# Patient Record
Sex: FEMALE | Race: WHITE | NOT HISPANIC OR LATINO | Employment: OTHER | URBAN - METROPOLITAN AREA
[De-identification: names, ages, dates, MRNs, and addresses within clinical notes are randomized per-mention and may not be internally consistent; named-entity substitution may affect disease eponyms.]

---

## 2017-03-03 RX ORDER — NITROFURANTOIN 25; 75 MG/1; MG/1
100 CAPSULE ORAL 2 TIMES DAILY
Qty: 14 CAPSULE | Refills: 0 | Status: SHIPPED | OUTPATIENT
Start: 2017-03-03 | End: 2017-03-10

## 2017-03-03 NOTE — TELEPHONE ENCOUNTER
Patient was advised to take macrobid 100 mg twice daily for seven days per [place] which will be sent in to the pharmacy per patient request patient verbalized understanding.

## 2017-03-20 ENCOUNTER — HOSPITAL ENCOUNTER (OUTPATIENT)
Dept: RADIOLOGY | Facility: HOSPITAL | Age: 57
Discharge: HOME | End: 2017-03-20
Attending: OBSTETRICS & GYNECOLOGY
Payer: MEDICARE

## 2017-03-20 ENCOUNTER — OFFICE VISIT (OUTPATIENT)
Dept: OBSTETRICS AND GYNECOLOGY | Facility: CLINIC | Age: 57
End: 2017-03-20
Attending: OBSTETRICS & GYNECOLOGY
Payer: MEDICARE

## 2017-03-20 VITALS
BODY MASS INDEX: 23.47 KG/M2 | SYSTOLIC BLOOD PRESSURE: 102 MMHG | DIASTOLIC BLOOD PRESSURE: 78 MMHG | WEIGHT: 124.31 LBS | HEIGHT: 61 IN

## 2017-03-20 DIAGNOSIS — N95.2 VAGINAL ATROPHY: ICD-10-CM

## 2017-03-20 DIAGNOSIS — Z12.31 VISIT FOR SCREENING MAMMOGRAM: Primary | ICD-10-CM

## 2017-03-20 DIAGNOSIS — Z01.419 ENCOUNTER FOR GYNECOLOGICAL EXAMINATION WITHOUT ABNORMAL FINDING: ICD-10-CM

## 2017-03-20 DIAGNOSIS — Z12.31 VISIT FOR SCREENING MAMMOGRAM: ICD-10-CM

## 2017-03-20 PROCEDURE — 77067 SCR MAMMO BI INCL CAD: CPT | Mod: 26,,, | Performed by: RADIOLOGY

## 2017-03-20 PROCEDURE — 99396 PREV VISIT EST AGE 40-64: CPT | Mod: S$GLB,,, | Performed by: OBSTETRICS & GYNECOLOGY

## 2017-03-20 PROCEDURE — 77063 BREAST TOMOSYNTHESIS BI: CPT | Mod: 26,,, | Performed by: RADIOLOGY

## 2017-03-20 PROCEDURE — 99999 PR PBB SHADOW E&M-EST. PATIENT-LVL III: CPT | Mod: PBBFAC,,, | Performed by: OBSTETRICS & GYNECOLOGY

## 2017-03-20 PROCEDURE — 77067 SCR MAMMO BI INCL CAD: CPT | Mod: TC

## 2017-03-20 PROCEDURE — 3074F SYST BP LT 130 MM HG: CPT | Mod: S$GLB,,, | Performed by: OBSTETRICS & GYNECOLOGY

## 2017-03-20 PROCEDURE — 3078F DIAST BP <80 MM HG: CPT | Mod: S$GLB,,, | Performed by: OBSTETRICS & GYNECOLOGY

## 2017-03-20 RX ORDER — BIMATOPROST 0.3 MG/ML
SOLUTION/ DROPS OPHTHALMIC
COMMUNITY
Start: 2017-03-07 | End: 2017-03-30

## 2017-03-20 NOTE — PROGRESS NOTES
SUBJECTIVE:   56 y.o. female   for annual routine checkup. Patient's last menstrual period was 04/15/2009..  She complains of vaginal dryness. .        Past Medical History:   Diagnosis Date    Allergy     Back pain     Disorder of vestibular function of both ears     Fibrocystic breast disease in female     HEARING LOSS     right side - no hearing aid    Hyperlipidemia     Meniere's disease     Osteopenia     PONV (postoperative nausea and vomiting)     PONV (postoperative nausea and vomiting)     Special screening for malignant neoplasms, colon 2013     Past Surgical History:   Procedure Laterality Date    ADENOIDECTOMY      APPENDECTOMY      BREAST SURGERY      left breast biopsy    CYST REMOVAL Right     neck    MANDIBLE FRACTURE SURGERY Right     scope    TONSILLECTOMY       Social History     Social History    Marital status: Single     Spouse name: N/A    Number of children: N/A    Years of education: N/A     Occupational History    Not on file.     Social History Main Topics    Smoking status: Never Smoker    Smokeless tobacco: Never Used    Alcohol use Yes      Comment: social    Drug use: No    Sexual activity: Not Currently     Birth control/ protection: None     Other Topics Concern    Not on file     Social History Narrative     Family History   Problem Relation Age of Onset    Heart disease Mother     Kidney disease Mother     Diabetes Mother     Hypertension Mother     Hyperlipidemia Mother     COPD Mother     Heart disease Father     Cancer Father      lymphoma    Breast cancer Neg Hx     Ovarian cancer Neg Hx      OB History    Para Term  AB SAB TAB Ectopic Multiple Living   0                        Current Outpatient Prescriptions   Medication Sig Dispense Refill    aspirin 81 MG Chew       b complex vitamins tablet Take 1 tablet by mouth once daily.      CETIRIZINE HCL (ZYRTEC ORAL) Take 1 tablet by mouth as needed.      cimetidine  (TAGAMET) 400 MG tablet Take 400 mg by mouth 2 (two) times daily.  5    conjugated estrogens (PREMARIN) vaginal cream Place 1 g vaginally twice a week. 1 applicator 3    diphenhydrAMINE (BENADRYL) 25 mg capsule       EPIPEN 2-PEDRO 0.3 mg/0.3 mL (1:1,000) AtIn   0    estradiol (ESTRACE) 1 MG tablet Take 1 tablet (1 mg total) by mouth once daily. 90 tablet 3    fexofenadine-pseudoephedrine (ALLEGRA-D 24) 180-240 mg per 24 hr tablet Take 1 tablet by mouth once daily.      FLAXSEED OIL ORAL Take 1 capsule by mouth once daily.      fluticasone (FLONASE) 50 mcg/actuation nasal spray UES 1 SPRAY IN EACH NOSTRIL DAILY 16 g 2    ibuprofen (ADVIL,MOTRIN) 800 MG tablet Take 1 tablet (800 mg total) by mouth 3 (three) times daily. For inflammation of neck disc. Take with food. 45 tablet 1    LATISSE 0.03 % ophthalmic solution       medroxyPROGESTERone (PROVERA) 2.5 MG tablet Take 1 tablet (2.5 mg total) by mouth once daily. 90 tablet 3    promethazine (PHENERGAN) 25 MG tablet Take 1 tablet (25 mg total) by mouth every 4 (four) hours. 30 tablet 2    RESTASIS 0.05 % ophthalmic emulsion       triamterene-hydrochlorothiazide 37.5-25 mg (DYAZIDE) 37.5-25 mg per capsule Take 1 capsule by mouth daily as needed. 30 capsule 5    bimatoprost (LUMIGAN) 0.03 % ophthalmic drops        No current facility-administered medications for this visit.      Allergies: Dexamethasone     ROS:  Constitutional: no weight loss, weight gain, fever, fatigue  Eyes:  No vision changes, glasses/contacts  ENT/Mouth: No ulcers, sinus problems, ears ringing, headache  Cardiovascular: No inability to lie flat, chest pain, exercise intolerance, swelling, heart palpitations  Respiratory: No wheezing, coughing blood, shortness of breath, or cough  Gastrointestinal: No diarrhea, bloody stool, nausea/vomiting, constipation, gas, hemorrhoids  Genitourinary: No blood in urine, painful urination, urgency of urination, frequency of urination, incomplete  emptying, incontinence, abnormal bleeding, painful periods, heavy periods, vaginal discharge, vaginal odor, painful intercourse, sexual problems, bleeding after intercourse, +vaginal dryness.  Musculoskeletal: No muscle weakness  Skin/Breast: No painful breasts, nipple discharge, masses, rash, ulcers  Neurological: No passing out, seizures, numbness, headache  Endocrine: No diabetes, hypothyroid, hyperthyroid, hot flashes, hair loss, abnormal hair growth, acne  Psychiatric: No depression, crying  Hematologic: No bruises, bleeding, swollen lymph nodes, anemia.      Physical Exam:   Constitutional: She is oriented to person, place, and time. She appears well-developed and well-nourished.      Neck: Normal range of motion. No tracheal deviation present. No thyromegaly present.    Cardiovascular: Exam reveals no edema.     Pulmonary/Chest: Effort normal. She exhibits no mass, no tenderness, no deformity and no retraction. Right breast exhibits no inverted nipple, no mass, no nipple discharge, no skin change, no tenderness, presence, no bleeding and no swelling. Left breast exhibits no inverted nipple, no mass, no nipple discharge, no skin change, no tenderness, presence, no bleeding and no swelling. Breasts are symmetrical.        Abdominal: Soft. She exhibits no distension and no mass. There is no tenderness. There is no rebound and no guarding. No hernia. Hernia confirmed negative in the left inguinal area.     Genitourinary: Vagina normal and uterus normal. Rectal exam shows no external hemorrhoid. There is no rash, tenderness or lesion on the right labia. There is no rash, tenderness or lesion on the left labia. Uterus is not deviated. Cervix is normal. No no adexnal prolapse. Right adnexum displays no mass, no tenderness and no fullness. Left adnexum displays no mass, no tenderness and no fullness. No tenderness, bleeding, rectocele, cystocele or unspecified prolapse of vaginal walls in the vagina. No vaginal  discharge found. Cervix exhibits no motion tenderness, no discharge and no friability.           Musculoskeletal: Normal range of motion and moves all extremeties. She exhibits no edema.      Lymphadenopathy:        Right: No inguinal adenopathy present.        Left: No inguinal adenopathy present.    Neurological: She is alert and oriented to person, place, and time.    Skin: No rash noted. No erythema. No pallor.    Psychiatric: She has a normal mood and affect. Her behavior is normal. Judgment and thought content normal.     +vaginal atrophy      ASSESSMENT:   well woman  Vaginal dryness  PLAN:   mammogram  return annually or prn

## 2017-03-20 NOTE — MR AVS SNAPSHOT
[facility] [address]  [address]  [place] [address]  Phone: [phone]  Fax: [phone]                  Raquel Kaye   3/20/2017 8:45 AM   Office Visit    Description:  Female : 1960   Provider:  Mckenna Mata MD   Department:  [facility] [address]           Reason for Visit     Well Woman           Diagnoses this Visit        Comments    Visit for screening mammogram    -  Primary            To Do List           Future Appointments        Provider Department Dept Phone    3/20/2017 8:45 AM Mckenna Mata MD [facility] [address] [phone]    3/27/2017 7:00 AM LAB, [facility] [phone]    3/27/2017 7:15 AM SPECIMEN, [facility] - Specimen Lab [phone]    3/30/2017 9:00 AM Salazar Bird MD [facility] - Internal Medicine [phone]      Goals (5 Years of Data)     None      [facility] On Call     [facility] On Call Nurse Care Line -  Assistance  Registered nurses in the [facility] On Call Center provide clinical advisement, health education, appointment booking, and other advisory services.  Call for this free service at [phone].             Medications           Message regarding Medications     Verify the changes and/or additions to your medication regime listed below are the same as discussed with your clinician today.  If any of these changes or additions are incorrect, please notify your healthcare provider.             Verify that the below list of medications is an accurate representation of the medications you are currently taking.  If none reported, the list may be blank. If incorrect, please contact your healthcare provider. Carry this list with you in case of emergency.           Current Medications     aspirin 81 MG Chew     b complex vitamins tablet Take 1 tablet by mouth once daily.    CETIRIZINE HCL (ZYRTEC ORAL) Take 1 tablet by mouth as needed.    cimetidine (TAGAMET) 400 MG tablet Take  "400 mg by mouth 2 (two) times daily.    conjugated estrogens (PREMARIN) vaginal cream Place 1 g vaginally twice a week.    diphenhydrAMINE (BENADRYL) 25 mg capsule     EPIPEN 2-PEDRO 0.3 mg/0.3 mL (1:1,000) AtIn     estradiol (ESTRACE) 1 MG tablet Take 1 tablet (1 mg total) by mouth once daily.    fexofenadine-pseudoephedrine (ALLEGRA-D 24) 180-240 mg per 24 hr tablet Take 1 tablet by mouth once daily.    FLAXSEED OIL ORAL Take 1 capsule by mouth once daily.    fluticasone (FLONASE) 50 mcg/actuation nasal spray UES 1 SPRAY IN EACH NOSTRIL DAILY    ibuprofen (ADVIL,MOTRIN) 800 MG tablet Take 1 tablet (800 mg total) by mouth 3 (three) times daily. For inflammation of neck disc. Take with food.    LATISSE 0.03 % ophthalmic solution     medroxyPROGESTERone (PROVERA) 2.5 MG tablet Take 1 tablet (2.5 mg total) by mouth once daily.    promethazine (PHENERGAN) 25 MG tablet Take 1 tablet (25 mg total) by mouth every 4 (four) hours.    RESTASIS 0.05 % ophthalmic emulsion     triamterene-hydrochlorothiazide 37.5-25 mg (DYAZIDE) 37.5-25 mg per capsule Take 1 capsule by mouth daily as needed.           Clinical Reference Information           Your Vitals Were     BP Height Weight Last Period BMI    102/78 5' 1" (1.549 m) 56.4 kg (124 lb 5.4 oz) 04/15/2009 23.49 kg/m2      Blood Pressure          Most Recent Value    BP  102/78      Allergies as of 3/20/2017     Dexamethasone      Immunizations Administered on Date of Encounter - 3/20/2017     None      Orders Placed During Today's Visit     Future Labs/Procedures Expected by Expires    Mammo Digital Screening Bilat with Tomosynthesis CAD  3/20/2017 5/20/2018      Language Assistance Services     ATTENTION: Language assistance services are available, free of charge. Please call [phone].      ATENCIÓN: Si habla radhaañol, tiene a mai disposición servicios gratuitos de asistencia lingüística. Llame al [phone].     [facility] Ý: N?u b?n nói Ti?ng Vi?t, có các d?ch v? h? tr? ngôn " ng? mi?n phí dành cho b?n. G?i s? [phone].         [facility] [address] complies with applicable Federal civil rights laws and does not discriminate on the basis of race, color, national origin, age, disability, or sex.

## 2017-03-27 ENCOUNTER — LAB VISIT (OUTPATIENT)
Dept: LAB | Facility: HOSPITAL | Age: 57
End: 2017-03-27
Attending: INTERNAL MEDICINE
Payer: MEDICARE

## 2017-03-27 DIAGNOSIS — E78.5 HYPERLIPIDEMIA, UNSPECIFIED: ICD-10-CM

## 2017-03-27 DIAGNOSIS — Z11.59 NEED FOR HEPATITIS C SCREENING TEST: ICD-10-CM

## 2017-03-27 DIAGNOSIS — N95.1 POST MENOPAUSAL SYNDROME: ICD-10-CM

## 2017-03-27 LAB
ALBUMIN SERPL BCP-MCNC: 3.9 G/DL
ALP SERPL-CCNC: 65 U/L
ALT SERPL W/O P-5'-P-CCNC: 19 U/L
ANION GAP SERPL CALC-SCNC: 8 MMOL/L
AST SERPL-CCNC: 20 U/L
BASOPHILS # BLD AUTO: 0.03 K/UL
BASOPHILS NFR BLD: 0.4 %
BILIRUB SERPL-MCNC: 0.8 MG/DL
BUN SERPL-MCNC: 12 MG/DL
CALCIUM SERPL-MCNC: 9 MG/DL
CHLORIDE SERPL-SCNC: 107 MMOL/L
CHOLEST/HDLC SERPL: 2.8 {RATIO}
CO2 SERPL-SCNC: 25 MMOL/L
CREAT SERPL-MCNC: 0.7 MG/DL
DIFFERENTIAL METHOD: ABNORMAL
EOSINOPHIL # BLD AUTO: 0.3 K/UL
EOSINOPHIL NFR BLD: 4 %
ERYTHROCYTE [DISTWIDTH] IN BLOOD BY AUTOMATED COUNT: 12.5 %
EST. GFR  (AFRICAN AMERICAN): >60 ML/MIN/1.73 M^2
EST. GFR  (NON AFRICAN AMERICAN): >60 ML/MIN/1.73 M^2
GLUCOSE SERPL-MCNC: 92 MG/DL
HCT VFR BLD AUTO: 40.9 %
HCV AB SERPL QL IA: NEGATIVE
HDL/CHOLESTEROL RATIO: 35.9 %
HDLC SERPL-MCNC: 181 MG/DL
HDLC SERPL-MCNC: 65 MG/DL
HGB BLD-MCNC: 13.7 G/DL
LDLC SERPL CALC-MCNC: 103.4 MG/DL
LYMPHOCYTES # BLD AUTO: 2.9 K/UL
LYMPHOCYTES NFR BLD: 42.3 %
MCH RBC QN AUTO: 33 PG
MCHC RBC AUTO-ENTMCNC: 33.5 %
MCV RBC AUTO: 99 FL
MONOCYTES # BLD AUTO: 0.9 K/UL
MONOCYTES NFR BLD: 12.7 %
NEUTROPHILS # BLD AUTO: 2.7 K/UL
NEUTROPHILS NFR BLD: 40.5 %
NONHDLC SERPL-MCNC: 116 MG/DL
PLATELET # BLD AUTO: 258 K/UL
PMV BLD AUTO: 10.7 FL
POTASSIUM SERPL-SCNC: 4.1 MMOL/L
PROT SERPL-MCNC: 7.3 G/DL
RBC # BLD AUTO: 4.15 M/UL
SODIUM SERPL-SCNC: 140 MMOL/L
TRIGL SERPL-MCNC: 63 MG/DL
TSH SERPL DL<=0.005 MIU/L-ACNC: 1.44 UIU/ML
WBC # BLD AUTO: 6.78 K/UL

## 2017-03-27 PROCEDURE — 85025 COMPLETE CBC W/AUTO DIFF WBC: CPT

## 2017-03-27 PROCEDURE — 84443 ASSAY THYROID STIM HORMONE: CPT

## 2017-03-27 PROCEDURE — 36415 COLL VENOUS BLD VENIPUNCTURE: CPT | Mod: PO

## 2017-03-27 PROCEDURE — 80061 LIPID PANEL: CPT

## 2017-03-27 PROCEDURE — 86803 HEPATITIS C AB TEST: CPT

## 2017-03-27 PROCEDURE — 80053 COMPREHEN METABOLIC PANEL: CPT

## 2017-03-29 ENCOUNTER — PATIENT MESSAGE (OUTPATIENT)
Dept: OBSTETRICS AND GYNECOLOGY | Facility: CLINIC | Age: 57
End: 2017-03-29

## 2017-03-30 ENCOUNTER — HOSPITAL ENCOUNTER (OUTPATIENT)
Dept: RADIOLOGY | Facility: HOSPITAL | Age: 57
Discharge: HOME | End: 2017-03-30
Attending: INTERNAL MEDICINE
Payer: MEDICARE

## 2017-03-30 ENCOUNTER — OFFICE VISIT (OUTPATIENT)
Dept: INTERNAL MEDICINE | Facility: CLINIC | Age: 57
End: 2017-03-30
Payer: MEDICARE

## 2017-03-30 VITALS
TEMPERATURE: 98 F | HEIGHT: 61 IN | DIASTOLIC BLOOD PRESSURE: 84 MMHG | RESPIRATION RATE: 16 BRPM | BODY MASS INDEX: 22.51 KG/M2 | SYSTOLIC BLOOD PRESSURE: 110 MMHG | HEART RATE: 88 BPM | WEIGHT: 119.25 LBS

## 2017-03-30 DIAGNOSIS — Z00.00 WELL ADULT EXAM: Primary | ICD-10-CM

## 2017-03-30 DIAGNOSIS — M54.50 CHRONIC LEFT-SIDED LOW BACK PAIN WITHOUT SCIATICA: ICD-10-CM

## 2017-03-30 DIAGNOSIS — R89.9 ABNORMAL LABORATORY TEST: ICD-10-CM

## 2017-03-30 DIAGNOSIS — G89.29 CHRONIC LEFT-SIDED LOW BACK PAIN WITHOUT SCIATICA: ICD-10-CM

## 2017-03-30 DIAGNOSIS — D51.9 ANEMIA DUE TO VITAMIN B12 DEFICIENCY, UNSPECIFIED B12 DEFICIENCY TYPE: ICD-10-CM

## 2017-03-30 DIAGNOSIS — J30.9 CHRONIC ALLERGIC RHINITIS: ICD-10-CM

## 2017-03-30 PROCEDURE — 99999 PR PBB SHADOW E&M-EST. PATIENT-LVL IV: CPT | Mod: PBBFAC,,, | Performed by: INTERNAL MEDICINE

## 2017-03-30 PROCEDURE — 3074F SYST BP LT 130 MM HG: CPT | Mod: S$GLB,,, | Performed by: INTERNAL MEDICINE

## 2017-03-30 PROCEDURE — 72100 X-RAY EXAM L-S SPINE 2/3 VWS: CPT | Mod: TC,PO

## 2017-03-30 PROCEDURE — 72100 X-RAY EXAM L-S SPINE 2/3 VWS: CPT | Mod: 26,,, | Performed by: RADIOLOGY

## 2017-03-30 PROCEDURE — 3079F DIAST BP 80-89 MM HG: CPT | Mod: S$GLB,,, | Performed by: INTERNAL MEDICINE

## 2017-03-30 PROCEDURE — 99396 PREV VISIT EST AGE 40-64: CPT | Mod: S$GLB,,, | Performed by: INTERNAL MEDICINE

## 2017-03-30 RX ORDER — FLUCONAZOLE 150 MG/1
150 TABLET ORAL ONCE
Qty: 1 TABLET | Refills: 0 | Status: SHIPPED | OUTPATIENT
Start: 2017-03-30 | End: 2017-03-30

## 2017-03-30 RX ORDER — TRIAMTERENE AND HYDROCHLOROTHIAZIDE 37.5; 25 MG/1; MG/1
1 CAPSULE ORAL DAILY PRN
Qty: 30 CAPSULE | Refills: 5 | Status: SHIPPED | OUTPATIENT
Start: 2017-03-30 | End: 2019-05-09 | Stop reason: SDUPTHER

## 2017-03-30 NOTE — PROGRESS NOTES
Subjective:       Patient ID: Raquel Kaye is a 56 y.o. female.    Chief Complaint: Annual Exam    HPI   The patient presents for annual physical examination.  Recent symptoms of upper respiratory infection and rhinitis have been improving.  She is completing Augmentin therapy.  She has noted intermittent symptoms of vertigo.  Symptoms have increased recently apparently related to her recent upper respiratory infection and ALLERGY flareup.  She uses Dyazide intermittently.  Left lumbar pain has been improving since 11/16.  Review of Systems   Constitutional: Positive for fatigue. Negative for fever and unexpected weight change.   HENT: Positive for congestion and postnasal drip. Negative for rhinorrhea and sore throat.    Eyes: Negative for visual disturbance.   Respiratory: Positive for cough. Negative for chest tightness, shortness of breath and wheezing.    Cardiovascular: Negative for chest pain, palpitations and leg swelling.   Gastrointestinal: Positive for diarrhea. Negative for abdominal pain and blood in stool.   Genitourinary: Positive for vaginal discharge (symptoms of yeast infection have been reported following recent antibiotic use.). Negative for dysuria, frequency and hematuria.   Musculoskeletal: Positive for arthralgias and back pain. Negative for joint swelling and myalgias.   Skin: Negative for rash.   Neurological: Positive for dizziness. Negative for syncope, weakness, numbness and headaches.   Psychiatric/Behavioral: Negative for sleep disturbance. The patient is not nervous/anxious.        Objective:      Physical Exam   Constitutional: She is oriented to person, place, and time. Vital signs are normal. She appears well-developed and well-nourished. No distress.   HENT:   Head: Normocephalic and atraumatic.   Right Ear: External ear normal.   Left Ear: External ear normal.   Nose: Nose normal.   Mouth/Throat: Oropharynx is clear and moist. No oropharyngeal exudate.   Sinuses are nontender  to palpation.   Eyes: Conjunctivae and EOM are normal. Pupils are equal, round, and reactive to light. No scleral icterus.   Neck: Normal range of motion. Neck supple. No JVD present. Carotid bruit is not present. No thyromegaly present.   Cardiovascular: Normal rate, regular rhythm, normal heart sounds, intact distal pulses and normal pulses.  Exam reveals no gallop and no friction rub.    No murmur heard.  Pulmonary/Chest: Effort normal and breath sounds normal. No respiratory distress. She has no wheezes. She has no rales.   Abdominal: Soft. Bowel sounds are normal. She exhibits no abdominal bruit and no mass. There is no splenomegaly or hepatomegaly. There is no tenderness. No hernia.   Musculoskeletal: Normal range of motion. She exhibits no edema or tenderness.        Right shoulder: She exhibits no effusion and no deformity.   Negative straight leg raising test bilaterally.  Left SI joint tenderness on palpation.     Lymphadenopathy:     She has no cervical adenopathy.     She has no axillary adenopathy.        Right: No supraclavicular adenopathy present.        Left: No supraclavicular adenopathy present.   Neurological: She is alert and oriented to person, place, and time. She has normal strength. No cranial nerve deficit.   Lower extremity strength is 5/5 bilaterally.  Gait is normal.   Skin: Skin is warm and dry. No rash noted.   Psychiatric: She has a normal mood and affect. Her speech is normal and behavior is normal.   Nursing note and vitals reviewed.      Results for orders placed or performed in visit on 03/27/17   Urinalysis   Result Value Ref Range    Specimen UA Urine, Clean Catch     Color, UA Yellow Yellow, Straw, Genie    Appearance, UA Hazy (A) Clear    pH, UA 6.0 5.0 - 8.0    Specific Gravity, UA 1.025 1.005 - 1.030    Protein, UA Negative Negative    Glucose, UA Negative Negative    Ketones, UA Negative Negative    Bilirubin (UA) Negative Negative    Occult Blood UA Negative Negative     Nitrite, UA Negative Negative    Urobilinogen, UA Negative <2.0 EU/dL    Leukocytes, UA Negative Negative   Urinalysis Microscopic   Result Value Ref Range    RBC, UA 2 0 - 4 /hpf    WBC, UA 1 0 - 5 /hpf    Bacteria, UA Rare None-Occ /hpf    Squam Epithel, UA 7 /hpf    Microscopic Comment SEE COMMENT      Other labs were reviewed with the patient.  Assessment:       1. Well adult exam    2. Chronic allergic rhinitis    3. Chronic left-sided low back pain without sciatica    4. Abnormal laboratory test    5. Anemia due to vitamin B12 deficiency, unspecified B12 deficiency type         Plan:       Raquel was seen today for annual exam.  Dyazide will be renewed alleviated vertical symptoms.  X-rays of lumbar spine will be obtained.  Physical therapy consultation will be obtained for evaluation and treatment of patient's lower back pain.  Diflucan will be ordered for symptoms of yeast infection.  Patient is to return to clinic in 6 months.    Diagnoses and all orders for this visit:    Well adult exam    Chronic allergic rhinitis    Chronic left-sided low back pain without sciatica  -     X-Ray Lumbar Spine Ap And Lateral; Future  -     Ambulatory consult to Physical Therapy    Abnormal laboratory test  -     Vitamin B12; Future    Anemia due to vitamin B12 deficiency, unspecified B12 deficiency type   -     Vitamin B12; Future    Other orders  -     triamterene-hydrochlorothiazide 37.5-25 mg (DYAZIDE) 37.5-25 mg per capsule; Take 1 capsule by mouth daily as needed.  -     fluconazole (DIFLUCAN) 150 MG Tab; Take 1 tablet (150 mg total) by mouth once.

## 2017-03-31 DIAGNOSIS — N95.1 MENOPAUSAL SYMPTOMS: Primary | ICD-10-CM

## 2017-03-31 RX ORDER — ESTRADIOL 1 MG/1
1 TABLET ORAL DAILY
Qty: 90 TABLET | Refills: 3 | Status: SHIPPED | OUTPATIENT
Start: 2017-03-31 | End: 2018-04-19 | Stop reason: SDUPTHER

## 2017-03-31 RX ORDER — MEDROXYPROGESTERONE ACETATE 2.5 MG/1
2.5 TABLET ORAL DAILY
Qty: 90 TABLET | Refills: 3 | Status: SHIPPED | OUTPATIENT
Start: 2017-03-31 | End: 2018-04-19

## 2017-04-12 ENCOUNTER — CLINICAL SUPPORT (OUTPATIENT)
Dept: REHABILITATION | Facility: HOSPITAL | Age: 57
End: 2017-04-12
Attending: INTERNAL MEDICINE
Payer: MEDICARE

## 2017-04-12 DIAGNOSIS — M54.50 CHRONIC LEFT-SIDED LOW BACK PAIN WITHOUT SCIATICA: Primary | ICD-10-CM

## 2017-04-12 DIAGNOSIS — G89.29 CHRONIC LEFT-SIDED LOW BACK PAIN WITHOUT SCIATICA: Primary | ICD-10-CM

## 2017-04-12 PROCEDURE — G8982 BODY POS GOAL STATUS: HCPCS | Mod: CJ

## 2017-04-12 PROCEDURE — 97161 PT EVAL LOW COMPLEX 20 MIN: CPT

## 2017-04-12 PROCEDURE — G8981 BODY POS CURRENT STATUS: HCPCS | Mod: CK

## 2017-04-12 NOTE — PROGRESS NOTES
Name:Raquel Kaye  Physician:Salazar Bird MD  Date of eval:4/12/2017  Orders:  Physical Therapy evaluate and treat  Clinic: [id number]  Diagnosis:  1. Chronic left-sided low back pain without sciatica         Visit 1 of 20. Expiration 12/31/17    SUBJECTIVE: states she wakes up with back stiffness    Onset of pain :  11/16  Mechanism of onset :  insidious    Chief complaint: Patient is a 55 yo WF referred to OP Pt secondary left sided low back pain.    Radicular symptoms: occasionally in B LEs  Bowel and Bladder incontinence: none    Aggravating factors: prolonged car rides, waking in the morning, turning over in bed, and squatting  Easing factors: ice, heat, pain medicine    Sleep is not disturbed. Sleeping position: side    Previous functional status includes: I with amb and ADL  Current functional status :  I with amb and ADL    Patients structured exercise routine:  Treadmill, bike, stretching, and core strengthening  Exercise routine prior to onset: treadmill, bike, core strengthening.    Allergies:    Review of patient's allergies indicates:   Allergen Reactions    Dexamethasone      Flushing and headache with medrol dosepack & tachycardia         Medical history:   Past Medical History:   Diagnosis Date    Allergy     Back pain     Disorder of vestibular function of both ears     Fibrocystic breast disease in female     HEARING LOSS     right side - no hearing aid    Hyperlipidemia     Meniere's disease     Osteopenia     PONV (postoperative nausea and vomiting)     PONV (postoperative nausea and vomiting)     Special screening for malignant neoplasms, colon 7/22/2013         Medication:   Current Outpatient Prescriptions on File Prior to Visit   Medication Sig Dispense Refill    aspirin 81 MG Chew       b complex vitamins tablet Take 1 tablet by mouth once daily.      CETIRIZINE HCL (ZYRTEC ORAL) Take 1 tablet by mouth as needed.      cimetidine (TAGAMET) 400 MG tablet Take 400 mg by  mouth 2 (two) times daily.  5    conjugated estrogens (PREMARIN) vaginal cream Place 1 g vaginally twice a week. 1 applicator 3    diphenhydrAMINE (BENADRYL) 25 mg capsule       EPIPEN 2-PEDRO 0.3 mg/0.3 mL (1:1,000) AtIn   0    estradiol (ESTRACE) 1 MG tablet Take 1 tablet (1 mg total) by mouth once daily. 90 tablet 3    fexofenadine-pseudoephedrine (ALLEGRA-D 24) 180-240 mg per 24 hr tablet Take 1 tablet by mouth once daily.      FLAXSEED OIL ORAL Take 1 capsule by mouth once daily.      fluticasone (FLONASE) 50 mcg/actuation nasal spray UES 1 SPRAY IN EACH NOSTRIL DAILY 16 g 2    ibuprofen (ADVIL,MOTRIN) 800 MG tablet Take 1 tablet (800 mg total) by mouth 3 (three) times daily. For inflammation of neck disc. Take with food. 45 tablet 1    LATISSE 0.03 % ophthalmic solution       medroxyPROGESTERone (PROVERA) 2.5 MG tablet Take 1 tablet (2.5 mg total) by mouth once daily. 90 tablet 3    promethazine (PHENERGAN) 25 MG tablet Take 1 tablet (25 mg total) by mouth every 4 (four) hours. 30 tablet 2    RESTASIS 0.05 % ophthalmic emulsion       triamterene-hydrochlorothiazide 37.5-25 mg (DYAZIDE) 37.5-25 mg per capsule Take 1 capsule by mouth daily as needed. 30 capsule 5     No current facility-administered medications on file prior to visit.          Special tests:   MRI:            Xray:  3/30/17   Mild DJD.  Mild lumbar scoliosis.  The L3/L4 disc space is slightly narrowed.  No fracture, spondylolisthesis or bone destruction identified              Past treatment includes:   OP PT    Work:   retired                       Pts goals: to learn exercises to help with her back pain.  Pain level with 0 being the lowest and 10 being the highest presently: 3  Pain level with 0 being the lowest and 10 being the highest at worst:  8 or 9    OBJECTIVE: 57 yo WF.  Postural examination in standing:  - increased lumbar lordosis  - forward head  - forward shoulders  - R anterior rotated pelvis, L posterior rotated  pelvis    Postural examination in sitting:   - normal lumbar lordosis  - forward head  - forward shoulders      Functional assessment:   - walking:  - sit to stand  - sit to supine:        - supine to sit  - supine to prone:     Lumbar active range of motion in standing is:  Flexion 100 deg   Extension 20 deg   Left side bending 15 deg   Right side bending 30 deg   Left rotation WNL   Right rotation decreased 25%       Flexibility testing:  - hamstrings:               B: WNL  - gastrocnemius:         B: WNL  - piriformis:                  B: WNL   - quadriceps:              B: WNL      - hip flexors:               B: tight, decreased 25%  - hip adductors:          B: WNL  - IT Bands:                 B: WNL     MMT R L   Hip flexion 5/5 5/5   Hip abduction 5/5 3+/5   Hip extension 3+/5 3+/5   Hip ER 5/5 5/5   Hip IR 5/5 5/5   Knee extension 5/5 5/5   Knee flexion 5/5 5/5   Ankle dorsiflexion 5/5 5/5   Ankle plantar flexion 5/5 5/5       Endurance is good.    Lumbar Special tests:  - SLR:                             negative  - Cross SLR:                  negative  - TARA:                        Decreased B hip flexibility  - Prone Instability Test:   Positive for pain reduction    Sensation:  - Light Touch: intact B  - Saddle:    Reflexes:   - Knee Jerk: normal bilaterally    Other:   Functional Limitations  Reports - G Codes    Category:  Changing and Maintaining Body position   Tool:  FOTO Outcomes Measurement System.   Score:  Patient scored out 54 of 100 on the FOTO Outcomes Measurement System.   Current:   CK at least 40% < 60% impaired, limited or restricted   Goal:   CJ at least 20% < 40% impaired, limited or restricted       Patient History Examination Clinical Presentation Clinical Decision Making   Comorbidities:  none    Personal Factors:  none       Activity and Participation Restriction:  Affects exercise performance and participation    Body Systems:  Musculoskeletal: strength    Body  "Regions: back Stable and uncomplicated   Low       FOTO: 40% < 60% impaired, limited or restricted           TREATMENT: evaluation, low complexity    Pt was provided with a written copy of  HEP to perform as tolerated,  Including: HEP2Go Code: [id number]  Push/pull to correct R anterior rotated pelvis/L posterior rotated pelvis  Bridging  Resisted hip add  TA bracing    Exercises were reviewed and pt was able to demonstrate them prior to the end of the session.     No cultural, environmental, or spiritual barriers identified to treatment or learning.    Treatment today also included:  Push/pull to correct R anterior rotated pelvis/L posterior rotated pelvis 3 x 3"  Bridging 3 reps  Resisted hip add x 5"    ASSESSMENT:  PT diagnosis: low back pain, decreased L LE strength, weak core,   Patient can benefit from outpatient physical therapy and a home program.  Treatment will be directed at improving the following impairments.  Prognosis is good. Patient presenting with good flexibility and strength. PT will focus on core stabilization and strengthening with successive session. Pt experienced decreased low back pain following push/pull sequence.    Medical necessity is demonstrated by the following  IMPAIRMENTS:  - poor posture  - pain  - decreased flexibility  - decreased muscle strength  - impaired function    GOALS: 6-8 weeks (6/12/17). Pt agrees with goals set.  1. Independent with HEP.  2. Report decreased lumbar pain < or =  4/10 with adls such as prolonged car rides, waking in the morning, turning over in bed, and squatting.   3. Increased MMT for B LE by 1 muscle grade to promote proper pelvic stability to decrease lumbar pain < or =  4/10 with adls such as prolonged car rides, waking in the morning, turning over in bed, and squatting.     4. Increased flexibility in B hip flexors to promote proper pelvic stability to decrease lumbar pain < or =  4/10 with adls such as prolonged car rides, waking in the morning, " turning over in bed, and squatting.   5. Patient to achieve CJ (at least 20% < 40% impaired, limited or restricted) level on the FOTO Outcomes Measurement System.    PLAN:  Outpatient physical therapy  2 times weekly to include: pt ed, hep, therapeutic exercises, joint mobilizations, and modalities prn. Pt may be seen by PTA to carry out plan of care.      Cont PT for 6-8 weeks.     I certify the need for these services furnished under this plan of treatment and while under my care.    ____________________________________ Physician/Referring Practitioner                                Date of Signature

## 2017-04-12 NOTE — PLAN OF CARE
Name:Raquel Kaye  Physician:Salazar Bird MD  Date of eval:4/12/2017  Orders:  Physical Therapy evaluate and treat  Clinic: [id number]  Diagnosis:  1. Chronic left-sided low back pain without sciatica         Visit 1 of 20. Expiration 12/31/17    SUBJECTIVE: states she wakes up with back stiffness    Onset of pain :  11/16  Mechanism of onset :  insidious    Chief complaint: Patient is a 55 yo WF referred to OP Pt secondary left sided low back pain.    Radicular symptoms: occasionally in B LEs  Bowel and Bladder incontinence: none    Aggravating factors: prolonged car rides, waking in the morning, turning over in bed, and squatting  Easing factors: ice, heat, pain medicine    Sleep is not disturbed. Sleeping position: side    Previous functional status includes: I with amb and ADL  Current functional status :  I with amb and ADL    Patients structured exercise routine:  Treadmill, bike, stretching, and core strengthening  Exercise routine prior to onset: treadmill, bike, core strengthening.    Allergies:    Review of patient's allergies indicates:   Allergen Reactions    Dexamethasone      Flushing and headache with medrol dosepack & tachycardia         Medical history:   Past Medical History:   Diagnosis Date    Allergy     Back pain     Disorder of vestibular function of both ears     Fibrocystic breast disease in female     HEARING LOSS     right side - no hearing aid    Hyperlipidemia     Meniere's disease     Osteopenia     PONV (postoperative nausea and vomiting)     PONV (postoperative nausea and vomiting)     Special screening for malignant neoplasms, colon 7/22/2013         Medication:   Current Outpatient Prescriptions on File Prior to Visit   Medication Sig Dispense Refill    aspirin 81 MG Chew       b complex vitamins tablet Take 1 tablet by mouth once daily.      CETIRIZINE HCL (ZYRTEC ORAL) Take 1 tablet by mouth as needed.      cimetidine (TAGAMET) 400 MG tablet Take 400 mg by  mouth 2 (two) times daily.  5    conjugated estrogens (PREMARIN) vaginal cream Place 1 g vaginally twice a week. 1 applicator 3    diphenhydrAMINE (BENADRYL) 25 mg capsule       EPIPEN 2-PEDRO 0.3 mg/0.3 mL (1:1,000) AtIn   0    estradiol (ESTRACE) 1 MG tablet Take 1 tablet (1 mg total) by mouth once daily. 90 tablet 3    fexofenadine-pseudoephedrine (ALLEGRA-D 24) 180-240 mg per 24 hr tablet Take 1 tablet by mouth once daily.      FLAXSEED OIL ORAL Take 1 capsule by mouth once daily.      fluticasone (FLONASE) 50 mcg/actuation nasal spray UES 1 SPRAY IN EACH NOSTRIL DAILY 16 g 2    ibuprofen (ADVIL,MOTRIN) 800 MG tablet Take 1 tablet (800 mg total) by mouth 3 (three) times daily. For inflammation of neck disc. Take with food. 45 tablet 1    LATISSE 0.03 % ophthalmic solution       medroxyPROGESTERone (PROVERA) 2.5 MG tablet Take 1 tablet (2.5 mg total) by mouth once daily. 90 tablet 3    promethazine (PHENERGAN) 25 MG tablet Take 1 tablet (25 mg total) by mouth every 4 (four) hours. 30 tablet 2    RESTASIS 0.05 % ophthalmic emulsion       triamterene-hydrochlorothiazide 37.5-25 mg (DYAZIDE) 37.5-25 mg per capsule Take 1 capsule by mouth daily as needed. 30 capsule 5     No current facility-administered medications on file prior to visit.          Special tests:   MRI:            Xray:  3/30/17   Mild DJD.  Mild lumbar scoliosis.  The L3/L4 disc space is slightly narrowed.  No fracture, spondylolisthesis or bone destruction identified              Past treatment includes:   OP PT    Work:   retired                       Pts goals: to learn exercises to help with her back pain.  Pain level with 0 being the lowest and 10 being the highest presently: 3  Pain level with 0 being the lowest and 10 being the highest at worst:  8 or 9    OBJECTIVE: 55 yo WF.  Postural examination in standing:  - increased lumbar lordosis  - forward head  - forward shoulders  - R anterior rotated pelvis, L posterior rotated  pelvis    Postural examination in sitting:   - normal lumbar lordosis  - forward head  - forward shoulders      Functional assessment:   - walking:  - sit to stand  - sit to supine:        - supine to sit  - supine to prone:     Lumbar active range of motion in standing is:  Flexion 100 deg   Extension 20 deg   Left side bending 15 deg   Right side bending 30 deg   Left rotation WNL   Right rotation decreased 25%       Flexibility testing:  - hamstrings:               B: WNL  - gastrocnemius:         B: WNL  - piriformis:                  B: WNL   - quadriceps:              B: WNL      - hip flexors:               B: tight, decreased 25%  - hip adductors:          B: WNL  - IT Bands:                 B: WNL     MMT R L   Hip flexion 5/5 5/5   Hip abduction 5/5 3+/5   Hip extension 3+/5 3+/5   Hip ER 5/5 5/5   Hip IR 5/5 5/5   Knee extension 5/5 5/5   Knee flexion 5/5 5/5   Ankle dorsiflexion 5/5 5/5   Ankle plantar flexion 5/5 5/5       Endurance is good.    Lumbar Special tests:  - SLR:                             negative  - Cross SLR:                  negative  - TARA:                        Decreased B hip flexibility  - Prone Instability Test:   Positive for pain reduction    Sensation:  - Light Touch: intact B  - Saddle:    Reflexes:   - Knee Jerk: normal bilaterally    Other:   Functional Limitations  Reports - G Codes    Category:  Changing and Maintaining Body position   Tool:  FOTO Outcomes Measurement System.   Score:  Patient scored out 54 of 100 on the FOTO Outcomes Measurement System.   Current:   CK at least 40% < 60% impaired, limited or restricted   Goal:   CJ at least 20% < 40% impaired, limited or restricted       Patient History Examination Clinical Presentation Clinical Decision Making   Comorbidities:  none    Personal Factors:  none       Activity and Participation Restriction:  Affects exercise performance and participation    Body Systems:  Musculoskeletal: strength    Body  "Regions: back Stable and uncomplicated   Low       FOTO: 40% < 60% impaired, limited or restricted           TREATMENT: evaluation, low complexity    Pt was provided with a written copy of  HEP to perform as tolerated,  Including: HEP2Go Code: [id number]  Push/pull to correct R anterior rotated pelvis/L posterior rotated pelvis  Bridging  Resisted hip add  TA bracing    Exercises were reviewed and pt was able to demonstrate them prior to the end of the session.     No cultural, environmental, or spiritual barriers identified to treatment or learning.    Treatment today also included:  Push/pull to correct R anterior rotated pelvis/L posterior rotated pelvis 3 x 3"  Bridging 3 reps  Resisted hip add x 5"    ASSESSMENT:  PT diagnosis: low back pain, decreased L LE strength, weak core,   Patient can benefit from outpatient physical therapy and a home program.  Treatment will be directed at improving the following impairments.  Prognosis is good. Patient presenting with good flexibility and strength. PT will focus on core stabilization and strengthening with successive session. Pt experienced decreased low back pain following push/pull sequence.    Medical necessity is demonstrated by the following  IMPAIRMENTS:  - poor posture  - pain  - decreased flexibility  - decreased muscle strength  - impaired function    GOALS: 6-8 weeks (6/12/17). Pt agrees with goals set.  1. Independent with HEP.  2. Report decreased lumbar pain < or =  4/10 with adls such as prolonged car rides, waking in the morning, turning over in bed, and squatting.   3. Increased MMT for B LE by 1 muscle grade to promote proper pelvic stability to decrease lumbar pain < or =  4/10 with adls such as prolonged car rides, waking in the morning, turning over in bed, and squatting.     4. Increased flexibility in B hip flexors to promote proper pelvic stability to decrease lumbar pain < or =  4/10 with adls such as prolonged car rides, waking in the morning, " turning over in bed, and squatting.   5. Patient to achieve CJ (at least 20% < 40% impaired, limited or restricted) level on the FOTO Outcomes Measurement System.    PLAN:  Outpatient physical therapy  2 times weekly to include: pt ed, hep, therapeutic exercises, joint mobilizations, and modalities prn. Pt may be seen by PTA to carry out plan of care.      Cont PT for 6-8 weeks.     I certify the need for these services furnished under this plan of treatment and while under my care.    ____________________________________ Physician/Referring Practitioner                                Date of Signature

## 2017-04-17 ENCOUNTER — CLINICAL SUPPORT (OUTPATIENT)
Dept: REHABILITATION | Facility: HOSPITAL | Age: 57
End: 2017-04-17
Attending: INTERNAL MEDICINE
Payer: MEDICARE

## 2017-04-17 DIAGNOSIS — G89.29 CHRONIC LEFT-SIDED LOW BACK PAIN WITHOUT SCIATICA: ICD-10-CM

## 2017-04-17 DIAGNOSIS — M54.50 CHRONIC LEFT-SIDED LOW BACK PAIN WITHOUT SCIATICA: ICD-10-CM

## 2017-04-17 PROCEDURE — 97110 THERAPEUTIC EXERCISES: CPT

## 2017-04-17 NOTE — PROGRESS NOTES
"Name: Raquel Kaye  Clinic Number: [id number]  Date of Treatment: 4/17/2017  Diagnosis:     ICD-10-CM ICD-9-CM    1. Chronic left-sided low back pain without sciatica M54.5 724.2     G89.29 338.29        Subjective:  Raquel Kaye reports improvement of symptoms.  Patient reports her low back pain to be 3 or 4/10 on a 0-10 scale with 0 being no pain and 10 being the worst pain imaginable.    Objective:  Patient was educated and performed therapy to increase strength, posture and core stabilization with activities as follows:     Raquel Kaye was educated and performed therapeutic exercises to develop strength, posture and core stabilization for 60 total minutes including:     One on one ther ex x 30 minutes    DATE 4/17/17   VISIT 2   G CODE 2/10   Push/pull  Bridging  Resisted hip add 2 x 3 x 3"  2 x 3 reps  2 x 5"   TA bracing 2 x 10 x 10"   TA Bracing with knee fallout 10 x 5"   planks Modified 3 x 20"   Side planks Modified 3 x 20"   Quadruped UE/LE lifts 10 x 5"   bridging 20 x 5"           INITIALS CDW, PT       Written Home Exercises Provided: HEP2Go Code: [id number]  TA bracing with knee fall outs  Modified plank  modified lateral plank  Bird dogs    Pt demo good understanding of the education provided. Raquel Kaye demonstrated good return demonstration of activities.     Assessment:   Added core stabilization exercises today to progress to below listed goals.    Pt will continue to benefit from skilled PT intervention. Medical Necessity is demonstrated by:  Requires skilled supervision to complete and progress HEP and Weakness.    Patient is making good progress towards established goals.    New/Revised goals: continue with current goals  GOALS: 6-8 weeks (6/12/17). Pt agrees with goals set.  1. Independent with HEP.  2. Report decreased lumbar pain < or = 4/10 with adls such as prolonged car rides, waking in the morning, turning over in bed, and squatting.   3. Increased MMT for B LE by 1 muscle " grade to promote proper pelvic stability to decrease lumbar pain < or = 4/10 with adls such as prolonged car rides, waking in the morning, turning over in bed, and squatting.    4. Increased flexibility in B hip flexors to promote proper pelvic stability to decrease lumbar pain < or = 4/10 with adls such as prolonged car rides, waking in the morning, turning over in bed, and squatting.   5. Patient to achieve CJ (at least 20% < 40% impaired, limited or restricted) level on the FOTO Outcomes Measurement System.    Plan:  Continue with established Plan of Care towards PT goals.

## 2017-04-19 ENCOUNTER — CLINICAL SUPPORT (OUTPATIENT)
Dept: REHABILITATION | Facility: HOSPITAL | Age: 57
End: 2017-04-19
Attending: INTERNAL MEDICINE
Payer: MEDICARE

## 2017-04-19 DIAGNOSIS — G89.29 CHRONIC LEFT-SIDED LOW BACK PAIN WITHOUT SCIATICA: ICD-10-CM

## 2017-04-19 DIAGNOSIS — M54.50 CHRONIC LEFT-SIDED LOW BACK PAIN WITHOUT SCIATICA: ICD-10-CM

## 2017-04-19 PROCEDURE — 97110 THERAPEUTIC EXERCISES: CPT

## 2017-04-19 NOTE — PROGRESS NOTES
"Name: Raquel Kaye  Clinic Number: [id number]  Date of Treatment: 4/19/2017  Diagnosis:     ICD-10-CM ICD-9-CM    1. Chronic left-sided low back pain without sciatica M54.5 724.2     G89.29 338.29        Subjective:  Raquel Kaye reports improvement of symptoms.  Patient reports her low back pain to be 2/10 on a 0-10 scale with 0 being no pain and 10 being the worst pain imaginable.    Objective: Patient arrived with proper pelvic alignment  Patient was educated and performed therapy to increase strength, posture and core stabilization with activities as follows:     Raquel Kaye was educated and performed therapeutic exercises to develop strength, posture and core stabilization for 46 total minutes including:     One on one ther ex x 25 minutes    DATE 4/1917 4/17/17   VISIT 3 2   G CODE 3/10 2/10   Push/pull  Bridging  Resisted hip add  2 x 3 x 3"  2 x 3 reps  2 x 5"   TA bracing 2 x 10 x 10" 2 x 10 x 10"   TA Bracing with knee fallout 2 x 10 x 10" 10 x 5"   planks Modified 3 x 20" Modified 3 x 20"   Side planks Modified 3 x 20"    Modified 3 x 20"   Quadruped UE/LE lifts 10 x 5" 10 x 5"   bridging 20 x 5" 20 x 5"   Lat pull downs with bridge 20 x 5" with OTB    Multifidus press outs 20 x 5" with OTB                   INITIALS CDW, PT CDW, PT       Written Home Exercises Provided: HEP2Go Code: [id number]  Lat pull down with bridge with band  Multifidus reach with band    Pt demo good understanding of the education provided. Raquel Kaye demonstrated good return demonstration of activities.     Assessment:   Will decrease frequency to once a week secondary progress.    Pt will continue to benefit from skilled PT intervention. Medical Necessity is demonstrated by:  Requires skilled supervision to complete and progress HEP and Weakness.    Patient is making good progress towards established goals.    New/Revised goals: continue with current goals  GOALS: 6-8 weeks (6/12/17). Pt agrees with goals " set.  1. Independent with HEP.  2. Report decreased lumbar pain < or = 4/10 with adls such as prolonged car rides, waking in the morning, turning over in bed, and squatting.   3. Increased MMT for B LE by 1 muscle grade to promote proper pelvic stability to decrease lumbar pain < or = 4/10 with adls such as prolonged car rides, waking in the morning, turning over in bed, and squatting.    4. Increased flexibility in B hip flexors to promote proper pelvic stability to decrease lumbar pain < or = 4/10 with adls such as prolonged car rides, waking in the morning, turning over in bed, and squatting.   5. Patient to achieve CJ (at least 20% < 40% impaired, limited or restricted) level on the FOTO Outcomes Measurement System.    Plan:  Continue with established Plan of Care towards PT goals.

## 2017-04-26 ENCOUNTER — CLINICAL SUPPORT (OUTPATIENT)
Dept: REHABILITATION | Facility: HOSPITAL | Age: 57
End: 2017-04-26
Attending: INTERNAL MEDICINE
Payer: MEDICARE

## 2017-04-26 DIAGNOSIS — M54.50 CHRONIC LEFT-SIDED LOW BACK PAIN WITHOUT SCIATICA: ICD-10-CM

## 2017-04-26 DIAGNOSIS — G89.29 CHRONIC LEFT-SIDED LOW BACK PAIN WITHOUT SCIATICA: ICD-10-CM

## 2017-04-26 PROCEDURE — 97110 THERAPEUTIC EXERCISES: CPT

## 2017-04-26 NOTE — PROGRESS NOTES
"Name: Raquel Kaye  Clinic Number: [id number]  Date of Treatment: 4/26/2017  Diagnosis:   No diagnosis found.    Subjective:  Raquel Kaye reports improvement of symptoms.  Patient reports her low back pain to be 1 or 2/10 on a 0-10 scale with 0 being no pain and 10 being the worst pain imaginable.    Objective: Patient arrived with proper pelvic alignment  Patient was educated and performed therapy to increase strength, posture and core stabilization with activities as follows:     Raquel Kaye was educated and performed therapeutic exercises to develop strength, posture and core stabilization for 46 total minutes including:     One on one ther ex x 55 minutes    DATE 4/26/17 4/1917 4/17/17   VISIT 4 3 2   G CODE 4/10 3/10 2/10   Push/pull  Bridging  Resisted hip add 2 x 3 x 3"  2 x 3 reps  2 x 5"  2 x 3 x 3"  2 x 3 reps  2 x 5"   TA bracing 2 x 10 x 10" 2 x 10 x 10" 2 x 10 x 10"   Pelvic floor activation  2 x 10 x 10"     TA Bracing with knee fallout 2 x 8 x 5" 2 x 10 x 10" 10 x 5"   planks Modified 3 x 20" Modified 3 x 20" Modified 3 x 20"   Side planks Modified 3 x 20" Modified 3 x 20"    Modified 3 x 20"   Quadruped UE/LE lifts 10 x 5" 10 x 5" 10 x 5"   bridging  20 x 5" 20 x 5"   Bridging with diaphragmatic breathing 20 x 5"     Lat pull downs with bridge 20 x 5" with OTB 20 x 5" with OTB    Multifidus press outs 20 x 5" with OTB 20 x 5" with OTB    Supine hip flexor stretch 2 x 45"                 INITIALS JG, PTA CDW, PT CDW, PT       Written Home Exercises Provided: HEP2Go Code: [id number]   Lat pull down with bridge with band  Multifidus reach with band  Diaphragmatic breathing  Pelvic floor activation  Hip flexor stretch    Pt demo good understanding of the education provided. Raquel Kaye demonstrated good return demonstration of activities.     Assessment:   Pt continued to report decrease pain and increased core activation.  Tx were expanded in order to promote core stabilization and hip " flexibility.      Pt will continue to benefit from skilled PT intervention. Medical Necessity is demonstrated by:  Requires skilled supervision to complete and progress HEP and Weakness.    Patient is making good progress towards established goals.    New/Revised goals: continue with current goals  GOALS: 6-8 weeks (6/12/17). Pt agrees with goals set.  1. Independent with HEP.  2. Report decreased lumbar pain < or = 4/10 with adls such as prolonged car rides, waking in the morning, turning over in bed, and squatting.   3. Increased MMT for B LE by 1 muscle grade to promote proper pelvic stability to decrease lumbar pain < or = 4/10 with adls such as prolonged car rides, waking in the morning, turning over in bed, and squatting.    4. Increased flexibility in B hip flexors to promote proper pelvic stability to decrease lumbar pain < or = 4/10 with adls such as prolonged car rides, waking in the morning, turning over in bed, and squatting.   5. Patient to achieve CJ (at least 20% < 40% impaired, limited or restricted) level on the FOTO Outcomes Measurement System.    Plan:  Continue with established Plan of Care towards PT goals.

## 2017-05-10 ENCOUNTER — CLINICAL SUPPORT (OUTPATIENT)
Dept: REHABILITATION | Facility: HOSPITAL | Age: 57
End: 2017-05-10
Attending: INTERNAL MEDICINE
Payer: MEDICARE

## 2017-05-10 DIAGNOSIS — G89.29 CHRONIC LEFT-SIDED LOW BACK PAIN WITHOUT SCIATICA: ICD-10-CM

## 2017-05-10 DIAGNOSIS — M54.50 CHRONIC LEFT-SIDED LOW BACK PAIN WITHOUT SCIATICA: ICD-10-CM

## 2017-05-10 PROCEDURE — 97110 THERAPEUTIC EXERCISES: CPT

## 2017-05-10 PROCEDURE — G8983 BODY POS D/C STATUS: HCPCS | Mod: CJ

## 2017-05-10 PROCEDURE — G8982 BODY POS GOAL STATUS: HCPCS | Mod: CJ

## 2017-05-10 NOTE — PROGRESS NOTES
Name: Raquel Kaye   Clinic Number: [id number]   Age: 56 y.o.   Diagnosis:   Encounter Diagnosis   Name Primary?    Chronic left-sided low back pain without sciatica       Physician: Salazar Bird MD   Original Orders : PT eval and treat  Initial visit: 4/12/17  Date of Last visit: 5/10/2017  Date of Discharge Note:  5/10/2017  Total Visits Received: 5  Missed Visits: 0    Subjective: Raquel Kaye reports improvement of symptoms.  Patient reports her low back pain to be 1 or 2/10 on a 0-10 scale with 0 being no pain and 10 being the worst pain imaginable. States she has the most pain when she first wakes up, but performs her push/pull and that helps to decrease pain.    Objective: Patient is a 57 yo WF referred to OP Pt secondary left sided low back pain.   Treatment :    Included:Therapeutic exercise and Stretching          Flexibility testing:  - hamstrings:        B: WNL  - gastrocnemius:  B: WNL  - piriformis:           B: WNL   - quadriceps:        B: WNL   - hip flexors:          B: WNL  - hip adductors:    B: WNL  - IT Bands:            B: WNL      MMT R L   Hip flexion 5/5 5/5   Hip abduction 5/5 5/5   Hip extension 5/5 5/5   Hip ER 5/5 5/5   Hip IR 5/5 5/5   Knee extension 5/5 5/5   Knee flexion 5/5 5/5   Ankle dorsiflexion 5/5 5/5   Ankle plantar flexion 5/5 5/5         Functional Limitations  Reports - G Codes    Category:  Changing and Maintaining Body Position   Tool:  FOTO Outcomes Measurement System.   Score:  Patient scored out 68 of 100 on the FOTO Outcomes Measurement System.   Discharge:   CJ at least 20% < 40% impaired, limited or restricted   Goal:   CJ at least 20% < 40% impaired, limited or restricted         Written Home Exercises Provided: HEP2Go Code: [id number]  Standing hip flexor stretch on chair    Pt demo good understanding of the education provided. Raquel Kaye demonstrated good return demonstration of activities.     Treatment today: ther ex x 32 minutes    Reassessed strength, flexibility, posture, and G code    Assessment:  Patient has progressed well in OP PT and will continue with her HEP.    Goals Achieved:   1. Independent with HEP.  2. Report decreased lumbar pain < or = 4/10 with adls such as prolonged car rides, waking in the morning, turning over in bed, and squatting.   3. Increased MMT for B LE by 1 muscle grade to promote proper pelvic stability to decrease lumbar pain < or = 4/10 with adls such as prolonged car rides, waking in the morning, turning over in bed, and squatting.    4. Increased flexibility in B hip flexors to promote proper pelvic stability to decrease lumbar pain < or = 4/10 with adls such as prolonged car rides, waking in the morning, turning over in bed, and squatting.   5. Patient to achieve CJ (at least 20% < 40% impaired, limited or restricted) level on the FOTO Outcomes Measurement System.      Discharge reason : Met goals    Discharge plan :Continue HEP and Pt to follow-up with MD as planned    Plan:  This patient is discharged from Physical Therapy Services.

## 2017-08-07 ENCOUNTER — PATIENT MESSAGE (OUTPATIENT)
Dept: INTERNAL MEDICINE | Facility: CLINIC | Age: 57
End: 2017-08-07

## 2017-09-11 ENCOUNTER — OFFICE VISIT (OUTPATIENT)
Dept: URGENT CARE | Facility: CLINIC | Age: 57
End: 2017-09-11
Payer: MEDICARE

## 2017-09-11 VITALS
DIASTOLIC BLOOD PRESSURE: 78 MMHG | SYSTOLIC BLOOD PRESSURE: 107 MMHG | OXYGEN SATURATION: 96 % | HEART RATE: 80 BPM | HEIGHT: 61 IN | BODY MASS INDEX: 22.28 KG/M2 | WEIGHT: 118 LBS | TEMPERATURE: 98 F | RESPIRATION RATE: 18 BRPM

## 2017-09-11 DIAGNOSIS — J01.90 ACUTE NON-RECURRENT SINUSITIS, UNSPECIFIED LOCATION: Primary | ICD-10-CM

## 2017-09-11 PROCEDURE — 99213 OFFICE O/P EST LOW 20 MIN: CPT | Mod: S$GLB,,, | Performed by: EMERGENCY MEDICINE

## 2017-09-11 PROCEDURE — 3074F SYST BP LT 130 MM HG: CPT | Mod: S$GLB,,, | Performed by: EMERGENCY MEDICINE

## 2017-09-11 PROCEDURE — 3078F DIAST BP <80 MM HG: CPT | Mod: S$GLB,,, | Performed by: EMERGENCY MEDICINE

## 2017-09-11 PROCEDURE — 3008F BODY MASS INDEX DOCD: CPT | Mod: S$GLB,,, | Performed by: EMERGENCY MEDICINE

## 2017-09-11 RX ORDER — CODEINE PHOSPHATE AND GUAIFENESIN 10; 100 MG/5ML; MG/5ML
5 SOLUTION ORAL 3 TIMES DAILY PRN
Qty: 118 ML | Refills: 0 | Status: SHIPPED | OUTPATIENT
Start: 2017-09-11 | End: 2017-09-16

## 2017-09-11 RX ORDER — AMOXICILLIN AND CLAVULANATE POTASSIUM 500; 125 MG/1; MG/1
1 TABLET, FILM COATED ORAL 2 TIMES DAILY
Qty: 14 TABLET | Refills: 0 | Status: SHIPPED | OUTPATIENT
Start: 2017-09-11 | End: 2017-09-18

## 2017-09-11 NOTE — PATIENT INSTRUCTIONS
Please drink plenty of fluids.  Please get plenty of rest.  Please return here or go to the Emergency Department for any concerns or worsening of condition.  If you were prescribed antibiotics, please take them to completion.  If you were prescribed a narcotic medication (cough syrup), do not drive or operate heavy equipment or machinery while taking these medications.  If you were given a steroid shot in the clinic and have also been given a prescription for a steroid such as Prednisone or a Medrol Dose Pack, please begin taking them tomorrow.  If you do not have Hypertension or any history of palpitations, it is ok to take over the counter Sudafed or Mucinex D or Allegra-D or Claritin-D or Zyrtec-D.  If you do take one of the above, it is ok to combine that with plain over the counter Mucinex or Allegra or Claritin or Zyrtec.  If for example you are taking Zyrtec -D, you can combine that with Mucinex, but not Mucinex-D.  If you are taking Mucinex-D, you can combine that with plain Allegra or Claritin or Zyrtec.   If you do have Hypertension or palpitations, it is safe to take Coricidin HBP for relief of sinus symptoms.  If not allergic, please take over the counter Tylenol (Acetaminophen) and/or Motrin (Ibuprofen) as directed for control of pain and/or fever.  Please follow up with your primary care doctor or specialist as needed.    If you  smoke, please stop smoking.      Acute Bacterial Rhinosinusitis (ABRS)    Acute bacterial rhinosinusitis (ABRS) is an infection of your nasal cavity and sinuses. Its caused by bacteria. Acute means that youve had symptoms for less than 12 weeks.  Understanding your sinuses  The nasal cavity is the large air-filled space behind your nose. The sinuses are a group of spaces formed by the bones of your face. They connect with your nasal cavity. ABRS causes the tissue lining these spaces to become inflamed. Mucus may not drain normally. This leads to facial pain and other  symptoms.  What causes ABRS?  ABRS most often follows an upper respiratory infection caused by a virus. Bacteria then infect the lining of your nasal cavity and sinuses. But you can also get ABRS if you have:  · Nasal allergies  · Long-term nasal swelling and congestion not caused by allergies  · Blockage in the nose  Symptoms of ABRS  The symptoms of ABRS may be different for each person, and can include:  · Nasal congestion  · Runny nose  · Fluid draining from the nose down the throat (postnasal drip)  · Headache  · Cough  · Pain in the sinuses  · Thick, colored fluid from the nose (mucus)  · Fever  Diagnosing ABRS  ABRS may be diagnosed if youve had an upper respiratory infection like a cold and cough for longer than 10 to 14 days. Your health care provider will ask about your symptoms and your medical history. The provider will check your vital signs, including your temperature. Youll have a physical exam. The health care provider will check your ears, nose, and throat. You likely wont need any tests. If ABRS comes back, you may have a culture or other tests.  Treatment for ABRS  Treatment may include:  · Antibiotic medicine. This is for symptoms that last for at least 10 to 14 days.  · Nasal corticosteroid medicine. Drops or spray used in the nose can lessen swelling and congestion.  · Over-the-counter pain medicine. This is to lessen sinus pain and pressure.  · Nasal decongestant medicine. Spray or drops may help to lessen congestion. Do not use them for more than a few days.  · Salt wash (saline irrigation). This can help to loosen mucus.  Possible complications of ABRS  ABRS may come back or become long-term (chronic).  In rare cases, ABRS may cause complications such as:   · Inflamed tissue around the brain and spinal cord (meningitis)  · Inflamed tissue around the eyes (orbital cellulitis)  · Inflamed bones around the sinuses (osteitis)  These problems may need to be treated in a hospital with  intravenous (IV) antibiotic medicine or surgery.  When to call the health care provider  Call your health care provider if you have any of the following:  · Symptoms that dont get better, or get worse  · Symptoms that dont get better after 3 to 5 days on antibiotics  · Trouble seeing  · Swelling around your eyes  · Confusion or trouble staying awake   Date Last Reviewed: 3/3/2015  © 2816-6828 [employer]. [address] [place] [address], [place] [address]. All rights reserved. This information is not intended as a substitute for professional medical care. Always follow your healthcare professional's instructions.

## 2017-09-11 NOTE — PROGRESS NOTES
"Subjective:       Patient ID: Raquel Kaye is a 57 y.o. female.    Vitals:  height is 5' 1" (1.549 m) and weight is 53.5 kg (118 lb). Her temperature is 97.7 °F (36.5 °C). Her blood pressure is 107/78 and her pulse is 80. Her respiration is 18 and oxygen saturation is 96%.     Chief Complaint: URI    URI    This is a new problem. The current episode started in the past 7 days. The problem has been unchanged. There has been no fever. Associated symptoms include congestion, coughing, ear pain, headaches and sinus pain. Pertinent negatives include no abdominal pain, chest pain, nausea, sore throat or wheezing.     Review of Systems   Constitution: Negative for chills, fever and malaise/fatigue.   HENT: Positive for congestion, ear pain and sinus pain. Negative for hoarse voice and sore throat.    Eyes: Negative for discharge and redness.   Cardiovascular: Negative for chest pain, dyspnea on exertion and leg swelling.   Respiratory: Positive for cough. Negative for shortness of breath, sputum production and wheezing.    Musculoskeletal: Negative for myalgias.   Gastrointestinal: Negative for abdominal pain and nausea.   Neurological: Positive for headaches.       Objective:      Physical Exam   Constitutional: She is oriented to person, place, and time. She appears well-developed and well-nourished. No distress.   HENT:   Head: Normocephalic and atraumatic.   Right Ear: Hearing, external ear and ear canal normal. A middle ear effusion is present.   Left Ear: Hearing, tympanic membrane, external ear and ear canal normal.   Nose: Rhinorrhea and sinus tenderness present. Right sinus exhibits maxillary sinus tenderness. Left sinus exhibits maxillary sinus tenderness.   Mouth/Throat: Uvula is midline. Posterior oropharyngeal erythema present. No oropharyngeal exudate, posterior oropharyngeal edema or tonsillar abscesses.   Eyes: Conjunctivae and lids are normal. Pupils are equal, round, and reactive to light.   Neck: " Normal range of motion. Neck supple.   Cardiovascular: Normal rate, regular rhythm and normal heart sounds.  Exam reveals no gallop and no friction rub.    No murmur heard.  Pulmonary/Chest: Effort normal. No accessory muscle usage. No apnea, no tachypnea and no bradypnea. No respiratory distress. She has no decreased breath sounds. She has no wheezes. She has no rhonchi. She has no rales. She exhibits no tenderness.   Abdominal: Normal appearance. She exhibits no ascites.   Musculoskeletal: She exhibits no edema, tenderness or deformity.   Lymphadenopathy:     She has no cervical adenopathy.     She has no axillary adenopathy.   Neurological: She is alert and oriented to person, place, and time.   Skin: Skin is warm and dry. Capillary refill takes less than 2 seconds. She is not diaphoretic.   Psychiatric: Her behavior is normal.   Nursing note and vitals reviewed.      Assessment:       1. Acute non-recurrent sinusitis, unspecified location        Plan:         Acute non-recurrent sinusitis, unspecified location    Other orders  -     amoxicillin-clavulanate 500-125mg (AUGMENTIN) 500-125 mg Tab; Take 1 tablet (500 mg total) by mouth 2 (two) times daily.  Dispense: 14 tablet; Refill: 0  -     guaifenesin-codeine 100-10 mg/5 ml (CHERATUSSIN AC)  mg/5 mL syrup; Take 5 mLs by mouth 3 (three) times daily as needed for Cough.  Dispense: 118 mL; Refill: 0

## 2017-10-03 DIAGNOSIS — N95.2 VAGINAL ATROPHY: ICD-10-CM

## 2017-10-16 ENCOUNTER — OFFICE VISIT (OUTPATIENT)
Dept: INTERNAL MEDICINE | Facility: CLINIC | Age: 57
End: 2017-10-16
Payer: MEDICARE

## 2017-10-16 VITALS
WEIGHT: 121.69 LBS | DIASTOLIC BLOOD PRESSURE: 82 MMHG | HEIGHT: 61 IN | TEMPERATURE: 98 F | HEART RATE: 76 BPM | SYSTOLIC BLOOD PRESSURE: 120 MMHG | BODY MASS INDEX: 22.98 KG/M2

## 2017-10-16 DIAGNOSIS — M54.50 CHRONIC LEFT-SIDED LOW BACK PAIN WITHOUT SCIATICA: ICD-10-CM

## 2017-10-16 DIAGNOSIS — G89.29 CHRONIC LEFT-SIDED LOW BACK PAIN WITHOUT SCIATICA: ICD-10-CM

## 2017-10-16 DIAGNOSIS — J30.9 CHRONIC ALLERGIC RHINITIS, UNSPECIFIED SEASONALITY, UNSPECIFIED TRIGGER: Primary | ICD-10-CM

## 2017-10-16 DIAGNOSIS — H83.90 DISORDER OF INNER EAR, UNSPECIFIED LATERALITY: Chronic | ICD-10-CM

## 2017-10-16 PROCEDURE — 99999 PR PBB SHADOW E&M-EST. PATIENT-LVL III: CPT | Mod: PBBFAC,,, | Performed by: INTERNAL MEDICINE

## 2017-10-16 PROCEDURE — 99214 OFFICE O/P EST MOD 30 MIN: CPT | Mod: S$GLB,,, | Performed by: INTERNAL MEDICINE

## 2017-10-20 NOTE — PROGRESS NOTES
Subjective:       Patient ID: Raquel Kaye is a 57 y.o. female.    Chief Complaint: Follow-up    HPI   Presents for follow-up of medical conditions.  The patient has chronic lower back pain.  She is performing home exercises as recommended by her physical therapist.  She has been diagnosed and scoliosis of her spine.  Her chiropractor's daughter she has shifting of her pelvis.  Back pain primarily involves the left lumbar area.  The pain is nonradiating.    The patient has had 2 sinus infections over a six-month period.  Her symptoms have not cleared.  She intermittently uses Flonase for rhinitis symptoms.  She does have an inner ear disorder and occasionally uses Dyazide for symptom relief.    She is followed by her eye doctor.  Increased intraocular pressures have been noted.    Review of Systems   Constitutional: Negative for activity change, appetite change, chills, fatigue, fever and unexpected weight change.   HENT: Positive for congestion, postnasal drip and sinus pressure.    Eyes: Negative for visual disturbance.   Respiratory: Negative for cough and shortness of breath.    Cardiovascular: Negative for chest pain, palpitations and leg swelling.   Gastrointestinal: Negative for abdominal pain, blood in stool, constipation and diarrhea.   Genitourinary: Negative for dysuria and hematuria.   Musculoskeletal: Positive for back pain. Negative for arthralgias, gait problem, joint swelling, myalgias, neck pain and neck stiffness.   Skin: Negative for rash.   Neurological: Positive for dizziness. Negative for syncope, weakness and headaches.   Psychiatric/Behavioral: Negative for sleep disturbance.       Objective:      Physical Exam   Constitutional: She is oriented to person, place, and time. She appears well-developed and well-nourished. No distress.   HENT:   Head: Normocephalic and atraumatic.   Eyes: Conjunctivae and EOM are normal. No scleral icterus.   Neck: Normal range of motion. Neck supple. No JVD  present. No thyromegaly present.   Cardiovascular: Normal rate, regular rhythm, normal heart sounds and intact distal pulses.  Exam reveals no gallop and no friction rub.    No murmur heard.  Pulmonary/Chest: Effort normal and breath sounds normal. No respiratory distress. She has no wheezes. She has no rales.   Abdominal: Soft. Bowel sounds are normal. She exhibits no mass. There is no tenderness.   Musculoskeletal: Normal range of motion. She exhibits no tenderness.   Lumbar scoliosis is present.  Negative straight leg raising test bilaterally.  No paraspinous muscle tenderness.  No SI joint tenderness.  Hip range of motion is intact.   Lymphadenopathy:     She has no cervical adenopathy.   Neurological: She is alert and oriented to person, place, and time.   Skin: Skin is warm and dry. No rash noted.   Nursing note and vitals reviewed.      Assessment:       1. Chronic allergic rhinitis, unspecified seasonality, unspecified trigger    2. Disorder of inner ear, unspecified laterality    3. Chronic left-sided low back pain without sciatica        Plan:       Raquel was seen today for follow-up.  The patient will be referred to the pain clinic for consultation regarding lower back pain.  Current therapy will be continued.  The patient is to return to clinic as needed.    Diagnoses and all orders for this visit:    Chronic allergic rhinitis, unspecified seasonality, unspecified trigger    Disorder of inner ear, unspecified laterality    Chronic left-sided low back pain without sciatica  -     Ambulatory consult to Pain Clinic

## 2017-10-26 ENCOUNTER — HOSPITAL ENCOUNTER (OUTPATIENT)
Dept: RADIOLOGY | Facility: OTHER | Age: 57
Discharge: HOME | End: 2017-10-26
Attending: ANESTHESIOLOGY
Payer: MEDICARE

## 2017-10-26 ENCOUNTER — OFFICE VISIT (OUTPATIENT)
Dept: SPINE | Facility: CLINIC | Age: 57
End: 2017-10-26
Attending: ANESTHESIOLOGY
Payer: MEDICARE

## 2017-10-26 VITALS
WEIGHT: 121 LBS | RESPIRATION RATE: 20 BRPM | BODY MASS INDEX: 22.84 KG/M2 | HEIGHT: 61 IN | HEART RATE: 77 BPM | SYSTOLIC BLOOD PRESSURE: 106 MMHG | DIASTOLIC BLOOD PRESSURE: 82 MMHG

## 2017-10-26 DIAGNOSIS — M47.816 SPONDYLOSIS OF LUMBAR REGION WITHOUT MYELOPATHY OR RADICULOPATHY: ICD-10-CM

## 2017-10-26 DIAGNOSIS — M79.10 MYALGIA: Primary | ICD-10-CM

## 2017-10-26 DIAGNOSIS — M79.10 MYALGIA: ICD-10-CM

## 2017-10-26 PROCEDURE — 99204 OFFICE O/P NEW MOD 45 MIN: CPT | Mod: S$GLB,,, | Performed by: ANESTHESIOLOGY

## 2017-10-26 PROCEDURE — 72114 X-RAY EXAM L-S SPINE BENDING: CPT | Mod: 26,,, | Performed by: RADIOLOGY

## 2017-10-26 PROCEDURE — 72114 X-RAY EXAM L-S SPINE BENDING: CPT | Mod: TC

## 2017-10-26 PROCEDURE — 99999 PR PBB SHADOW E&M-EST. PATIENT-LVL IV: CPT | Mod: PBBFAC,,, | Performed by: ANESTHESIOLOGY

## 2017-10-26 RX ORDER — DICLOFENAC SODIUM 10 MG/G
2 GEL TOPICAL 4 TIMES DAILY
Qty: 1 TUBE | Refills: 2 | Status: SHIPPED | OUTPATIENT
Start: 2017-10-26 | End: 2018-04-19

## 2017-10-26 NOTE — PROGRESS NOTES
Chronic Pain - New Consult    Referring Physician: Slaazar Bird MD    Chief Complaint:   Chief Complaint   Patient presents with    Low-back Pain     left sided back pain without sciatica        SUBJECTIVE: Disclaimer: This note has been generated using voice-recognition software. There may be typographical errors that have been missed during proof-reading    Initial encounter:    Raquel Kaye presents to the clinic for the evaluation of left sided low back pain. The pain started 10 years ago and symptoms have been worsening.    Brief history:  Patient had a history of radicular symptoms but they are not occurring now.    Pain Description:    The pain is located in the left side of lower back area and does not radiate.      At BEST  2/10     At WORST  9/10 on the WORST day.      On average pain is rated as 5/10.     Today the pain is rated as 4/10    The pain is described as aching and sharp      Symptoms interfere with daily activity.     Exacerbating factors: Sitting, Standing, Laying, Bending, Coughing/Sneezing, Walking, Morning, Lifting and Getting out of bed/chair.      Mitigating factors heat, ice, massage and medications.     Patient denies night fever/night sweats, urinary incontinence, bowel incontinence, significant weight loss, significant motor weakness and loss of sensations.  Patient denies any suicidal or homicidal ideations    Pain Medications:  Current:  Ibuprofen prn with improvement    Tried in Past:  NSAIDs -Never  TCA -Never  SNRI -Never  Anti-convulsants -asprin 81mg  Muscle Relaxants -Never  Opioids-Never    Physical Therapy/Home Exercise: yes  -works with a physical therapist and psychiatrist     report:  Reviewed and consistent with medication use as prescribed.    Pain Procedures: Never    Chiropractor -never  Acupuncture - never  TENS unit -never  Spinal decompression -never  Joint replacement -never    Imaging:  X-Ray Lumbar Spine  Narrative     3 views    Mild DJD.  Mild  lumbar scoliosis.  The L3/L4 disc space is slightly narrowed.  No fracture, spondylolisthesis or bone destruction identified   Impression      See above      Electronically signed by: Jimbo Johnston MD  Date: 03/30/17  Time: 11:02     Encounter     View Encounter            DEXA scan shows osteopenia    Past Medical History:   Diagnosis Date    Allergy     Back pain     Disorder of vestibular function of both ears     Fibrocystic breast disease in female     HEARING LOSS     right side - no hearing aid    Hyperlipidemia     Meniere's disease     Osteopenia     PONV (postoperative nausea and vomiting)     PONV (postoperative nausea and vomiting)     Special screening for malignant neoplasms, colon 7/22/2013     Past Surgical History:   Procedure Laterality Date    ADENOIDECTOMY      APPENDECTOMY      BREAST SURGERY      left breast biopsy    CYST REMOVAL Right     neck    MANDIBLE FRACTURE SURGERY Right     scope    TONSILLECTOMY       Social History     Social History    Marital status: Single     Spouse name: N/A    Number of children: N/A    Years of education: N/A     Occupational History    Not on file.     Social History Main Topics    Smoking status: Never Smoker    Smokeless tobacco: Never Used    Alcohol use Yes      Comment: social    Drug use: No    Sexual activity: Not Currently     Birth control/ protection: None     Other Topics Concern    Not on file     Social History Narrative    No narrative on file     Family History   Problem Relation Age of Onset    Heart disease Mother     Kidney disease Mother     Diabetes Mother     Hypertension Mother     Hyperlipidemia Mother     COPD Mother     Heart disease Father     Cancer Father      lymphoma    Breast cancer Neg Hx     Ovarian cancer Neg Hx        Review of patient's allergies indicates:   Allergen Reactions    Dexamethasone      Flushing and headache with medrol dosepack & tachycardia       Current Outpatient  Prescriptions   Medication Sig    aspirin 81 MG Chew     b complex vitamins tablet Take 1 tablet by mouth once daily.    CETIRIZINE HCL (ZYRTEC ORAL) Take 1 tablet by mouth as needed.    cimetidine (TAGAMET) 400 MG tablet Take 400 mg by mouth 2 (two) times daily.    conjugated estrogens (PREMARIN) vaginal cream Place 1 g vaginally twice a week.    diphenhydrAMINE (BENADRYL) 25 mg capsule     EPIPEN 2-PEDRO 0.3 mg/0.3 mL (1:1,000) AtIn     estradiol (ESTRACE) 1 MG tablet Take 1 tablet (1 mg total) by mouth once daily.    fexofenadine-pseudoephedrine (ALLEGRA-D 24) 180-240 mg per 24 hr tablet Take 1 tablet by mouth once daily.    FLAXSEED OIL ORAL Take 1 capsule by mouth once daily.    FLUCELVAX QUAD 3412-1914, PF, 60 mcg (15 mcg x 4)/0.5 mL Syrg vaccine TO BE ADMINISTERED BY PHARMACIST FOR IMMUNIZATION    fluticasone (FLONASE) 50 mcg/actuation nasal spray UES 1 SPRAY IN EACH NOSTRIL DAILY    ibuprofen (ADVIL,MOTRIN) 800 MG tablet Take 1 tablet (800 mg total) by mouth 3 (three) times daily. For inflammation of neck disc. Take with food.    LATISSE 0.03 % ophthalmic solution     medroxyPROGESTERone (PROVERA) 2.5 MG tablet Take 1 tablet (2.5 mg total) by mouth once daily.    promethazine (PHENERGAN) 25 MG tablet Take 1 tablet (25 mg total) by mouth every 4 (four) hours.    RESTASIS 0.05 % ophthalmic emulsion     triamterene-hydrochlorothiazide 37.5-25 mg (DYAZIDE) 37.5-25 mg per capsule Take 1 capsule by mouth daily as needed.    diclofenac sodium (VOLTAREN) 1 % Gel Apply 2 g topically 4 (four) times daily.     No current facility-administered medications for this visit.        REVIEW OF SYSTEMS:    GENERAL:  No weight loss, malaise or fevers.  HEENT:   No recent changes in vision or hearing  NECK:  Negative for lumps, no difficulty with swallowing.  RESPIRATORY:  Negative for cough, wheezing or shortness of breath, patient denies any recent URI.  CARDIOVASCULAR:  Negative for chest pain, leg swelling  "or palpitations.  GI:  Negative for abdominal discomfort, blood in stools or black stools or change in bowel habits.  MUSCULOSKELETAL:  See HPI.  SKIN:  Negative for lesions, rash, and itching.  PSYCH:  No mood disorder or recent psychosocial stressors.  Patients sleep is disturbed secondary to pain.  HEMATOLOGY/LYMPHOLOGY:  Negative for prolonged bleeding, bruising easily or swollen nodes.  Patient is not currently taking any anti-coagulants  ENDO: No history of diabetes or thyroid dysfunction  NEURO:   No history of headaches, syncope, paralysis, seizures or tremors.  All other reviewed and negative other than HPI.    OBJECTIVE:    /82   Pulse 77   Resp 20   Ht 5' 1" (1.549 m)   Wt 54.9 kg (121 lb)   LMP 04/15/2009   BMI 22.86 kg/m²     PHYSICAL EXAMINATION:    GENERAL: Well appearing, in no acute distress, alert and oriented x3.  PSYCH:  Mood and affect appropriate.  SKIN: Skin color, texture, turgor normal, no rashes or lesions.  HEAD/FACE:  Normocephalic, atraumatic. Cranial nerves grossly intact.  CV: RRR with palpation of the radial artery.  PULM: No evidence of respiratory difficulty, symmetric chest rise.  BACK: Straight leg raising in the sitting and supine positions is negative to radicular pain. There is pain with palpation over the facet joints of the lumbar spine on the left at L4/5, L5/S1. There is decreased range of motion with extension to 15 degrees, and facet loading maneuvers cause reproducible pain on the left.    EXTREMITIES: Peripheral joint ROM is full and pain free without obvious instability or laxity in all four extremities. No deformities, edema, or skin discoloration. Good capillary refill.  MUSCULOSKELETAL: Hip, and knee provocative maneuvers are negative.  There is  pain with palpation over the sacroiliac joints bilaterally.  There is no pain to palpation over the greater trochanteric bursa bilaterally.  FABERs test is positive bilaterally.  FADIRs test is negative.   " Bilateral lower extremity strength is normal and symmetric.  No atrophy or tone abnormalities are noted.  NEURO: Bilateral lower extremity coordination and muscle stretch reflexes are physiologic and symmetric.  Plantar response are downgoing. No clonus.  No loss of sensation is noted.  GAIT: normal.        ASSESSMENT: 57 y.o. year old female with pain, consistent with     Encounter Diagnoses   Name Primary?    Myalgia Yes    Spondylosis of lumbar region without myelopathy or radiculopathy        PLAN:   Flex/ext xray of the lumbar spine to further evaluate facet arthropathy and r/o instability    voltaren gel to apply to the lower back    Continue PT exercises to tolerance    Will schedule for MBB left L3, L4, L5 to be followed by RFA if diagnostic    F/u in 2 weeks with APC    In the future we can consider trial of celebrex or meloxicam.    The above plan and management options were discussed at length with patient. Patient is in agreement with the above and verbalized understanding. It will be communicated with the referring physician via electronic record, fax, or mail.    Alirio Lei  10/26/2017

## 2017-10-26 NOTE — LETTER
October 26, 2017      Salazar Bird MD  [address] [address]           [facility]  [address], [address]  [facility] [place] [address]  Phone: [phone]  Fax: [phone]          Patient: Raquel Kaye   MR Number: [id number]   YOB: 1960   Date of Visit: 10/26/2017       Dear Dr. Salazar Bird:    Thank you for referring Raquel Kaye to me for evaluation. Attached you will find relevant portions of my assessment and plan of care.    If you have questions, please do not hesitate to call me. I look forward to following Raquel Kaye along with you.    Sincerely,    Alirio Lei MD    Enclosure  CC:  No Recipients    If you would like to receive this communication electronically, please contact [email or web address] or [phone] to request more information on [employer] Link access.    For providers and/or their staff who would like to refer a patient to [facility], please contact us through our one-stop-shop provider referral line, [facility], at [phone].    If you feel you have received this communication in error or would no longer like to receive these types of communications, please e-mail [email or web address]

## 2017-11-07 ENCOUNTER — SURGERY (OUTPATIENT)
Age: 57
End: 2017-11-07

## 2017-11-07 ENCOUNTER — HOSPITAL ENCOUNTER (OUTPATIENT)
Facility: OTHER | Age: 57
Discharge: HOME | End: 2017-11-07
Attending: ANESTHESIOLOGY | Admitting: ANESTHESIOLOGY
Payer: MEDICARE

## 2017-11-07 VITALS
BODY MASS INDEX: 22.28 KG/M2 | TEMPERATURE: 98 F | SYSTOLIC BLOOD PRESSURE: 147 MMHG | WEIGHT: 118 LBS | DIASTOLIC BLOOD PRESSURE: 71 MMHG | HEART RATE: 64 BPM | HEIGHT: 61 IN | RESPIRATION RATE: 18 BRPM | OXYGEN SATURATION: 100 %

## 2017-11-07 DIAGNOSIS — M47.816 SPONDYLOSIS OF LUMBAR REGION WITHOUT MYELOPATHY OR RADICULOPATHY: Primary | ICD-10-CM

## 2017-11-07 PROCEDURE — 64495 INJ PARAVERT F JNT L/S 3 LEV: CPT | Performed by: ANESTHESIOLOGY

## 2017-11-07 PROCEDURE — 64494 INJ PARAVERT F JNT L/S 2 LEV: CPT | Performed by: ANESTHESIOLOGY

## 2017-11-07 PROCEDURE — 64493 INJ PARAVERT F JNT L/S 1 LEV: CPT | Performed by: ANESTHESIOLOGY

## 2017-11-07 PROCEDURE — S0020 INJECTION, BUPIVICAINE HYDRO: HCPCS | Performed by: ANESTHESIOLOGY

## 2017-11-07 PROCEDURE — 25000003 PHARM REV CODE 250: Performed by: ANESTHESIOLOGY

## 2017-11-07 PROCEDURE — 64494 INJ PARAVERT F JNT L/S 2 LEV: CPT | Mod: LT,,, | Performed by: ANESTHESIOLOGY

## 2017-11-07 PROCEDURE — 64493 INJ PARAVERT F JNT L/S 1 LEV: CPT | Mod: LT,,, | Performed by: ANESTHESIOLOGY

## 2017-11-07 RX ORDER — LIDOCAINE HYDROCHLORIDE 10 MG/ML
INJECTION INFILTRATION; PERINEURAL
Status: DISCONTINUED | OUTPATIENT
Start: 2017-11-07 | End: 2017-11-07 | Stop reason: HOSPADM

## 2017-11-07 RX ORDER — BUPIVACAINE HYDROCHLORIDE 5 MG/ML
INJECTION, SOLUTION EPIDURAL; INTRACAUDAL
Status: DISCONTINUED | OUTPATIENT
Start: 2017-11-07 | End: 2017-11-07 | Stop reason: HOSPADM

## 2017-11-07 RX ADMIN — BUPIVACAINE HYDROCHLORIDE 10 ML: 5 INJECTION, SOLUTION EPIDURAL; INTRACAUDAL; PERINEURAL at 09:11

## 2017-11-07 RX ADMIN — LIDOCAINE HYDROCHLORIDE 10 ML: 10 INJECTION, SOLUTION INFILTRATION; PERINEURAL at 09:11

## 2017-11-07 NOTE — DISCHARGE SUMMARY
Discharge Note  Short Stay      SUMMARY     Admit Date: 11/7/2017    Attending Physician: Alirio Lei    Discharge Diagnosis: Facet arthritis, degenerative, lumbar spine [M47.896]    Discharge Physician: Alirio Lei      Discharge Date: 11/7/2017 9:32 AM     PROCEDURE:  Left medial branch block at the transverse processes at the level of L4, L5, Sacral ala    REASON FOR PROCEDURE: Facet arthritis, degenerative, lumbar spine [M47.896], lumbar spondylosis    Disposition: Home or self care    Patient Instructions:   Current Discharge Medication List      CONTINUE these medications which have NOT CHANGED    Details   aspirin 81 MG Chew       b complex vitamins tablet Take 1 tablet by mouth once daily.      CETIRIZINE HCL (ZYRTEC ORAL) Take 1 tablet by mouth as needed.      cimetidine (TAGAMET) 400 MG tablet Take 400 mg by mouth 2 (two) times daily.  Refills: 5      conjugated estrogens (PREMARIN) vaginal cream Place 1 g vaginally twice a week.  Qty: 1 applicator, Refills: 3    Associated Diagnoses: Vaginal atrophy      diclofenac sodium (VOLTAREN) 1 % Gel Apply 2 g topically 4 (four) times daily.  Qty: 1 Tube, Refills: 2    Associated Diagnoses: Myalgia; Spondylosis of lumbar region without myelopathy or radiculopathy      diphenhydrAMINE (BENADRYL) 25 mg capsule       EPIPEN 2-PEDRO 0.3 mg/0.3 mL (1:1,000) AtIn Refills: 0      estradiol (ESTRACE) 1 MG tablet Take 1 tablet (1 mg total) by mouth once daily.  Qty: 90 tablet, Refills: 3    Associated Diagnoses: Menopausal symptoms      fexofenadine-pseudoephedrine (ALLEGRA-D 24) 180-240 mg per 24 hr tablet Take 1 tablet by mouth once daily.      FLAXSEED OIL ORAL Take 1 capsule by mouth once daily.      FLUCELVAX QUAD 6068-3623, PF, 60 mcg (15 mcg x 4)/0.5 mL Syrg vaccine TO BE ADMINISTERED BY PHARMACIST FOR IMMUNIZATION  Refills: 0      fluticasone (FLONASE) 50 mcg/actuation nasal spray UES 1 SPRAY IN EACH NOSTRIL DAILY  Qty: 16 g, Refills: 2      ibuprofen  (ADVIL,MOTRIN) 800 MG tablet Take 1 tablet (800 mg total) by mouth 3 (three) times daily. For inflammation of neck disc. Take with food.  Qty: 45 tablet, Refills: 1    Associated Diagnoses: Neck pain on left side; Degenerative cervical disc      LATISSE 0.03 % ophthalmic solution       medroxyPROGESTERone (PROVERA) 2.5 MG tablet Take 1 tablet (2.5 mg total) by mouth once daily.  Qty: 90 tablet, Refills: 3    Associated Diagnoses: Menopausal symptoms      promethazine (PHENERGAN) 25 MG tablet Take 1 tablet (25 mg total) by mouth every 4 (four) hours.  Qty: 30 tablet, Refills: 2      RESTASIS 0.05 % ophthalmic emulsion       triamterene-hydrochlorothiazide 37.5-25 mg (DYAZIDE) 37.5-25 mg per capsule Take 1 capsule by mouth daily as needed.  Qty: 30 capsule, Refills: 5             Resume home diet and activity

## 2017-11-07 NOTE — DISCHARGE INSTRUCTIONS

## 2017-11-07 NOTE — OP NOTE
lUMBAR Medial Branch Block Under Fluoroscopy  Time-out taken to identify patient and procedure side prior to starting the procedure.            11/07/2017                                                         PROCEDURE:  Left medial branch block at the transverse processes at the level of L4, L5, Sacral ala    REASON FOR PROCEDURE: Facet arthritis, degenerative, lumbar spine [M47.896], lumbar spondylosis    PHYSICIAN: Alirio Lei MD  ASSISTANTS: None    MEDICATIONS INJECTED: 0.5% bupivicane, 1mL at each level    LOCAL ANESTHETIC USED: Xylocaine 1% 10ml    SEDATION MEDICATIONS: None    ESTIMATED BLOOD LOSS:  None.    COMPLICATIONS:  None.    TECHNIQUE: Laying in a prone position, the patient was prepped and draped in the usual sterile fashion using ChloraPrep and fenestrated drape.  The level was determined under fluoroscopic guidance.  Local anesthetic was given by going down to the hub of the 27-gauge 1.25in needle and raising a wheel.  A 22-gauge 3.5inch needle was introduced to the anatomic local of the medial branch at each of the above levels using fluoroscopy in the AP, oblique, and lateral views.  After negative aspiration, medication was injected slowly. The patient tolerated the procedure well.       The patient was monitored after the procedure.  Patient was given post procedure and discharge instructions to follow at home.  We will see the patient back in two weeks or the patient may call to inform of status. The patient was discharged in a stable condition

## 2017-12-07 ENCOUNTER — OFFICE VISIT (OUTPATIENT)
Dept: SPINE | Facility: CLINIC | Age: 57
End: 2017-12-07
Payer: MEDICARE

## 2017-12-07 VITALS
HEIGHT: 61 IN | SYSTOLIC BLOOD PRESSURE: 97 MMHG | HEART RATE: 77 BPM | DIASTOLIC BLOOD PRESSURE: 74 MMHG | WEIGHT: 120 LBS | BODY MASS INDEX: 22.66 KG/M2

## 2017-12-07 DIAGNOSIS — M79.10 MYALGIA: ICD-10-CM

## 2017-12-07 DIAGNOSIS — M53.3 SACROILIAC JOINT PAIN: Primary | ICD-10-CM

## 2017-12-07 DIAGNOSIS — M47.816 SPONDYLOSIS OF LUMBAR REGION WITHOUT MYELOPATHY OR RADICULOPATHY: ICD-10-CM

## 2017-12-07 PROCEDURE — 99999 PR PBB SHADOW E&M-EST. PATIENT-LVL III: CPT | Mod: PBBFAC,,, | Performed by: NURSE PRACTITIONER

## 2017-12-07 PROCEDURE — 99213 OFFICE O/P EST LOW 20 MIN: CPT | Mod: S$GLB,,, | Performed by: NURSE PRACTITIONER

## 2017-12-07 NOTE — PROGRESS NOTES
Chronic Pain - New Consult    Referring Physician: No ref. provider found    Chief Complaint:   Chief Complaint   Patient presents with    Follow-up     2 WKS [facility]        SUBJECTIVE: Disclaimer: This note has been generated using voice-recognition software. There may be typographical errors that have been missed during proof-reading    Interval History 12/7/2017:  The patient presents today for follow up of left sided lower back and buttock pain.  She is s/p left L3,4,5 MBB on 11/7/17.  She reports that she felt numbness to her lower back for about one hour after the procedure.  Immediately after the procedure, she went to walk her dog in the park and started to have her usual pain.  Her pain is mainly to the left buttock at this time.  It is worsened with prolonged sitting, changing from sitting to standing, and lifting her 20 lb nephew.  She also notices that she carried him on her left hip which worsens her pain.  She does have some benefit with OTC NSAIDs.  Voltaren gel provided limited benefit.  Her pain today is     Initial encounter:    Raquel Kaye presents to the clinic for the evaluation of left sided low back pain. The pain started 10 years ago and symptoms have been worsening.    Brief history:  Patient had a history of radicular symptoms but they are not occurring now.    Pain Description:    The pain is located in the left side of lower back area and does not radiate.      At BEST  2/10     At WORST  9/10 on the WORST day.      On average pain is rated as 5/10.     Today the pain is rated as 4/10    The pain is described as aching and sharp      Symptoms interfere with daily activity.     Exacerbating factors: Sitting, Standing, Laying, Bending, Coughing/Sneezing, Walking, Morning, Lifting and Getting out of bed/chair.      Mitigating factors heat, ice, massage and medications.     Patient denies night fever/night sweats, urinary incontinence, bowel incontinence, significant weight loss,  significant motor weakness and loss of sensations.  Patient denies any suicidal or homicidal ideations    Pain Medications:  Current:  Ibuprofen prn with improvement    Tried in Past:  NSAIDs - Ibuprofen and Voltaren gel  TCA -Never  SNRI -Never  Anti-convulsants -asprin 81mg  Muscle Relaxants -Never  Opioids-Never    Physical Therapy/Home Exercise: yes  -works with a physical therapist and psychiatrist     report:  Reviewed and consistent with medication use as prescribed.    Pain Procedures:   11/7/17 Left L3,4,5 MBB- limited relief after 1 hr with activity    Chiropractor -never  Acupuncture - never  TENS unit -never  Spinal decompression -never  Joint replacement -never    Imaging:    X-Ray Lumbar Spine 10/26/17    Narrative     Technique: Lateral neutral, flexion and extension positioning of the lumbosacral spine with additional bilateral oblique and AP views         Comparison: 03/30/2017    Results: Continued convex right curvature the lumbar spine. There is step wise Grade 1 retrolisthesis of L2 on L3 and L3 on L4 with neutral positioning which is relatively fixed with flexion and extension positioning. The lumbar vertebral bodies heights and contours are stable without evidence for acute fracture with scattered endplate degeneration. No evidence for spondylolysis. Further evaluation as warrented clinically.   Impression      See above.       DEXA scan shows osteopenia    Past Medical History:   Diagnosis Date    Allergy     Back pain     Disorder of vestibular function of both ears     Fibrocystic breast disease in female     HEARING LOSS     right side - no hearing aid    Hyperlipidemia     Meniere's disease     Osteopenia     PONV (postoperative nausea and vomiting)     PONV (postoperative nausea and vomiting)     Special screening for malignant neoplasms, colon 7/22/2013     Past Surgical History:   Procedure Laterality Date    ADENOIDECTOMY      APPENDECTOMY      BREAST SURGERY       left breast biopsy    CYST REMOVAL Right     neck    MANDIBLE FRACTURE SURGERY Right     scope    TONSILLECTOMY       Social History     Social History    Marital status: Single     Spouse name: N/A    Number of children: N/A    Years of education: N/A     Occupational History    Not on file.     Social History Main Topics    Smoking status: Never Smoker    Smokeless tobacco: Never Used    Alcohol use Yes      Comment: social    Drug use: No    Sexual activity: Not Currently     Birth control/ protection: None     Other Topics Concern    Not on file     Social History Narrative    No narrative on file     Family History   Problem Relation Age of Onset    Heart disease Mother     Kidney disease Mother     Diabetes Mother     Hypertension Mother     Hyperlipidemia Mother     COPD Mother     Heart disease Father     Cancer Father      lymphoma    Breast cancer Neg Hx     Ovarian cancer Neg Hx        Review of patient's allergies indicates:   Allergen Reactions    Dexamethasone      Flushing and headache with medrol dosepack & tachycardia       Current Outpatient Prescriptions   Medication Sig    aspirin 81 MG Chew     b complex vitamins tablet Take 1 tablet by mouth once daily.    CETIRIZINE HCL (ZYRTEC ORAL) Take 1 tablet by mouth as needed.    cimetidine (TAGAMET) 400 MG tablet Take 400 mg by mouth 2 (two) times daily.    conjugated estrogens (PREMARIN) vaginal cream Place 1 g vaginally twice a week.    diphenhydrAMINE (BENADRYL) 25 mg capsule     EPIPEN 2-PEDRO 0.3 mg/0.3 mL (1:1,000) AtIn     estradiol (ESTRACE) 1 MG tablet Take 1 tablet (1 mg total) by mouth once daily.    fexofenadine-pseudoephedrine (ALLEGRA-D 24) 180-240 mg per 24 hr tablet Take 1 tablet by mouth once daily.    FLAXSEED OIL ORAL Take 1 capsule by mouth once daily.    FLUCELVAX QUAD 5945-8131, PF, 60 mcg (15 mcg x 4)/0.5 mL Syrg vaccine TO BE ADMINISTERED BY PHARMACIST FOR IMMUNIZATION    fluticasone (FLONASE)  "50 mcg/actuation nasal spray UES 1 SPRAY IN EACH NOSTRIL DAILY    ibuprofen (ADVIL,MOTRIN) 800 MG tablet Take 1 tablet (800 mg total) by mouth 3 (three) times daily. For inflammation of neck disc. Take with food.    LATISSE 0.03 % ophthalmic solution     medroxyPROGESTERone (PROVERA) 2.5 MG tablet Take 1 tablet (2.5 mg total) by mouth once daily.    promethazine (PHENERGAN) 25 MG tablet Take 1 tablet (25 mg total) by mouth every 4 (four) hours.    RESTASIS 0.05 % ophthalmic emulsion     triamterene-hydrochlorothiazide 37.5-25 mg (DYAZIDE) 37.5-25 mg per capsule Take 1 capsule by mouth daily as needed.    diclofenac sodium (VOLTAREN) 1 % Gel Apply 2 g topically 4 (four) times daily.     No current facility-administered medications for this visit.        REVIEW OF SYSTEMS:    GENERAL:  No weight loss, malaise or fevers.  HEENT:   No recent changes in vision or hearing  NECK:  Negative for lumps, no difficulty with swallowing.  RESPIRATORY:  Negative for cough, wheezing or shortness of breath, patient denies any recent URI.  CARDIOVASCULAR:  Negative for chest pain, leg swelling or palpitations.  GI:  Negative for abdominal discomfort, blood in stools or black stools or change in bowel habits.  MUSCULOSKELETAL:  See HPI.  SKIN:  Negative for lesions, rash, and itching.  PSYCH:  No mood disorder or recent psychosocial stressors.  Patient's sleep is disturbed secondary to pain.  HEMATOLOGY/LYMPHOLOGY:  Negative for prolonged bleeding, bruising easily or swollen nodes.  Patient is not currently taking any anti-coagulants  ENDO: No history of diabetes or thyroid dysfunction  NEURO:   No history of headaches, syncope, paralysis, seizures or tremors.  All other reviewed and negative other than HPI.    OBJECTIVE:    BP 97/74   Pulse 77   Ht 5' 1" (1.549 m)   Wt 54.4 kg (120 lb)   LMP 04/15/2009   BMI 22.67 kg/m²     PHYSICAL EXAMINATION:    GENERAL: Well appearing, in no acute distress, alert and oriented " x3.  PSYCH:  Mood and affect appropriate.  SKIN: Skin color, texture, turgor normal, no rashes or lesions.  HEAD/FACE:  Normocephalic, atraumatic. Cranial nerves grossly intact.  CV: RRR with palpation of the radial artery.  PULM: No evidence of respiratory difficulty, symmetric chest rise.  BACK: Straight leg raising in the sitting and supine positions is negative to radicular pain. There is pain with palpation over the facet joints of the lumbar spine on the left at L4/5, L5/S1. There is decreased range of motion with extension to 15 degrees, and facet loading maneuvers cause reproducible pain on the left.    EXTREMITIES: Peripheral joint ROM is full and pain free without obvious instability or laxity in all four extremities. No deformities, edema, or skin discoloration. Good capillary refill.  MUSCULOSKELETAL: Hip, and knee provocative maneuvers are negative.  There is  pain with palpation over the sacroiliac joint on the left.  There is no pain to palpation over the greater trochanteric bursa bilaterally.  FABERs test is positive on the left.  Provocative maneuvers of the left hip cause posterior hip and SI joint pain.  Bilateral lower extremity strength is normal and symmetric.  No atrophy or tone abnormalities are noted.  NEURO: Bilateral lower extremity coordination and muscle stretch reflexes are physiologic and symmetric.  Plantar response are downgoing. No clonus.  No loss of sensation is noted.  GAIT: Normal.      ASSESSMENT: 57 y.o. year old female with left sided back/buttock pain, consistent with the following:    Encounter Diagnoses   Name Primary?    Sacroiliac joint pain Yes    Myalgia     Spondylosis of lumbar region without myelopathy or radiculopathy        PLAN:     - Previous imaging was reviewed and discussed with the patient today.    - She is s/p left L3, L4, L5 MBB.  She went to the park about 1 hour after the procedure and walked, and this caused her pain.  Her exam today is mostly  symptomatic for left sided sacroiliitis.  Will schedule for left SI joint injection under fluoro.  The procedure, risks, benefits and options were discussed with patient. There are no contraindications to the procedure. The patient expressed understanding and agreed to proceed.  Consent obtained today.    - Consider repeat MBB vs hip imaging if limited benefit.    - She did not want to trial prescription NSAID at this time.    - RTC 2 weeks after procedure.    - Dr. Lei was consulted on the patient and agrees with this plan.      The above plan and management options were discussed at length with patient. Patient is in agreement with the above and verbalized understanding.    Jennifer Pickens  12/07/2017

## 2017-12-11 PROBLEM — J01.90 ACUTE NON-RECURRENT SINUSITIS: Status: RESOLVED | Noted: 2017-09-11 | Resolved: 2017-12-11

## 2018-01-09 ENCOUNTER — TELEPHONE (OUTPATIENT)
Dept: INTERNAL MEDICINE | Facility: CLINIC | Age: 58
End: 2018-01-09

## 2018-01-09 DIAGNOSIS — N95.1 MENOPAUSAL SYNDROME ON HORMONE REPLACEMENT THERAPY: ICD-10-CM

## 2018-01-09 DIAGNOSIS — M85.9 DISORDER OF BONE DENSITY AND STRUCTURE, UNSPECIFIED: ICD-10-CM

## 2018-01-09 DIAGNOSIS — M85.80 OSTEOPENIA, UNSPECIFIED LOCATION: ICD-10-CM

## 2018-01-09 DIAGNOSIS — Z00.00 WELL WOMAN EXAM (NO GYNECOLOGICAL EXAM): Primary | ICD-10-CM

## 2018-01-09 DIAGNOSIS — Z79.890 MENOPAUSAL SYNDROME ON HORMONE REPLACEMENT THERAPY: ICD-10-CM

## 2018-01-09 DIAGNOSIS — Z51.81 MEDICATION MONITORING ENCOUNTER: ICD-10-CM

## 2018-01-09 DIAGNOSIS — R79.9 ABNORMAL FINDING OF BLOOD CHEMISTRY: ICD-10-CM

## 2018-01-09 DIAGNOSIS — R25.1 TREMOR: ICD-10-CM

## 2018-01-09 NOTE — TELEPHONE ENCOUNTER
----- Message from Vivi Shahid sent at 1/9/2018 11:52 AM CST -----  Lab Orders Needed    I have scheduled the above patients annual physical and lab appointments. Lab orders need to be placed and linked.    Date of Annual Physical: 04/02/2018    Date of Lab appt: 03/27/2018    Thank You

## 2018-01-15 ENCOUNTER — PES CALL (OUTPATIENT)
Dept: ADMINISTRATIVE | Facility: CLINIC | Age: 58
End: 2018-01-15

## 2018-01-16 ENCOUNTER — SURGERY (OUTPATIENT)
Age: 58
End: 2018-01-16

## 2018-01-16 ENCOUNTER — HOSPITAL ENCOUNTER (OUTPATIENT)
Facility: OTHER | Age: 58
Discharge: HOME | End: 2018-01-16
Attending: ANESTHESIOLOGY | Admitting: ANESTHESIOLOGY
Payer: MEDICARE

## 2018-01-16 ENCOUNTER — TELEPHONE (OUTPATIENT)
Dept: PAIN MEDICINE | Facility: CLINIC | Age: 58
End: 2018-01-16

## 2018-01-16 VITALS
OXYGEN SATURATION: 98 % | HEART RATE: 71 BPM | RESPIRATION RATE: 18 BRPM | DIASTOLIC BLOOD PRESSURE: 78 MMHG | SYSTOLIC BLOOD PRESSURE: 126 MMHG

## 2018-01-16 DIAGNOSIS — M47.817 DJD (DEGENERATIVE JOINT DISEASE), LUMBOSACRAL: Primary | ICD-10-CM

## 2018-01-16 DIAGNOSIS — G89.29 CHRONIC PAIN: ICD-10-CM

## 2018-01-16 PROCEDURE — 27096 INJECT SACROILIAC JOINT: CPT | Mod: LT,,, | Performed by: ANESTHESIOLOGY

## 2018-01-16 PROCEDURE — 27096 INJECT SACROILIAC JOINT: CPT | Performed by: ANESTHESIOLOGY

## 2018-01-16 PROCEDURE — 25000003 PHARM REV CODE 250: Performed by: ANESTHESIOLOGY

## 2018-01-16 PROCEDURE — 25500020 PHARM REV CODE 255: Performed by: ANESTHESIOLOGY

## 2018-01-16 PROCEDURE — 63600175 PHARM REV CODE 636 W HCPCS: Performed by: ANESTHESIOLOGY

## 2018-01-16 RX ORDER — LIDOCAINE HYDROCHLORIDE 10 MG/ML
INJECTION INFILTRATION; PERINEURAL
Status: DISCONTINUED | OUTPATIENT
Start: 2018-01-16 | End: 2018-01-16 | Stop reason: HOSPADM

## 2018-01-16 RX ORDER — SODIUM CHLORIDE 9 MG/ML
500 INJECTION, SOLUTION INTRAVENOUS CONTINUOUS
Status: DISCONTINUED | OUTPATIENT
Start: 2018-01-16 | End: 2018-01-16 | Stop reason: HOSPADM

## 2018-01-16 RX ORDER — METHYLPREDNISOLONE ACETATE 40 MG/ML
INJECTION, SUSPENSION INTRA-ARTICULAR; INTRALESIONAL; INTRAMUSCULAR; SOFT TISSUE
Status: DISCONTINUED | OUTPATIENT
Start: 2018-01-16 | End: 2018-01-16 | Stop reason: HOSPADM

## 2018-01-16 RX ORDER — GLUCOSAMINE/CHONDRO SU A 500-400 MG
1 TABLET ORAL DAILY
COMMUNITY

## 2018-01-16 RX ORDER — BUPIVACAINE HYDROCHLORIDE 2.5 MG/ML
INJECTION, SOLUTION EPIDURAL; INFILTRATION; INTRACAUDAL
Status: DISCONTINUED | OUTPATIENT
Start: 2018-01-16 | End: 2018-01-16 | Stop reason: HOSPADM

## 2018-01-16 RX ADMIN — IOHEXOL 3 ML: 300 INJECTION, SOLUTION INTRAVENOUS at 08:01

## 2018-01-16 RX ADMIN — LIDOCAINE HYDROCHLORIDE 10 ML: 10 INJECTION, SOLUTION INFILTRATION; PERINEURAL at 08:01

## 2018-01-16 RX ADMIN — BUPIVACAINE HYDROCHLORIDE 10 ML: 2.5 INJECTION, SOLUTION EPIDURAL; INFILTRATION; INTRACAUDAL; PERINEURAL at 08:01

## 2018-01-16 RX ADMIN — METHYLPREDNISOLONE ACETATE 40 MG: 40 INJECTION, SUSPENSION INTRA-ARTICULAR; INTRALESIONAL; INTRAMUSCULAR; SOFT TISSUE at 08:01

## 2018-01-16 NOTE — DISCHARGE SUMMARY
Discharge Note  Short Stay      SUMMARY     Admit Date: 1/16/2018    Attending Physician: Alirio Lei    Discharge Diagnosis: Sacroiliitis [M46.1]    Discharge Physician: Alirio Lei      Discharge Date: 1/16/2018 8:26 AM       PROCEDURE:  Left sacroiliac joint injection under fluoroscopy.    REASON FOR PROCEDURE: left Sacroiliitis [M46.1]    Disposition: Home or self care    Patient Instructions:   Current Discharge Medication List      CONTINUE these medications which have NOT CHANGED    Details   aspirin 81 MG Chew       b complex vitamins tablet Take 1 tablet by mouth once daily.      conjugated estrogens (PREMARIN) vaginal cream Place 1 g vaginally twice a week.  Qty: 1 applicator, Refills: 3    Associated Diagnoses: Vaginal atrophy      estradiol (ESTRACE) 1 MG tablet Take 1 tablet (1 mg total) by mouth once daily.  Qty: 90 tablet, Refills: 3    Associated Diagnoses: Menopausal symptoms      fexofenadine-pseudoephedrine (ALLEGRA-D 24) 180-240 mg per 24 hr tablet Take 1 tablet by mouth once daily.      glucosamine-chondroitin 500-400 mg tablet Take 1 tablet by mouth 3 (three) times daily.      medroxyPROGESTERone (PROVERA) 2.5 MG tablet Take 1 tablet (2.5 mg total) by mouth once daily.  Qty: 90 tablet, Refills: 3    Associated Diagnoses: Menopausal symptoms      RESTASIS 0.05 % ophthalmic emulsion       CETIRIZINE HCL (ZYRTEC ORAL) Take 1 tablet by mouth as needed.      cimetidine (TAGAMET) 400 MG tablet Take 400 mg by mouth 2 (two) times daily.  Refills: 5      diclofenac sodium (VOLTAREN) 1 % Gel Apply 2 g topically 4 (four) times daily.  Qty: 1 Tube, Refills: 2    Associated Diagnoses: Myalgia; Spondylosis of lumbar region without myelopathy or radiculopathy      diphenhydrAMINE (BENADRYL) 25 mg capsule       EPIPEN 2-PEDRO 0.3 mg/0.3 mL (1:1,000) AtIn Refills: 0      FLAXSEED OIL ORAL Take 1 capsule by mouth once daily.      FLUCELVAX QUAD 4836-3191, PF, 60 mcg (15 mcg x 4)/0.5 mL Syrg vaccine TO BE  ADMINISTERED BY PHARMACIST FOR IMMUNIZATION  Refills: 0      fluticasone (FLONASE) 50 mcg/actuation nasal spray UES 1 SPRAY IN EACH NOSTRIL DAILY  Qty: 16 g, Refills: 2      ibuprofen (ADVIL,MOTRIN) 800 MG tablet Take 1 tablet (800 mg total) by mouth 3 (three) times daily. For inflammation of neck disc. Take with food.  Qty: 45 tablet, Refills: 1    Associated Diagnoses: Neck pain on left side; Degenerative cervical disc      LATISSE 0.03 % ophthalmic solution       promethazine (PHENERGAN) 25 MG tablet Take 1 tablet (25 mg total) by mouth every 4 (four) hours.  Qty: 30 tablet, Refills: 2      triamterene-hydrochlorothiazide 37.5-25 mg (DYAZIDE) 37.5-25 mg per capsule Take 1 capsule by mouth daily as needed.  Qty: 30 capsule, Refills: 5             Resume home diet and activity

## 2018-01-16 NOTE — OP NOTE
Sacroiliac Joint Injection under Fluoroscopy    Date of procedure 01/16/2018    Time-out taken to identify patient and procedure side prior to starting the procedure.                                                                 PROCEDURE:  Left sacroiliac joint injection under fluoroscopy.    REASON FOR PROCEDURE: left Sacroiliitis [M46.1]    PHYSICIAN: Alirio Lei MD    Assistants:   Andrey Glasgow, PGY-5, Pain Fellow  I was present and supervising all critical portions of the procedure      MEDICATIONS INJECTED:  Depo-Medrol 40mg and 4 mL Bupivacaine 0.25%    LOCAL ANESTHETIC USED: Xylocaine 1% 5mL    SEDATION MEDICATIONS: None    ESTIMATED BLOOD LOSS:  None.    COMPLICATIONS:  None.    TECHNIQUE:   Laying in the prone position, the patient was prepped and draped in the usual sterile fashion using ChloraPrep and fenestrated drape.  The area was determined under fluoroscopy.  Local Xylocaine was injected by raising a wheel and going down to the periosteum using a 27-gauge hypodermic needle.  The 3.5 inch 22-gauge spinal needle was introduce into the left sacroiliac joint.  Negative pressure applied to confirm no intravascular placement.  Omnipaque was injected to confirm placement and to confirm that there was no vascular runoff.  The medication was then injected slowly.  The patient tolerated the procedure well.      The patient was monitored for approximately 30 minutes after the procedure.  Patient was given post procedure and discharge instructions to follow at home.  We will see the patient back in two weeks or the patient may call to inform of status. The patient was discharged in a stable condition

## 2018-01-16 NOTE — DISCHARGE INSTRUCTIONS
Thank you for allowing us to care for you today. You may receive a survey about the care we provided. Your feedback is valuable and helps us provide excellent care throughout the community. Home Care Instructions Pain Management:    1. DIET:   You may resume your normal diet today.   2. BATHING:   You may shower with luke warm water.  3. DRESSING:   You may remove your bandage today.   4. ACTIVITY LEVEL:   You may resume your normal activities 24 hrs after your procedure.  5. MEDICATIONS:   You may resume your normal medications today.   6. SPECIAL INSTRUCTIONS:   No heat to the injection site for 24 hrs including, bath or shower, heating pad, moist heat, or hot tubs.    Use ice pack to injection site for any pain or discomfort.  Apply ice packs for 20 minute intervals as needed.   If you have received any sedatives by mouth today you may not drive for 12 hours.    If you have received any sedation through your IV, you may not drive for 24 hrs.     PLEASE CALL YOUR DOCTOR IF:  1. Redness or swelling around the injection site.  2. Fever of 101 degrees  3. Drainage (pus) from the injection site.  4. For any continuous bleeding (some dried blood over the incision is normal.)    FOR EMERGENCIES:   If any unusual problems or difficulties occur during clinic hours, call [phone] or [phone].

## 2018-01-16 NOTE — TELEPHONE ENCOUNTER
----- Message from Araceli Gamez MA sent at 1/15/2018  4:56 PM CST -----  Message   Received: Today    Pt Advice   Message Contents   Zach A[facility] Staff  Caller: Raquel Kaye (Today, 12:25 PM)         X_  1st Request   _  2nd Request   _  3rd Request         Who: Raquel Kaye     Why: Patient returning phone call in regards to procedure     What Number to Call Back: [phone]     When to Expect a call back: (Within 24 hours)     Please return the call at earliest convenience. Thanks

## 2018-02-01 ENCOUNTER — OFFICE VISIT (OUTPATIENT)
Dept: SPINE | Facility: CLINIC | Age: 58
End: 2018-02-01
Payer: MEDICARE

## 2018-02-01 VITALS
WEIGHT: 120 LBS | HEART RATE: 76 BPM | SYSTOLIC BLOOD PRESSURE: 101 MMHG | DIASTOLIC BLOOD PRESSURE: 56 MMHG | HEIGHT: 61 IN | BODY MASS INDEX: 22.66 KG/M2

## 2018-02-01 DIAGNOSIS — M79.10 MYALGIA: ICD-10-CM

## 2018-02-01 DIAGNOSIS — M53.3 SACROILIAC JOINT PAIN: Primary | ICD-10-CM

## 2018-02-01 DIAGNOSIS — M47.816 SPONDYLOSIS OF LUMBAR REGION WITHOUT MYELOPATHY OR RADICULOPATHY: ICD-10-CM

## 2018-02-01 PROCEDURE — 99213 OFFICE O/P EST LOW 20 MIN: CPT | Mod: S$GLB,,, | Performed by: NURSE PRACTITIONER

## 2018-02-01 PROCEDURE — 3008F BODY MASS INDEX DOCD: CPT | Mod: S$GLB,,, | Performed by: NURSE PRACTITIONER

## 2018-02-01 PROCEDURE — 99999 PR PBB SHADOW E&M-EST. PATIENT-LVL III: CPT | Mod: PBBFAC,,, | Performed by: NURSE PRACTITIONER

## 2018-02-01 NOTE — PROGRESS NOTES
Chronic Pain - Established Pain    Referring Physician: No ref. provider found    Chief Complaint:   Chief Complaint   Patient presents with    Follow-up        SUBJECTIVE: Disclaimer: This note has been generated using voice-recognition software. There may be typographical errors that have been missed during proof-reading    Interval History 2/1/2018:  The patient presents today for follow up.  She is s/p left SI joint injection on 1/16/18 with about 80% pain relief overall.  She has intermittent pain to left buttock which is mild.  This is mainly with bending and lifting.  Since her last visit, she has also changed her body mechanics which she thinks is helping.  She takes OTC NSAIDs sparingly as needed.  She has also increased her activity and has been walking.  Her pain today is 3/10.    Interval History 12/7/2017:  The patient presents today for follow up of left sided lower back and buttock pain.  She is s/p left L3,4,5 MBB on 11/7/17.  She reports that she felt numbness to her lower back for about one hour after the procedure.  Immediately after the procedure, she went to walk her dog in the park and started to have her usual pain.  Her pain is mainly to the left buttock at this time.  It is worsened with prolonged sitting, changing from sitting to standing, and lifting her 20 lb nephew.  She also notices that she carried him on her left hip which worsens her pain.  She does have some benefit with OTC NSAIDs.  Voltaren gel provided limited benefit.      Initial encounter:    Raquel Kaye presents to the clinic for the evaluation of left sided low back pain. The pain started 10 years ago and symptoms have been worsening.    Brief history:  Patient had a history of radicular symptoms but they are not occurring now.    Pain Description:    The pain is located in the left side of lower back area and does not radiate.      At BEST  2/10     At WORST  9/10 on the WORST day.      On average pain is rated as  5/10.     Today the pain is rated as 4/10    The pain is described as aching and sharp      Symptoms interfere with daily activity.     Exacerbating factors: Sitting, Standing, Laying, Bending, Coughing/Sneezing, Walking, Morning, Lifting and Getting out of bed/chair.      Mitigating factors heat, ice, massage and medications.     Patient denies night fever/night sweats, urinary incontinence, bowel incontinence, significant weight loss, significant motor weakness and loss of sensations.  Patient denies any suicidal or homicidal ideations    Pain Medications:  Current:  Ibuprofen prn with improvement    Tried in Past:  NSAIDs - Ibuprofen and Voltaren gel  TCA -Never  SNRI -Never  Anti-convulsants -asprin 81mg  Muscle Relaxants -Never  Opioids-Never    Physical Therapy/Home Exercise: yes  -works with a physical therapist and psychiatrist     report:  Reviewed and consistent with medication use as prescribed.    Pain Procedures:   11/7/17 Left L3,4,5 MBB- limited relief after 1 hr with activity  1/16/18 Left SI joint injection- 80% relief    Chiropractor -never  Acupuncture - never  TENS unit -never  Spinal decompression -never  Joint replacement -never    Imaging:    X-Ray Lumbar Spine 10/26/17    Narrative     Technique: Lateral neutral, flexion and extension positioning of the lumbosacral spine with additional bilateral oblique and AP views         Comparison: 03/30/2017    Results: Continued convex right curvature the lumbar spine. There is step wise Grade 1 retrolisthesis of L2 on L3 and L3 on L4 with neutral positioning which is relatively fixed with flexion and extension positioning. The lumbar vertebral bodies heights and contours are stable without evidence for acute fracture with scattered endplate degeneration. No evidence for spondylolysis. Further evaluation as warrented clinically.   Impression      See above.       DEXA scan shows osteopenia    Past Medical History:   Diagnosis Date    Allergy      Back pain     Disorder of vestibular function of both ears     Fibrocystic breast disease in female     HEARING LOSS     right side - no hearing aid    Hyperlipidemia     Meniere's disease     Osteopenia     PONV (postoperative nausea and vomiting)     PONV (postoperative nausea and vomiting)     Special screening for malignant neoplasms, colon 7/22/2013     Past Surgical History:   Procedure Laterality Date    ADENOIDECTOMY      APPENDECTOMY      BREAST SURGERY      left breast biopsy    CYST REMOVAL Right     neck    MANDIBLE FRACTURE SURGERY Right     scope    TONSILLECTOMY       Social History     Social History    Marital status: Single     Spouse name: N/A    Number of children: N/A    Years of education: N/A     Occupational History    Not on file.     Social History Main Topics    Smoking status: Never Smoker    Smokeless tobacco: Never Used    Alcohol use Yes      Comment: social    Drug use: No    Sexual activity: Not Currently     Birth control/ protection: None     Other Topics Concern    Not on file     Social History Narrative    No narrative on file     Family History   Problem Relation Age of Onset    Heart disease Mother     Kidney disease Mother     Diabetes Mother     Hypertension Mother     Hyperlipidemia Mother     COPD Mother     Heart disease Father     Cancer Father      lymphoma    Breast cancer Neg Hx     Ovarian cancer Neg Hx        Review of patient's allergies indicates:   Allergen Reactions    Dexamethasone      Flushing and headache with medrol dosepack & tachycardia       Current Outpatient Prescriptions   Medication Sig    aspirin 81 MG Chew     b complex vitamins tablet Take 1 tablet by mouth once daily.    CETIRIZINE HCL (ZYRTEC ORAL) Take 1 tablet by mouth as needed.    cimetidine (TAGAMET) 400 MG tablet Take 400 mg by mouth 2 (two) times daily.    conjugated estrogens (PREMARIN) vaginal cream Place 1 g vaginally twice a week.     diphenhydrAMINE (BENADRYL) 25 mg capsule     EPIPEN 2-PEDRO 0.3 mg/0.3 mL (1:1,000) AtIn     estradiol (ESTRACE) 1 MG tablet Take 1 tablet (1 mg total) by mouth once daily.    fexofenadine-pseudoephedrine (ALLEGRA-D 24) 180-240 mg per 24 hr tablet Take 1 tablet by mouth once daily.    FLAXSEED OIL ORAL Take 1 capsule by mouth once daily.    FLUCELVAX QUAD 9100-1890, PF, 60 mcg (15 mcg x 4)/0.5 mL Syrg vaccine TO BE ADMINISTERED BY PHARMACIST FOR IMMUNIZATION    fluticasone (FLONASE) 50 mcg/actuation nasal spray UES 1 SPRAY IN EACH NOSTRIL DAILY    glucosamine-chondroitin 500-400 mg tablet Take 1 tablet by mouth 3 (three) times daily.    ibuprofen (ADVIL,MOTRIN) 800 MG tablet Take 1 tablet (800 mg total) by mouth 3 (three) times daily. For inflammation of neck disc. Take with food.    LATISSE 0.03 % ophthalmic solution     medroxyPROGESTERone (PROVERA) 2.5 MG tablet Take 1 tablet (2.5 mg total) by mouth once daily.    promethazine (PHENERGAN) 25 MG tablet Take 1 tablet (25 mg total) by mouth every 4 (four) hours.    RESTASIS 0.05 % ophthalmic emulsion     triamterene-hydrochlorothiazide 37.5-25 mg (DYAZIDE) 37.5-25 mg per capsule Take 1 capsule by mouth daily as needed.    diclofenac sodium (VOLTAREN) 1 % Gel Apply 2 g topically 4 (four) times daily.     No current facility-administered medications for this visit.        REVIEW OF SYSTEMS:    GENERAL:  No weight loss, malaise or fevers.  HEENT:   No recent changes in vision or hearing  NECK:  Negative for lumps, no difficulty with swallowing.  RESPIRATORY:  Negative for cough, wheezing or shortness of breath, patient denies any recent URI.  CARDIOVASCULAR:  Negative for chest pain, leg swelling or palpitations.  GI:  Negative for abdominal discomfort, blood in stools or black stools or change in bowel habits.  MUSCULOSKELETAL:  See HPI.  SKIN:  Negative for lesions, rash, and itching.  PSYCH:  No mood disorder or recent psychosocial stressors.  Patient's  "sleep is disturbed secondary to pain.  HEMATOLOGY/LYMPHOLOGY:  Negative for prolonged bleeding, bruising easily or swollen nodes.  Patient is not currently taking any anti-coagulants  ENDO: No history of diabetes or thyroid dysfunction  NEURO:   No history of headaches, syncope, paralysis, seizures or tremors.  All other reviewed and negative other than HPI.    OBJECTIVE:    BP (!) 101/56   Pulse 76   Ht 5' 1" (1.549 m)   Wt 54.4 kg (120 lb)   LMP 04/15/2009   BMI 22.67 kg/m²     PHYSICAL EXAMINATION:    GENERAL: Well appearing, in no acute distress, alert and oriented x3.  PSYCH:  Mood and affect appropriate.  SKIN: Skin color, texture, turgor normal, no rashes or lesions.  HEAD/FACE:  Normocephalic, atraumatic. Cranial nerves grossly intact.  CV: RRR with palpation of the radial artery.  PULM: No evidence of respiratory difficulty, symmetric chest rise.  BACK: Straight leg raising in the sitting and supine positions is negative to radicular pain. There is no pain with palpation over the facet joints of the lumbar spine. There is decreased range of motion with extension to 15 degrees, and facet loading maneuvers cause reproducible pain on the left.    EXTREMITIES: Peripheral joint ROM is full and pain free without obvious instability or laxity in all four extremities. No deformities, edema, or skin discoloration. Good capillary refill.  MUSCULOSKELETAL: Hip, and knee provocative maneuvers are negative.  There is pain with palpation over the sacroiliac joint on the left.  There is no pain to palpation over the greater trochanteric bursa bilaterally.  FABERs test is negative bilaterally.  Provocative maneuvers of the left hip cause posterior hip and SI joint pain.  Bilateral lower extremity strength is normal and symmetric.  No atrophy or tone abnormalities are noted.  NEURO: Bilateral lower extremity coordination and muscle stretch reflexes are physiologic and symmetric.  Plantar response are downgoing. No " clonus.  No loss of sensation is noted.  GAIT: Normal.      ASSESSMENT: 57 y.o. year old female with left sided back/buttock pain, consistent with the following:    Encounter Diagnoses   Name Primary?    Sacroiliac joint pain Yes    Myalgia     Spondylosis of lumbar region without myelopathy or radiculopathy        PLAN:     - Previous imaging was reviewed and discussed with the patient today.    - She is s/p left SI joint injection with significant benefit.  Can repeat if pain returns in the future.      - Continue OTC NSAIDs sparingly as needed.    - The patient will continue a home exercise routine to help with pain and strengthening.  She will continue walking.  We again discussed proper body mechanics, particularly with lifting her great nephew whom she watches frequently.  I provided her with written literature on SI joint pain and stretching.    - RTC as needed.    - Dr. Lei was consulted on the patient and agrees with this plan.      The above plan and management options were discussed at length with patient. Patient is in agreement with the above and verbalized understanding.    Jennifer Pickens  02/01/2018

## 2018-02-01 NOTE — PATIENT INSTRUCTIONS
Anatomy of the Sacroiliac Joint  There are 2 sacroiliac (SI) joints. They are in the very low back (buttocks area). There is 1 joint on either side of the pelvis. The SI joints link the sacrum to the ilium. The sacrum is a triangular bone at the bottom of the spine. The ilium is part of the pelvis. The SI joints are held in place by ligaments. Ligaments are strong tissue that link bone to bone. The joints do not move very much, but they do help with mobility. They work with your spine and femurs to help you bend and walk. They also help bear the weight of your upper body.      Date Last Reviewed: 9/1/2015  © 2744-3035 [employer]. [address] [place] [address], [place] [address]. All rights reserved. This information is not intended as a substitute for professional medical care. Always follow your healthcare professional's instructions.        Sacroiliitis  The sacrum is the triangle-shaped bone at the base of the spine. It is linked to the other pelvis bones by the sacroiliac joints, also called SI joints. Sacroiliitis is when one or both SI joints are hurt or inflamed. It can make small movements of the lower back and pelvis very painful.        This condition has been linked to other diseases. They include ankylosing spondylitis, rheumatoid arthritis, psoriasis, and Crohns disease or colitis. Symptoms may include pain or stiffness in the hips, lower back, thighs, or buttocks. Pain occurs most often in the morning or after sitting for long periods of time. The pain may get worse when you walk. The swinging motion of the hips strains the SI joints.  Sacroiliitis is caused by many factors such as:  · Heavy lifting, especially if not done the right way  · Severe injury, such as a fall or car accident  · Osteoarthritis  · Pregnancy  · Infection of the joint  This condition is hard to diagnose. It may be confused with other causes of low back pain. To confirm the diagnosis, you may be given a shot of numbing  medicine in the SI joint. Treatment includes rest, physical therapy, and anti-inflammatory medicines. If another health problem is the cause, then that must also be treated. More testing may be needed if your symptoms dont get better.  Home care  · If your healthcare provider has prescribed medicines, take all of them as directed.  · You may use over-the-counter pain medicine to control pain, unless another medicine was prescribed. If prednisone was prescribed, dont use NSAIDs, or nonsteroidal anti-inflammatory drugs, such as ibuprofen or naproxen. Talk with your provider before using this medicine if you have chronic liver or kidney disease or ever had a stomach ulcer or GI bleeding.  · If you were referred to physical therapy, make an appointment. Be sure to do any prescribed exercises.  · Dont smoke. Smoking reduces blood flow to the inflamed area. This makes it harder to treat.  Follow-up care  Follow up with your healthcare provider, or as advised.  If you had an X-ray or an MRI, you will be notified of any new findings that may affect your care.  When to seek medical advice  Contact your healthcare provider right away if any of these occur:  · Increasing low back pain  · Inflammation of the eyes  · Skin rash or redness  · Weakness or numbness in one or both legs  · Loss of bowel or bladder control  · Numbness in the groin area  Date Last Reviewed: 11/24/2015  © 7513-9644 [employer]. [address] [place] [address], [place] [id number]. All rights reserved. This information is not intended as a substitute for professional medical care. Always follow your healthcare professional's instructions.        Side Lying Hip Abduction (Strength)    1. Lie down on the floor on your side. Rest your head on your arm. Bend your legs at the knees.  2. Keep your feet together and lift your top leg up so that your knees are . Keep your hips steady.     3. Slowly lower your leg back down.  4. Repeat 10 times, or  as instructed.  5. Switch sides if instructed.     Challenge yourself  Put an elastic band or tubing around your thighs. Raise and lower your top leg slowly and steadily.      Date Last Reviewed: 3/29/2016  © 3677-9366 [employer]. [address] [place] [address], [place] [address]. All rights reserved. This information is not intended as a substitute for professional medical care. Always follow your healthcare professional's instructions.

## 2018-03-13 ENCOUNTER — TELEPHONE (OUTPATIENT)
Dept: OBSTETRICS AND GYNECOLOGY | Facility: CLINIC | Age: 58
End: 2018-03-13

## 2018-03-13 NOTE — TELEPHONE ENCOUNTER
----- Message from Angel Vázquez sent at 3/13/2018  4:02 PM CDT -----  Contact: Pt  _x  1st Request  _  2nd Request  _  3rd Request        Who: RHONDA WHITE [[id number]]    Why: Requesting a call back in regards to rescheduling 03/26 appointment to 04/05. Attempted to schedule, next available is 06/2018  Please return the call at earliest convenience to discuss further. Thanks!    What Number to Call Back: [phone] (home)         When to Expect a call back: (Within 24 hours)

## 2018-03-13 NOTE — TELEPHONE ENCOUNTER
pt called and needs to reschedule her appointment on Monday 3/26. Pt states she can not make it on that day. Rescheduled to 3/29

## 2018-03-14 ENCOUNTER — TELEPHONE (OUTPATIENT)
Dept: INTERNAL MEDICINE | Facility: CLINIC | Age: 58
End: 2018-03-14

## 2018-03-14 DIAGNOSIS — E78.5 HYPERLIPIDEMIA, UNSPECIFIED HYPERLIPIDEMIA TYPE: Primary | ICD-10-CM

## 2018-03-14 NOTE — TELEPHONE ENCOUNTER
----- Message from Diane Wormkan sent at 3/13/2018  4:10 PM CDT -----  Contact: Pt [phone]  Doctor appointment and lab have been scheduled.  Please link lab orders to the lab appointment.  Date of doctor appointment:  05/07/2018  Physical or EP: Ep    Date of lab appointment:  05/01/2018  Comments:

## 2018-03-17 ENCOUNTER — OFFICE VISIT (OUTPATIENT)
Dept: URGENT CARE | Facility: CLINIC | Age: 58
End: 2018-03-17
Payer: MEDICARE

## 2018-03-17 VITALS
TEMPERATURE: 98 F | HEIGHT: 61 IN | WEIGHT: 120 LBS | OXYGEN SATURATION: 99 % | RESPIRATION RATE: 18 BRPM | DIASTOLIC BLOOD PRESSURE: 74 MMHG | BODY MASS INDEX: 22.66 KG/M2 | HEART RATE: 90 BPM | SYSTOLIC BLOOD PRESSURE: 118 MMHG

## 2018-03-17 DIAGNOSIS — L23.7 PLANT ALLERGIC CONTACT DERMATITIS: Primary | ICD-10-CM

## 2018-03-17 PROCEDURE — 3078F DIAST BP <80 MM HG: CPT | Mod: CPTII,S$GLB,, | Performed by: INTERNAL MEDICINE

## 2018-03-17 PROCEDURE — 99213 OFFICE O/P EST LOW 20 MIN: CPT | Mod: 25,S$GLB,, | Performed by: INTERNAL MEDICINE

## 2018-03-17 PROCEDURE — 3074F SYST BP LT 130 MM HG: CPT | Mod: CPTII,S$GLB,, | Performed by: INTERNAL MEDICINE

## 2018-03-17 PROCEDURE — 96372 THER/PROPH/DIAG INJ SC/IM: CPT | Mod: S$GLB,,, | Performed by: INTERNAL MEDICINE

## 2018-03-17 RX ORDER — BETAMETHASONE SODIUM PHOSPHATE AND BETAMETHASONE ACETATE 3; 3 MG/ML; MG/ML
9 INJECTION, SUSPENSION INTRA-ARTICULAR; INTRALESIONAL; INTRAMUSCULAR; SOFT TISSUE ONCE
Status: COMPLETED | OUTPATIENT
Start: 2018-03-17 | End: 2018-03-17

## 2018-03-17 RX ORDER — METHYLPREDNISOLONE 4 MG/1
TABLET ORAL
Qty: 1 PACKAGE | Refills: 0 | Status: SHIPPED | OUTPATIENT
Start: 2018-03-18 | End: 2018-05-07 | Stop reason: ALTCHOICE

## 2018-03-17 RX ORDER — METHYLPREDNISOLONE 4 MG/1
TABLET ORAL
Qty: 1 PACKAGE | Refills: 0 | Status: CANCELLED | OUTPATIENT
Start: 2018-03-18

## 2018-03-17 RX ORDER — BETAMETHASONE SODIUM PHOSPHATE AND BETAMETHASONE ACETATE 3; 3 MG/ML; MG/ML
9 INJECTION, SUSPENSION INTRA-ARTICULAR; INTRALESIONAL; INTRAMUSCULAR; SOFT TISSUE ONCE
Status: CANCELLED | OUTPATIENT
Start: 2018-03-17 | End: 2018-03-17

## 2018-03-17 RX ORDER — HYDROXYZINE PAMOATE 25 MG/1
25 CAPSULE ORAL EVERY 6 HOURS PRN
Qty: 30 CAPSULE | Refills: 0 | Status: SHIPPED | OUTPATIENT
Start: 2018-03-17 | End: 2019-08-29 | Stop reason: SDUPTHER

## 2018-03-17 RX ORDER — HYDROXYZINE PAMOATE 25 MG/1
25 CAPSULE ORAL 4 TIMES DAILY
Qty: 30 CAPSULE | Refills: 0 | Status: CANCELLED | OUTPATIENT
Start: 2018-03-17

## 2018-03-17 RX ADMIN — BETAMETHASONE SODIUM PHOSPHATE AND BETAMETHASONE ACETATE 9 MG: 3; 3 INJECTION, SUSPENSION INTRA-ARTICULAR; INTRALESIONAL; INTRAMUSCULAR; SOFT TISSUE at 09:03

## 2018-03-17 NOTE — PROGRESS NOTES
"Subjective:       Patient ID: Raquel Kaye is a 57 y.o. female.    Vitals:  height is 5' 1" (1.549 m) and weight is 54.4 kg (120 lb). Her oral temperature is 98 °F (36.7 °C). Her blood pressure is 118/74 and her pulse is 90. Her respiration is 18 and oxygen saturation is 99%.     Chief Complaint: Rash    Rash   This is a new problem. The current episode started 1 to 4 weeks ago. The problem is unchanged. The affected locations include the right arm, left arm, left upper leg, left lower leg, right upper leg and right lower leg. The rash is characterized by itchiness. She was exposed to plant contact. Pertinent negatives include no fever, joint pain, shortness of breath or sore throat. Past treatments include anti-itch cream. The treatment provided mild relief. There is no history of asthma, eczema or varicella.     Review of Systems   Constitution: Negative for chills and fever.   HENT: Negative for sore throat.    Respiratory: Negative for shortness of breath.    Skin: Positive for color change, itching and rash.   Musculoskeletal: Negative for joint pain.       Objective:      Physical Exam   Constitutional: She appears well-developed and well-nourished.   Cardiovascular: Normal rate and regular rhythm.    Pulmonary/Chest: Effort normal and breath sounds normal.   Skin:   Linear vesicular rash arms and legs   Nursing note and vitals reviewed.      Assessment:       1. Plant allergic contact dermatitis        Plan:         Plant allergic contact dermatitis    Other orders  -     Cancel: betamethasone acetate-betamethasone sodium phosphate injection 9 mg; Inject 1.5 mLs (9 mg total) into the muscle once.  -     Cancel: methylPREDNISolone (MEDROL DOSEPACK) 4 mg tablet; use as directed  Dispense: 1 Package; Refill: 0  -     Cancel: hydrOXYzine pamoate (VISTARIL) 25 MG Cap; Take 1 capsule (25 mg total) by mouth 4 (four) times daily.  Dispense: 30 capsule; Refill: 0  -     betamethasone acetate-betamethasone sodium " phosphate injection 9 mg; Inject 1.5 mLs (9 mg total) into the muscle once.  -     methylPREDNISolone (MEDROL DOSEPACK) 4 mg tablet; use as directed  Dispense: 1 Package; Refill: 0  -     hydrOXYzine pamoate (VISTARIL) 25 MG Cap; Take 1 capsule (25 mg total) by mouth every 6 (six) hours as needed.  Dispense: 30 capsule; Refill: 0

## 2018-03-20 DIAGNOSIS — N95.2 VAGINAL ATROPHY: ICD-10-CM

## 2018-03-27 ENCOUNTER — TELEPHONE (OUTPATIENT)
Dept: OBSTETRICS AND GYNECOLOGY | Facility: CLINIC | Age: 58
End: 2018-03-27

## 2018-03-27 DIAGNOSIS — Z12.31 ENCOUNTER FOR SCREENING MAMMOGRAM FOR MALIGNANT NEOPLASM OF BREAST: Primary | ICD-10-CM

## 2018-03-27 NOTE — TELEPHONE ENCOUNTER
Pt called to rescheduled appointment for tomorrow because they are leaving town earlier. Rescheduled pt's appointment and scheduled mmg

## 2018-03-27 NOTE — TELEPHONE ENCOUNTER
----- Message from Yoel Taylor sent at 3/27/2018  8:33 AM CDT -----  Contact: Pt  X_ 1st Request  _ 2nd Request  _ 3rd Request    Who: RHONDA WHITE [[id number]]    Why: Patient would like to speak with staff in regards to rescheduling her appointment due to going out of town. Next available isn't until June. Patient refused another provider at this time    What Number to Call Back: [phone]    When to Expect a call back: (Before the end of the day)  -- if call after 3:00 call back will be tomorrow.

## 2018-04-05 ENCOUNTER — HOSPITAL ENCOUNTER (OUTPATIENT)
Dept: RADIOLOGY | Facility: HOSPITAL | Age: 58
Discharge: HOME | End: 2018-04-05
Attending: OBSTETRICS & GYNECOLOGY
Payer: MEDICARE

## 2018-04-05 DIAGNOSIS — Z12.31 ENCOUNTER FOR SCREENING MAMMOGRAM FOR MALIGNANT NEOPLASM OF BREAST: ICD-10-CM

## 2018-04-05 PROCEDURE — 77067 SCR MAMMO BI INCL CAD: CPT | Mod: 26,,, | Performed by: RADIOLOGY

## 2018-04-05 PROCEDURE — 77063 BREAST TOMOSYNTHESIS BI: CPT | Mod: 26,,, | Performed by: RADIOLOGY

## 2018-04-05 PROCEDURE — 77067 SCR MAMMO BI INCL CAD: CPT | Mod: TC

## 2018-04-17 DIAGNOSIS — N95.2 VAGINAL ATROPHY: ICD-10-CM

## 2018-04-19 ENCOUNTER — OFFICE VISIT (OUTPATIENT)
Dept: OBSTETRICS AND GYNECOLOGY | Facility: CLINIC | Age: 58
End: 2018-04-19
Attending: OBSTETRICS & GYNECOLOGY
Payer: MEDICARE

## 2018-04-19 VITALS
WEIGHT: 119.19 LBS | BODY MASS INDEX: 22.5 KG/M2 | DIASTOLIC BLOOD PRESSURE: 80 MMHG | HEIGHT: 61 IN | SYSTOLIC BLOOD PRESSURE: 120 MMHG

## 2018-04-19 DIAGNOSIS — N95.1 MENOPAUSAL SYMPTOMS: ICD-10-CM

## 2018-04-19 DIAGNOSIS — M81.0 AGE-RELATED OSTEOPOROSIS WITHOUT CURRENT PATHOLOGICAL FRACTURE: ICD-10-CM

## 2018-04-19 DIAGNOSIS — Z12.4 SCREENING FOR CERVICAL CANCER: Primary | ICD-10-CM

## 2018-04-19 DIAGNOSIS — M85.80 OSTEOPENIA, UNSPECIFIED LOCATION: ICD-10-CM

## 2018-04-19 DIAGNOSIS — Z01.419 ENCOUNTER FOR GYNECOLOGICAL EXAMINATION WITHOUT ABNORMAL FINDING: ICD-10-CM

## 2018-04-19 DIAGNOSIS — N95.2 VAGINAL ATROPHY: ICD-10-CM

## 2018-04-19 PROCEDURE — G0101 CA SCREEN;PELVIC/BREAST EXAM: HCPCS | Mod: S$GLB,,, | Performed by: OBSTETRICS & GYNECOLOGY

## 2018-04-19 PROCEDURE — 3079F DIAST BP 80-89 MM HG: CPT | Mod: CPTII,S$GLB,, | Performed by: OBSTETRICS & GYNECOLOGY

## 2018-04-19 PROCEDURE — 3074F SYST BP LT 130 MM HG: CPT | Mod: CPTII,S$GLB,, | Performed by: OBSTETRICS & GYNECOLOGY

## 2018-04-19 PROCEDURE — 88175 CYTOPATH C/V AUTO FLUID REDO: CPT

## 2018-04-19 RX ORDER — ESTRADIOL 0.1 MG/G
1 CREAM VAGINAL
Qty: 42.5 G | Refills: 11 | Status: SHIPPED | OUTPATIENT
Start: 2018-04-19 | End: 2018-05-07 | Stop reason: SDUPTHER

## 2018-04-19 RX ORDER — ESTRADIOL 1 MG/1
1 TABLET ORAL DAILY
Qty: 90 TABLET | Refills: 3 | Status: SHIPPED | OUTPATIENT
Start: 2018-04-19 | End: 2018-04-23 | Stop reason: SDUPTHER

## 2018-04-19 RX ORDER — MEDROXYPROGESTERONE ACETATE 2.5 MG/1
2.5 TABLET ORAL DAILY
Qty: 30 TABLET | Refills: 11 | Status: SHIPPED | OUTPATIENT
Start: 2018-04-19 | End: 2018-04-23 | Stop reason: SDUPTHER

## 2018-04-19 RX ORDER — DOXYCYCLINE HYCLATE 50 MG/1
50 CAPSULE ORAL 2 TIMES DAILY
Refills: 2 | COMMUNITY
Start: 2018-04-12 | End: 2019-04-05

## 2018-04-19 RX ORDER — ASPIRIN 325 MG
TABLET, DELAYED RELEASE (ENTERIC COATED) ORAL
COMMUNITY
Start: 2018-04-02 | End: 2018-05-07

## 2018-04-19 NOTE — PROGRESS NOTES
SUBJECTIVE:   57 y.o. female   for annual routine Pap and checkup. Patient's last menstrual period was 04/15/2009..  She has history of osteopenia. .    She desires to stay on HRT    Past Medical History:   Diagnosis Date    Allergy     Back pain     Disorder of vestibular function of both ears     Fibrocystic breast disease in female     HEARING LOSS     right side - no hearing aid    Hyperlipidemia     Meniere's disease     Osteopenia     PONV (postoperative nausea and vomiting)     PONV (postoperative nausea and vomiting)     Special screening for malignant neoplasms, colon 2013     Past Surgical History:   Procedure Laterality Date    ADENOIDECTOMY      APPENDECTOMY      BREAST BIOPSY      BREAST SURGERY      left breast biopsy    CYST REMOVAL Right     neck    DILATION AND CURETTAGE OF UTERUS      MANDIBLE FRACTURE SURGERY Right     scope    TONSILLECTOMY       Social History     Social History    Marital status: Single     Spouse name: N/A    Number of children: N/A    Years of education: N/A     Occupational History    Not on file.     Social History Main Topics    Smoking status: Never Smoker    Smokeless tobacco: Never Used    Alcohol use Yes      Comment: social    Drug use: No    Sexual activity: Yes     Partners: Male     Birth control/ protection: None     Other Topics Concern    Not on file     Social History Narrative    No narrative on file     Family History   Problem Relation Age of Onset    Heart disease Mother     Kidney disease Mother     Diabetes Mother     Hypertension Mother     Hyperlipidemia Mother     COPD Mother     Heart disease Father     Cancer Father      lymphoma    Breast cancer Neg Hx     Ovarian cancer Neg Hx     Colon cancer Neg Hx      OB History    Para Term  AB Living   0   0         SAB TAB Ectopic Multiple Live Births                             Current Outpatient Prescriptions   Medication Sig Dispense  Refill    aspirin 81 MG Chew       b complex vitamins tablet Take 1 tablet by mouth once daily.      CALCIUM 600 WITH VITAMIN D3 600 mg(1,500mg) -200 unit Tab       CETIRIZINE HCL (ZYRTEC ORAL) Take 1 tablet by mouth as needed.      conjugated estrogens (PREMARIN) vaginal cream Place 1 g vaginally twice a week. 30 g 3    diphenhydrAMINE (BENADRYL) 25 mg capsule       doxycycline (VIBRAMYCIN) 50 MG capsule Take 50 mg by mouth 2 (two) times daily.  2    EPIPEN 2-PEDRO 0.3 mg/0.3 mL (1:1,000) AtIn   0    estradiol (ESTRACE) 1 MG tablet Take 1 tablet (1 mg total) by mouth once daily. 90 tablet 3    fexofenadine-pseudoephedrine (ALLEGRA-D 24) 180-240 mg per 24 hr tablet Take 1 tablet by mouth once daily.      FLUCELVAX QUAD [id number], PF, 60 mcg (15 mcg x 4)/0.5 mL Syrg vaccine TO BE ADMINISTERED BY PHARMACIST FOR IMMUNIZATION  0    fluticasone (FLONASE) 50 mcg/actuation nasal spray UES 1 SPRAY IN EACH NOSTRIL DAILY 16 g 2    glucosamine-chondroitin 500-400 mg tablet Take 1 tablet by mouth 3 (three) times daily.      hydrOXYzine pamoate (VISTARIL) 25 MG Cap Take 1 capsule (25 mg total) by mouth every 6 (six) hours as needed. 30 capsule 0    ibuprofen (ADVIL,MOTRIN) 800 MG tablet Take 1 tablet (800 mg total) by mouth 3 (three) times daily. For inflammation of neck disc. Take with food. 45 tablet 1    LATISSE 0.03 % ophthalmic solution       medroxyPROGESTERone (PROVERA) 2.5 MG tablet Take 1 tablet (2.5 mg total) by mouth once daily. 90 tablet 3    methylPREDNISolone (MEDROL DOSEPACK) 4 mg tablet use as directed 1 Package 0    promethazine (PHENERGAN) 25 MG tablet Take 1 tablet (25 mg total) by mouth every 4 (four) hours. 30 tablet 2    RESTASIS 0.05 % ophthalmic emulsion       triamterene-hydrochlorothiazide 37.5-25 mg (DYAZIDE) 37.5-25 mg per capsule Take 1 capsule by mouth daily as needed. 30 capsule 5    calamine lotion       cetirizine 10 mg Cap       clotrimazole (LOTRIMIN) 1 % Crea        estradiol (ESTRACE) 0.01 % (0.1 mg/gram) vaginal cream Place 1 g vaginally twice a week. 42.5 g 11     No current facility-administered medications for this visit.      Allergies: Patient has no known allergies.     The 10-year ASCVD risk score (Cimarronbenjamín GUTIERREZ Jr., et al., 2013) is: 2.1%    Values used to calculate the score:      Age: 57 years      Sex: Female      Is Non- : No      Diabetic: No      Tobacco smoker: No      Systolic Blood Pressure: 120 mmHg      Is BP treated: No      HDL Cholesterol: 65 mg/dL      Total Cholesterol: 181 mg/dL      ROS:  Constitutional: no weight loss, weight gain, fever, fatigue  Eyes:  No vision changes, glasses/contacts  ENT/Mouth: No ulcers, sinus problems, ears ringing, headache  Cardiovascular: No inability to lie flat, chest pain, exercise intolerance, swelling, heart palpitations  Respiratory: No wheezing, coughing blood, shortness of breath, or cough  Gastrointestinal: No diarrhea, bloody stool, nausea/vomiting, constipation, gas, hemorrhoids  Genitourinary: No blood in urine, painful urination, urgency of urination, frequency of urination, incomplete emptying, incontinence, abnormal bleeding, painful periods, heavy periods, vaginal discharge, vaginal odor, painful intercourse, sexual problems, bleeding after intercourse.  Musculoskeletal: No muscle weakness  Skin/Breast: No painful breasts, nipple discharge, masses, rash, ulcers  Neurological: No passing out, seizures, numbness, headache  Endocrine: No diabetes, hypothyroid, hyperthyroid, hot flashes, hair loss, abnormal hair growth, acne  Psychiatric: No depression, crying  Hematologic: No bruises, bleeding, swollen lymph nodes, anemia.      Physical Exam:   Constitutional: She is oriented to person, place, and time. She appears well-developed and well-nourished.      Neck: Normal range of motion. No tracheal deviation present. No thyromegaly present.    Cardiovascular: Exam reveals no edema.      Pulmonary/Chest: Effort normal. She exhibits no mass, no tenderness, no deformity and no retraction. Right breast exhibits no inverted nipple, no mass, no nipple discharge, no skin change, no tenderness, presence, no bleeding and no swelling. Left breast exhibits no inverted nipple, no mass, no nipple discharge, no skin change, no tenderness, presence, no bleeding and no swelling. Breasts are symmetrical.        Abdominal: Soft. She exhibits no distension and no mass. There is no tenderness. There is no rebound and no guarding. No hernia. Hernia confirmed negative in the left inguinal area.     Genitourinary: Vagina normal and uterus normal. Rectal exam shows no external hemorrhoid. There is no rash, tenderness or lesion on the right labia. There is no rash, tenderness or lesion on the left labia. Uterus is not deviated. Cervix is normal. No no adexnal prolapse. Right adnexum displays no mass, no tenderness and no fullness. Left adnexum displays no mass, no tenderness and no fullness. No tenderness, bleeding, rectocele, cystocele or unspecified prolapse of vaginal walls in the vagina. No vaginal discharge found. Cervix exhibits no motion tenderness, no discharge and no friability.           Musculoskeletal: Normal range of motion and moves all extremeties. She exhibits no edema.      Lymphadenopathy:        Right: No inguinal adenopathy present.        Left: No inguinal adenopathy present.    Neurological: She is alert and oriented to person, place, and time.    Skin: No rash noted. No erythema. No pallor.    Psychiatric: She has a normal mood and affect. Her behavior is normal. Judgment and thought content normal.         ASSESSMENT:   well woman  Vaginal atrophy  Menopausal symptoms  PLAN:   mammogram  pap smear  No contraindication to continue HRT  return annually or prn

## 2018-04-23 ENCOUNTER — APPOINTMENT (OUTPATIENT)
Dept: RADIOLOGY | Facility: CLINIC | Age: 58
End: 2018-04-23
Attending: OBSTETRICS & GYNECOLOGY
Payer: MEDICARE

## 2018-04-23 DIAGNOSIS — M81.0 AGE-RELATED OSTEOPOROSIS WITHOUT CURRENT PATHOLOGICAL FRACTURE: ICD-10-CM

## 2018-04-23 DIAGNOSIS — N95.1 MENOPAUSAL SYMPTOMS: ICD-10-CM

## 2018-04-23 DIAGNOSIS — M85.80 OSTEOPENIA, UNSPECIFIED LOCATION: ICD-10-CM

## 2018-04-23 DIAGNOSIS — N95.2 VAGINAL ATROPHY: ICD-10-CM

## 2018-04-23 PROCEDURE — 77080 DXA BONE DENSITY AXIAL: CPT | Mod: 26,,, | Performed by: INTERNAL MEDICINE

## 2018-04-23 PROCEDURE — 77080 DXA BONE DENSITY AXIAL: CPT | Mod: TC,PO

## 2018-04-23 RX ORDER — ESTRADIOL 1 MG/1
1 TABLET ORAL DAILY
Qty: 90 TABLET | Refills: 3 | Status: SHIPPED | OUTPATIENT
Start: 2018-04-23 | End: 2019-04-07 | Stop reason: SDUPTHER

## 2018-04-23 RX ORDER — MEDROXYPROGESTERONE ACETATE 2.5 MG/1
2.5 TABLET ORAL DAILY
Qty: 30 TABLET | Refills: 11 | Status: SHIPPED | OUTPATIENT
Start: 2018-04-23 | End: 2019-04-07 | Stop reason: SDUPTHER

## 2018-04-23 NOTE — TELEPHONE ENCOUNTER
Pt called needs her prescription sent to [facility] of [facility]. Advised pt will have them resent

## 2018-04-23 NOTE — TELEPHONE ENCOUNTER
----- Message from Anila Peoples sent at 4/23/2018  2:38 PM CDT -----            Name of Who is Calling: RHONDA WHITE [[id number]]      What is the request in detail: PT states she needs to speak with Nayeli regarding her prescriptions. Please call to discuss.      Can the clinic reply by [facility]: no      What [facility]: [phone]

## 2018-04-30 ENCOUNTER — TELEPHONE (OUTPATIENT)
Dept: OBSTETRICS AND GYNECOLOGY | Facility: CLINIC | Age: 58
End: 2018-04-30

## 2018-04-30 NOTE — TELEPHONE ENCOUNTER
----- Message from Vinita Chawla sent at 4/30/2018  3:07 PM CDT -----  Pt needs to talk to nurse about her medication. Pt can be reached at [phone].

## 2018-04-30 NOTE — TELEPHONE ENCOUNTER
Pt called stating she is being charged $142 for  the premarin vaginal cream. Pt states she was paying $47 for the last 2 years. Advised pt spoke with pharmacist at [facility] and she stated they were charging her for a monthly supply when she was getting a 90 day supply because she is suppose to use it twice weekly. Pt states she sometimes uses it more then twice weekly. Advised pt will talk to Dr. Mata and see if there is anything else.

## 2018-05-01 ENCOUNTER — LAB VISIT (OUTPATIENT)
Dept: LAB | Facility: HOSPITAL | Age: 58
End: 2018-05-01
Attending: INTERNAL MEDICINE
Payer: MEDICARE

## 2018-05-01 DIAGNOSIS — Z00.00 WELL WOMAN EXAM (NO GYNECOLOGICAL EXAM): ICD-10-CM

## 2018-05-01 DIAGNOSIS — Z51.81 MEDICATION MONITORING ENCOUNTER: ICD-10-CM

## 2018-05-01 DIAGNOSIS — Z79.890 MENOPAUSAL SYNDROME ON HORMONE REPLACEMENT THERAPY: ICD-10-CM

## 2018-05-01 DIAGNOSIS — N95.1 MENOPAUSAL SYNDROME ON HORMONE REPLACEMENT THERAPY: ICD-10-CM

## 2018-05-01 DIAGNOSIS — M85.9 DISORDER OF BONE DENSITY AND STRUCTURE, UNSPECIFIED: ICD-10-CM

## 2018-05-01 DIAGNOSIS — R79.9 ABNORMAL FINDING OF BLOOD CHEMISTRY: ICD-10-CM

## 2018-05-01 DIAGNOSIS — R25.1 TREMOR: ICD-10-CM

## 2018-05-01 LAB
25(OH)D3+25(OH)D2 SERPL-MCNC: 43 NG/ML
ALBUMIN SERPL BCP-MCNC: 3.9 G/DL
ALP SERPL-CCNC: 66 U/L
ALT SERPL W/O P-5'-P-CCNC: 13 U/L
ANION GAP SERPL CALC-SCNC: 10 MMOL/L
AST SERPL-CCNC: 15 U/L
BASOPHILS # BLD AUTO: 0.06 K/UL
BASOPHILS NFR BLD: 0.8 %
BILIRUB SERPL-MCNC: 0.6 MG/DL
BUN SERPL-MCNC: 17 MG/DL
CALCIUM SERPL-MCNC: 8.8 MG/DL
CHLORIDE SERPL-SCNC: 108 MMOL/L
CHOLEST SERPL-MCNC: 194 MG/DL
CHOLEST/HDLC SERPL: 3.1 {RATIO}
CO2 SERPL-SCNC: 22 MMOL/L
CREAT SERPL-MCNC: 0.7 MG/DL
DIFFERENTIAL METHOD: ABNORMAL
EOSINOPHIL # BLD AUTO: 0.2 K/UL
EOSINOPHIL NFR BLD: 3.2 %
ERYTHROCYTE [DISTWIDTH] IN BLOOD BY AUTOMATED COUNT: 12.3 %
EST. GFR  (AFRICAN AMERICAN): >60 ML/MIN/1.73 M^2
EST. GFR  (NON AFRICAN AMERICAN): >60 ML/MIN/1.73 M^2
GLUCOSE SERPL-MCNC: 87 MG/DL
HCT VFR BLD AUTO: 41 %
HDLC SERPL-MCNC: 63 MG/DL
HDLC SERPL: 32.5 %
HGB BLD-MCNC: 13.2 G/DL
IMM GRANULOCYTES # BLD AUTO: 0.02 K/UL
IMM GRANULOCYTES NFR BLD AUTO: 0.3 %
LDLC SERPL CALC-MCNC: 121 MG/DL
LYMPHOCYTES # BLD AUTO: 2.3 K/UL
LYMPHOCYTES NFR BLD: 29.9 %
MCH RBC QN AUTO: 33 PG
MCHC RBC AUTO-ENTMCNC: 32.2 G/DL
MCV RBC AUTO: 103 FL
MONOCYTES # BLD AUTO: 0.6 K/UL
MONOCYTES NFR BLD: 7.5 %
NEUTROPHILS # BLD AUTO: 4.4 K/UL
NEUTROPHILS NFR BLD: 58.3 %
NONHDLC SERPL-MCNC: 131 MG/DL
NRBC BLD-RTO: 0 /100 WBC
PLATELET # BLD AUTO: 245 K/UL
PMV BLD AUTO: 10.8 FL
POTASSIUM SERPL-SCNC: 4 MMOL/L
PROT SERPL-MCNC: 6.9 G/DL
RBC # BLD AUTO: 4 M/UL
SODIUM SERPL-SCNC: 140 MMOL/L
TRIGL SERPL-MCNC: 50 MG/DL
TSH SERPL DL<=0.005 MIU/L-ACNC: 2.04 UIU/ML
WBC # BLD AUTO: 7.58 K/UL

## 2018-05-01 PROCEDURE — 36415 COLL VENOUS BLD VENIPUNCTURE: CPT | Mod: PO

## 2018-05-01 PROCEDURE — 80053 COMPREHEN METABOLIC PANEL: CPT

## 2018-05-01 PROCEDURE — 84443 ASSAY THYROID STIM HORMONE: CPT

## 2018-05-01 PROCEDURE — 85025 COMPLETE CBC W/AUTO DIFF WBC: CPT

## 2018-05-01 PROCEDURE — 82306 VITAMIN D 25 HYDROXY: CPT

## 2018-05-01 PROCEDURE — 80061 LIPID PANEL: CPT

## 2018-05-07 ENCOUNTER — OFFICE VISIT (OUTPATIENT)
Dept: INTERNAL MEDICINE | Facility: CLINIC | Age: 58
End: 2018-05-07
Payer: MEDICARE

## 2018-05-07 VITALS
DIASTOLIC BLOOD PRESSURE: 44 MMHG | SYSTOLIC BLOOD PRESSURE: 92 MMHG | HEIGHT: 61 IN | BODY MASS INDEX: 22.56 KG/M2 | WEIGHT: 119.5 LBS | HEART RATE: 80 BPM | TEMPERATURE: 98 F | RESPIRATION RATE: 16 BRPM

## 2018-05-07 DIAGNOSIS — Z83.719 FH: COLON POLYPS: ICD-10-CM

## 2018-05-07 DIAGNOSIS — Z12.11 ENCOUNTER FOR SCREENING COLONOSCOPY: ICD-10-CM

## 2018-05-07 DIAGNOSIS — Z00.00 WELL ADULT EXAM: Primary | ICD-10-CM

## 2018-05-07 DIAGNOSIS — J30.9 CHRONIC ALLERGIC RHINITIS: ICD-10-CM

## 2018-05-07 PROCEDURE — 3074F SYST BP LT 130 MM HG: CPT | Mod: CPTII,S$GLB,, | Performed by: INTERNAL MEDICINE

## 2018-05-07 PROCEDURE — 3078F DIAST BP <80 MM HG: CPT | Mod: CPTII,S$GLB,, | Performed by: INTERNAL MEDICINE

## 2018-05-07 PROCEDURE — 99396 PREV VISIT EST AGE 40-64: CPT | Mod: S$GLB,,, | Performed by: INTERNAL MEDICINE

## 2018-05-07 PROCEDURE — 99999 PR PBB SHADOW E&M-EST. PATIENT-LVL III: CPT | Mod: PBBFAC,,, | Performed by: INTERNAL MEDICINE

## 2018-05-07 RX ORDER — MINERAL OIL
180 ENEMA (ML) RECTAL DAILY
Qty: 30 TABLET | Refills: 11
Start: 2018-05-07 | End: 2023-04-11

## 2018-05-07 NOTE — PROGRESS NOTES
Subjective:       Patient ID: Raquel Kaye is a 57 y.o. female.    Chief Complaint: Annual Exam    HPI   The patient presents for annual physical examination.  She has been doing well.  She has chronic ALLERGIC rhinitis which she is managing with daily use of Allegra.  She uses Flonase intermittently for exacerbations.  She is very ALLERGIC to poison ivy has had skin outbreaks related sepsis.  She was treated at a local urgent care clinic with steroid medication.  The symptoms have resolved.  She remains physically active.  She exercises regularly.  She has chronic recurrent lower back pain.  She has had a good response to lumbar spine injections.  She is currently asymptomatic.  She is due for a screening colonoscopy.  There is a strong family history of colon polyps in her mother's sister.  She is up-to-date on bone density study and mammogram.  We discussed the new shingles vaccine indicated for adults 50 and over.    Review of Systems   Constitutional: Negative for fatigue, fever and unexpected weight change.   HENT: Positive for congestion and postnasal drip. Negative for rhinorrhea and sore throat.    Eyes: Negative for visual disturbance.   Respiratory: Negative for cough, chest tightness, shortness of breath and wheezing.    Cardiovascular: Negative for chest pain, palpitations and leg swelling.   Gastrointestinal: Negative for abdominal pain and blood in stool.   Genitourinary: Negative for dysuria, frequency and hematuria.   Musculoskeletal: Positive for back pain. Negative for arthralgias, joint swelling and myalgias.   Skin: Positive for rash.   Neurological: Negative for dizziness, syncope, weakness, numbness and headaches.   Psychiatric/Behavioral: Negative for sleep disturbance. The patient is not nervous/anxious.        Objective:      Physical Exam   Constitutional: She is oriented to person, place, and time. Vital signs are normal. She appears well-developed and well-nourished. No distress.    HENT:   Head: Normocephalic and atraumatic.   Right Ear: External ear normal.   Left Ear: External ear normal.   Nose: Nose normal.   Mouth/Throat: Oropharynx is clear and moist. No oropharyngeal exudate.   Eyes: Conjunctivae and EOM are normal. Pupils are equal, round, and reactive to light. No scleral icterus.   Neck: Normal range of motion. Neck supple. No JVD present. Carotid bruit is not present. No thyromegaly present.   Cardiovascular: Normal rate, regular rhythm, normal heart sounds, intact distal pulses and normal pulses.  Exam reveals no gallop and no friction rub.    No murmur heard.  Pulmonary/Chest: Effort normal and breath sounds normal. No respiratory distress. She has no wheezes. She has no rales.   Abdominal: Soft. Bowel sounds are normal. She exhibits no abdominal bruit and no mass. There is no splenomegaly or hepatomegaly. There is no tenderness. No hernia.   Musculoskeletal: Normal range of motion. She exhibits no edema or tenderness.        Right shoulder: She exhibits no effusion and no deformity.   Lymphadenopathy:     She has no cervical adenopathy.     She has no axillary adenopathy.        Right: No supraclavicular adenopathy present.        Left: No supraclavicular adenopathy present.   Neurological: She is alert and oriented to person, place, and time. She has normal strength. No cranial nerve deficit.   Skin: Skin is warm and dry. No rash noted.   Psychiatric: She has a normal mood and affect. Her speech is normal and behavior is normal.   Nursing note and vitals reviewed.      Results for orders placed or performed in visit on 05/01/18   Urinalysis   Result Value Ref Range    Specimen UA Urine, Clean Catch     Color, UA Yellow Yellow, Straw, Genie    Appearance, UA Hazy (A) Clear    pH, UA 5.0 5.0 - 8.0    Specific Gravity, UA 1.015 1.005 - 1.030    Protein, UA Negative Negative    Glucose, UA Negative Negative    Ketones, UA Negative Negative    Bilirubin (UA) Negative Negative     Occult Blood UA Negative Negative    Nitrite, UA Negative Negative    Urobilinogen, UA Negative <2.0 EU/dL    Leukocytes, UA Negative Negative     Other labs were reviewed with the patient.  Assessment:       1. Well adult exam    2. Chronic allergic rhinitis    3. Encounter for screening colonoscopy    4. FH: colon polyps        Plan:           Raquel was seen today for annual exam.  Current therapy was reviewed.  A prescription for the Shingrix shingles vaccine was given to the patient.  She will take this to her pharmacy.  A screening colonoscopy will be ordered.  The patient is to return to clinic annually and as needed.    Diagnoses and all orders for this visit:    Well adult exam    Chronic allergic rhinitis    Encounter for screening colonoscopy  -     Case request GI: COLONOSCOPY    FH: colon polyps  -     Case request GI: COLONOSCOPY    Other orders  -     fexofenadine (ALLEGRA) 180 MG tablet; Take 1 tablet (180 mg total) by mouth once daily.  -     varicella-zoster [id number], PF, (SHINGRIX, PF,) 50 mcg/0.5 mL injection; Inject 0.5 mLs into the muscle once.

## 2018-07-11 ENCOUNTER — PES CALL (OUTPATIENT)
Dept: ADMINISTRATIVE | Facility: CLINIC | Age: 58
End: 2018-07-11

## 2018-07-30 ENCOUNTER — TELEPHONE (OUTPATIENT)
Dept: INTERNAL MEDICINE | Facility: CLINIC | Age: 58
End: 2018-07-30

## 2018-07-30 DIAGNOSIS — Z12.11 SPECIAL SCREENING FOR MALIGNANT NEOPLASMS, COLON: Primary | ICD-10-CM

## 2018-07-30 NOTE — TELEPHONE ENCOUNTER
----- Message from Lilian Dumont sent at 7/30/2018 11:19 AM CDT -----  Contact: Raquel Kaye [phone]  Raquel is requesting a colonoscopy.

## 2018-08-02 DIAGNOSIS — Z12.11 SPECIAL SCREENING FOR MALIGNANT NEOPLASMS, COLON: Primary | ICD-10-CM

## 2018-08-02 RX ORDER — POLYETHYLENE GLYCOL 3350, SODIUM SULFATE ANHYDROUS, SODIUM BICARBONATE, SODIUM CHLORIDE, POTASSIUM CHLORIDE 236; 22.74; 6.74; 5.86; 2.97 G/4L; G/4L; G/4L; G/4L; G/4L
4 POWDER, FOR SOLUTION ORAL ONCE
Qty: 4000 ML | Refills: 0 | Status: SHIPPED | OUTPATIENT
Start: 2018-08-02 | End: 2018-08-02

## 2018-09-07 ENCOUNTER — TELEPHONE (OUTPATIENT)
Dept: PAIN MEDICINE | Facility: CLINIC | Age: 58
End: 2018-09-07

## 2018-09-07 NOTE — TELEPHONE ENCOUNTER
----- Message from Rivka Valdivia sent at 9/7/2018 12:53 PM CDT -----  Contact: pt            Name of Who is Calling: RHONDA WHITE [[id number]]      What is the request in detail: pt calling to schedule injection appt.. Please adivse      Can the clinic reply by [facility]: no      [facility]: [phone]

## 2018-09-07 NOTE — TELEPHONE ENCOUNTER
Staff contacted and spoke to patient regarding her message, she reports she was told she could call to schedule a repeat injection when her pain returned and it has.     She was informed that her request will be presented to Jennifer Pickens and once she informs staff what is required for her she would be contacted by the schedulers.     Patient verbalized understanding.

## 2018-09-10 DIAGNOSIS — M53.3 SACROILIAC JOINT PAIN: Primary | ICD-10-CM

## 2018-09-25 ENCOUNTER — ANESTHESIA EVENT (OUTPATIENT)
Dept: ENDOSCOPY | Facility: HOSPITAL | Age: 58
End: 2018-09-25
Payer: MEDICARE

## 2018-09-27 ENCOUNTER — HOSPITAL ENCOUNTER (OUTPATIENT)
Facility: HOSPITAL | Age: 58
Discharge: HOME | End: 2018-09-27
Attending: COLON & RECTAL SURGERY | Admitting: COLON & RECTAL SURGERY
Payer: MEDICARE

## 2018-09-27 ENCOUNTER — ANESTHESIA (OUTPATIENT)
Dept: ENDOSCOPY | Facility: HOSPITAL | Age: 58
End: 2018-09-27
Payer: MEDICARE

## 2018-09-27 VITALS
TEMPERATURE: 98 F | RESPIRATION RATE: 17 BRPM | SYSTOLIC BLOOD PRESSURE: 115 MMHG | DIASTOLIC BLOOD PRESSURE: 58 MMHG | HEART RATE: 64 BPM | OXYGEN SATURATION: 100 %

## 2018-09-27 DIAGNOSIS — Z12.11 SCREENING FOR COLON CANCER: Primary | ICD-10-CM

## 2018-09-27 DIAGNOSIS — Z12.11 SPECIAL SCREENING FOR MALIGNANT NEOPLASMS, COLON: ICD-10-CM

## 2018-09-27 PROCEDURE — 37000009 HC ANESTHESIA EA ADD 15 MINS: Performed by: COLON & RECTAL SURGERY

## 2018-09-27 PROCEDURE — 25000003 PHARM REV CODE 250: Performed by: NURSE PRACTITIONER

## 2018-09-27 PROCEDURE — E9220 PRA ENDO ANESTHESIA: HCPCS | Mod: ,,, | Performed by: NURSE ANESTHETIST, CERTIFIED REGISTERED

## 2018-09-27 PROCEDURE — G0121 COLON CA SCRN NOT HI RSK IND: HCPCS | Mod: ,,, | Performed by: COLON & RECTAL SURGERY

## 2018-09-27 PROCEDURE — G0105 COLORECTAL SCRN; HI RISK IND: HCPCS | Performed by: COLON & RECTAL SURGERY

## 2018-09-27 PROCEDURE — 63600175 PHARM REV CODE 636 W HCPCS: Performed by: NURSE ANESTHETIST, CERTIFIED REGISTERED

## 2018-09-27 PROCEDURE — G0121 COLON CA SCRN NOT HI RSK IND: HCPCS | Performed by: COLON & RECTAL SURGERY

## 2018-09-27 PROCEDURE — 37000008 HC ANESTHESIA 1ST 15 MINUTES: Performed by: COLON & RECTAL SURGERY

## 2018-09-27 RX ORDER — LIDOCAINE HCL/PF 100 MG/5ML
SYRINGE (ML) INTRAVENOUS
Status: DISCONTINUED | OUTPATIENT
Start: 2018-09-27 | End: 2018-09-27

## 2018-09-27 RX ORDER — ONDANSETRON 2 MG/ML
INJECTION INTRAMUSCULAR; INTRAVENOUS
Status: DISCONTINUED | OUTPATIENT
Start: 2018-09-27 | End: 2018-09-27

## 2018-09-27 RX ORDER — PROPOFOL 10 MG/ML
VIAL (ML) INTRAVENOUS CONTINUOUS PRN
Status: DISCONTINUED | OUTPATIENT
Start: 2018-09-27 | End: 2018-09-27

## 2018-09-27 RX ORDER — SODIUM CHLORIDE 0.9 % (FLUSH) 0.9 %
3 SYRINGE (ML) INJECTION
Status: DISCONTINUED | OUTPATIENT
Start: 2018-09-27 | End: 2018-09-27 | Stop reason: HOSPADM

## 2018-09-27 RX ORDER — SODIUM CHLORIDE 9 MG/ML
INJECTION, SOLUTION INTRAVENOUS CONTINUOUS
Status: DISCONTINUED | OUTPATIENT
Start: 2018-09-27 | End: 2018-09-27 | Stop reason: HOSPADM

## 2018-09-27 RX ORDER — PROPOFOL 10 MG/ML
VIAL (ML) INTRAVENOUS
Status: DISCONTINUED | OUTPATIENT
Start: 2018-09-27 | End: 2018-09-27

## 2018-09-27 RX ADMIN — SODIUM CHLORIDE: 0.9 INJECTION, SOLUTION INTRAVENOUS at 07:09

## 2018-09-27 RX ADMIN — ONDANSETRON 4 MG: 2 INJECTION INTRAMUSCULAR; INTRAVENOUS at 08:09

## 2018-09-27 RX ADMIN — LIDOCAINE HYDROCHLORIDE 50 MG: 20 INJECTION, SOLUTION INTRAVENOUS at 08:09

## 2018-09-27 RX ADMIN — PROPOFOL 200 MCG/KG/MIN: 10 INJECTION, EMULSION INTRAVENOUS at 08:09

## 2018-09-27 RX ADMIN — PROPOFOL 70 MG: 10 INJECTION, EMULSION INTRAVENOUS at 08:09

## 2018-09-27 NOTE — [employer] PATIENT INSTRUCTIONS
Discharge Summary/Instructions after an Endoscopic Procedure  Patient Name: Raquel Kaye  Patient MRN: [id number]  Patient YOB: 1960 Thursday, September 27, 2018  Uche Smiley MD  RESTRICTIONS:  During your procedure today, you received medications for sedation.  These   medications may affect your judgment, balance and coordination.  Therefore,   for 24 hours, you have the following restrictions:   - DO NOT drive a car, operate machinery, make legal/financial decisions,   sign important papers or drink alcohol.    ACTIVITY:  Today: no heavy lifting, straining or running due to procedural   sedation/anesthesia.  The following day: return to full activity including work.  DIET:  Eat and drink normally unless instructed otherwise.     TREATMENT FOR COMMON SIDE EFFECTS:  - Mild abdominal pain, nausea, belching, bloating or excessive gas:  rest,   eat lightly and use a heating pad.  - Sore Throat: treat with throat lozenges and/or gargle with warm salt   water.  - Because air was used during the procedure, expelling large amounts of air   from your rectum or belching is normal.  - If a bowel prep was taken, you may not have a bowel movement for 1-3 days.    This is normal.  SYMPTOMS TO WATCH FOR AND REPORT TO YOUR PHYSICIAN:  1. Abdominal pain or bloating, other than gas cramps.  2. Chest pain.  3. Back pain.  4. Signs of infection such as: chills or fever occurring within 24 hours   after the procedure.  5. Rectal bleeding, which would show as bright red, maroon, or black stools.   (A tablespoon of blood from the rectum is not serious, especially if   hemorrhoids are present.)  6. Vomiting.  7. Weakness or dizziness.  GO DIRECTLY TO THE NEAREST EMERGENCY ROOM IF YOU HAVE ANY OF THE FOLLOWING:      Difficulty breathing              Chills and/or fever over 101 F   Persistent vomiting and/or vomiting blood   Severe abdominal pain   Severe chest pain   Black, tarry stools   Bleeding- more than one  tablespoon   Any other symptom or condition that you feel may need urgent attention  Your doctor recommends these additional instructions:  If any biopsies were taken, your doctors clinic will contact you in 1 to 2   weeks with any results.  - Patient has a contact number available for emergencies.  The signs and   symptoms of potential delayed complications were discussed with the   patient.  Return to normal activities tomorrow.  Written discharge   instructions were provided to the patient.   - Discharge patient to home (ambulatory).   - Resume regular diet indefinitely.   - Repeat colonoscopy in 10 years for screening purposes.   - Continue present medications.  For questions, problems or results please call your physician - Uche Smiley MD at Work:  [phone].  [facility] [place], EMERGENCY ROOM PHONE NUMBER: [phone]  IF A COMPLICATION OR EMERGENCY SITUATION ARISES AND YOU ARE UNABLE TO REACH   YOUR PHYSICIAN - GO DIRECTLY TO THE EMERGENCY ROOM.  Uche Smiley MD  9/27/2018 9:02:44 AM  This report has been verified and signed electronically.  [employer]

## 2018-09-27 NOTE — DISCHARGE INSTRUCTIONS

## 2018-09-27 NOTE — ANESTHESIA PREPROCEDURE EVALUATION
09/27/2018  Raquel Kaye is a 58 y.o., female.    Anesthesia Evaluation    I have reviewed the Patient Summary Reports.     I have reviewed the Medications.     Review of Systems  Anesthesia Hx:  No problems with previous Anesthesia Denies Hx of Anesthetic complications  Neg history of prior surgery. Denies Family Hx of Anesthesia complications.  Personal Hx of Anesthesia complications, Post-Operative Nausea/Vomiting, in the past, but not with recent anesthetics / prophylaxis   Social:  Non-Smoker    Hematology/Oncology:  Hematology Normal   Oncology Normal     EENT/Dental:EENT/Dental Normal   Cardiovascular:  Cardiovascular Normal Exercise tolerance: good     Pulmonary:  Pulmonary Normal    Renal/:  Renal/ Normal     Hepatic/GI:  Hepatic/GI Normal    Musculoskeletal:   Arthritis     Neurological:  Neurology Normal    Endocrine:  Endocrine Normal    Dermatological:  Skin Normal    Psych:  Psychiatric Normal           Physical Exam  General:  Well nourished    Airway/Jaw/Neck:  Airway Findings: Mouth Opening: Normal Tongue: Normal  General Airway Assessment: Adult  Mallampati: II  TM Distance: Normal, at least 6 cm        Eyes/Ears/Nose:  EYES/EARS/NOSE FINDINGS: Normal   Dental:  Dental Findings: In tact   Chest/Lungs:  Chest/Lungs Findings: Clear to auscultation, Normal Respiratory Rate     Heart/Vascular:  Heart Findings: Rate: Normal  Rhythm: Regular Rhythm  Sounds: Normal  Heart murmur: negative Vascular Findings: Normal    Abdomen:  Abdomen Findings:  Normal, Soft, Nontender     Musculoskeletal:  Musculoskeletal Findings: Normal   Skin:  Skin Findings: Normal    Mental Status:  Mental Status Findings:  Cooperative, Alert and Oriented         Anesthesia Plan  Type of Anesthesia, risks & benefits discussed:  Anesthesia Type:  general  Patient's Preference:   Intra-op Monitoring Plan: standard ASA  monitors  Intra-op Monitoring Plan Comments:   Post Op Pain Control Plan:   Post Op Pain Control Plan Comments:   Induction:   IV  Beta Blocker:  Patient is not currently on a Beta-Blocker (No further documentation required).       Informed Consent: Patient understands risks and agrees with Anesthesia plan.  Questions answered. Anesthesia consent signed with patient.  ASA Score: 2     Day of Surgery Review of History & Physical:    H&P update referred to the provider.         Ready For Surgery From Anesthesia Perspective.

## 2018-09-27 NOTE — ANESTHESIA POSTPROCEDURE EVALUATION
Anesthesia Post Evaluation    Patient: Raquel Kaye    Procedure(s) Performed: Procedure(s) (LRB):  COLONOSCOPY (N/A)    Final Anesthesia Type: general  Patient location during evaluation: PACU  Patient participation: Yes- Able to Participate  Level of consciousness: awake and alert  Post-procedure vital signs: reviewed and stable  Pain management: adequate  Airway patency: patent  PONV status at discharge: No PONV  Anesthetic complications: no      Cardiovascular status: blood pressure returned to baseline  Respiratory status: unassisted  Hydration status: euvolemic  Follow-up not needed.        Visit Vitals  BP (!) 115/58 (BP Location: Left arm, Patient Position: Lying)   Pulse 64   Temp 36.6 °C (97.9 °F) (Temporal)   Resp 17   LMP 04/15/2009   SpO2 100%   Breastfeeding? No       Pain/Abelardo Score: Pain Assessment Performed: Yes (9/27/2018  7:51 AM)  Presence of Pain: denies (9/27/2018  9:30 AM)  Pain Rating Prior to Med Admin: 0 (9/27/2018  7:51 AM)  Abelardo Score: 10 (9/27/2018  9:15 AM)

## 2018-09-27 NOTE — TRANSFER OF CARE
Anesthesia Transfer of Care Note    Patient: Raquel Kaye    Procedure(s) Performed: Procedure(s) (LRB):  COLONOSCOPY (N/A)    Patient location: GI    Anesthesia Type: general    Transport from OR: Transported from OR on 2-3 L/min O2 by NC with adequate spontaneous ventilation    Post pain: adequate analgesia    Post assessment: no apparent anesthetic complications and tolerated procedure well    Post vital signs: stable    Level of consciousness: awake, alert and oriented    Nausea/Vomiting: no nausea/vomiting    Complications: none    Transfer of care protocol was followed      Last vitals:   Visit Vitals  LMP 04/15/2009   Breastfeeding? No

## 2018-09-27 NOTE — PLAN OF CARE
Pt verbalized an understanding of discharge instructions including diet, s/s to notify md, and follow up.  Pt refused wheel chair and will ambulate off unit with boyfriend.

## 2018-09-27 NOTE — ANESTHESIA POSTPROCEDURE EVALUATION
Anesthesia Post Evaluation    Patient: Raquel Kaye    Procedure(s) Performed: Procedure(s) (LRB):  COLONOSCOPY (N/A)    [facility] Anesthesia Post Op Evaluation    Visit Vitals  BP (!) 115/58 (BP Location: Left arm, Patient Position: Lying)   Pulse 64   Temp 36.6 °C (97.9 °F) (Temporal)   Resp 17   LMP 04/15/2009   SpO2 100%   Breastfeeding? No       Pain/Abelardo Score: Pain Assessment Performed: Yes (9/27/2018  7:51 AM)  Presence of Pain: denies (9/27/2018  9:30 AM)  Pain Rating Prior to Med Admin: 0 (9/27/2018  7:51 AM)  Abelardo Score: 10 (9/27/2018  9:15 AM)

## 2018-09-27 NOTE — H&P
Endoscopy H&P    Procedure : Colonoscopy      personal history of colon polyps, last c-scope 2013 with no polyps, undergoing 5 year follow-up; parents both with colonic polyps no known fhx or CRC, UC, CD      Past Medical History:   Diagnosis Date    Allergy     Back pain     Disorder of vestibular function of both ears     Fibrocystic breast disease in female     HEARING LOSS     right side - no hearing aid    Hyperlipidemia     Meniere's disease     Osteopenia     PONV (postoperative nausea and vomiting)     PONV (postoperative nausea and vomiting)     Special screening for malignant neoplasms, colon 7/22/2013     Sedation Problems: NO  Family History   Problem Relation Age of Onset    Heart disease Mother     Kidney disease Mother     Diabetes Mother     Hypertension Mother     Hyperlipidemia Mother     COPD Mother     Heart disease Father     Cancer Father         lymphoma    Breast cancer Neg Hx     Ovarian cancer Neg Hx     Colon cancer Neg Hx      Fam Hx of Sedation Problems: NO  Social History     Socioeconomic History    Marital status: Single     Spouse name: Not on file    Number of children: Not on file    Years of education: Not on file    Highest education level: Not on file   Social Needs    Financial resource strain: Not on file    Food insecurity - worry: Not on file    Food insecurity - inability: Not on file    Transportation needs - medical: Not on file    Transportation needs - non-medical: Not on file   Occupational History    Not on file   Tobacco Use    Smoking status: Never Smoker    Smokeless tobacco: Never Used   Substance and Sexual Activity    Alcohol use: Yes     Comment: social    Drug use: No    Sexual activity: Yes     Partners: Male     Birth control/protection: None   Other Topics Concern    Not on file   Social History Narrative    Not on file       Review of Systems -  Negative     Respiratory ROS: no cough, shortness of breath, or wheezing  Cardiovascular ROS: no chest pain or dyspnea on exertion  Gastrointestinal ROS: no abdominal pain, change in bowel habits, or black or bloody stools  Musculoskeletal ROS: negative  Neurological ROS: negative        Physical Exam:  General: well nourished, no distress  Head: normocephalic  Airway:  normal oropharynx, airway normal  Neck: supple, symmetrical, trachea midline  Lungs:  normal respiratory effort  Heart: HDS  Abdomen: SNTND  Extremities: warm, well perfused       Deep Sedation: Mallampati Score per anesthesia     SedationPlan :Choice     ASA : II

## 2018-10-01 ENCOUNTER — TELEPHONE (OUTPATIENT)
Dept: SURGERY | Facility: CLINIC | Age: 58
End: 2018-10-01

## 2018-10-01 NOTE — TELEPHONE ENCOUNTER
----- Message from Smiley Tomas sent at 10/1/2018  9:32 AM CDT -----  Contact: self [phone]  Pt called stating she is having a small stomach problems after her colonoscopy on 09/27. She is inquiring if that is normal; she also states she had one small bowel     Contact: self [phone]

## 2018-10-01 NOTE — TELEPHONE ENCOUNTER
Spoke with patient and she reports that she has some fullness in her abdomen with only a small bowel movement after having a colonoscopy a 5 days ago.  Instructed her to take a capful of Miralax and call back if this does not help.

## 2018-10-03 ENCOUNTER — PATIENT MESSAGE (OUTPATIENT)
Dept: INTERNAL MEDICINE | Facility: CLINIC | Age: 58
End: 2018-10-03

## 2018-10-03 RX ORDER — MECLIZINE HYDROCHLORIDE 25 MG/1
25 TABLET ORAL EVERY 8 HOURS PRN
Qty: 40 TABLET | Refills: 2 | Status: SHIPPED | OUTPATIENT
Start: 2018-10-03 | End: 2019-08-29

## 2018-10-03 RX ORDER — PROMETHAZINE HYDROCHLORIDE 25 MG/1
25 TABLET ORAL EVERY 4 HOURS PRN
Qty: 30 TABLET | Refills: 2 | Status: SHIPPED | OUTPATIENT
Start: 2018-10-03 | End: 2020-03-04

## 2018-10-03 RX ORDER — PROMETHAZINE HYDROCHLORIDE 25 MG/1
25 TABLET ORAL EVERY 4 HOURS
Qty: 30 TABLET | Refills: 2 | Status: CANCELLED | OUTPATIENT
Start: 2018-10-03

## 2018-10-04 ENCOUNTER — TELEPHONE (OUTPATIENT)
Dept: ENDOSCOPY | Facility: HOSPITAL | Age: 58
End: 2018-10-04

## 2018-10-16 ENCOUNTER — HOSPITAL ENCOUNTER (OUTPATIENT)
Facility: OTHER | Age: 58
Discharge: HOME | End: 2018-10-16
Attending: ANESTHESIOLOGY | Admitting: ANESTHESIOLOGY
Payer: MEDICARE

## 2018-10-16 VITALS
HEART RATE: 72 BPM | OXYGEN SATURATION: 99 % | DIASTOLIC BLOOD PRESSURE: 65 MMHG | SYSTOLIC BLOOD PRESSURE: 146 MMHG | HEIGHT: 61 IN | RESPIRATION RATE: 18 BRPM | BODY MASS INDEX: 21.71 KG/M2 | TEMPERATURE: 98 F | WEIGHT: 115 LBS

## 2018-10-16 DIAGNOSIS — M46.1 SACROILIITIS, NOT ELSEWHERE CLASSIFIED: Primary | ICD-10-CM

## 2018-10-16 DIAGNOSIS — R52 PAIN: ICD-10-CM

## 2018-10-16 PROCEDURE — 25000003 PHARM REV CODE 250: Performed by: ANESTHESIOLOGY

## 2018-10-16 PROCEDURE — 25500020 PHARM REV CODE 255: Performed by: ANESTHESIOLOGY

## 2018-10-16 PROCEDURE — 27096 INJECT SACROILIAC JOINT: CPT | Mod: LT,,, | Performed by: ANESTHESIOLOGY

## 2018-10-16 PROCEDURE — 63600175 PHARM REV CODE 636 W HCPCS: Performed by: ANESTHESIOLOGY

## 2018-10-16 PROCEDURE — 27096 INJECT SACROILIAC JOINT: CPT | Performed by: ANESTHESIOLOGY

## 2018-10-16 RX ORDER — LIDOCAINE HYDROCHLORIDE 10 MG/ML
INJECTION, SOLUTION EPIDURAL; INFILTRATION; INTRACAUDAL; PERINEURAL
Status: DISCONTINUED | OUTPATIENT
Start: 2018-10-16 | End: 2018-10-16 | Stop reason: HOSPADM

## 2018-10-16 RX ORDER — BUPIVACAINE HYDROCHLORIDE 2.5 MG/ML
INJECTION, SOLUTION EPIDURAL; INFILTRATION; INTRACAUDAL
Status: DISCONTINUED | OUTPATIENT
Start: 2018-10-16 | End: 2018-10-16 | Stop reason: HOSPADM

## 2018-10-16 RX ORDER — METHYLPREDNISOLONE ACETATE 40 MG/ML
INJECTION, SUSPENSION INTRA-ARTICULAR; INTRALESIONAL; INTRAMUSCULAR; SOFT TISSUE
Status: DISCONTINUED | OUTPATIENT
Start: 2018-10-16 | End: 2018-10-16 | Stop reason: HOSPADM

## 2018-10-16 NOTE — OP NOTE
Sacroiliac Joint Injection under Fluoroscopy    Date of procedure 10/16/2018    Time-out taken to identify patient and procedure side prior to starting the procedure.                                                                 PROCEDURE:  leftsacroiliac joint injection under fluoroscopy.    REASON FOR PROCEDURE: left Sacroiliac joint pain [M53.3]    PHYSICIAN: Alirio Lei MD    ASSISTANTS: >None    MEDICATIONS INJECTED:  Depo-Medrol 40mg and 4 mL Bupivacaine 0.25%    LOCAL ANESTHETIC USED: Xylocaine 1% 5mL    SEDATION MEDICATIONS: None    ESTIMATED BLOOD LOSS:  None.    COMPLICATIONS:  None.    TECHNIQUE:   Laying in the prone position, the patient was prepped and draped in the usual sterile fashion using ChloraPrep and fenestrated drape.  The area was determined under fluoroscopy.  Local Xylocaine was injected by raising a wheel and going down to the periosteum using a 27-gauge hypodermic needle.  The 3.5 inch 22-gauge spinal needle was introduce into the left sacroiliac joint.  Negative pressure applied to confirm no intravascular placement.  Omnipaque was injected to confirm placement and to confirm that there was no vascular runoff.  The medication was then injected slowly.  The patient tolerated the procedure well.      The patient was monitored for approximately 30 minutes after the procedure.  Patient was given post procedure and discharge instructions to follow at home.  We will see the patient back in two weeks or the patient may call to inform of status. The patient was discharged in a stable condition

## 2018-10-16 NOTE — DISCHARGE INSTRUCTIONS
Thank you for allowing us to care for you today. You may receive a survey about the care we provided. Your feedback is valuable and helps us provide excellent care throughout the community.     Home Care Instructions for Pain Management:    1. DIET:   You may resume your normal diet today.   2. BATHING:   You may shower with luke warm water. No tub baths or anything that will soak injection sites under water for the next 24 hours.  3. DRESSING:   You may remove your bandage today.   4. ACTIVITY LEVEL:   You may resume your normal activities 24 hrs after your procedure. Nothing strenuous today.  5. MEDICATIONS:   You may resume your normal medications today. To restart blood thinners, ask your doctor.  6. DRIVING    If you have received any sedatives by mouth today, you may not drive for 12 hours.    If you have received any sedation through your IV, you may not drive for 24 hrs.   7. SPECIAL INSTRUCTIONS:   No heat to the injection site for 24 hrs including, hot bath or shower, heating pad, moist heat, or hot tubs.    Use ice pack to injection site for any pain or discomfort.  Apply ice packs for 20 minute intervals as needed.    IF you have diabetes, be sure to monitor your blood sugar more closely. IF your injection contained steroids your blood sugar levels may become higher than normal.    If you are still having pain upon discharge:  Your pain may improve over the next 48 hours. The anesthetic (numbing medication) works immediately to 48 hours. IF your injection contained a steroid (anti-inflammatory medication), it takes approximately 3 days to start feeling relief and 7-10 days to see your greatest results from the medication. It is possible you may need subsequent injections. This would be discussed at your follow up appointment with pain management or your referring doctor.      PLEASE CALL YOUR DOCTOR IF:  1. Redness or swelling around the injection site.  2. Fever of 101 degrees or more  3. Drainage  (pus) from the injection site.  4. For any continuous bleeding (some dried blood over the incision is normal.)    FOR EMERGENCIES:   If any unusual problems or difficulties occur during clinic hours, call [phone] or [phone].

## 2018-10-16 NOTE — DISCHARGE SUMMARY
Discharge Note  Short Stay      SUMMARY     Admit Date: 10/16/2018    Attending Physician: Alirio Lei      Discharge Physician: Alirio Lei      Discharge Date: 10/16/2018 10:28 AM    Procedure(s) (LRB):  INJECTION, JOINT, LEFT SI JOINT INJECTION UNDER FLUORO (Left)    Final Diagnosis: Sacroiliac joint pain [M53.3]    Disposition: Home or self care    Patient Instructions:   Current Discharge Medication List      CONTINUE these medications which have NOT CHANGED    Details   aspirin 81 MG Chew       b complex vitamins tablet Take 1 tablet by mouth once daily.      CALCIUM 600 WITH VITAMIN D3 600 mg(1,500mg) -200 unit Tab       CETIRIZINE HCL (ZYRTEC ORAL) Take 1 tablet by mouth as needed.      conjugated estrogens (PREMARIN) vaginal cream Place 1 g vaginally twice a week.  Qty: 30 g, Refills: 3    Associated Diagnoses: Vaginal atrophy      diphenhydrAMINE (BENADRYL) 25 mg capsule       doxycycline (VIBRAMYCIN) 50 MG capsule Take 50 mg by mouth 2 (two) times daily.  Refills: 2      EPIPEN 2-PEDRO 0.3 mg/0.3 mL (1:1,000) AtIn Refills: 0      estradiol (ESTRACE) 1 MG tablet Take 1 tablet (1 mg total) by mouth once daily.  Qty: 90 tablet, Refills: 3    Associated Diagnoses: Menopausal symptoms      fexofenadine (ALLEGRA) 180 MG tablet Take 1 tablet (180 mg total) by mouth once daily.  Qty: 30 tablet, Refills: 11      fluticasone (FLONASE) 50 mcg/actuation nasal spray UES 1 SPRAY IN EACH NOSTRIL DAILY  Qty: 16 g, Refills: 2      glucosamine-chondroitin 500-400 mg tablet Take 1 tablet by mouth 3 (three) times daily.      hydrOXYzine pamoate (VISTARIL) 25 MG Cap Take 1 capsule (25 mg total) by mouth every 6 (six) hours as needed.  Qty: 30 capsule, Refills: 0      ibuprofen (ADVIL,MOTRIN) 800 MG tablet Take 1 tablet (800 mg total) by mouth 3 (three) times daily. For inflammation of neck disc. Take with food.  Qty: 45 tablet, Refills: 1    Associated Diagnoses: Neck pain on left side; Degenerative cervical disc       meclizine (ANTIVERT) 25 mg tablet Take 1 tablet (25 mg total) by mouth every 8 (eight) hours as needed for Dizziness.  Qty: 40 tablet, Refills: 2      medroxyPROGESTERone (PROVERA) 2.5 MG tablet Take 1 tablet (2.5 mg total) by mouth once daily.  Qty: 30 tablet, Refills: 11    Associated Diagnoses: Menopausal symptoms      promethazine (PHENERGAN) 25 MG tablet Take 1 tablet (25 mg total) by mouth every 4 (four) hours as needed for Nausea.  Qty: 30 tablet, Refills: 2      RESTASIS 0.05 % ophthalmic emulsion       triamterene-hydrochlorothiazide 37.5-25 mg (DYAZIDE) 37.5-25 mg per capsule Take 1 capsule by mouth daily as needed.  Qty: 30 capsule, Refills: 5                 Discharge Diagnosis: Sacroiliac joint pain [M53.3]  Condition on Discharge: Stable with no complications to procedure   Diet on Discharge: Same as before.  Activity: as per instruction sheet.  Discharge to: Home with a responsible adult.  Follow up: 2-4 weeks

## 2018-10-23 ENCOUNTER — TELEPHONE (OUTPATIENT)
Dept: PAIN MEDICINE | Facility: CLINIC | Age: 58
End: 2018-10-23

## 2018-10-23 NOTE — TELEPHONE ENCOUNTER
Spoke with pain regarding message, stated that she has had pain since doing yard work on yesterday, refused follow up appointment

## 2018-10-23 NOTE — TELEPHONE ENCOUNTER
Spoke with patient again, after speaking with Jennifer regarding earlier message, patient refused office appointment, stated she needs to get ready to go out of town

## 2018-10-23 NOTE — TELEPHONE ENCOUNTER
----- Message from Fatmata Delgado sent at 10/23/2018  8:36 AM CDT -----  Name of Who is Calling: RHONDA WHITE [[id number]]    What is the request in detail: Pt states she can't stand straight and walk and she has back pain. Please call to further discuss and advise.       Can the clinic reply by [facility]:   No       What Number to Call Back if not in [facility]: [phone]

## 2018-11-26 ENCOUNTER — OFFICE VISIT (OUTPATIENT)
Dept: PAIN MEDICINE | Facility: CLINIC | Age: 58
End: 2018-11-26
Payer: MEDICARE

## 2018-11-26 ENCOUNTER — IMMUNIZATION (OUTPATIENT)
Dept: PHARMACY | Facility: CLINIC | Age: 58
End: 2018-11-26
Payer: MEDICARE

## 2018-11-26 VITALS
DIASTOLIC BLOOD PRESSURE: 68 MMHG | WEIGHT: 122.13 LBS | HEART RATE: 81 BPM | BODY MASS INDEX: 23.06 KG/M2 | HEIGHT: 61 IN | SYSTOLIC BLOOD PRESSURE: 109 MMHG | TEMPERATURE: 98 F

## 2018-11-26 DIAGNOSIS — M46.1 SACROILIITIS, NOT ELSEWHERE CLASSIFIED: Primary | ICD-10-CM

## 2018-11-26 DIAGNOSIS — M47.816 SPONDYLOSIS OF LUMBAR REGION WITHOUT MYELOPATHY OR RADICULOPATHY: ICD-10-CM

## 2018-11-26 DIAGNOSIS — M47.817 DJD (DEGENERATIVE JOINT DISEASE), LUMBOSACRAL: ICD-10-CM

## 2018-11-26 PROCEDURE — 3074F SYST BP LT 130 MM HG: CPT | Mod: CPTII,HCNC,S$GLB, | Performed by: NURSE PRACTITIONER

## 2018-11-26 PROCEDURE — 3078F DIAST BP <80 MM HG: CPT | Mod: CPTII,HCNC,S$GLB, | Performed by: NURSE PRACTITIONER

## 2018-11-26 PROCEDURE — 3008F BODY MASS INDEX DOCD: CPT | Mod: CPTII,HCNC,S$GLB, | Performed by: NURSE PRACTITIONER

## 2018-11-26 PROCEDURE — 99214 OFFICE O/P EST MOD 30 MIN: CPT | Mod: 25,HCNC,S$GLB, | Performed by: NURSE PRACTITIONER

## 2018-11-26 PROCEDURE — 96372 THER/PROPH/DIAG INJ SC/IM: CPT | Mod: HCNC,S$GLB,, | Performed by: NURSE PRACTITIONER

## 2018-11-26 PROCEDURE — 99999 PR PBB SHADOW E&M-EST. PATIENT-LVL IV: CPT | Mod: PBBFAC,HCNC,, | Performed by: NURSE PRACTITIONER

## 2018-11-26 PROCEDURE — 99499 UNLISTED E&M SERVICE: CPT | Mod: S$GLB,,, | Performed by: NURSE PRACTITIONER

## 2018-11-26 RX ORDER — KETOROLAC TROMETHAMINE 30 MG/ML
60 INJECTION, SOLUTION INTRAMUSCULAR; INTRAVENOUS
Status: COMPLETED | OUTPATIENT
Start: 2018-11-26 | End: 2018-11-26

## 2018-11-26 RX ADMIN — KETOROLAC TROMETHAMINE 60 MG: 30 INJECTION, SOLUTION INTRAMUSCULAR; INTRAVENOUS at 02:11

## 2018-11-26 NOTE — PROGRESS NOTES
Chronic Pain - Established Pain    Referring Physician: No ref. provider found    Chief Complaint:   No chief complaint on file.       SUBJECTIVE: Disclaimer: This note has been generated using voice-recognition software. There may be typographical errors that have been missed during proof-reading    Interval History 11/26/2018:  The patient returns for follow up of left sided lower back pain.  She is s/p left SI joint injection on 10/16/18 with minimal benefit.  Previous injection helped significantly.  She continues to report pain over the left buttock.  She does have pain over the lumbar spine, but this is mild.  Her pain is most severe when she sits for prolonged periods or changes from sitting to standing.  She is active and has been exercising and gardening.  Her previous XRAYs from last year show right sided curvature and retrolisthesis without instability.  She has not had imaging of hips or pelvis recently.  Her pain today is 6/10.  She takes OTC Ibuprofen with some benefit.    Interval History 2/1/2018:  The patient presents today for follow up.  She is s/p left SI joint injection on 1/16/18 with about 80% pain relief overall.  She has intermittent pain to left buttock which is mild.  This is mainly with bending and lifting.  Since her last visit, she has also changed her body mechanics which she thinks is helping.  She takes OTC NSAIDs sparingly as needed.  She has also increased her activity and has been walking.  Her pain today is 3/10.    Interval History 12/7/2017:  The patient presents today for follow up of left sided lower back and buttock pain.  She is s/p left L3,4,5 MBB on 11/7/17.  She reports that she felt numbness to her lower back for about one hour after the procedure.  Immediately after the procedure, she went to walk her dog in the park and started to have her usual pain.  Her pain is mainly to the left buttock at this time.  It is worsened with prolonged sitting, changing from sitting  to standing, and lifting her 20 lb nephew.  She also notices that she carried him on her left hip which worsens her pain.  She does have some benefit with OTC NSAIDs.  Voltaren gel provided limited benefit.      Initial encounter:    Raquel Kaye presents to the clinic for the evaluation of left sided low back pain. The pain started 10 years ago and symptoms have been worsening.    Brief history:  Patient had a history of radicular symptoms but they are not occurring now.    Pain Description:    The pain is located in the left side of lower back area and does not radiate.      At BEST  2/10     At WORST  9/10 on the WORST day.      On average pain is rated as 5/10.     Today the pain is rated as 4/10    The pain is described as aching and sharp      Symptoms interfere with daily activity.     Exacerbating factors: Sitting, Standing, Laying, Bending, Coughing/Sneezing, Walking, Morning, Lifting and Getting out of bed/chair.      Mitigating factors heat, ice, massage and medications.     Patient denies night fever/night sweats, urinary incontinence, bowel incontinence, significant weight loss, significant motor weakness and loss of sensations.  Patient denies any suicidal or homicidal ideations    Pain Medications:  Current:  Ibuprofen prn with improvement    Tried in Past:  NSAIDs - Ibuprofen and Voltaren gel  TCA -Never  SNRI -Never  Anti-convulsants -asprin 81mg  Muscle Relaxants -Never  Opioids-Never    Physical Therapy/Home Exercise: yes  -works with a physical therapist and psychiatrist     report:  Reviewed and consistent with medication use as prescribed.    Pain Procedures:   11/7/17 Left L3,4,5 MBB- limited relief after 1 hr with activity  1/16/18 Left SI joint injection- 80% relief  10/16/18 Left SI joint injection    Chiropractor -never  Acupuncture - never  TENS unit -never  Spinal decompression -never  Joint replacement -never    CMP  Sodium   Date Value Ref Range Status   05/01/2018 140 136 - 145  mmol/L Final     Potassium   Date Value Ref Range Status   05/01/2018 4.0 3.5 - 5.1 mmol/L Final     Chloride   Date Value Ref Range Status   05/01/2018 108 95 - 110 mmol/L Final     CO2   Date Value Ref Range Status   05/01/2018 22 (L) 23 - 29 mmol/L Final     Glucose   Date Value Ref Range Status   05/01/2018 87 70 - 110 mg/dL Final     BUN, Bld   Date Value Ref Range Status   05/01/2018 17 6 - 20 mg/dL Final     Creatinine   Date Value Ref Range Status   05/01/2018 0.7 0.5 - 1.4 mg/dL Final     Calcium   Date Value Ref Range Status   05/01/2018 8.8 8.7 - 10.5 mg/dL Final     Total Protein   Date Value Ref Range Status   05/01/2018 6.9 6.0 - 8.4 g/dL Final     Albumin   Date Value Ref Range Status   05/01/2018 3.9 3.5 - 5.2 g/dL Final     Total Bilirubin   Date Value Ref Range Status   05/01/2018 0.6 0.1 - 1.0 mg/dL Final     Comment:     For infants and newborns, interpretation of results should be based  on gestational age, weight and in agreement with clinical  observations.  Premature Infant recommended reference ranges:  Up to 24 hours.............<8.0 mg/dL  Up to 48 hours............<12.0 mg/dL  3-5 days..................<15.0 mg/dL  6-29 days.................<15.0 mg/dL       Alkaline Phosphatase   Date Value Ref Range Status   05/01/2018 66 55 - 135 U/L Final     AST   Date Value Ref Range Status   05/01/2018 15 10 - 40 U/L Final     ALT   Date Value Ref Range Status   05/01/2018 13 10 - 44 U/L Final     Anion Gap   Date Value Ref Range Status   05/01/2018 10 8 - 16 mmol/L Final     eGFR if    Date Value Ref Range Status   05/01/2018 >60.0 >60 mL/min/1.73 m^2 Final     eGFR if non    Date Value Ref Range Status   05/01/2018 >60.0 >60 mL/min/1.73 m^2 Final     Comment:     Calculation used to obtain the estimated glomerular filtration  rate (eGFR) is the CKD-EPI equation.        Lab Results   Component Value Date    WBC 7.58 05/01/2018    HGB 13.2 05/01/2018    HCT 41.0  05/01/2018     (H) 05/01/2018     05/01/2018       Imaging:    X-Ray Lumbar Spine 10/26/17    Narrative     Technique: Lateral neutral, flexion and extension positioning of the lumbosacral spine with additional bilateral oblique and AP views         Comparison: 03/30/2017    Results: Continued convex right curvature the lumbar spine. There is step wise Grade 1 retrolisthesis of L2 on L3 and L3 on L4 with neutral positioning which is relatively fixed with flexion and extension positioning. The lumbar vertebral bodies heights and contours are stable without evidence for acute fracture with scattered endplate degeneration. No evidence for spondylolysis. Further evaluation as warrented clinically.   Impression      See above.       DEXA scan shows osteopenia    Past Medical History:   Diagnosis Date    Allergy     Back pain     Disorder of vestibular function of both ears     Fibrocystic breast disease in female     HEARING LOSS     right side - no hearing aid    Hyperlipidemia     Meniere's disease     Osteopenia     PONV (postoperative nausea and vomiting)     PONV (postoperative nausea and vomiting)     Special screening for malignant neoplasms, colon 7/22/2013     Past Surgical History:   Procedure Laterality Date    ADENOIDECTOMY      APPENDECTOMY      BLOCK-NERVE-MEDIAL BRANCH-LUMBAR Left 11/7/2017    [facility]    BREAST BIOPSY      BREAST SURGERY      left breast biopsy    COLONOSCOPY N/A 9/27/2018    Procedure: COLONOSCOPY;  Surgeon: Uche Smiley MD;  Location: [facility] (4TH FLR);  Service: Endoscopy;  Laterality: N/A;  pt/instructed on prep day before and day of importance    COLONOSCOPY N/A 9/27/2018    Performed by [facility]che Smiley MD at [facility] (4TH FLR)    COLONOSCOPY N/A 7/22/2013    Performed by Uche Smiley MD at [facility] (4TH FLR)    CYST REMOVAL Right     neck    DILATION AND CURETTAGE OF UTERUS       [id number] N/A 4/22/2015    Performed by [facility] at [facility]    INJECTION OF JOINT Left 10/16/2018    Procedure: INJECTION, JOINT, LEFT SI JOINT INJECTION UNDER FLUORO;  Surgeon: Alirio Lei MD;  Location: [facility];  Service: Pain Management;  Laterality: Left;  NEEDS CONSENT    INJECTION, JOINT, LEFT SI JOINT INJECTION UNDER FLUORO Left 10/16/2018    Performed by [facility] at [facility]    INJECTION-JOINT Left 1/16/2018    Performed by [facility] at [facility]    MANDIBLE FRACTURE SURGERY Right     scope    TONSILLECTOMY       Social History     Socioeconomic History    Marital status: Single     Spouse name: Not on file    Number of children: Not on file    Years of education: Not on file    Highest education level: Not on file   Social Needs    Financial resource strain: Not on file    Food insecurity - worry: Not on file    Food insecurity - inability: Not on file    Transportation needs - medical: Not on file    Transportation needs - non-medical: Not on file   Occupational History    Not on file   Tobacco Use    Smoking status: Never Smoker    Smokeless tobacco: Never Used   Substance and Sexual Activity    Alcohol use: Yes     Comment: social    Drug use: No    Sexual activity: Yes     Partners: Male     Birth control/protection: None   Other Topics Concern    Not on file   Social History Narrative    Not on file     Family History   Problem Relation Age of Onset    Heart disease Mother     Kidney disease Mother     Diabetes Mother     Hypertension Mother     Hyperlipidemia Mother     COPD Mother     Heart disease Father     Cancer Father         lymphoma    Breast cancer Neg Hx     Ovarian cancer Neg Hx     Colon cancer Neg Hx        Review of patient's allergies indicates:   Allergen Reactions    Dexamethasone      Flushing and headache with medrol dosepack & tachycardia       Current Outpatient  Medications   Medication Sig    aspirin 81 MG Chew     b complex vitamins tablet Take 1 tablet by mouth once daily.    CALCIUM 600 WITH VITAMIN D3 600 mg(1,500mg) -200 unit Tab     CETIRIZINE HCL (ZYRTEC ORAL) Take 1 tablet by mouth as needed.    conjugated estrogens (PREMARIN) vaginal cream Place 1 g vaginally twice a week.    diphenhydrAMINE (BENADRYL) 25 mg capsule     doxycycline (VIBRAMYCIN) 50 MG capsule Take 50 mg by mouth 2 (two) times daily.    EPIPEN 2-PEDRO 0.3 mg/0.3 mL (1:1,000) AtIn     estradiol (ESTRACE) 1 MG tablet Take 1 tablet (1 mg total) by mouth once daily.    fexofenadine (ALLEGRA) 180 MG tablet Take 1 tablet (180 mg total) by mouth once daily.    fluticasone (FLONASE) 50 mcg/actuation nasal spray UES 1 SPRAY IN EACH NOSTRIL DAILY    glucosamine-chondroitin 500-400 mg tablet Take 1 tablet by mouth 3 (three) times daily.    hydrOXYzine pamoate (VISTARIL) 25 MG Cap Take 1 capsule (25 mg total) by mouth every 6 (six) hours as needed.    ibuprofen (ADVIL,MOTRIN) 800 MG tablet Take 1 tablet (800 mg total) by mouth 3 (three) times daily. For inflammation of neck disc. Take with food.    meclizine (ANTIVERT) 25 mg tablet Take 1 tablet (25 mg total) by mouth every 8 (eight) hours as needed for Dizziness.    medroxyPROGESTERone (PROVERA) 2.5 MG tablet Take 1 tablet (2.5 mg total) by mouth once daily.    promethazine (PHENERGAN) 25 MG tablet Take 1 tablet (25 mg total) by mouth every 4 (four) hours as needed for Nausea.    RESTASIS 0.05 % ophthalmic emulsion     triamterene-hydrochlorothiazide 37.5-25 mg (DYAZIDE) 37.5-25 mg per capsule Take 1 capsule by mouth daily as needed.     No current facility-administered medications for this visit.        REVIEW OF SYSTEMS:    GENERAL:  No weight loss, malaise or fevers.  HEENT:   No recent changes in vision or hearing  NECK:  Negative for lumps, no difficulty with swallowing.  RESPIRATORY:  Negative for cough, wheezing or shortness of breath,  "patient denies any recent URI.  CARDIOVASCULAR:  Negative for chest pain, leg swelling or palpitations.  GI:  Negative for abdominal discomfort, blood in stools or black stools or change in bowel habits.  MUSCULOSKELETAL:  See HPI.  SKIN:  Negative for lesions, rash, and itching.  PSYCH:  No mood disorder or recent psychosocial stressors.  Patient's sleep is disturbed secondary to pain.  HEMATOLOGY/LYMPHOLOGY:  Negative for prolonged bleeding, bruising easily or swollen nodes.  Patient is not currently taking any anti-coagulants  ENDO: No history of diabetes or thyroid dysfunction  NEURO:   No history of headaches, syncope, paralysis, seizures or tremors.  All other reviewed and negative other than HPI.    OBJECTIVE:    /68   Pulse 81   Temp 97.9 °F (36.6 °C)   Ht 5' 1" (1.549 m)   Wt 55.4 kg (122 lb 2.2 oz)   LMP 04/15/2009   BMI 23.08 kg/m²     PHYSICAL EXAMINATION:    GENERAL: Well appearing, in no acute distress, alert and oriented x3.  PSYCH:  Mood and affect appropriate.  SKIN: Skin color, texture, turgor normal, no rashes or lesions.  HEAD/FACE:  Normocephalic, atraumatic. Cranial nerves grossly intact.  CV: RRR with palpation of the radial artery.  PULM: No evidence of respiratory difficulty, symmetric chest rise.  BACK: Straight leg raising in the sitting and supine positions is negative to radicular pain. There is no pain with palpation over the facet joints of the lumbar spine. There is decreased range of motion with extension to 15 degrees, and facet loading maneuvers cause reproducible pain on the left.    EXTREMITIES: Peripheral joint ROM is full and pain free without obvious instability or laxity in all four extremities. No deformities, edema, or skin discoloration. Good capillary refill.  MUSCULOSKELETAL: Hip, and knee provocative maneuvers are negative.  There is pain with palpation over the sacroiliac joint on the left.  There is no pain to palpation over the greater trochanteric bursa " bilaterally.  FABERs test is positive on the left.  Pain with internal rotation of left hip.  Bilateral lower extremity strength is normal and symmetric.  No atrophy or tone abnormalities are noted.  NEURO: Bilateral lower extremity coordination and muscle stretch reflexes are physiologic and symmetric.  Plantar response are downgoing. No clonus.  No loss of sensation is noted.  GAIT: Normal.      ASSESSMENT: 58 y.o. year old female with left sided back/buttock pain, consistent with the following:    Encounter Diagnoses   Name Primary?    Sacroiliitis, not elsewhere classified Yes    Spondylosis of lumbar region without myelopathy or radiculopathy     DJD (degenerative joint disease), lumbosacral        PLAN:     - Previous imaging was reviewed and discussed with the patient today.    - She is s/p left SI joint injection with minimal benefit.    - Will obtain updated lumbar XRAYs and hip/pelvis.    - 60 mg IM Toradol today.    - The patient will continue a home exercise routine to help with pain and strengthening.      - RTC as needed.  I will call with imaging results.        The above plan and management options were discussed at length with patient. Patient is in agreement with the above and verbalized understanding.    Jennifer Pickens  11/26/2018

## 2018-11-27 ENCOUNTER — HOSPITAL ENCOUNTER (OUTPATIENT)
Dept: RADIOLOGY | Facility: OTHER | Age: 58
Discharge: HOME | End: 2018-11-27
Attending: NURSE PRACTITIONER
Payer: MEDICARE

## 2018-11-27 DIAGNOSIS — M47.817 DJD (DEGENERATIVE JOINT DISEASE), LUMBOSACRAL: ICD-10-CM

## 2018-11-27 DIAGNOSIS — M46.1 SACROILIITIS, NOT ELSEWHERE CLASSIFIED: ICD-10-CM

## 2018-11-27 DIAGNOSIS — M47.816 SPONDYLOSIS OF LUMBAR REGION WITHOUT MYELOPATHY OR RADICULOPATHY: ICD-10-CM

## 2018-11-27 PROCEDURE — 73521 X-RAY EXAM HIPS BI 2 VIEWS: CPT | Mod: 26,HCNC,, | Performed by: RADIOLOGY

## 2018-11-27 PROCEDURE — 72110 X-RAY EXAM L-2 SPINE 4/>VWS: CPT | Mod: 26,HCNC,, | Performed by: RADIOLOGY

## 2018-11-27 PROCEDURE — 73521 X-RAY EXAM HIPS BI 2 VIEWS: CPT | Mod: TC,FY,HCNC

## 2018-11-27 PROCEDURE — 72110 X-RAY EXAM L-2 SPINE 4/>VWS: CPT | Mod: TC,FY,HCNC

## 2018-12-13 DIAGNOSIS — M47.816 SPONDYLOSIS OF LUMBAR REGION WITHOUT MYELOPATHY OR RADICULOPATHY: ICD-10-CM

## 2018-12-13 DIAGNOSIS — M79.10 MYALGIA: ICD-10-CM

## 2018-12-13 RX ORDER — DICLOFENAC SODIUM 10 MG/G
2 GEL TOPICAL 4 TIMES DAILY
Qty: 100 G | Refills: 0 | Status: SHIPPED | OUTPATIENT
Start: 2018-12-13 | End: 2019-08-29 | Stop reason: SDUPTHER

## 2019-01-08 ENCOUNTER — TELEPHONE (OUTPATIENT)
Dept: OBSTETRICS AND GYNECOLOGY | Facility: CLINIC | Age: 59
End: 2019-01-08

## 2019-01-08 ENCOUNTER — TELEPHONE (OUTPATIENT)
Dept: INTERNAL MEDICINE | Facility: CLINIC | Age: 59
End: 2019-01-08

## 2019-01-08 DIAGNOSIS — E78.5 HYPERLIPIDEMIA, UNSPECIFIED HYPERLIPIDEMIA TYPE: Primary | ICD-10-CM

## 2019-01-08 DIAGNOSIS — I10 HYPERTENSION, ESSENTIAL: ICD-10-CM

## 2019-01-08 RX ORDER — ALPRAZOLAM 0.5 MG/1
0.5 TABLET ORAL 2 TIMES DAILY PRN
Qty: 30 TABLET | Refills: 1 | Status: SHIPPED | OUTPATIENT
Start: 2019-01-08 | End: 2019-05-09

## 2019-01-08 NOTE — TELEPHONE ENCOUNTER
Patient was notified that we would call in medication , she has a friend who is dying and she  is feeling anxious.

## 2019-01-08 NOTE — TELEPHONE ENCOUNTER
----- Message from Daniel Garcia sent at 1/8/2019  8:51 AM CST -----  Contact: RHONDA WHITE [[id number]]  Name of Who is Calling: RHONDA WHITE [[id number]]      What is the request in detail: Pt requesting to schedule WWE in April. Contact at your earliest convenience.  Thanks-          Can the clinic reply by [facility]: Y    [facility]: [phone]

## 2019-01-08 NOTE — TELEPHONE ENCOUNTER
----- Message from Harini Winn sent at 1/8/2019  8:46 AM CST -----  Contact: Patient/[phone]  The patient would like to speak with the nurse concerning a medication for anxiety.    Doctor appointment and lab have been scheduled.  Please link lab orders to the lab appointment.  Date of doctor appointment: 05/09/19   Physical or EP:  physical  Date of lab appointment: 05/09/19   Comments:     .

## 2019-01-08 NOTE — TELEPHONE ENCOUNTER
----- Message from Harini Winn sent at 1/8/2019  8:46 AM CST -----  Contact: Patient/[phone]  The patient would like to speak with the nurse concerning a medication for anxiety.    Doctor appointment and lab have been scheduled.  Please link lab orders to the lab appointment.  Date of doctor appointment: 05/09/19   Physical or EP:  physical  Date of lab appointment: 05/09/19   Comments:     .

## 2019-01-23 ENCOUNTER — PES CALL (OUTPATIENT)
Dept: ADMINISTRATIVE | Facility: CLINIC | Age: 59
End: 2019-01-23

## 2019-04-04 ENCOUNTER — TELEPHONE (OUTPATIENT)
Dept: PAIN MEDICINE | Facility: CLINIC | Age: 59
End: 2019-04-04

## 2019-04-04 ENCOUNTER — TELEPHONE (OUTPATIENT)
Dept: OBSTETRICS AND GYNECOLOGY | Facility: CLINIC | Age: 59
End: 2019-04-04

## 2019-04-04 DIAGNOSIS — Z12.31 SCREENING MAMMOGRAM, ENCOUNTER FOR: Primary | ICD-10-CM

## 2019-04-04 NOTE — TELEPHONE ENCOUNTER
----- Message from Eloisa Gilliam sent at 4/4/2019  2:51 PM CDT -----  Contact: pt   Name of Who is Calling: RHONDA WHITE [[id number]]    What is the request in detail: Patient is requesting mmg orders....Please contact to further discuss and advise      Can the clinic reply by [facility]:     What Number to Call Back if not in [facility]: [phone]

## 2019-04-04 NOTE — TELEPHONE ENCOUNTER
----- Message from Yoel Taylor sent at 4/4/2019  3:28 PM CDT -----  Contact: RHONDA WHITE [[id number]]  Name of Who is Calling: RHONDA WHITE [[id number]]      What is the request in detail: Patient would like to speak to staff in regards to an in office injection appointment and a prescription for duexis is what she is spelling. Please advise      Can the clinic reply by [facility]: no      What Number to Call Back if not in [facility]: [phone]

## 2019-04-04 NOTE — TELEPHONE ENCOUNTER
Staff contacted and spoke with patient in regards to her message. Staff stated to patient that we can get her in on tomorrow 4.5.19 with PRESTON to further discuss injections & medication options. Patient was fine with staff going forward with making the appointment to see PRESTON for 2:20p on 4.5.19(Fri)    Patient verbalized understanding and has confirm appt.

## 2019-04-05 ENCOUNTER — TELEPHONE (OUTPATIENT)
Dept: DERMATOLOGY | Facility: CLINIC | Age: 59
End: 2019-04-05

## 2019-04-05 ENCOUNTER — OFFICE VISIT (OUTPATIENT)
Dept: PAIN MEDICINE | Facility: CLINIC | Age: 59
End: 2019-04-05
Payer: MEDICARE

## 2019-04-05 VITALS
HEIGHT: 61 IN | BODY MASS INDEX: 22.84 KG/M2 | TEMPERATURE: 98 F | DIASTOLIC BLOOD PRESSURE: 76 MMHG | HEART RATE: 69 BPM | RESPIRATION RATE: 18 BRPM | WEIGHT: 121 LBS | SYSTOLIC BLOOD PRESSURE: 117 MMHG

## 2019-04-05 DIAGNOSIS — M46.1 SACROILIITIS, NOT ELSEWHERE CLASSIFIED: Primary | ICD-10-CM

## 2019-04-05 DIAGNOSIS — M47.817 DJD (DEGENERATIVE JOINT DISEASE), LUMBOSACRAL: ICD-10-CM

## 2019-04-05 DIAGNOSIS — M54.2 NECK PAIN ON LEFT SIDE: ICD-10-CM

## 2019-04-05 DIAGNOSIS — M47.816 SPONDYLOSIS OF LUMBAR REGION WITHOUT MYELOPATHY OR RADICULOPATHY: ICD-10-CM

## 2019-04-05 DIAGNOSIS — M50.30 DEGENERATIVE CERVICAL DISC: ICD-10-CM

## 2019-04-05 PROCEDURE — 3008F BODY MASS INDEX DOCD: CPT | Mod: HCNC,CPTII,S$GLB, | Performed by: NURSE PRACTITIONER

## 2019-04-05 PROCEDURE — 99213 PR OFFICE/OUTPT VISIT, EST, LEVL III, 20-29 MIN: ICD-10-PCS | Mod: HCNC,25,S$GLB, | Performed by: NURSE PRACTITIONER

## 2019-04-05 PROCEDURE — 96372 PR INJECTION,THERAP/PROPH/DIAG2ST, IM OR SUBCUT: ICD-10-PCS | Mod: HCNC,S$GLB,, | Performed by: NURSE PRACTITIONER

## 2019-04-05 PROCEDURE — 3008F PR BODY MASS INDEX (BMI) DOCUMENTED: ICD-10-PCS | Mod: HCNC,CPTII,S$GLB, | Performed by: NURSE PRACTITIONER

## 2019-04-05 PROCEDURE — 3074F PR MOST RECENT SYSTOLIC BLOOD PRESSURE < 130 MM HG: ICD-10-PCS | Mod: HCNC,CPTII,S$GLB, | Performed by: NURSE PRACTITIONER

## 2019-04-05 PROCEDURE — 99999 PR PBB SHADOW E&M-EST. PATIENT-LVL III: ICD-10-PCS | Mod: PBBFAC,HCNC,, | Performed by: NURSE PRACTITIONER

## 2019-04-05 PROCEDURE — 99999 PR PBB SHADOW E&M-EST. PATIENT-LVL III: CPT | Mod: PBBFAC,HCNC,, | Performed by: NURSE PRACTITIONER

## 2019-04-05 PROCEDURE — 99499 UNLISTED E&M SERVICE: CPT | Mod: HCNC,S$GLB,, | Performed by: NURSE PRACTITIONER

## 2019-04-05 PROCEDURE — 3078F PR MOST RECENT DIASTOLIC BLOOD PRESSURE < 80 MM HG: ICD-10-PCS | Mod: HCNC,CPTII,S$GLB, | Performed by: NURSE PRACTITIONER

## 2019-04-05 PROCEDURE — 99213 OFFICE O/P EST LOW 20 MIN: CPT | Mod: HCNC,25,S$GLB, | Performed by: NURSE PRACTITIONER

## 2019-04-05 PROCEDURE — 96372 THER/PROPH/DIAG INJ SC/IM: CPT | Mod: HCNC,S$GLB,, | Performed by: NURSE PRACTITIONER

## 2019-04-05 PROCEDURE — 99499 RISK ADDL DX/OHS AUDIT: ICD-10-PCS | Mod: HCNC,S$GLB,, | Performed by: NURSE PRACTITIONER

## 2019-04-05 PROCEDURE — 3078F DIAST BP <80 MM HG: CPT | Mod: HCNC,CPTII,S$GLB, | Performed by: NURSE PRACTITIONER

## 2019-04-05 PROCEDURE — 3074F SYST BP LT 130 MM HG: CPT | Mod: HCNC,CPTII,S$GLB, | Performed by: NURSE PRACTITIONER

## 2019-04-05 RX ORDER — PHENYLPROPANOLAMINE/CLEMASTINE 75-1.34MG
200 TABLET, EXTENDED RELEASE ORAL
COMMUNITY
Start: 2019-03-05 | End: 2019-04-05

## 2019-04-05 RX ORDER — AZITHROMYCIN 250 MG/1
TABLET, FILM COATED ORAL
Refills: 0 | COMMUNITY
Start: 2019-01-09 | End: 2019-04-05

## 2019-04-05 RX ORDER — IBUPROFEN 800 MG/1
800 TABLET ORAL 3 TIMES DAILY
Qty: 90 TABLET | Refills: 1 | Status: SHIPPED | OUTPATIENT
Start: 2019-04-05 | End: 2020-07-27 | Stop reason: SDUPTHER

## 2019-04-05 RX ORDER — KETOROLAC TROMETHAMINE 30 MG/ML
60 INJECTION, SOLUTION INTRAMUSCULAR; INTRAVENOUS
Status: COMPLETED | OUTPATIENT
Start: 2019-04-05 | End: 2019-04-05

## 2019-04-05 RX ADMIN — KETOROLAC TROMETHAMINE 60 MG: 30 INJECTION, SOLUTION INTRAMUSCULAR; INTRAVENOUS at 04:04

## 2019-04-05 NOTE — PROGRESS NOTES
Chronic Pain - Established Pain    Referring Physician: No ref. provider found    Chief Complaint:   Chief Complaint   Patient presents with    Pelvic Pain     left side        SUBJECTIVE: Disclaimer: This note has been generated using voice-recognition software. There may be typographical errors that have been missed during proof-reading    Interval History 4/5/2019:  The patient returns to discuss pain.  After her last visit, I gave her a Toradol shot which provided her with significant benefit for some time.  She has been taking 800 mg Ibuprofen as needed with benefit, usually about once per week.  Her pain worsens with sitting and driving.  She does not have very much pain with walking.  Her pain today is 9/10.     Interval History 11/26/2018:  The patient returns for follow up of left sided lower back pain.  She is s/p left SI joint injection on 10/16/18 with minimal benefit.  Previous injection helped significantly.  She continues to report pain over the left buttock.  She does have pain over the lumbar spine, but this is mild.  Her pain is most severe when she sits for prolonged periods or changes from sitting to standing.  She is active and has been exercising and gardening.  Her previous XRAYs from last year show right sided curvature and retrolisthesis without instability.  She has not had imaging of hips or pelvis recently.  Her pain today is 6/10.  She takes OTC Ibuprofen with some benefit.    Interval History 2/1/2018:  The patient presents today for follow up.  She is s/p left SI joint injection on 1/16/18 with about 80% pain relief overall.  She has intermittent pain to left buttock which is mild.  This is mainly with bending and lifting.  Since her last visit, she has also changed her body mechanics which she thinks is helping.  She takes OTC NSAIDs sparingly as needed.  She has also increased her activity and has been walking.  Her pain today is 3/10.    Interval History 12/7/2017:  The patient  presents today for follow up of left sided lower back and buttock pain.  She is s/p left L3,4,5 MBB on 11/7/17.  She reports that she felt numbness to her lower back for about one hour after the procedure.  Immediately after the procedure, she went to walk her dog in the park and started to have her usual pain.  Her pain is mainly to the left buttock at this time.  It is worsened with prolonged sitting, changing from sitting to standing, and lifting her 20 lb nephew.  She also notices that she carried him on her left hip which worsens her pain.  She does have some benefit with OTC NSAIDs.  Voltaren gel provided limited benefit.      Initial encounter:    Raquel Kaye presents to the clinic for the evaluation of left sided low back pain. The pain started 10 years ago and symptoms have been worsening.    Brief history:  Patient had a history of radicular symptoms but they are not occurring now.    Pain Description:    The pain is located in the left side of lower back area and does not radiate.      At BEST  2/10     At WORST  9/10 on the WORST day.      On average pain is rated as 5/10.     Today the pain is rated as 4/10    The pain is described as aching and sharp      Symptoms interfere with daily activity.     Exacerbating factors: Sitting, Standing, Laying, Bending, Coughing/Sneezing, Walking, Morning, Lifting and Getting out of bed/chair.      Mitigating factors heat, ice, massage and medications.     Patient denies night fever/night sweats, urinary incontinence, bowel incontinence, significant weight loss, significant motor weakness and loss of sensations.  Patient denies any suicidal or homicidal ideations    Pain Medications:  Current:  Ibuprofen prn with improvement    Tried in Past:  NSAIDs - Ibuprofen and Voltaren gel  TCA -Never  SNRI -Never  Anti-convulsants -asprin 81mg  Muscle Relaxants -Never  Opioids-Never    Physical Therapy/Home Exercise: yes  -works with a physical therapist and  psychiatrist     report:  Reviewed and consistent with medication use as prescribed.    Pain Procedures:   11/7/17 Left L3,4,5 MBB- limited relief after 1 hr with activity  1/16/18 Left SI joint injection- 80% relief  10/16/18 Left SI joint injection    Chiropractor -never  Acupuncture - never  TENS unit -never  Spinal decompression -never  Joint replacement -never    CMP  Sodium   Date Value Ref Range Status   05/01/2018 140 136 - 145 mmol/L Final     Potassium   Date Value Ref Range Status   05/01/2018 4.0 3.5 - 5.1 mmol/L Final     Chloride   Date Value Ref Range Status   05/01/2018 108 95 - 110 mmol/L Final     CO2   Date Value Ref Range Status   05/01/2018 22 (L) 23 - 29 mmol/L Final     Glucose   Date Value Ref Range Status   05/01/2018 87 70 - 110 mg/dL Final     BUN, Bld   Date Value Ref Range Status   05/01/2018 17 6 - 20 mg/dL Final     Creatinine   Date Value Ref Range Status   05/01/2018 0.7 0.5 - 1.4 mg/dL Final     Calcium   Date Value Ref Range Status   05/01/2018 8.8 8.7 - 10.5 mg/dL Final     Total Protein   Date Value Ref Range Status   05/01/2018 6.9 6.0 - 8.4 g/dL Final     Albumin   Date Value Ref Range Status   05/01/2018 3.9 3.5 - 5.2 g/dL Final     Total Bilirubin   Date Value Ref Range Status   05/01/2018 0.6 0.1 - 1.0 mg/dL Final     Comment:     For infants and newborns, interpretation of results should be based  on gestational age, weight and in agreement with clinical  observations.  Premature Infant recommended reference ranges:  Up to 24 hours.............<8.0 mg/dL  Up to 48 hours............<12.0 mg/dL  3-5 days..................<15.0 mg/dL  6-29 days.................<15.0 mg/dL       Alkaline Phosphatase   Date Value Ref Range Status   05/01/2018 66 55 - 135 U/L Final     AST   Date Value Ref Range Status   05/01/2018 15 10 - 40 U/L Final     ALT   Date Value Ref Range Status   05/01/2018 13 10 - 44 U/L Final     Anion Gap   Date Value Ref Range Status   05/01/2018 10 8 - 16  mmol/L Final     eGFR if    Date Value Ref Range Status   05/01/2018 >60.0 >60 mL/min/1.73 m^2 Final     eGFR if non    Date Value Ref Range Status   05/01/2018 >60.0 >60 mL/min/1.73 m^2 Final     Comment:     Calculation used to obtain the estimated glomerular filtration  rate (eGFR) is the CKD-EPI equation.        Lab Results   Component Value Date    WBC 7.58 05/01/2018    HGB 13.2 05/01/2018    HCT 41.0 05/01/2018     (H) 05/01/2018     05/01/2018       Imaging:  Narrative     EXAMINATION:  XR HIPS BILATERAL 2 VIEW INCL AP PELVIS    CLINICAL HISTORY:  Sacroiliitis, not elsewhere classified    TECHNIQUE:  AP view of the pelvis and frogleg lateral views of both hips were performed.    COMPARISON:  None.    FINDINGS:  There is no evidence of acute fracture, dislocation, or bone destruction.  Joint spaces are well preserved.  There is mild degenerative change of the sacroiliac joints.  No erosions are seen      Impression       See above.     Narrative     EXAMINATION:  XR LUMBAR SPINE 5 VIEW WITH FLEX AND EXT    CLINICAL HISTORY:  Low back pain, >6wks conservative tx, persistent-progressive sx, surgical candidate;  Sacroiliitis, not elsewhere classified    TECHNIQUE:  Five views of the lumbar spine plus flexion extension views were performed.    COMPARISON:  Prior dated 10/26/2017    FINDINGS:  There is dextroscoliosis of the lumbar spine.  There is retrolisthesis of L2-3 and L3 on L4 (grade 1) measuring approximately 4 mm.  This is similar to previous exam.  There is no significant change on flexion/extension views to suggest translational instability.  There are endplate degenerative changes with subchondral sclerosis and marginal osteophyte formation and disc height narrowing at L3-4.  Mild facet arthropathy is present at the lower lumbar levels.      Impression       Degenerative change of the lumbar spine with associated dextroscoliosis and L2-3/L3-4  retrolisthesis, similar to previous exam.  No translational instability.       DEXA scan shows osteopenia    Past Medical History:   Diagnosis Date    Allergy     Back pain     Disorder of vestibular function of both ears     Fibrocystic breast disease in female     HEARING LOSS     right side - no hearing aid    Hyperlipidemia     Meniere's disease     Osteopenia     PONV (postoperative nausea and vomiting)     PONV (postoperative nausea and vomiting)     Special screening for malignant neoplasms, colon 7/22/2013     Past Surgical History:   Procedure Laterality Date    ADENOIDECTOMY      APPENDECTOMY      BLOCK-NERVE-MEDIAL BRANCH-LUMBAR Left 11/7/2017    [facility]    BREAST BIOPSY      BREAST SURGERY      left breast biopsy    COLONOSCOPY N/A 9/27/2018    [facility]    COLONOSCOPY N/A 7/22/2013    [facility]    CYST REMOVAL Right     neck    DILATION AND CURETTAGE OF UTERUS      [id number] N/A 4/22/2015    Per[facility] at [facility]    INJECTION, JOINT, LEFT SI JOINT INJECTION UNDER FLUORO Left 10/16/2018    [facility] at [facility]    INJECTION-JOINT Left 1/16/2018    [facility] at [facility]    MANDIBLE FRACTURE SURGERY Right     scope    TONSILLECTOMY       Social History     Socioeconomic History    Marital status: Single     Spouse name: Not on file    Number of children: Not on file    Years of education: Not on file    Highest education level: Not on file   Occupational History    Not on file   Social Needs    Financial resource strain: Not on file    Food insecurity:     Worry: Not on file     Inability: Not on file    Transportation needs:     Medical: Not on file     Non-medical: Not on file   Tobacco Use    Smoking status: Never Smoker     Smokeless tobacco: Never Used   Substance and Sexual Activity    Alcohol use: Yes     Comment: social    Drug use: No    Sexual activity: Yes     Partners: Male     Birth control/protection: None   Lifestyle    Physical activity:     Days per week: Not on file     Minutes per session: Not on file    Stress: Not on file   Relationships    Social connections:     Talks on phone: Not on file     Gets together: Not on file     Attends [employer] service: Not on file     Active member of club or organization: Not on file     Attends meetings of clubs or organizations: Not on file     Relationship status: Not on file   Other Topics Concern    Not on file   Social History Narrative    Not on file     Family History   Problem Relation Age of Onset    Heart disease Mother     Kidney disease Mother     Diabetes Mother     Hypertension Mother     Hyperlipidemia Mother     COPD Mother     Heart disease Father     Cancer Father         lymphoma    Breast cancer Neg Hx     Ovarian cancer Neg Hx     Colon cancer Neg Hx        Review of patient's allergies indicates:   Allergen Reactions    Dexamethasone      Flushing and headache with medrol dosepack & tachycardia       Current Outpatient Medications   Medication Sig    ALPRAZolam (XANAX) 0.5 MG tablet Take 1 tablet (0.5 mg total) by mouth 2 (two) times daily as needed for Anxiety.    aspirin 81 MG Chew     b complex vitamins tablet Take 1 tablet by mouth once daily.    CALCIUM 600 WITH VITAMIN D3 600 mg(1,500mg) -200 unit Tab     CETIRIZINE HCL (ZYRTEC ORAL) Take 1 tablet by mouth as needed.    conjugated estrogens (PREMARIN) vaginal cream Place 1 g vaginally twice a week.    diclofenac sodium (VOLTAREN) 1 % Gel APPLY 2 G TOPICALLY 4 (FOUR) TIMES DAILY.    diphenhydrAMINE (BENADRYL) 25 mg capsule     doxycycline (VIBRAMYCIN) 50 MG capsule Take 50 mg by mouth 2 (two) times daily.    EPIPEN 2-PEDRO 0.3 mg/0.3 mL (1:1,000) AtIn     estradiol (ESTRACE)  1 MG tablet Take 1 tablet (1 mg total) by mouth once daily.    fexofenadine (ALLEGRA) 180 MG tablet Take 1 tablet (180 mg total) by mouth once daily.    fluticasone (FLONASE) 50 mcg/actuation nasal spray UES 1 SPRAY IN EACH NOSTRIL DAILY    glucosamine-chondroitin 500-400 mg tablet Take 1 tablet by mouth 3 (three) times daily.    hydrOXYzine pamoate (VISTARIL) 25 MG Cap Take 1 capsule (25 mg total) by mouth every 6 (six) hours as needed.    ibuprofen (ADVIL,MOTRIN) 800 MG tablet Take 1 tablet (800 mg total) by mouth 3 (three) times daily. For inflammation of neck disc. Take with food.    meclizine (ANTIVERT) 25 mg tablet Take 1 tablet (25 mg total) by mouth every 8 (eight) hours as needed for Dizziness.    medroxyPROGESTERone (PROVERA) 2.5 MG tablet Take 1 tablet (2.5 mg total) by mouth once daily.    promethazine (PHENERGAN) 25 MG tablet Take 1 tablet (25 mg total) by mouth every 4 (four) hours as needed for Nausea.    RESTASIS 0.05 % ophthalmic emulsion     triamterene-hydrochlorothiazide 37.5-25 mg (DYAZIDE) 37.5-25 mg per capsule Take 1 capsule by mouth daily as needed.     No current facility-administered medications for this visit.        REVIEW OF SYSTEMS:    GENERAL:  No weight loss, malaise or fevers.  HEENT:   No recent changes in vision or hearing  NECK:  Negative for lumps, no difficulty with swallowing.  RESPIRATORY:  Negative for cough, wheezing or shortness of breath, patient denies any recent URI.  CARDIOVASCULAR:  Negative for chest pain, leg swelling or palpitations.  GI:  Negative for abdominal discomfort, blood in stools or black stools or change in bowel habits.  MUSCULOSKELETAL:  See HPI.  SKIN:  Negative for lesions, rash, and itching.  PSYCH:  No mood disorder or recent psychosocial stressors.  Patient's sleep is disturbed secondary to pain.  HEMATOLOGY/LYMPHOLOGY:  Negative for prolonged bleeding, bruising easily or swollen nodes.  Patient is not currently taking any  "anti-coagulants  ENDO: No history of diabetes or thyroid dysfunction  NEURO:   No history of headaches, syncope, paralysis, seizures or tremors.  All other reviewed and negative other than HPI.    OBJECTIVE:    /76   Pulse 69   Temp 98.1 °F (36.7 °C)   Resp 18   Ht 5' 1" (1.549 m)   Wt 54.9 kg (121 lb)   LMP 04/15/2009   BMI 22.86 kg/m²     PHYSICAL EXAMINATION:    GENERAL: Well appearing, in no acute distress, alert and oriented x3.  PSYCH:  Mood and affect appropriate.  SKIN: Skin color, texture, turgor normal, no rashes or lesions.  HEAD/FACE:  Normocephalic, atraumatic. Cranial nerves grossly intact.  CV: RRR with palpation of the radial artery.  PULM: No evidence of respiratory difficulty, symmetric chest rise.  BACK: Straight leg raising in the sitting and supine positions is negative to radicular pain. There is no pain with palpation over the facet joints of the lumbar spine. There is decreased range of motion with extension to 15 degrees, and facet loading maneuvers cause reproducible pain bilaterally, L>R.  EXTREMITIES: Peripheral joint ROM is full and pain free without obvious instability or laxity in all four extremities. No deformities, edema, or skin discoloration. Good capillary refill.  MUSCULOSKELETAL: Hip, and knee provocative maneuvers are negative.  There is pain with palpation over the sacroiliac joint on the left.  There is no pain to palpation over the greater trochanteric bursa bilaterally.  FABERs test is positive on the left.  No pain with provocative maneuvers of the hips.  Bilateral lower extremity strength is normal and symmetric.  No atrophy or tone abnormalities are noted.  NEURO: Bilateral lower extremity coordination and muscle stretch reflexes are physiologic and symmetric.  Plantar response are downgoing. No clonus.  No loss of sensation is noted.  GAIT: Normal.      ASSESSMENT: 58 y.o. year old female with left sided back/buttock pain, consistent with the " following:    Encounter Diagnoses   Name Primary?    Sacroiliitis, not elsewhere classified Yes    Spondylosis of lumbar region without myelopathy or radiculopathy     DJD (degenerative joint disease), lumbosacral     Neck pain on left side     Degenerative cervical disc        PLAN:     - Previous imaging was reviewed and discussed with the patient today.    - Can repeat left SI joint injection if needed.  She feels as though the previous was very painful and would like to avoid.    - 60 mg IM Toradol today.  No NSAIDs for 24 hours.    - Can continue 800 mg Ibuprofen sparingly.    - The patient will continue a home exercise routine to help with pain and strengthening.      - RTC as needed.         The above plan and management options were discussed at length with patient. Patient is in agreement with the above and verbalized understanding.    Jennifer Pickens  04/05/2019

## 2019-04-05 NOTE — TELEPHONE ENCOUNTER
Called PT in regards to failed rx request message in Epic. Pt had not been seen by Dr. OTONIEL rivas over a year. Was calling to inform her that she would need appointment before we refill any meds. LVM for PT to call back at her convenience.

## 2019-04-07 DIAGNOSIS — N95.1 MENOPAUSAL SYMPTOMS: ICD-10-CM

## 2019-04-08 ENCOUNTER — HOSPITAL ENCOUNTER (OUTPATIENT)
Dept: RADIOLOGY | Facility: HOSPITAL | Age: 59
Discharge: HOME | End: 2019-04-08
Attending: OBSTETRICS & GYNECOLOGY
Payer: MEDICARE

## 2019-04-08 DIAGNOSIS — Z12.31 SCREENING MAMMOGRAM, ENCOUNTER FOR: ICD-10-CM

## 2019-04-08 PROCEDURE — 77067 MAMMO DIGITAL SCREENING BILAT WITH TOMOSYNTHESIS_CAD: ICD-10-PCS | Mod: 26,HCNC,, | Performed by: RADIOLOGY

## 2019-04-08 PROCEDURE — 77067 SCR MAMMO BI INCL CAD: CPT | Mod: TC,HCNC

## 2019-04-08 PROCEDURE — 77063 MAMMO DIGITAL SCREENING BILAT WITH TOMOSYNTHESIS_CAD: ICD-10-PCS | Mod: 26,HCNC,, | Performed by: RADIOLOGY

## 2019-04-08 PROCEDURE — 77063 BREAST TOMOSYNTHESIS BI: CPT | Mod: 26,HCNC,, | Performed by: RADIOLOGY

## 2019-04-08 PROCEDURE — 77067 SCR MAMMO BI INCL CAD: CPT | Mod: 26,HCNC,, | Performed by: RADIOLOGY

## 2019-04-08 RX ORDER — MEDROXYPROGESTERONE ACETATE 2.5 MG/1
TABLET ORAL
Qty: 90 TABLET | Refills: 11 | Status: SHIPPED | OUTPATIENT
Start: 2019-04-08 | End: 2019-08-29

## 2019-04-08 RX ORDER — ESTRADIOL 1 MG/1
1 TABLET ORAL DAILY
Qty: 90 TABLET | Refills: 3 | Status: SHIPPED | OUTPATIENT
Start: 2019-04-08 | End: 2019-07-31 | Stop reason: SDUPTHER

## 2019-04-16 ENCOUNTER — PROCEDURE VISIT (OUTPATIENT)
Dept: DERMATOLOGY | Facility: CLINIC | Age: 59
End: 2019-04-16
Payer: MEDICARE

## 2019-04-16 DIAGNOSIS — Z41.1 ELECTIVE PROCEDURE FOR UNACCEPTABLE COSMETIC APPEARANCE: Primary | ICD-10-CM

## 2019-04-16 NOTE — PROGRESS NOTES
Patient is here today for cosmetic Botox treatment.   She denies any new medical problems or medications.   She denies any history of adverse reaction to Botox or history of neuromuscular disease.     Discussed benefits and risks of Botox injections, including headache, weakness/paralysis of muscles, asymmetry, eyebrow/lid drooping, pain, bruising, swelling, infection, and rare risk of systemic botulism. Verbal and written consent was obtained.    Patient agreed to proceed with treatment. 23 units total were injected today:  8 in frontalis  15 in glabella (5-5-5)    Patient tolerated well with no complications. She was instructed not to massage the treated areas and that she should avoid exercise today.    Botox dilution: 2:1 (4:1 frontalis)  Lot #: [id number]  Exp date: 01/2021

## 2019-05-01 RX ORDER — TRETINOIN 0.25 MG/G
GEL TOPICAL
Qty: 30 G | Refills: 11 | Status: SHIPPED | OUTPATIENT
Start: 2019-05-01 | End: 2020-05-08

## 2019-05-02 ENCOUNTER — LAB VISIT (OUTPATIENT)
Dept: LAB | Facility: HOSPITAL | Age: 59
End: 2019-05-02
Attending: INTERNAL MEDICINE
Payer: MEDICARE

## 2019-05-02 DIAGNOSIS — I10 HYPERTENSION, ESSENTIAL: ICD-10-CM

## 2019-05-02 DIAGNOSIS — E78.5 HYPERLIPIDEMIA, UNSPECIFIED HYPERLIPIDEMIA TYPE: ICD-10-CM

## 2019-05-02 LAB
ALBUMIN SERPL BCP-MCNC: 3.8 G/DL (ref 3.5–5.2)
ALP SERPL-CCNC: 55 U/L (ref 55–135)
ALT SERPL W/O P-5'-P-CCNC: 14 U/L (ref 10–44)
ANION GAP SERPL CALC-SCNC: 9 MMOL/L (ref 8–16)
AST SERPL-CCNC: 15 U/L (ref 10–40)
BASOPHILS # BLD AUTO: 0.08 K/UL (ref 0–0.2)
BASOPHILS NFR BLD: 1.2 % (ref 0–1.9)
BILIRUB SERPL-MCNC: 0.9 MG/DL (ref 0.1–1)
BUN SERPL-MCNC: 14 MG/DL (ref 6–20)
CALCIUM SERPL-MCNC: 9.4 MG/DL (ref 8.7–10.5)
CHLORIDE SERPL-SCNC: 107 MMOL/L (ref 95–110)
CHOLEST SERPL-MCNC: 187 MG/DL (ref 120–199)
CHOLEST/HDLC SERPL: 2.7 {RATIO} (ref 2–5)
CO2 SERPL-SCNC: 25 MMOL/L (ref 23–29)
CREAT SERPL-MCNC: 0.8 MG/DL (ref 0.5–1.4)
DIFFERENTIAL METHOD: ABNORMAL
EOSINOPHIL # BLD AUTO: 0.4 K/UL (ref 0–0.5)
EOSINOPHIL NFR BLD: 6.5 % (ref 0–8)
ERYTHROCYTE [DISTWIDTH] IN BLOOD BY AUTOMATED COUNT: 12.4 % (ref 11.5–14.5)
EST. GFR  (AFRICAN AMERICAN): >60 ML/MIN/1.73 M^2
EST. GFR  (NON AFRICAN AMERICAN): >60 ML/MIN/1.73 M^2
GLUCOSE SERPL-MCNC: 92 MG/DL (ref 70–110)
HCT VFR BLD AUTO: 41.4 % (ref 37–48.5)
HDLC SERPL-MCNC: 70 MG/DL (ref 40–75)
HDLC SERPL: 37.4 % (ref 20–50)
HGB BLD-MCNC: 13.6 G/DL (ref 12–16)
IMM GRANULOCYTES # BLD AUTO: 0.01 K/UL (ref 0–0.04)
IMM GRANULOCYTES NFR BLD AUTO: 0.2 % (ref 0–0.5)
LDLC SERPL CALC-MCNC: 104 MG/DL (ref 63–159)
LYMPHOCYTES # BLD AUTO: 2.3 K/UL (ref 1–4.8)
LYMPHOCYTES NFR BLD: 35.9 % (ref 18–48)
MCH RBC QN AUTO: 33 PG (ref 27–31)
MCHC RBC AUTO-ENTMCNC: 32.9 G/DL (ref 32–36)
MCV RBC AUTO: 101 FL (ref 82–98)
MONOCYTES # BLD AUTO: 0.6 K/UL (ref 0.3–1)
MONOCYTES NFR BLD: 9.9 % (ref 4–15)
NEUTROPHILS # BLD AUTO: 3 K/UL (ref 1.8–7.7)
NEUTROPHILS NFR BLD: 46.3 % (ref 38–73)
NONHDLC SERPL-MCNC: 117 MG/DL
NRBC BLD-RTO: 0 /100 WBC
PLATELET # BLD AUTO: 267 K/UL (ref 150–350)
PMV BLD AUTO: 10.9 FL (ref 9.2–12.9)
POTASSIUM SERPL-SCNC: 4.4 MMOL/L (ref 3.5–5.1)
PROT SERPL-MCNC: 6.7 G/DL (ref 6–8.4)
RBC # BLD AUTO: 4.12 M/UL (ref 4–5.4)
SODIUM SERPL-SCNC: 141 MMOL/L (ref 136–145)
TRIGL SERPL-MCNC: 65 MG/DL (ref 30–150)
TSH SERPL DL<=0.005 MIU/L-ACNC: 2.12 UIU/ML (ref 0.4–4)
WBC # BLD AUTO: 6.44 K/UL (ref 3.9–12.7)

## 2019-05-02 PROCEDURE — 85025 COMPLETE CBC W/AUTO DIFF WBC: CPT | Mod: HCNC

## 2019-05-02 PROCEDURE — 84443 ASSAY THYROID STIM HORMONE: CPT | Mod: HCNC

## 2019-05-02 PROCEDURE — 36415 COLL VENOUS BLD VENIPUNCTURE: CPT | Mod: HCNC,PO

## 2019-05-02 PROCEDURE — 80061 LIPID PANEL: CPT | Mod: HCNC

## 2019-05-02 PROCEDURE — 80053 COMPREHEN METABOLIC PANEL: CPT | Mod: HCNC

## 2019-05-09 ENCOUNTER — LAB VISIT (OUTPATIENT)
Dept: LAB | Facility: HOSPITAL | Age: 59
End: 2019-05-09
Attending: INTERNAL MEDICINE
Payer: MEDICARE

## 2019-05-09 ENCOUNTER — OFFICE VISIT (OUTPATIENT)
Dept: INTERNAL MEDICINE | Facility: CLINIC | Age: 59
End: 2019-05-09
Payer: MEDICARE

## 2019-05-09 VITALS
HEIGHT: 61 IN | RESPIRATION RATE: 16 BRPM | WEIGHT: 120.13 LBS | BODY MASS INDEX: 22.68 KG/M2 | HEART RATE: 84 BPM | TEMPERATURE: 98 F | DIASTOLIC BLOOD PRESSURE: 66 MMHG | SYSTOLIC BLOOD PRESSURE: 94 MMHG

## 2019-05-09 DIAGNOSIS — Z01.84 IMMUNITY TO MEASLES DETERMINED BY SEROLOGIC TEST: ICD-10-CM

## 2019-05-09 DIAGNOSIS — Z79.890 MENOPAUSAL SYNDROME ON HORMONE REPLACEMENT THERAPY: Chronic | ICD-10-CM

## 2019-05-09 DIAGNOSIS — N95.1 MENOPAUSAL SYNDROME ON HORMONE REPLACEMENT THERAPY: Chronic | ICD-10-CM

## 2019-05-09 DIAGNOSIS — J30.9 CHRONIC ALLERGIC RHINITIS: ICD-10-CM

## 2019-05-09 DIAGNOSIS — M85.9 DISORDER OF BONE DENSITY AND STRUCTURE, UNSPECIFIED: ICD-10-CM

## 2019-05-09 DIAGNOSIS — Z00.00 WELL ADULT EXAM: Primary | ICD-10-CM

## 2019-05-09 DIAGNOSIS — G89.29 CHRONIC LEFT-SIDED LOW BACK PAIN WITHOUT SCIATICA: ICD-10-CM

## 2019-05-09 DIAGNOSIS — M54.50 CHRONIC LEFT-SIDED LOW BACK PAIN WITHOUT SCIATICA: ICD-10-CM

## 2019-05-09 DIAGNOSIS — H81.93 DISORDER OF VESTIBULAR FUNCTION OF BOTH EARS: ICD-10-CM

## 2019-05-09 DIAGNOSIS — N95.1 MENOPAUSAL SYNDROME: Chronic | ICD-10-CM

## 2019-05-09 PROCEDURE — 3074F SYST BP LT 130 MM HG: CPT | Mod: HCNC,CPTII,S$GLB, | Performed by: INTERNAL MEDICINE

## 2019-05-09 PROCEDURE — 3074F PR MOST RECENT SYSTOLIC BLOOD PRESSURE < 130 MM HG: ICD-10-PCS | Mod: HCNC,CPTII,S$GLB, | Performed by: INTERNAL MEDICINE

## 2019-05-09 PROCEDURE — 99396 PR PREVENTIVE VISIT,EST,40-64: ICD-10-PCS | Mod: HCNC,S$GLB,, | Performed by: INTERNAL MEDICINE

## 2019-05-09 PROCEDURE — 99999 PR PBB SHADOW E&M-EST. PATIENT-LVL III: CPT | Mod: PBBFAC,HCNC,, | Performed by: INTERNAL MEDICINE

## 2019-05-09 PROCEDURE — 3078F PR MOST RECENT DIASTOLIC BLOOD PRESSURE < 80 MM HG: ICD-10-PCS | Mod: HCNC,CPTII,S$GLB, | Performed by: INTERNAL MEDICINE

## 2019-05-09 PROCEDURE — 99999 PR PBB SHADOW E&M-EST. PATIENT-LVL III: ICD-10-PCS | Mod: PBBFAC,HCNC,, | Performed by: INTERNAL MEDICINE

## 2019-05-09 PROCEDURE — 3078F DIAST BP <80 MM HG: CPT | Mod: HCNC,CPTII,S$GLB, | Performed by: INTERNAL MEDICINE

## 2019-05-09 PROCEDURE — 99396 PREV VISIT EST AGE 40-64: CPT | Mod: HCNC,S$GLB,, | Performed by: INTERNAL MEDICINE

## 2019-05-09 PROCEDURE — 36415 COLL VENOUS BLD VENIPUNCTURE: CPT | Mod: HCNC,PO

## 2019-05-09 PROCEDURE — 86765 RUBEOLA ANTIBODY: CPT | Mod: HCNC

## 2019-05-09 RX ORDER — TRIAMTERENE AND HYDROCHLOROTHIAZIDE 37.5; 25 MG/1; MG/1
1 CAPSULE ORAL DAILY PRN
Qty: 30 CAPSULE | Refills: 5 | Status: SHIPPED | OUTPATIENT
Start: 2019-05-09 | End: 2019-08-29

## 2019-05-09 NOTE — PROGRESS NOTES
Subjective:       Patient ID: Raquel Kaye is a 58 y.o. female.    Chief Complaint: Annual Exam    HPI   The patient presents for annual physical examination.  She has generally been doing well.  She occasionally notes vertigo.  Dyazide has been helpful with symptom management.  She occasionally uses meclizine and Phenergan for symptom relief.  She uses Allegra daily for management of chronic rhinitis symptoms.    She recently received both Shingrix vaccine injections through her pharmacy.  She did experience flu-like symptoms which lasted for about a day after the injection.  She is up-to-date on her other immunizations.    There is a family history of colon cancer.  She had a negative colonoscopy on 09/27/2018.  Diverticulosis of the sigmoid colon was incidentally noted. A follow-up screening examination in 10 years was recommended at the time of the examination.  However, she does report a family history of colon cancer.  We discussed obtaining a follow-up screening colonoscopy in 5 years as an option.      Review of Systems   Constitutional: Negative for fatigue, fever and unexpected weight change.   HENT: Positive for congestion and postnasal drip. Negative for rhinorrhea and sore throat.    Eyes: Negative for visual disturbance.   Respiratory: Negative for cough, chest tightness, shortness of breath and wheezing.    Cardiovascular: Negative for chest pain, palpitations and leg swelling.   Gastrointestinal: Negative for abdominal pain and blood in stool.   Endocrine:        Occasional hot flashes are noted.   Genitourinary: Negative for dysuria, frequency and hematuria.   Musculoskeletal: Negative for arthralgias, back pain, joint swelling and myalgias.   Skin: Negative for rash.   Neurological: Positive for dizziness. Negative for syncope, weakness, numbness and headaches.        Occasional vertigo episodes are noted.   Psychiatric/Behavioral: Negative for sleep disturbance. The patient is not  nervous/anxious.        Objective:      Physical Exam   Constitutional: She is oriented to person, place, and time. Vital signs are normal. She appears well-developed and well-nourished. No distress.   HENT:   Head: Normocephalic and atraumatic.   Right Ear: External ear normal.   Left Ear: External ear normal.   Nose: Nose normal.   Mouth/Throat: Oropharynx is clear and moist. No oropharyngeal exudate.   Sinuses are nontender to palpation.   Eyes: Pupils are equal, round, and reactive to light. Conjunctivae and EOM are normal. No scleral icterus.   Neck: Normal range of motion. Neck supple. No JVD present. Carotid bruit is not present. No thyromegaly present.   Cardiovascular: Normal rate, regular rhythm, normal heart sounds, intact distal pulses and normal pulses. Exam reveals no gallop and no friction rub.   No murmur heard.  Pulmonary/Chest: Effort normal and breath sounds normal. No respiratory distress. She has no wheezes. She has no rales.   Abdominal: Soft. Bowel sounds are normal. She exhibits no abdominal bruit and no mass. There is no splenomegaly or hepatomegaly. There is no tenderness. No hernia.   Musculoskeletal: Normal range of motion. She exhibits no edema or tenderness.        Right shoulder: She exhibits no effusion and no deformity.   Lymphadenopathy:     She has no cervical adenopathy.     She has no axillary adenopathy.        Right: No supraclavicular adenopathy present.        Left: No supraclavicular adenopathy present.   Neurological: She is alert and oriented to person, place, and time. She has normal strength. No cranial nerve deficit.   Skin: Skin is warm and dry. No rash noted.   Psychiatric: She has a normal mood and affect. Her speech is normal and behavior is normal.   Nursing note and vitals reviewed.        Lab Visit on 05/02/2019   Component Date Value Ref Range Status    Specimen UA 05/02/2019 Urine, Clean Catch   Final    Color, UA 05/02/2019 Yellow  Yellow, Straw, Genie Final     Appearance, UA 05/02/2019 Cloudy* Clear Final    pH, UA 05/02/2019 5.0  5.0 - 8.0 Final    Specific Gravity, UA 05/02/2019 1.020  1.005 - 1.030 Final    Protein, UA 05/02/2019 Negative  Negative Final    Comment: Recommend a 24 hour urine protein or a urine   protein/creatinine ratio if globulin induced proteinuria is  clinically suspected.      Glucose, UA 05/02/2019 Negative  Negative Final    Ketones, UA 05/02/2019 Negative  Negative Final    Bilirubin (UA) 05/02/2019 Negative  Negative Final    Occult Blood UA 05/02/2019 Negative  Negative Final    Nitrite, UA 05/02/2019 Negative  Negative Final    Leukocytes, UA 05/02/2019 Trace* Negative Final    RBC, UA 05/02/2019 1  0 - 4 /hpf Final    WBC, UA 05/02/2019 6* 0 - 5 /hpf Final    Bacteria 05/02/2019 Moderate* None-Occ /hpf Final    Squam Epithel, UA 05/02/2019 14  /hpf Final    Non-Squam Epith 05/02/2019 <1  <1/hpf /hpf Final    Microscopic Comment 05/02/2019 SEE COMMENT   Final    Comment: Other formed elements not mentioned in the report are not   present in the microscopic examination.      Lab Visit on 05/02/2019   Component Date Value Ref Range Status    WBC 05/02/2019 6.44  3.90 - 12.70 K/uL Final    RBC 05/02/2019 4.12  4.00 - 5.40 M/uL Final    Hemoglobin 05/02/2019 13.6  12.0 - 16.0 g/dL Final    Hematocrit 05/02/2019 41.4  37.0 - 48.5 % Final    Mean Corpuscular Volume 05/02/2019 101* 82 - 98 fL Final    Mean Corpuscular Hemoglobin 05/02/2019 33.0* 27.0 - 31.0 pg Final    Mean Corpuscular Hemoglobin Conc 05/02/2019 32.9  32.0 - 36.0 g/dL Final    RDW 05/02/2019 12.4  11.5 - 14.5 % Final    Platelets 05/02/2019 267  150 - 350 K/uL Final    MPV 05/02/2019 10.9  9.2 - 12.9 fL Final    Immature Granulocytes 05/02/2019 0.2  0.0 - 0.5 % Final    Gran # (ANC) 05/02/2019 3.0  1.8 - 7.7 K/uL Final    Immature Grans (Abs) 05/02/2019 0.01  0.00 - 0.04 K/uL Final    Comment: Mild elevation in immature granulocytes is non specific  and   can be seen in a variety of conditions including stress response,   acute inflammation, trauma and pregnancy. Correlation with other   laboratory and clinical findings is essential.      Lymph # 05/02/2019 2.3  1.0 - 4.8 K/uL Final    Mono # 05/02/2019 0.6  0.3 - 1.0 K/uL Final    Eos # 05/02/2019 0.4  0.0 - 0.5 K/uL Final    Baso # 05/02/2019 0.08  0.00 - 0.20 K/uL Final    nRBC 05/02/2019 0  0 /100 WBC Final    Gran% 05/02/2019 46.3  38.0 - 73.0 % Final    Lymph% 05/02/2019 35.9  18.0 - 48.0 % Final    Mono% 05/02/2019 9.9  4.0 - 15.0 % Final    Eosinophil% 05/02/2019 6.5  0.0 - 8.0 % Final    Basophil% 05/02/2019 1.2  0.0 - 1.9 % Final    Differential Method 05/02/2019 Automated   Final    Sodium 05/02/2019 141  136 - 145 mmol/L Final    Potassium 05/02/2019 4.4  3.5 - 5.1 mmol/L Final    Chloride 05/02/2019 107  95 - 110 mmol/L Final    CO2 05/02/2019 25  23 - 29 mmol/L Final    Glucose 05/02/2019 92  70 - 110 mg/dL Final    BUN, Bld 05/02/2019 14  6 - 20 mg/dL Final    Creatinine 05/02/2019 0.8  0.5 - 1.4 mg/dL Final    Calcium 05/02/2019 9.4  8.7 - 10.5 mg/dL Final    Total Protein 05/02/2019 6.7  6.0 - 8.4 g/dL Final    Albumin 05/02/2019 3.8  3.5 - 5.2 g/dL Final    Total Bilirubin 05/02/2019 0.9  0.1 - 1.0 mg/dL Final    Alkaline Phosphatase 05/02/2019 55  55 - 135 U/L Final    AST 05/02/2019 15  10 - 40 U/L Final    ALT 05/02/2019 14  10 - 44 U/L Final    Anion Gap 05/02/2019 9  8 - 16 mmol/L Final    eGFR if African American 05/02/2019 >60.0  >60 mL/min/1.73 m^2 Final    eGFR if non African American 05/02/2019 >60.0  >60 mL/min/1.73 m^2 Final    Comment: Calculation used to obtain the estimated glomerular filtration  rate (eGFR) is the CKD-EPI equation.       Cholesterol 05/02/2019 187  120 - 199 mg/dL Final    Comment: The National Cholesterol Education Program (NCEP) has set the  following guidelines (reference ranges) for Cholesterol:  Optimal.....................<200  mg/dL  Borderline High.............200-239 mg/dL  High........................> or = 240 mg/dL      Triglycerides 05/02/2019 65  30 - 150 mg/dL Final    Comment: The National Cholesterol Education Program (NCEP) has set the  following guidelines (reference values) for triglycerides:  Normal......................<150 mg/dL  Borderline High.............150-199 mg/dL  High........................200-499 mg/dL      HDL 05/02/2019 70  40 - 75 mg/dL Final    Comment: The National Cholesterol Education Program (NCEP) has set the  following guidelines (reference values) for HDL Cholesterol:  Low...............<40 mg/dL  Optimal...........>60 mg/dL      LDL Cholesterol 05/02/2019 104.0  63.0 - 159.0 mg/dL Final    Comment: The National Cholesterol Education Program (NCEP) has set the  following guidelines (reference values) for LDL Cholesterol:  Optimal.......................<130 mg/dL  Borderline High...............130-159 mg/dL  High..........................160-189 mg/dL  Very High.....................>190 mg/dL      Hdl/Cholesterol Ratio 05/02/2019 37.4  20.0 - 50.0 % Final    Total Cholesterol/HDL Ratio 05/02/2019 2.7  2.0 - 5.0 Final    Non-HDL Cholesterol 05/02/2019 117  mg/dL Final    Comment: Risk category and Non-HDL cholesterol goals:  Coronary heart disease (CHD)or equivalent (10-year risk of CHD >20%):  Non-HDL cholesterol goal     <130 mg/dL  Two or more CHD risk factors and 10-year risk of CHD <= 20%:  Non-HDL cholesterol goal     <160 mg/dL  0 to 1 CHD risk factor:  Non-HDL cholesterol goal     <190 mg/dL      TSH 05/02/2019 2.121  0.400 - 4.000 uIU/mL Final         Assessment:       1. Well adult exam    2. Chronic allergic rhinitis    3. Menopausal syndrome    4. Disorder of bone density and structure, unspecified    5. Chronic left-sided low back pain without sciatica    6. Menopausal syndrome on hormone replacement therapy    7. 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 Immunity to measles  determined by serologic test        Plan:       Raquel was seen today for annual exam.  Current medications will be continued.  And measles antibody titer will be obtained to check for immunity in view of the recent measles outbreak in the country.  The patient is to return to clinic annually and as needed.    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

## 2019-05-11 LAB
RUBEOLA IGG ANTIBODY: 2.49 ISR (ref 0–0.9)
RUBEOLA INTERPRETATION: POSITIVE

## 2019-05-15 ENCOUNTER — OFFICE VISIT (OUTPATIENT)
Dept: OBSTETRICS AND GYNECOLOGY | Facility: CLINIC | Age: 59
End: 2019-05-15
Attending: OBSTETRICS & GYNECOLOGY
Payer: MEDICARE

## 2019-05-15 ENCOUNTER — LAB VISIT (OUTPATIENT)
Dept: LAB | Facility: OTHER | Age: 59
End: 2019-05-15
Attending: OBSTETRICS & GYNECOLOGY
Payer: MEDICARE

## 2019-05-15 VITALS
BODY MASS INDEX: 23.27 KG/M2 | WEIGHT: 123.25 LBS | SYSTOLIC BLOOD PRESSURE: 92 MMHG | HEIGHT: 61 IN | DIASTOLIC BLOOD PRESSURE: 60 MMHG

## 2019-05-15 DIAGNOSIS — N95.1 MENOPAUSAL SYMPTOM: ICD-10-CM

## 2019-05-15 DIAGNOSIS — N95.1 MENOPAUSAL SYMPTOM: Primary | ICD-10-CM

## 2019-05-15 DIAGNOSIS — Z01.419 ENCOUNTER FOR GYNECOLOGICAL EXAMINATION WITHOUT ABNORMAL FINDING: ICD-10-CM

## 2019-05-15 LAB — ESTRADIOL SERPL-MCNC: 36 PG/ML

## 2019-05-15 PROCEDURE — 3078F PR MOST RECENT DIASTOLIC BLOOD PRESSURE < 80 MM HG: ICD-10-PCS | Mod: CPTII,S$GLB,, | Performed by: OBSTETRICS & GYNECOLOGY

## 2019-05-15 PROCEDURE — 3074F PR MOST RECENT SYSTOLIC BLOOD PRESSURE < 130 MM HG: ICD-10-PCS | Mod: CPTII,S$GLB,, | Performed by: OBSTETRICS & GYNECOLOGY

## 2019-05-15 PROCEDURE — 3074F SYST BP LT 130 MM HG: CPT | Mod: CPTII,S$GLB,, | Performed by: OBSTETRICS & GYNECOLOGY

## 2019-05-15 PROCEDURE — G0101 CA SCREEN;PELVIC/BREAST EXAM: HCPCS | Mod: S$GLB,,, | Performed by: OBSTETRICS & GYNECOLOGY

## 2019-05-15 PROCEDURE — 36415 COLL VENOUS BLD VENIPUNCTURE: CPT | Mod: HCNC

## 2019-05-15 PROCEDURE — 3078F DIAST BP <80 MM HG: CPT | Mod: CPTII,S$GLB,, | Performed by: OBSTETRICS & GYNECOLOGY

## 2019-05-15 PROCEDURE — 82670 ASSAY OF TOTAL ESTRADIOL: CPT | Mod: HCNC

## 2019-05-15 PROCEDURE — G0101 PR CA SCREEN;PELVIC/BREAST EXAM: ICD-10-PCS | Mod: S$GLB,,, | Performed by: OBSTETRICS & GYNECOLOGY

## 2019-05-15 RX ORDER — PROGESTERONE 100 MG/1
100 CAPSULE ORAL NIGHTLY
Qty: 30 CAPSULE | Refills: 11 | Status: SHIPPED | OUTPATIENT
Start: 2019-05-15 | End: 2019-05-15 | Stop reason: SDUPTHER

## 2019-05-15 RX ORDER — PROGESTERONE 100 MG/1
100 CAPSULE ORAL NIGHTLY
Qty: 30 CAPSULE | Refills: 11 | Status: SHIPPED | OUTPATIENT
Start: 2019-05-15 | End: 2019-08-09 | Stop reason: SDUPTHER

## 2019-05-15 NOTE — PROGRESS NOTES
"SUBJECTIVE:   58 y.o. female   for annual routine Pap and checkup. Patient's last menstrual period was 04/15/2009..  She reports having hot "flushes"- face gets red. She reports some mood swings- feels impatient.she reports feeling this way for about 1 month. She has not been using Premarin vaginal cream. She does take Provera nightly as well- no bleeding.  .        Past Medical History:   Diagnosis Date    Allergy     Back pain     Disorder of vestibular function of both ears     Fibrocystic breast disease in female     HEARING LOSS     right side - no hearing aid    Hyperlipidemia     Meniere's disease     Osteopenia     PONV (postoperative nausea and vomiting)     PONV (postoperative nausea and vomiting)     Special screening for malignant neoplasms, colon 2013     Past Surgical History:   Procedure Laterality Date    ADENOIDECTOMY      APPENDECTOMY      BLOCK-NERVE-MEDIAL BRANCH-LUMBAR Left 2017    Per[facility]    BREAST BIOPSY      BREAST SURGERY      left breast biopsy    COLONOSCOPY N/A 2018    [facility]    COLONOSCOPY N/A 2013    [facility]    CYST REMOVAL Right     neck    DILATION AND CURETTAGE OF UTERUS      [id number] N/A 2015    Per[facility]ata [facility] at [facility] OR    INJECTION, JOINT, LEFT SI JOINT INJECTION UNDER FLUORO Left 10/16/2018    Performed [facility] at [facility] PAIN T    INJECTION-JOINT Left 2018    [facility]    MANDIBLE FRACTURE SURGERY Right     scope    TONSILLECTOMY       Social History     Socioeconomic History    Marital status: Single     Spouse name: Not on file    Number of children: Not on file    Years of education: Not on file    Highest education level: Not on file   Occupational History    Not " on file   Social Needs    Financial resource strain: Not on file    Food insecurity:     Worry: Not on file     Inability: Not on file    Transportation needs:     Medical: Not on file     Non-medical: Not on file   Tobacco Use    Smoking status: Never Smoker    Smokeless tobacco: Never Used   Substance and Sexual Activity    Alcohol use: Yes     Comment: social    Drug use: No    Sexual activity: Yes     Partners: Male     Birth control/protection: None   Lifestyle    Physical activity:     Days per week: Not on file     Minutes per session: Not on file    Stress: Not on file   Relationships    Social connections:     Talks on phone: Not on file     Gets together: Not on file     Attends Druze service: Not on file     Active member of club or organization: Not on file     Attends meetings of clubs or organizations: Not on file     Relationship status: Not on file   Other Topics Concern    Not on file   Social History Narrative    Not on file     Family History   Problem Relation Age of Onset    Heart disease Mother     Kidney disease Mother     Diabetes Mother     Hypertension Mother     Hyperlipidemia Mother     COPD Mother     Heart disease Father     Cancer Father 75        lymphoma, bladder tumors    Breast cancer Neg Hx     Ovarian cancer Neg Hx     Colon cancer Neg Hx      OB History    Para Term  AB Living   0   0         SAB TAB Ectopic Multiple Live Births                       Current Outpatient Medications   Medication Sig Dispense Refill    aspirin 81 MG Chew       b complex vitamins tablet Take 1 tablet by mouth once daily.      CALCIUM 600 WITH VITAMIN D3 600 mg(1,500mg) -200 unit Tab       CETIRIZINE HCL (ZYRTEC ORAL) Take 1 tablet by mouth as needed.      conjugated estrogens (PREMARIN) vaginal cream Place 1 g vaginally twice a week. 30 g 3    diclofenac sodium (VOLTAREN) 1 % Gel APPLY 2 G TOPICALLY 4 (FOUR) TIMES DAILY. 100 g 0    diphenhydrAMINE  (BENADRYL) 25 mg capsule       EPIPEN 2-PEDRO 0.3 mg/0.3 mL (1:1,000) AtIn   0    estradiol (ESTRACE) 1 MG tablet TAKE 1 TABLET (1 MG TOTAL) BY MOUTH ONCE DAILY. 90 tablet 3    fexofenadine (ALLEGRA) 180 MG tablet Take 1 tablet (180 mg total) by mouth once daily. 30 tablet 11    fluticasone (FLONASE) 50 mcg/actuation nasal spray UES 1 SPRAY IN EACH NOSTRIL DAILY 16 g 2    glucosamine-chondroitin 500-400 mg tablet Take 1 tablet by mouth 3 (three) times daily.      hydrOXYzine pamoate (VISTARIL) 25 MG Cap Take 1 capsule (25 mg total) by mouth every 6 (six) hours as needed. 30 capsule 0    ibuprofen (ADVIL,MOTRIN) 800 MG tablet Take 1 tablet (800 mg total) by mouth 3 (three) times daily. 90 tablet 1    meclizine (ANTIVERT) 25 mg tablet Take 1 tablet (25 mg total) by mouth every 8 (eight) hours as needed for Dizziness. 40 tablet 2    medroxyPROGESTERone (PROVERA) 2.5 MG tablet TAKE 1 TABLET EVERY DAY 90 tablet 11    promethazine (PHENERGAN) 25 MG tablet Take 1 tablet (25 mg total) by mouth every 4 (four) hours as needed for Nausea. 30 tablet 2    RESTASIS 0.05 % ophthalmic emulsion       tretinoin (RETIN-A) 0.025 % gel APPLY SPARINGLY TO AFFECTED AREA(S) ONCE DAILY 30 g 11    triamterene-hydrochlorothiazide 37.5-25 mg (DYAZIDE) 37.5-25 mg per capsule Take 1 capsule by mouth daily as needed. 30 capsule 5     No current facility-administered medications for this visit.      Allergies: Patient has no known allergies.     The 10-year ASCVD risk score (Daniela MATT Jr., et al., 2013) is: 1.4%    Values used to calculate the score:      Age: 58 years      Sex: Female      Is Non- : No      Diabetic: No      Tobacco smoker: No      Systolic Blood Pressure: 92 mmHg      Is BP treated: No      HDL Cholesterol: 70 mg/dL      Total Cholesterol: 187 mg/dL      ROS:  Constitutional: no weight loss, weight gain, fever, fatigue  Eyes:  No vision changes, glasses/contacts  ENT/Mouth: No ulcers, sinus  problems, ears ringing, headache  Cardiovascular: No inability to lie flat, chest pain, exercise intolerance, swelling, heart palpitations  Respiratory: No wheezing, coughing blood, shortness of breath, or cough  Gastrointestinal: No diarrhea, bloody stool, nausea/vomiting, constipation, gas, hemorrhoids  Genitourinary: No blood in urine, painful urination, urgency of urination, frequency of urination, incomplete emptying, incontinence, abnormal bleeding, painful periods, heavy periods, vaginal discharge, vaginal odor, painful intercourse, sexual problems, bleeding after intercourse.  Musculoskeletal: No muscle weakness  Skin/Breast: No painful breasts, nipple discharge, masses, rash, ulcers  Neurological: No passing out, seizures, numbness, headache  Endocrine: No diabetes, hypothyroid, hyperthyroid, hot flashes, hair loss, abnormal hair growth, acne  Psychiatric: No depression, crying, +mood swings  Hematologic: No bruises, bleeding, swollen lymph nodes, anemia.      Physical Exam:   Constitutional: She is oriented to person, place, and time. She appears well-developed and well-nourished.      Neck: Normal range of motion. No tracheal deviation present. No thyromegaly present.    Cardiovascular: Exam reveals no edema.     Pulmonary/Chest: Effort normal. She exhibits no mass, no tenderness, no deformity and no retraction. Right breast exhibits no inverted nipple, no mass, no nipple discharge, no skin change, no tenderness, presence, no bleeding and no swelling. Left breast exhibits no inverted nipple, no mass, no nipple discharge, no skin change, no tenderness, presence, no bleeding and no swelling. Breasts are symmetrical.        Abdominal: Soft. She exhibits no distension and no mass. There is no tenderness. There is no rebound and no guarding. No hernia. Hernia confirmed negative in the left inguinal area.     Genitourinary: Vagina normal and uterus normal. Rectal exam shows no external hemorrhoid. There is no  rash, tenderness or lesion on the right labia. There is no rash, tenderness or lesion on the left labia. Uterus is not deviated. Cervix is normal. No no adexnal prolapse. Right adnexum displays no mass, no tenderness and no fullness. Left adnexum displays no mass, no tenderness and no fullness. No tenderness, bleeding, rectocele, cystocele or unspecified prolapse of vaginal walls in the vagina. No vaginal discharge found. Cervix exhibits no motion tenderness, no discharge and no friability.           Musculoskeletal: Normal range of motion and moves all extremeties. She exhibits no edema.      Lymphadenopathy:        Right: No inguinal adenopathy present.        Left: No inguinal adenopathy present.    Neurological: She is alert and oriented to person, place, and time.    Skin: No rash noted. No erythema. No pallor.    Psychiatric: She has a normal mood and affect. Her behavior is normal. Judgment and thought content normal.         ASSESSMENT:   Well exam  Menopausal symptoms    PLAN:   No contraindication to remain on HRT  Will check Estradiol level- if low will consider increasing her Estradiol dose secondary to change in her symptoms  Mammogram

## 2019-05-28 ENCOUNTER — TELEPHONE (OUTPATIENT)
Dept: OBSTETRICS AND GYNECOLOGY | Facility: CLINIC | Age: 59
End: 2019-05-28

## 2019-05-28 NOTE — TELEPHONE ENCOUNTER
Advised pt estradiol level was low and Dr Mata recommends upping the dose of her hormones to see how she feels. Advised pt per Dr Mata we will up the estradiol from 1mg to 2mg daily. Pt states she just got a 3 months supply of the estradiol 1mg and wanted to know if she could just take two pills. Advised pt spoke with Dr Mata and she is ok with her taking the 2pill daily to see how she feels then let us know and we can go from there. Pt verbalized understanding

## 2019-07-29 ENCOUNTER — PROCEDURE VISIT (OUTPATIENT)
Dept: DERMATOLOGY | Facility: CLINIC | Age: 59
End: 2019-07-29
Payer: MEDICARE

## 2019-07-29 DIAGNOSIS — Z41.1 ELECTIVE PROCEDURE FOR UNACCEPTABLE COSMETIC APPEARANCE: Primary | ICD-10-CM

## 2019-07-29 PROCEDURE — 99499 UNLISTED E&M SERVICE: CPT | Mod: CSM,S$GLB,, | Performed by: DERMATOLOGY

## 2019-07-29 PROCEDURE — 99499 NO LOS: ICD-10-PCS | Mod: CSM,S$GLB,, | Performed by: DERMATOLOGY

## 2019-07-29 NOTE — PROGRESS NOTES
Patient is here today for cosmetic Botox treatment.   She denies any new medical problems or medications.   She denies any history of adverse reaction to Botox or history of neuromuscular disease.     Discussed benefits and risks of Botox injections, including headache, weakness/paralysis of muscles, asymmetry, eyebrow/lid drooping, pain, bruising, swelling, infection, and rare risk of systemic botulism. Verbal and written consent was obtained.    Patient agreed to proceed with treatment. 31 units total were injected today:  8 in frontalis  15 in glabella (2-3-5-3-2)  4 in each lateral periorbital region    Patient tolerated well with no complications. She was instructed not to massage the treated areas and that she should avoid exercise today.    Botox dilution: 2:1 (4:1 frontalis)  Lot #: [id number]  Exp date: 01/2021

## 2019-07-31 DIAGNOSIS — N95.1 MENOPAUSAL SYMPTOMS: ICD-10-CM

## 2019-07-31 RX ORDER — ESTRADIOL 1 MG/1
1 TABLET ORAL DAILY
Qty: 90 TABLET | Refills: 3 | Status: SHIPPED | OUTPATIENT
Start: 2019-07-31 | End: 2020-03-04

## 2019-07-31 NOTE — TELEPHONE ENCOUNTER
Pt calling to get refills on her estradiol. Pt needs it sent to [facility] delivery pharmacy. Pt was seen 5/2019

## 2019-07-31 NOTE — TELEPHONE ENCOUNTER
----- Message from Elda Yeboah sent at 7/30/2019  3:02 PM CDT -----  Contact: RHONDA WHITE   Please refill the medication(s) listed below. The patient can be reached at this phone number once it is called into the pharmacy. [phone]    Medication #1: Estradiol 1 mg 90 day supply (no further information given)    Medication #2    Preferred Pharmacy: [facility] - [facility] [address]

## 2019-08-08 ENCOUNTER — PATIENT MESSAGE (OUTPATIENT)
Dept: OBSTETRICS AND GYNECOLOGY | Facility: CLINIC | Age: 59
End: 2019-08-08

## 2019-08-09 ENCOUNTER — PES CALL (OUTPATIENT)
Dept: ADMINISTRATIVE | Facility: CLINIC | Age: 59
End: 2019-08-09

## 2019-08-09 RX ORDER — PROGESTERONE 100 MG/1
100 CAPSULE ORAL NIGHTLY
Qty: 90 CAPSULE | Refills: 3 | Status: SHIPPED | OUTPATIENT
Start: 2019-08-09 | End: 2019-08-29 | Stop reason: SDUPTHER

## 2019-08-26 ENCOUNTER — PATIENT MESSAGE (OUTPATIENT)
Dept: OBSTETRICS AND GYNECOLOGY | Facility: CLINIC | Age: 59
End: 2019-08-26

## 2019-08-27 ENCOUNTER — PATIENT MESSAGE (OUTPATIENT)
Dept: OBSTETRICS AND GYNECOLOGY | Facility: CLINIC | Age: 59
End: 2019-08-27

## 2019-08-27 ENCOUNTER — TELEPHONE (OUTPATIENT)
Dept: PAIN MEDICINE | Facility: CLINIC | Age: 59
End: 2019-08-27

## 2019-08-27 NOTE — TELEPHONE ENCOUNTER
My name is Staff, I am contacting you from [facility] regarding your appointment scheduled for 08.28.19, with PRESTON, just confirming you will be able to make it.    If you feel you need to reschedule or canceled please give our office a call so we can better assist you.      Staff requesting patient to arrive 15 mins ahead of schedule appointment time.    Pt verbalized understanding and has confirmed appt

## 2019-08-28 ENCOUNTER — OFFICE VISIT (OUTPATIENT)
Dept: PAIN MEDICINE | Facility: CLINIC | Age: 59
End: 2019-08-28
Payer: MEDICARE

## 2019-08-28 VITALS
BODY MASS INDEX: 23.22 KG/M2 | DIASTOLIC BLOOD PRESSURE: 78 MMHG | TEMPERATURE: 99 F | HEIGHT: 61 IN | SYSTOLIC BLOOD PRESSURE: 113 MMHG | WEIGHT: 123 LBS | HEART RATE: 91 BPM

## 2019-08-28 DIAGNOSIS — R52 PAIN: ICD-10-CM

## 2019-08-28 DIAGNOSIS — M47.816 SPONDYLOSIS OF LUMBAR REGION WITHOUT MYELOPATHY OR RADICULOPATHY: ICD-10-CM

## 2019-08-28 DIAGNOSIS — M46.1 SACROILIITIS, NOT ELSEWHERE CLASSIFIED: Primary | ICD-10-CM

## 2019-08-28 DIAGNOSIS — M47.817 DJD (DEGENERATIVE JOINT DISEASE), LUMBOSACRAL: ICD-10-CM

## 2019-08-28 PROCEDURE — 99999 PR PBB SHADOW E&M-EST. PATIENT-LVL IV: ICD-10-PCS | Mod: PBBFAC,HCNC,, | Performed by: NURSE PRACTITIONER

## 2019-08-28 PROCEDURE — 3074F SYST BP LT 130 MM HG: CPT | Mod: HCNC,CPTII,S$GLB, | Performed by: NURSE PRACTITIONER

## 2019-08-28 PROCEDURE — 96372 THER/PROPH/DIAG INJ SC/IM: CPT | Mod: HCNC,S$GLB,, | Performed by: NURSE PRACTITIONER

## 2019-08-28 PROCEDURE — 99499 UNLISTED E&M SERVICE: CPT | Mod: HCNC,S$GLB,, | Performed by: NURSE PRACTITIONER

## 2019-08-28 PROCEDURE — 99999 PR PBB SHADOW E&M-EST. PATIENT-LVL IV: CPT | Mod: PBBFAC,HCNC,, | Performed by: NURSE PRACTITIONER

## 2019-08-28 PROCEDURE — 3074F PR MOST RECENT SYSTOLIC BLOOD PRESSURE < 130 MM HG: ICD-10-PCS | Mod: HCNC,CPTII,S$GLB, | Performed by: NURSE PRACTITIONER

## 2019-08-28 PROCEDURE — 99213 PR OFFICE/OUTPT VISIT, EST, LEVL III, 20-29 MIN: ICD-10-PCS | Mod: 25,HCNC,S$GLB, | Performed by: NURSE PRACTITIONER

## 2019-08-28 PROCEDURE — 3008F BODY MASS INDEX DOCD: CPT | Mod: HCNC,CPTII,S$GLB, | Performed by: NURSE PRACTITIONER

## 2019-08-28 PROCEDURE — 3008F PR BODY MASS INDEX (BMI) DOCUMENTED: ICD-10-PCS | Mod: HCNC,CPTII,S$GLB, | Performed by: NURSE PRACTITIONER

## 2019-08-28 PROCEDURE — 99213 OFFICE O/P EST LOW 20 MIN: CPT | Mod: 25,HCNC,S$GLB, | Performed by: NURSE PRACTITIONER

## 2019-08-28 PROCEDURE — 99499 RISK ADDL DX/OHS AUDIT: ICD-10-PCS | Mod: HCNC,S$GLB,, | Performed by: NURSE PRACTITIONER

## 2019-08-28 PROCEDURE — 3078F PR MOST RECENT DIASTOLIC BLOOD PRESSURE < 80 MM HG: ICD-10-PCS | Mod: HCNC,CPTII,S$GLB, | Performed by: NURSE PRACTITIONER

## 2019-08-28 PROCEDURE — 96372 PR INJECTION,THERAP/PROPH/DIAG2ST, IM OR SUBCUT: ICD-10-PCS | Mod: HCNC,S$GLB,, | Performed by: NURSE PRACTITIONER

## 2019-08-28 PROCEDURE — 3078F DIAST BP <80 MM HG: CPT | Mod: HCNC,CPTII,S$GLB, | Performed by: NURSE PRACTITIONER

## 2019-08-28 RX ORDER — KETOROLAC TROMETHAMINE 30 MG/ML
60 INJECTION, SOLUTION INTRAMUSCULAR; INTRAVENOUS
Status: COMPLETED | OUTPATIENT
Start: 2019-08-28 | End: 2019-08-28

## 2019-08-28 RX ORDER — CETIRIZINE HYDROCHLORIDE 1 MG/ML
SOLUTION ORAL
COMMUNITY
Start: 2019-08-01 | End: 2020-03-04

## 2019-08-28 RX ADMIN — KETOROLAC TROMETHAMINE 60 MG: 30 INJECTION, SOLUTION INTRAMUSCULAR; INTRAVENOUS at 02:08

## 2019-08-28 NOTE — PROGRESS NOTES
Chronic Pain - Established Pain    Referring Physician: No ref. provider found    Chief Complaint:   Chief Complaint   Patient presents with    Hip Pain     Left Hip        SUBJECTIVE: Disclaimer: This note has been generated using voice-recognition software. There may be typographical errors that have been missed during proof-reading    Interval History 8/28/2019:  The patient is here for follow up of left sided buttock pain.  She has intermittent Toradol injections as well as SI joint injections as needed.  She had a Toradol shot in April which controlled her symptoms well until recently.  She is requesting a repeat today.  She takes Ibuprofen sparingly as well.  Her pain worsens with sitting and standing.  She is not having any shooting into the legs.  Her pain today is 3/10.    Interval History 4/5/2019:  The patient returns to discuss pain.  After her last visit, I gave her a Toradol shot which provided her with significant benefit for some time.  She has been taking 800 mg Ibuprofen as needed with benefit, usually about once per week.  Her pain worsens with sitting and driving.  She does not have very much pain with walking.  Her pain today is 9/10.     Interval History 11/26/2018:  The patient returns for follow up of left sided lower back pain.  She is s/p left SI joint injection on 10/16/18 with minimal benefit.  Previous injection helped significantly.  She continues to report pain over the left buttock.  She does have pain over the lumbar spine, but this is mild.  Her pain is most severe when she sits for prolonged periods or changes from sitting to standing.  She is active and has been exercising and gardening.  Her previous XRAYs from last year show right sided curvature and retrolisthesis without instability.  She has not had imaging of hips or pelvis recently.  Her pain today is 6/10.  She takes OTC Ibuprofen with some benefit.    Interval History 2/1/2018:  The patient presents today for follow  up.  She is s/p left SI joint injection on 1/16/18 with about 80% pain relief overall.  She has intermittent pain to left buttock which is mild.  This is mainly with bending and lifting.  Since her last visit, she has also changed her body mechanics which she thinks is helping.  She takes OTC NSAIDs sparingly as needed.  She has also increased her activity and has been walking.  Her pain today is 3/10.    Interval History 12/7/2017:  The patient presents today for follow up of left sided lower back and buttock pain.  She is s/p left L3,4,5 MBB on 11/7/17.  She reports that she felt numbness to her lower back for about one hour after the procedure.  Immediately after the procedure, she went to walk her dog in the park and started to have her usual pain.  Her pain is mainly to the left buttock at this time.  It is worsened with prolonged sitting, changing from sitting to standing, and lifting her 20 lb nephew.  She also notices that she carried him on her left hip which worsens her pain.  She does have some benefit with OTC NSAIDs.  Voltaren gel provided limited benefit.      Initial encounter:    Raquel Kaye presents to the clinic for the evaluation of left sided low back pain. The pain started 10 years ago and symptoms have been worsening.    Brief history:  Patient had a history of radicular symptoms but they are not occurring now.    Pain Description:    The pain is located in the left side of lower back area and does not radiate.      At BEST  2/10     At WORST  9/10 on the WORST day.      On average pain is rated as 5/10.     Today the pain is rated as 4/10    The pain is described as aching and sharp      Symptoms interfere with daily activity.     Exacerbating factors: Sitting, Standing, Laying, Bending, Coughing/Sneezing, Walking, Morning, Lifting and Getting out of bed/chair.      Mitigating factors heat, ice, massage and medications.     Patient denies night fever/night sweats, urinary incontinence,  bowel incontinence, significant weight loss, significant motor weakness and loss of sensations.  Patient denies any suicidal or homicidal ideations    Pain Medications:  Current:  800 mg Ibuprofen prn with improvement    Tried in Past:  NSAIDs - Ibuprofen and Voltaren gel  TCA -Never  SNRI -Never  Anti-convulsants -asprin 81mg  Muscle Relaxants -Never  Opioids-Never    Physical Therapy/Home Exercise: yes  -works with a physical therapist and psychiatrist     report:  Reviewed and consistent with medication use as prescribed.    Pain Procedures:   11/7/17 Left L3,4,5 MBB- limited relief after 1 hr with activity  1/16/18 Left SI joint injection- 80% relief  10/16/18 Left SI joint injection    Chiropractor -never  Acupuncture - never  TENS unit -never  Spinal decompression -never  Joint replacement -never    CMP  Sodium   Date Value Ref Range Status   05/02/2019 141 136 - 145 mmol/L Final     Potassium   Date Value Ref Range Status   05/02/2019 4.4 3.5 - 5.1 mmol/L Final     Chloride   Date Value Ref Range Status   05/02/2019 107 95 - 110 mmol/L Final     CO2   Date Value Ref Range Status   05/02/2019 25 23 - 29 mmol/L Final     Glucose   Date Value Ref Range Status   05/02/2019 92 70 - 110 mg/dL Final     BUN, Bld   Date Value Ref Range Status   05/02/2019 14 6 - 20 mg/dL Final     Creatinine   Date Value Ref Range Status   05/02/2019 0.8 0.5 - 1.4 mg/dL Final     Calcium   Date Value Ref Range Status   05/02/2019 9.4 8.7 - 10.5 mg/dL Final     Total Protein   Date Value Ref Range Status   05/02/2019 6.7 6.0 - 8.4 g/dL Final     Albumin   Date Value Ref Range Status   05/02/2019 3.8 3.5 - 5.2 g/dL Final     Total Bilirubin   Date Value Ref Range Status   05/02/2019 0.9 0.1 - 1.0 mg/dL Final     Comment:     For infants and newborns, interpretation of results should be based  on gestational age, weight and in agreement with clinical  observations.  Premature Infant recommended reference ranges:  Up to 24  hours.............<8.0 mg/dL  Up to 48 hours............<12.0 mg/dL  3-5 days..................<15.0 mg/dL  6-29 days.................<15.0 mg/dL       Alkaline Phosphatase   Date Value Ref Range Status   05/02/2019 55 55 - 135 U/L Final     AST   Date Value Ref Range Status   05/02/2019 15 10 - 40 U/L Final     ALT   Date Value Ref Range Status   05/02/2019 14 10 - 44 U/L Final     Anion Gap   Date Value Ref Range Status   05/02/2019 9 8 - 16 mmol/L Final     eGFR if    Date Value Ref Range Status   05/02/2019 >60.0 >60 mL/min/1.73 m^2 Final     eGFR if non    Date Value Ref Range Status   05/02/2019 >60.0 >60 mL/min/1.73 m^2 Final     Comment:     Calculation used to obtain the estimated glomerular filtration  rate (eGFR) is the CKD-EPI equation.        Lab Results   Component Value Date    WBC 6.44 05/02/2019    HGB 13.6 05/02/2019    HCT 41.4 05/02/2019     (H) 05/02/2019     05/02/2019       Imaging:  Narrative     EXAMINATION:  XR HIPS BILATERAL 2 VIEW INCL AP PELVIS    CLINICAL HISTORY:  Sacroiliitis, not elsewhere classified    TECHNIQUE:  AP view of the pelvis and frogleg lateral views of both hips were performed.    COMPARISON:  None.    FINDINGS:  There is no evidence of acute fracture, dislocation, or bone destruction.  Joint spaces are well preserved.  There is mild degenerative change of the sacroiliac joints.  No erosions are seen      Impression       See above.     Narrative     EXAMINATION:  XR LUMBAR SPINE 5 VIEW WITH FLEX AND EXT    CLINICAL HISTORY:  Low back pain, >6wks conservative tx, persistent-progressive sx, surgical candidate;  Sacroiliitis, not elsewhere classified    TECHNIQUE:  Five views of the lumbar spine plus flexion extension views were performed.    COMPARISON:  Prior dated 10/26/2017    FINDINGS:  There is dextroscoliosis of the lumbar spine.  There is retrolisthesis of L2-3 and L3 on L4 (grade 1) measuring approximately 4 mm.  This  is similar to previous exam.  There is no significant change on flexion/extension views to suggest translational instability.  There are endplate degenerative changes with subchondral sclerosis and marginal osteophyte formation and disc height narrowing at L3-4.  Mild facet arthropathy is present at the lower lumbar levels.      Impression       Degenerative change of the lumbar spine with associated dextroscoliosis and L2-3/L3-4 retrolisthesis, similar to previous exam.  No translational instability.       DEXA scan shows osteopenia    Past Medical History:   Diagnosis Date    Allergy     Back pain     Disorder of vestibular function of both ears     Fibrocystic breast disease in female     HEARING LOSS     right side - no hearing aid    Hyperlipidemia     Meniere's disease     Osteopenia     PONV (postoperative nausea and vomiting)     PONV (postoperative nausea and vomiting)     Special screening for malignant neoplasms, colon 7/22/2013     Past Surgical History:   Procedure Laterality Date    ADENOIDECTOMY      APPENDECTOMY      BLOCK-NERVE-MEDIAL BRANCH-LUMBAR Left 11/7/2017    [facility] PAIN MGT    BREAST BIOPSY      BREAST SURGERY      left breast biopsy    COLONOSCOPY N/A 9/27/2018    [facility] ENDO ([facility] F[facility])    COLONOSCOPY N/A 7/22/2013    Performed by [facility] at [facility] ENDO (4TH FLR)    CYST REMOVAL Right     neck    DILATION AND CURETTAGE OF UTERUS      [id number]-QYMOXYDNY N/A 4/22/2015    [facility]ata [facility] at [facility] OR    INJECTION, JOINT, LEFT SI JOINT INJECTION UNDER FLUORO Left 10/16/2018    Performed [facility] at [facility] PAIN MGT    INJECTION-JOINT Left 1/16/2018    Per[facility] at [facility] PAIN MGT    MANDIBLE FRACTURE SURGERY Right     scope    TONSILLECTOMY       Social History     Socioeconomic History    Marital status: Single      Spouse name: Not on file    Number of children: Not on file    Years of education: Not on file    Highest education level: Not on file   Occupational History    Not on file   Social Needs    Financial resource strain: Not on file    Food insecurity:     Worry: Not on file     Inability: Not on file    Transportation needs:     Medical: Not on file     Non-medical: Not on file   Tobacco Use    Smoking status: Never Smoker    Smokeless tobacco: Never Used   Substance and Sexual Activity    Alcohol use: Yes     Comment: social    Drug use: No    Sexual activity: Yes     Partners: Male     Birth control/protection: None   Lifestyle    Physical activity:     Days per week: Not on file     Minutes per session: Not on file    Stress: Not at all   Relationships    Social connections:     Talks on phone: Not on file     Gets together: Not on file     Attends [employer] service: Not on file     Active member of club or organization: Not on file     Attends meetings of clubs or organizations: Not on file     Relationship status: Not on file   Other Topics Concern    Not on file   Social History Narrative    Not on file     Family History   Problem Relation Age of Onset    Heart disease Mother     Kidney disease Mother     Diabetes Mother     Hypertension Mother     Hyperlipidemia Mother     COPD Mother     Heart disease Father     Cancer Father 75        lymphoma, bladder tumors    Breast cancer Neg Hx     Ovarian cancer Neg Hx     Colon cancer Neg Hx        Review of patient's allergies indicates:   Allergen Reactions    Dexamethasone      Flushing and headache with medrol dosepack & tachycardia       Current Outpatient Medications   Medication Sig    aspirin 81 MG Chew     b complex vitamins tablet Take 1 tablet by mouth once daily.    CALCIUM 600 WITH VITAMIN D3 600 mg(1,500mg) -200 unit Tab     CETIRIZINE HCL (ZYRTEC ORAL) Take 1 tablet by mouth as needed.    conjugated estrogens (PREMARIN)  vaginal cream Place 1 g vaginally twice a week.    diphenhydrAMINE (BENADRYL) 25 mg capsule     EPIPEN 2-PEDRO 0.3 mg/0.3 mL (1:1,000) AtIn     estradiol (ESTRACE) 1 MG tablet Take 1 tablet (1 mg total) by mouth once daily.    fluticasone (FLONASE) 50 mcg/actuation nasal spray UES 1 SPRAY IN EACH NOSTRIL DAILY    glucosamine-chondroitin 500-400 mg tablet Take 1 tablet by mouth 3 (three) times daily.    meclizine (ANTIVERT) 25 mg tablet Take 1 tablet (25 mg total) by mouth every 8 (eight) hours as needed for Dizziness.    medroxyPROGESTERone (PROVERA) 2.5 MG tablet TAKE 1 TABLET EVERY DAY    promethazine (PHENERGAN) 25 MG tablet Take 1 tablet (25 mg total) by mouth every 4 (four) hours as needed for Nausea.    RESTASIS 0.05 % ophthalmic emulsion     tretinoin (RETIN-A) 0.025 % gel APPLY SPARINGLY TO AFFECTED AREA(S) ONCE DAILY    triamterene-hydrochlorothiazide 37.5-25 mg (DYAZIDE) 37.5-25 mg per capsule Take 1 capsule by mouth daily as needed.    cetirizine (ZYRTEC) 1 mg/mL syrup     diclofenac sodium (VOLTAREN) 1 % Gel APPLY 2 G TOPICALLY 4 (FOUR) TIMES DAILY.    fexofenadine (ALLEGRA) 180 MG tablet Take 1 tablet (180 mg total) by mouth once daily.    hydrOXYzine pamoate (VISTARIL) 25 MG Cap Take 1 capsule (25 mg total) by mouth every 6 (six) hours as needed.    progesterone (PROMETRIUM) 100 MG capsule Take 1 capsule (100 mg total) by mouth nightly.     No current facility-administered medications for this visit.        REVIEW OF SYSTEMS:    GENERAL:  No weight loss, malaise or fevers.  HEENT:   No recent changes in vision or hearing  NECK:  Negative for lumps, no difficulty with swallowing.  RESPIRATORY:  Negative for cough, wheezing or shortness of breath, patient denies any recent URI.  CARDIOVASCULAR:  Negative for chest pain, leg swelling or palpitations.  GI:  Negative for abdominal discomfort, blood in stools or black stools or change in bowel habits.  MUSCULOSKELETAL:  See HPI.  SKIN:   "Negative for lesions, rash, and itching.  PSYCH:  No mood disorder or recent psychosocial stressors.  Patient's sleep is disturbed secondary to pain.  HEMATOLOGY/LYMPHOLOGY:  Negative for prolonged bleeding, bruising easily or swollen nodes.  Patient is not currently taking any anti-coagulants  ENDO: No history of diabetes or thyroid dysfunction  NEURO:   No history of headaches, syncope, paralysis, seizures or tremors.  All other reviewed and negative other than HPI.    OBJECTIVE:    /78 (BP Location: Right arm, Patient Position: Sitting, BP Method: Medium (Automatic))   Pulse 91   Temp 98.9 °F (37.2 °C) (Oral)   Ht 5' 1" (1.549 m)   Wt 55.8 kg (123 lb 0.3 oz)   LMP 04/15/2009   BMI 23.24 kg/m²     PHYSICAL EXAMINATION:    GENERAL: Well appearing, in no acute distress, alert and oriented x3.  PSYCH:  Mood and affect appropriate.  SKIN: Skin color, texture, turgor normal, no rashes or lesions.  HEAD/FACE:  Normocephalic, atraumatic. Cranial nerves grossly intact.  CV: RRR with palpation of the radial artery.  PULM: No evidence of respiratory difficulty, symmetric chest rise.  BACK: Straight leg raising in the sitting and supine positions is negative to radicular pain. There is no pain with palpation over the facet joints of the lumbar spine. There is decreased range of motion with extension to 15 degrees, and facet loading maneuvers cause reproducible pain bilaterally, L>R.  EXTREMITIES: Peripheral joint ROM is full and pain free without obvious instability or laxity in all four extremities. No deformities, edema, or skin discoloration. Good capillary refill.  MUSCULOSKELETAL: Hip, and knee provocative maneuvers are negative.  There is pain with palpation over the sacroiliac joint on the left.  There is no pain to palpation over the greater trochanteric bursa bilaterally.  FABERs test is positive bilaterally.  No pain with provocative maneuvers of the hips.  Bilateral lower extremity strength is normal " and symmetric.  No atrophy or tone abnormalities are noted.  NEURO: Bilateral lower extremity coordination and muscle stretch reflexes are physiologic and symmetric.  Plantar response are downgoing.   GAIT: Normal.      ASSESSMENT: 59 y.o. year old female with left sided back/buttock pain, consistent with the following:    Encounter Diagnoses   Name Primary?    Sacroiliitis, not elsewhere classified Yes    Spondylosis of lumbar region without myelopathy or radiculopathy     DJD (degenerative joint disease), lumbosacral     Pain        PLAN:     - Previous imaging was reviewed and discussed with the patient today.    - Can repeat left SI joint injection if needed.  She can call if needed.    - 60 mg IM Toradol today.  This has provided significant benefit in the past.    - Can continue 800 mg Ibuprofen sparingly.    - The patient will continue a home exercise routine to help with pain and strengthening.      - RTC as needed.       The above plan and management options were discussed at length with patient. Patient is in agreement with the above and verbalized understanding.    Jennifer Pickens  08/28/2019

## 2019-08-29 ENCOUNTER — OFFICE VISIT (OUTPATIENT)
Dept: INTERNAL MEDICINE | Facility: CLINIC | Age: 59
End: 2019-08-29
Payer: MEDICARE

## 2019-08-29 VITALS
SYSTOLIC BLOOD PRESSURE: 122 MMHG | HEART RATE: 74 BPM | WEIGHT: 125 LBS | RESPIRATION RATE: 18 BRPM | BODY MASS INDEX: 23.6 KG/M2 | HEIGHT: 61 IN | TEMPERATURE: 98 F | DIASTOLIC BLOOD PRESSURE: 76 MMHG

## 2019-08-29 DIAGNOSIS — S63.651A SPRAIN OF METACARPOPHALANGEAL (MCP) JOINT OF LEFT INDEX FINGER, INITIAL ENCOUNTER: Primary | ICD-10-CM

## 2019-08-29 PROCEDURE — 99999 PR PBB SHADOW E&M-EST. PATIENT-LVL III: CPT | Mod: PBBFAC,HCNC,, | Performed by: INTERNAL MEDICINE

## 2019-08-29 PROCEDURE — 3008F BODY MASS INDEX DOCD: CPT | Mod: HCNC,CPTII,S$GLB, | Performed by: INTERNAL MEDICINE

## 2019-08-29 PROCEDURE — 99213 PR OFFICE/OUTPT VISIT, EST, LEVL III, 20-29 MIN: ICD-10-PCS | Mod: HCNC,S$GLB,, | Performed by: INTERNAL MEDICINE

## 2019-08-29 PROCEDURE — 3074F SYST BP LT 130 MM HG: CPT | Mod: HCNC,CPTII,S$GLB, | Performed by: INTERNAL MEDICINE

## 2019-08-29 PROCEDURE — 99213 OFFICE O/P EST LOW 20 MIN: CPT | Mod: HCNC,S$GLB,, | Performed by: INTERNAL MEDICINE

## 2019-08-29 PROCEDURE — 3008F PR BODY MASS INDEX (BMI) DOCUMENTED: ICD-10-PCS | Mod: HCNC,CPTII,S$GLB, | Performed by: INTERNAL MEDICINE

## 2019-08-29 PROCEDURE — 3074F PR MOST RECENT SYSTOLIC BLOOD PRESSURE < 130 MM HG: ICD-10-PCS | Mod: HCNC,CPTII,S$GLB, | Performed by: INTERNAL MEDICINE

## 2019-08-29 PROCEDURE — 99999 PR PBB SHADOW E&M-EST. PATIENT-LVL III: ICD-10-PCS | Mod: PBBFAC,HCNC,, | Performed by: INTERNAL MEDICINE

## 2019-08-29 PROCEDURE — 3078F PR MOST RECENT DIASTOLIC BLOOD PRESSURE < 80 MM HG: ICD-10-PCS | Mod: HCNC,CPTII,S$GLB, | Performed by: INTERNAL MEDICINE

## 2019-08-29 PROCEDURE — 3078F DIAST BP <80 MM HG: CPT | Mod: HCNC,CPTII,S$GLB, | Performed by: INTERNAL MEDICINE

## 2019-08-29 NOTE — PROGRESS NOTES
Subjective:       Patient ID: Raquel Kaye is a 59 y.o. female.    Chief Complaint: Pain (palm of left hand) and veins very dark (left arm)    HPI   Pt here for evaluation of 24 hrs of resolving left sided hand pain on the do surface of her 2nd MCP joint. No trauma to the area. Some relief with Toradol.  Review of Systems   Constitutional: Negative for activity change, appetite change, chills, diaphoresis, fatigue, fever and unexpected weight change.   HENT: Negative for postnasal drip, rhinorrhea, sinus pressure, sneezing, sore throat, trouble swallowing and voice change.    Respiratory: Negative for cough, shortness of breath and wheezing.    Cardiovascular: Negative for chest pain, palpitations and leg swelling.   Gastrointestinal: Negative for abdominal pain, blood in stool, constipation, diarrhea, nausea and vomiting.   Genitourinary: Negative for dysuria.   Musculoskeletal: Negative for arthralgias and myalgias.   Skin: Negative for rash and wound.   Allergic/Immunologic: Negative for environmental allergies and food allergies.   Hematological: Negative for adenopathy. Does not bruise/bleed easily.       Objective:      Physical Exam   Constitutional: She is oriented to person, place, and time. She appears well-developed and well-nourished. No distress.   HENT:   Head: Normocephalic and atraumatic.   Eyes: Pupils are equal, round, and reactive to light. Conjunctivae and EOM are normal. Right eye exhibits no discharge. Left eye exhibits no discharge. No scleral icterus.   Neck: Neck supple. No JVD present.   Cardiovascular: Normal rate, regular rhythm, normal heart sounds and intact distal pulses.   Pulmonary/Chest: Effort normal and breath sounds normal. No respiratory distress. She has no wheezes. She has no rales.   Musculoskeletal: She exhibits no edema.        Hands:  Lymphadenopathy:     She has no cervical adenopathy.   Neurological: She is alert and oriented to person, place, and time.   Skin:  Skin is warm and dry. No rash noted. She is not diaphoretic. No pallor.       Assessment:       1. Sprain of metacarpophalangeal (MCP) joint of left index finger, initial encounter        Plan:    1. Resolving, may use Tylenol/NSAIDs PRN

## 2019-10-18 ENCOUNTER — TELEPHONE (OUTPATIENT)
Dept: OBSTETRICS AND GYNECOLOGY | Facility: CLINIC | Age: 59
End: 2019-10-18

## 2019-10-18 NOTE — TELEPHONE ENCOUNTER
----- Message from Jacinta Sky sent at 10/18/2019  3:42 PM CDT -----  Contact: RHONDA WHITE [[id number]]  Name of Who is Calling: RHONDA WHITE [[id number]]      What is the request in detail: Pt is requesting a call back from clinical team in regard to hormone    Please contact to further discuss and advise.          Can the clinic reply by [facility]: No      [facility]: [phone]

## 2019-10-19 ENCOUNTER — PATIENT OUTREACH (OUTPATIENT)
Dept: ADMINISTRATIVE | Facility: OTHER | Age: 59
End: 2019-10-19

## 2019-10-21 ENCOUNTER — PATIENT MESSAGE (OUTPATIENT)
Dept: OBSTETRICS AND GYNECOLOGY | Facility: CLINIC | Age: 59
End: 2019-10-21

## 2019-10-22 ENCOUNTER — PROCEDURE VISIT (OUTPATIENT)
Dept: DERMATOLOGY | Facility: CLINIC | Age: 59
End: 2019-10-22
Payer: MEDICARE

## 2019-10-22 DIAGNOSIS — Z41.1 ELECTIVE PROCEDURE FOR UNACCEPTABLE COSMETIC APPEARANCE: Primary | ICD-10-CM

## 2019-10-22 PROCEDURE — 99499 NO LOS: ICD-10-PCS | Mod: CSM,S$GLB,, | Performed by: DERMATOLOGY

## 2019-10-22 PROCEDURE — 99499 UNLISTED E&M SERVICE: CPT | Mod: CSM,S$GLB,, | Performed by: DERMATOLOGY

## 2019-10-22 RX ORDER — ESTRADIOL 2 MG/1
2 TABLET ORAL DAILY
Qty: 90 TABLET | Refills: 3 | Status: SHIPPED | OUTPATIENT
Start: 2019-10-22 | End: 2020-07-07 | Stop reason: ALTCHOICE

## 2019-10-22 NOTE — PROGRESS NOTES
Patient is here today for cosmetic Botox treatment.   She denies any new medical problems or medications.   She denies any history of adverse reaction to Botox or history of neuromuscular disease.     Discussed benefits and risks of Botox injections, including headache, weakness/paralysis of muscles, asymmetry, eyebrow/lid drooping, pain, bruising, swelling, infection, and rare risk of systemic botulism. Verbal and written consent was obtained.    Patient agreed to proceed with treatment. 23 units total were injected today:  8 in frontalis  15 in glabella (2-3-5-3-2)    Patient tolerated well with no complications. She was instructed not to massage the treated areas and that she should avoid exercise today.    Botox dilution: 2:1 (4:1 frontalis)  Lot #: [id number]  Exp date: 01/2022

## 2019-10-22 NOTE — TELEPHONE ENCOUNTER
Pt states she is taking estradiol 2mg not estrace 1mg. Pt states needs a prescription for estrace 2mg called in. Advised pt will have Dr Mata send in new prescription

## 2020-01-02 DIAGNOSIS — N95.2 VAGINAL ATROPHY: ICD-10-CM

## 2020-01-02 RX ORDER — CONJUGATED ESTROGENS 0.62 MG/G
CREAM VAGINAL
Qty: 30 G | Refills: 3 | Status: SHIPPED | OUTPATIENT
Start: 2020-01-02 | End: 2021-01-27 | Stop reason: SDUPTHER

## 2020-01-10 ENCOUNTER — PROCEDURE VISIT (OUTPATIENT)
Dept: DERMATOLOGY | Facility: CLINIC | Age: 60
End: 2020-01-10
Payer: MEDICARE

## 2020-01-10 ENCOUNTER — TELEPHONE (OUTPATIENT)
Dept: DERMATOLOGY | Facility: CLINIC | Age: 60
End: 2020-01-10

## 2020-01-10 DIAGNOSIS — Z41.1 ELECTIVE PROCEDURE FOR UNACCEPTABLE COSMETIC APPEARANCE: Primary | ICD-10-CM

## 2020-01-10 PROCEDURE — 99499 UNLISTED E&M SERVICE: CPT | Mod: CSM,S$GLB,, | Performed by: DERMATOLOGY

## 2020-01-10 PROCEDURE — 99499 NO LOS: ICD-10-PCS | Mod: CSM,S$GLB,, | Performed by: DERMATOLOGY

## 2020-01-10 NOTE — PROGRESS NOTES
Patient is here today for cosmetic Botox treatment.   She denies any new medical problems or medications.   She denies any history of adverse reaction to Botox or history of neuromuscular disease.     Discussed benefits and risks of Botox injections, including headache, weakness/paralysis of muscles, asymmetry, eyebrow/lid drooping, pain, bruising, swelling, infection, and rare risk of systemic botulism. Verbal and written consent was obtained.    Patient agreed to proceed with treatment. 23 units total were injected today:  8 in frontalis  15 in glabella (2-3-5-3-2)    Patient tolerated well with no complications. She was instructed not to massage the treated areas and that she should avoid exercise today.    Botox dilution: 2:1 (4:1 frontalis)  Lot #: [id number]  Exp date: 05/2022

## 2020-01-10 NOTE — TELEPHONE ENCOUNTER
----- Message from Belen Birmingham sent at 1/10/2020 10:27 AM CST -----  Contact: RHONDA WHITE  Name of Who is Calling: RHONDA WHITE      What is the request in detail: Patient requesting to speak with Rg in regards to botox. Please advise.       Can the clinic reply by [facility]: No      What Number to Call Back if not in [facility]: [phone]

## 2020-01-14 ENCOUNTER — TELEPHONE (OUTPATIENT)
Dept: OBSTETRICS AND GYNECOLOGY | Facility: CLINIC | Age: 60
End: 2020-01-14

## 2020-01-14 DIAGNOSIS — Z12.31 ENCOUNTER FOR SCREENING MAMMOGRAM FOR MALIGNANT NEOPLASM OF BREAST: Primary | ICD-10-CM

## 2020-01-15 ENCOUNTER — TELEPHONE (OUTPATIENT)
Dept: INTERNAL MEDICINE | Facility: CLINIC | Age: 60
End: 2020-01-15

## 2020-01-15 DIAGNOSIS — Z00.00 WELL ADULT EXAM: Primary | ICD-10-CM

## 2020-01-15 DIAGNOSIS — E78.5 HYPERLIPIDEMIA, UNSPECIFIED HYPERLIPIDEMIA TYPE: ICD-10-CM

## 2020-01-15 DIAGNOSIS — M85.9 DISORDER OF BONE DENSITY AND STRUCTURE, UNSPECIFIED: ICD-10-CM

## 2020-01-15 DIAGNOSIS — I10 HYPERTENSION, ESSENTIAL: ICD-10-CM

## 2020-01-15 DIAGNOSIS — J30.9 CHRONIC ALLERGIC RHINITIS: ICD-10-CM

## 2020-01-15 NOTE — TELEPHONE ENCOUNTER
----- Message from Kate Denton sent at 1/14/2020  4:20 PM CST -----  Contact: pt- [phone]  Type:  Needs Medical Advice    Who Called: pt    Best Call Back Number:  [phone]    Additional Information:  Pt scheduled for labs 5/19 without orders.

## 2020-01-26 ENCOUNTER — OFFICE VISIT (OUTPATIENT)
Dept: URGENT CARE | Facility: CLINIC | Age: 60
End: 2020-01-26
Payer: MEDICARE

## 2020-01-26 VITALS
TEMPERATURE: 98 F | HEART RATE: 81 BPM | WEIGHT: 120 LBS | RESPIRATION RATE: 16 BRPM | OXYGEN SATURATION: 96 % | HEIGHT: 61 IN | BODY MASS INDEX: 22.66 KG/M2 | DIASTOLIC BLOOD PRESSURE: 81 MMHG | SYSTOLIC BLOOD PRESSURE: 120 MMHG

## 2020-01-26 DIAGNOSIS — J20.9 ACUTE PURULENT BRONCHITIS: ICD-10-CM

## 2020-01-26 DIAGNOSIS — J98.01 ACUTE BRONCHOSPASM: Primary | ICD-10-CM

## 2020-01-26 DIAGNOSIS — J01.90 ACUTE BACTERIAL SINUSITIS: ICD-10-CM

## 2020-01-26 DIAGNOSIS — B96.89 ACUTE BACTERIAL SINUSITIS: ICD-10-CM

## 2020-01-26 PROCEDURE — 99214 OFFICE O/P EST MOD 30 MIN: CPT | Mod: 25,S$GLB,, | Performed by: INTERNAL MEDICINE

## 2020-01-26 PROCEDURE — 96372 PR INJECTION,THERAP/PROPH/DIAG2ST, IM OR SUBCUT: ICD-10-PCS | Mod: S$GLB,,, | Performed by: INTERNAL MEDICINE

## 2020-01-26 PROCEDURE — 96372 THER/PROPH/DIAG INJ SC/IM: CPT | Mod: S$GLB,,, | Performed by: INTERNAL MEDICINE

## 2020-01-26 PROCEDURE — 99214 PR OFFICE/OUTPT VISIT, EST, LEVL IV, 30-39 MIN: ICD-10-PCS | Mod: 25,S$GLB,, | Performed by: INTERNAL MEDICINE

## 2020-01-26 RX ORDER — AZITHROMYCIN 250 MG/1
TABLET, FILM COATED ORAL
Qty: 6 TABLET | Refills: 0 | Status: SHIPPED | OUTPATIENT
Start: 2020-01-26 | End: 2020-03-04

## 2020-01-26 RX ORDER — CODEINE PHOSPHATE AND GUAIFENESIN 10; 100 MG/5ML; MG/5ML
5 SOLUTION ORAL EVERY 4 HOURS PRN
Qty: 118 ML | Refills: 0 | Status: SHIPPED | OUTPATIENT
Start: 2020-01-26 | End: 2020-01-28 | Stop reason: ALTCHOICE

## 2020-01-26 RX ORDER — BETAMETHASONE SODIUM PHOSPHATE AND BETAMETHASONE ACETATE 3; 3 MG/ML; MG/ML
9 INJECTION, SUSPENSION INTRA-ARTICULAR; INTRALESIONAL; INTRAMUSCULAR; SOFT TISSUE ONCE
Status: COMPLETED | OUTPATIENT
Start: 2020-01-26 | End: 2020-01-26

## 2020-01-26 RX ADMIN — BETAMETHASONE SODIUM PHOSPHATE AND BETAMETHASONE ACETATE 9 MG: 3; 3 INJECTION, SUSPENSION INTRA-ARTICULAR; INTRALESIONAL; INTRAMUSCULAR; SOFT TISSUE at 09:01

## 2020-01-26 NOTE — PROGRESS NOTES
"Subjective:       Patient ID: Raquel Kaye is a 59 y.o. female.    Vitals:  height is 5' 1" (1.549 m) and weight is 54.4 kg (120 lb). Her oral temperature is 98.3 °F (36.8 °C). Her blood pressure is 120/81 and her pulse is 81. Her respiration is 16 and oxygen saturation is 96%.     Chief Complaint: URI    URI    This is a new problem. Episode onset: x5 days. The problem has been unchanged. There has been no fever. Associated symptoms include congestion, coughing, sinus pain and a sore throat. Pertinent negatives include no ear pain, nausea, rash, vomiting or wheezing. She has tried decongestant (Theraflu) for the symptoms. The treatment provided no relief.       Constitution: Negative for chills, sweating, fatigue and fever.   HENT: Positive for congestion, sinus pain, sinus pressure, sore throat and voice change. Negative for ear pain.    Neck: Negative for painful lymph nodes.   Eyes: Negative for eye redness.   Respiratory: Positive for cough and sputum production. Negative for chest tightness, bloody sputum, COPD, shortness of breath, stridor, wheezing and asthma.    Gastrointestinal: Negative for nausea and vomiting.   Musculoskeletal: Negative for muscle ache.   Skin: Negative for rash.   Allergic/Immunologic: Negative for seasonal allergies and asthma.   Hematologic/Lymphatic: Negative for swollen lymph nodes.       Objective:      Physical Exam   Constitutional: She appears well-developed and well-nourished.   HENT:   Head: Normocephalic and atraumatic.   Nose: Mucosal edema and purulent discharge (bloody) present. Right sinus exhibits maxillary sinus tenderness. Left sinus exhibits maxillary sinus tenderness.   Eyes: Pupils are equal, round, and reactive to light. Conjunctivae and EOM are normal.   Neck: Normal range of motion. Neck supple.   Cardiovascular: Normal rate and regular rhythm.   Pulmonary/Chest: Effort normal. She has wheezes.   Nursing note and vitals reviewed.        Assessment:       1. " Acute bronchospasm    2. Acute bacterial sinusitis    3. Acute purulent bronchitis        Plan:         Acute bronchospasm  -     betamethasone acetate-betamethasone sodium phosphate injection 9 mg    Acute bacterial sinusitis  -     azithromycin (ZITHROMAX Z-PEDRO) 250 MG tablet; Take 2 tablets (500 mg) on  Day 1,  followed by 1 tablet (250 mg) once daily on Days 2 through 5.  Dispense: 6 tablet; Refill: 0    Acute purulent bronchitis  -     guaifenesin-codeine 100-10 mg/5 ml (CHERATUSSIN AC)  mg/5 mL syrup; Take 5 mLs by mouth every 4 (four) hours as needed for Cough.  Dispense: 118 mL; Refill: 0

## 2020-01-28 ENCOUNTER — TELEPHONE (OUTPATIENT)
Dept: INTERNAL MEDICINE | Facility: CLINIC | Age: 60
End: 2020-01-28

## 2020-01-28 RX ORDER — BENZONATATE 100 MG/1
100 CAPSULE ORAL EVERY 6 HOURS PRN
Qty: 40 CAPSULE | Refills: 1 | Status: SHIPPED | OUTPATIENT
Start: 2020-01-28 | End: 2020-02-07

## 2020-01-28 RX ORDER — PROMETHAZINE HYDROCHLORIDE AND CODEINE PHOSPHATE 6.25; 1 MG/5ML; MG/5ML
5 SOLUTION ORAL EVERY 4 HOURS PRN
Qty: 120 ML | Refills: 0 | Status: SHIPPED | OUTPATIENT
Start: 2020-01-28 | End: 2020-02-07

## 2020-01-28 NOTE — TELEPHONE ENCOUNTER
----- Message from Myrna Mcgee sent at 1/28/2020  8:22 AM CST -----  Contact: [phone]  Patient is requesting if the doctor can call in a cough suppressant for her bad cough to [facility]    Pleased advise, thank you.

## 2020-02-10 ENCOUNTER — TELEPHONE (OUTPATIENT)
Dept: OBSTETRICS AND GYNECOLOGY | Facility: CLINIC | Age: 60
End: 2020-02-10

## 2020-02-10 DIAGNOSIS — N95.0 PMB (POSTMENOPAUSAL BLEEDING): Primary | ICD-10-CM

## 2020-02-10 NOTE — TELEPHONE ENCOUNTER
----- Message from Yoel Taylor sent at 2/10/2020  8:04 AM CST -----  Contact: RHONDA WHITE [[id number]]  Name of Who is Calling: RHONDA WHITE [[id number]]      What is the request in detail: Would like to speak with staff in regards to cramps and spotting, wants to know she she stop the hormones or come in to be seen. Please advise      Can the clinic reply by [facility]: no      What [facility]: [phone]

## 2020-02-18 ENCOUNTER — HOSPITAL ENCOUNTER (OUTPATIENT)
Dept: RADIOLOGY | Facility: HOSPITAL | Age: 60
Discharge: HOME | End: 2020-02-18
Attending: OBSTETRICS & GYNECOLOGY
Payer: MEDICARE

## 2020-02-18 DIAGNOSIS — N95.0 PMB (POSTMENOPAUSAL BLEEDING): ICD-10-CM

## 2020-02-18 PROCEDURE — 76856 US EXAM PELVIC COMPLETE: CPT | Mod: 26,HCNC,, | Performed by: RADIOLOGY

## 2020-02-18 PROCEDURE — 76856 US PELVIS COMP WITH TRANSVAG NON-OB (XPD): ICD-10-PCS | Mod: 26,HCNC,, | Performed by: RADIOLOGY

## 2020-02-18 PROCEDURE — 76830 US PELVIS COMP WITH TRANSVAG NON-OB (XPD): ICD-10-PCS | Mod: 26,HCNC,, | Performed by: RADIOLOGY

## 2020-02-18 PROCEDURE — 76830 TRANSVAGINAL US NON-OB: CPT | Mod: TC,HCNC

## 2020-02-18 PROCEDURE — 76830 TRANSVAGINAL US NON-OB: CPT | Mod: 26,HCNC,, | Performed by: RADIOLOGY

## 2020-02-21 ENCOUNTER — PATIENT MESSAGE (OUTPATIENT)
Dept: OBSTETRICS AND GYNECOLOGY | Facility: CLINIC | Age: 60
End: 2020-02-21

## 2020-03-04 ENCOUNTER — PROCEDURE VISIT (OUTPATIENT)
Dept: OBSTETRICS AND GYNECOLOGY | Facility: CLINIC | Age: 60
End: 2020-03-04
Attending: OBSTETRICS & GYNECOLOGY
Payer: MEDICARE

## 2020-03-04 VITALS
DIASTOLIC BLOOD PRESSURE: 74 MMHG | SYSTOLIC BLOOD PRESSURE: 116 MMHG | WEIGHT: 124.31 LBS | HEIGHT: 61 IN | BODY MASS INDEX: 23.47 KG/M2

## 2020-03-04 DIAGNOSIS — R93.89 THICKENED ENDOMETRIUM: Primary | ICD-10-CM

## 2020-03-04 PROCEDURE — 88305 TISSUE EXAM BY PATHOLOGIST: CPT | Mod: HCNC | Performed by: PATHOLOGY

## 2020-03-04 PROCEDURE — 88305 TISSUE EXAM BY PATHOLOGIST: CPT | Mod: 26,,, | Performed by: PATHOLOGY

## 2020-03-04 PROCEDURE — 58100 BIOPSY OF UTERUS LINING: CPT | Mod: S$GLB,,, | Performed by: OBSTETRICS & GYNECOLOGY

## 2020-03-04 PROCEDURE — 88305 TISSUE EXAM BY PATHOLOGIST: ICD-10-PCS | Mod: 26,,, | Performed by: PATHOLOGY

## 2020-03-04 PROCEDURE — 58100 PR BIOPSY OF UTERUS LINING: ICD-10-PCS | Mod: S$GLB,,, | Performed by: OBSTETRICS & GYNECOLOGY

## 2020-03-04 RX ORDER — PROGESTERONE 100 MG/1
CAPSULE ORAL
COMMUNITY
Start: 2020-02-06 | End: 2020-06-10 | Stop reason: CLARIF

## 2020-03-04 RX ORDER — MECLIZINE HCL 12.5 MG 12.5 MG/1
TABLET ORAL
COMMUNITY
Start: 2020-01-09 | End: 2020-05-18

## 2020-03-04 NOTE — PROCEDURES
Endometrial Biopsy- Today  Date/Time: 3/4/2020 9:00 AM  Performed by: Mckenna Mata MD  Authorized by: Mckenna Mata MD   Preparation: Patient was prepped and draped in the usual sterile fashion.  Local anesthesia used: no    Anesthesia:  Local anesthesia used: no    Sedation:  Patient sedated: no    Patient tolerance: Patient tolerated the procedure well with no immediate complications  Comments: CC: ENDOMETRIAL BIOPSPY    Raquel Kaye is a 59 y.o. female  presents for an endometrial biopsy secondary to postmenopausal bleeding.   UPT is not done    PRE ENDOMETRIAL BIOPSY COUNSELING:  The patient was informed of the risk of bleeding, infection, uterine perforation and pain and that the test will rule-out endometrial cancer with accuracy greater than 95%. She was counseled on the alternatives to endometrial biopsy and agrees to proceed.    TIME OUT PERFORMED.  The cervix was visualized with a speculum.  A single tooth tenaculum was placed on the anterior lip prior to the biopsy.  A sterile endometrial pipelle was passed without difficulty to a depth of 7 cm.  Scant endometrial tissue was obtained.    The specimen was placed in formalyn and sent to Pathology for histology evaluation. The patient tolerated the procedure well    ASSESSMENT: Postmenopausal bleeding   Thickened endometrial stripe    POST ENDOMETRIAL BIOPSY COUNSELING:  Manage post biopsy cramping with NSAIDs or Tyleno.  Expect spotting or light bleeding for a few days.  Report bleeding heavier than a period, fever > 101.0 F, worsening pain or a foul smelling vaginal discharge.    Counseling lasted approximately 15 minutes and all her questions were answered.    FOLLOW-UP: Pending biopsy results.

## 2020-03-08 ENCOUNTER — PATIENT MESSAGE (OUTPATIENT)
Dept: OBSTETRICS AND GYNECOLOGY | Facility: CLINIC | Age: 60
End: 2020-03-08

## 2020-03-09 ENCOUNTER — TELEPHONE (OUTPATIENT)
Dept: DERMATOLOGY | Facility: CLINIC | Age: 60
End: 2020-03-09

## 2020-03-09 NOTE — TELEPHONE ENCOUNTER
----- Message from Carlos A Soriano sent at 3/9/2020  2:37 PM CDT -----  Contact: RHONDA WHITE [[id number]]  Name of Who is Calling: RHONDA WHITE [[id number]]     What is the request in detail:RHONDA WHITE [[id number]] is requesting a call back in regards to last appointment     Please contact to further discuss and advise      Can the clinic reply by [facility]: yes      What Number to [facility] Back [facility]:  [phone]

## 2020-03-16 LAB
FINAL PATHOLOGIC DIAGNOSIS: NORMAL
GROSS: NORMAL

## 2020-03-26 ENCOUNTER — PATIENT MESSAGE (OUTPATIENT)
Dept: OBSTETRICS AND GYNECOLOGY | Facility: CLINIC | Age: 60
End: 2020-03-26

## 2020-05-06 ENCOUNTER — OFFICE VISIT (OUTPATIENT)
Dept: FAMILY MEDICINE | Facility: CLINIC | Age: 60
End: 2020-05-06
Payer: MEDICARE

## 2020-05-06 VITALS
RESPIRATION RATE: 18 BRPM | TEMPERATURE: 98 F | SYSTOLIC BLOOD PRESSURE: 114 MMHG | HEIGHT: 61 IN | DIASTOLIC BLOOD PRESSURE: 62 MMHG | WEIGHT: 123 LBS | OXYGEN SATURATION: 98 % | BODY MASS INDEX: 23.22 KG/M2 | HEART RATE: 65 BPM

## 2020-05-06 DIAGNOSIS — L25.5 DERMATITIS DUE TO PLANTS, INCLUDING POISON IVY, SUMAC, AND OAK: Primary | ICD-10-CM

## 2020-05-06 PROCEDURE — 99214 OFFICE O/P EST MOD 30 MIN: CPT | Mod: 25,HCNC,S$GLB, | Performed by: NURSE PRACTITIONER

## 2020-05-06 PROCEDURE — 3078F DIAST BP <80 MM HG: CPT | Mod: HCNC,CPTII,S$GLB, | Performed by: NURSE PRACTITIONER

## 2020-05-06 PROCEDURE — 3078F PR MOST RECENT DIASTOLIC BLOOD PRESSURE < 80 MM HG: ICD-10-PCS | Mod: HCNC,CPTII,S$GLB, | Performed by: NURSE PRACTITIONER

## 2020-05-06 PROCEDURE — 3074F SYST BP LT 130 MM HG: CPT | Mod: HCNC,CPTII,S$GLB, | Performed by: NURSE PRACTITIONER

## 2020-05-06 PROCEDURE — 96372 PR INJECTION,THERAP/PROPH/DIAG2ST, IM OR SUBCUT: ICD-10-PCS | Mod: HCNC,S$GLB,, | Performed by: NURSE PRACTITIONER

## 2020-05-06 PROCEDURE — 3008F PR BODY MASS INDEX (BMI) DOCUMENTED: ICD-10-PCS | Mod: HCNC,CPTII,S$GLB, | Performed by: NURSE PRACTITIONER

## 2020-05-06 PROCEDURE — 96372 THER/PROPH/DIAG INJ SC/IM: CPT | Mod: HCNC,S$GLB,, | Performed by: NURSE PRACTITIONER

## 2020-05-06 PROCEDURE — 99999 PR PBB SHADOW E&M-EST. PATIENT-LVL IV: ICD-10-PCS | Mod: PBBFAC,HCNC,, | Performed by: NURSE PRACTITIONER

## 2020-05-06 PROCEDURE — 99999 PR PBB SHADOW E&M-EST. PATIENT-LVL IV: CPT | Mod: PBBFAC,HCNC,, | Performed by: NURSE PRACTITIONER

## 2020-05-06 PROCEDURE — 99214 PR OFFICE/OUTPT VISIT, EST, LEVL IV, 30-39 MIN: ICD-10-PCS | Mod: 25,HCNC,S$GLB, | Performed by: NURSE PRACTITIONER

## 2020-05-06 PROCEDURE — 3074F PR MOST RECENT SYSTOLIC BLOOD PRESSURE < 130 MM HG: ICD-10-PCS | Mod: HCNC,CPTII,S$GLB, | Performed by: NURSE PRACTITIONER

## 2020-05-06 PROCEDURE — 3008F BODY MASS INDEX DOCD: CPT | Mod: HCNC,CPTII,S$GLB, | Performed by: NURSE PRACTITIONER

## 2020-05-06 RX ORDER — PREDNISONE 20 MG/1
TABLET ORAL
Qty: 42 TABLET | Refills: 0 | Status: SHIPPED | OUTPATIENT
Start: 2020-05-06 | End: 2020-05-18

## 2020-05-06 RX ORDER — BETAMETHASONE SODIUM PHOSPHATE AND BETAMETHASONE ACETATE 3; 3 MG/ML; MG/ML
6 INJECTION, SUSPENSION INTRA-ARTICULAR; INTRALESIONAL; INTRAMUSCULAR; SOFT TISSUE
Status: COMPLETED | OUTPATIENT
Start: 2020-05-06 | End: 2020-05-06

## 2020-05-06 RX ORDER — HYDROXYZINE HYDROCHLORIDE 25 MG/1
25 TABLET, FILM COATED ORAL 3 TIMES DAILY PRN
Qty: 42 TABLET | Refills: 0 | Status: SHIPPED | OUTPATIENT
Start: 2020-05-06 | End: 2020-05-18

## 2020-05-06 RX ADMIN — BETAMETHASONE SODIUM PHOSPHATE AND BETAMETHASONE ACETATE 6 MG: 3; 3 INJECTION, SUSPENSION INTRA-ARTICULAR; INTRALESIONAL; INTRAMUSCULAR; SOFT TISSUE at 02:05

## 2020-05-06 NOTE — PROGRESS NOTES
Patient verified by name and . Patient received Celestone 6mg  in R ventrogluteal. Patient tolerated injection well. Patient advised to wait in clinic for 15 minutes in case of adverse reactions. Patient verbalized understanding.

## 2020-05-06 NOTE — PATIENT INSTRUCTIONS
Poison Ivy Rash  You have a rash and itching. This is a delayed reaction to the oils of the poison ivy plant. You likely came in contact with it during the 3 days before your symptoms began. Your skin will become red and itchy. Small blisters may appear. These can break and leak a clear yellow fluid. This fluid is not contagious. The reaction usually starts to go away after 1 to 2 weeks. But it may take 4 to 6 weeks to fully clear.    Home care  Follow these guidelines when caring for yourself at home:  · The plant oils still on your skin or clothes can be spread to other places on your body. They can also be passed on to other people and cause a similar reaction. Thats why its important to wash all of the plant oils off your skin and any clothes that may have been exposed. Wash all clothes that you were wearing. Use hot water with ordinary laundry detergent.  · Don't use over-the-counter creams that have neomycin or bacitracin. These may make the rash worse.  · Avoid anything that heats up your skin. This includes hot showers or baths, or direct sunlight. These can make itching worse.  · Put a cold compress on areas that are leaking (weeping), or on blistered areas. Do this for 30 minutes 3 times a day. To make a cold compress, dip a wash cloth in a mixture of 1 pint of cold water and 1 packet of astringent or oatmeal bath powder. Keep the solution in the refrigerator for future use.  · If large areas of skin are affected, take a lukewarm bath. Add colloidal oatmeal, or 1 cup of cornstarch or baking soda to the water.  · For a rash in a smaller area, use hydrocortisone cream for redness and irritation. But dont use this if another medicine was prescribed. For severe itching, put an ice pack on the area. To make an ice pack, put ice cubes in a plastic bag that seals at the top. Wrap the bag in a clean, thin towel or cloth. Never put ice or an ice pack directly on the skin. Over-the-counter products that have  calamine lotion may also be helpful.  · You can also use an oral antihistamine medicine with diphenhydramine for itching, unless another medicine was prescribed. This medicine may make you sleepy. So use lower doses during the daytime and higher doses at bedtime. Dont use medicine that has diphenhydramine if you have glaucoma. Also dont use it if you are a man who has trouble urinating because of an enlarged prostate. Antihistamines with loratidine cause less drowsiness. They are a good choice for daytime use.  · For severe cases, your provider may prescribe oral steroid medicines. Always take these exactly as prescribed.  Follow-up care  Follow up with your healthcare provider, or as directed. Call your provider if your rash gets worse or you are not starting to get better after 1 week of treatment.  When to seek medical advice  Call your healthcare provider right away if any of these occur:  · Spreading facial rash with swollen mouth or eyelids  · Rash that spreads to the groin and causes swelling of the penis, scrotum, or vaginal area  · Trouble urinating because of swelling in the genital area  Also call your provider if you have signs of infection in the areas of broken blisters:  · Spreading redness  · Pus or fluid draining from the blisters  · Yellow-brown crusts form over the open blisters  · Fever of 1 degree, or higher, above your normal temperature, or as directed by your provider  Call 911  Call 911 if you have severe swelling on your face, eyelids, mouth, throat, or tongue.  Date Last Reviewed: 8/1/2016  © 4478-4491 [employer]. [address] [place] [address], [place] [address]. All rights reserved. This information is not intended as a substitute for professional medical care. Always follow your healthcare professional's instructions.        Poison Ivy Rash  You have a rash and itching. This is a delayed reaction to the oils of the poison ivy plant. You likely came in contact with it during the  3 days before your symptoms began. Your skin will become red and itchy. Small blisters may appear. These can break and leak a clear yellow fluid. This fluid is not contagious. The reaction usually starts to go away after 1 to 2 weeks. But it may take 4 to 6 weeks to fully clear.    Home care  Follow these guidelines when caring for yourself at home:  · The plant oils still on your skin or clothes can be spread to other places on your body. They can also be passed on to other people and cause a similar reaction. Thats why its important to wash all of the plant oils off your skin and any clothes that may have been exposed. Wash all clothes that you were wearing. Use hot water with ordinary laundry detergent.  · Don't use over-the-counter creams that have neomycin or bacitracin. These may make the rash worse.  · Avoid anything that heats up your skin. This includes hot showers or baths, or direct sunlight. These can make itching worse.  · Put a cold compress on areas that are leaking (weeping), or on blistered areas. Do this for 30 minutes 3 times a day. To make a cold compress, dip a wash cloth in a mixture of 1 pint of cold water and 1 packet of astringent or oatmeal bath powder. Keep the solution in the refrigerator for future use.  · If large areas of skin are affected, take a lukewarm bath. Add colloidal oatmeal, or 1 cup of cornstarch or baking soda to the water.  · For a rash in a smaller area, use hydrocortisone cream for redness and irritation. But dont use this if another medicine was prescribed. For severe itching, put an ice pack on the area. To make an ice pack, put ice cubes in a plastic bag that seals at the top. Wrap the bag in a clean, thin towel or cloth. Never put ice or an ice pack directly on the skin. Over-the-counter products that have calamine lotion may also be helpful.  · You can also use an oral antihistamine medicine with diphenhydramine for itching, unless another medicine was  prescribed. This medicine may make you sleepy. So use lower doses during the daytime and higher doses at bedtime. Dont use medicine that has diphenhydramine if you have glaucoma. Also dont use it if you are a man who has trouble urinating because of an enlarged prostate. Antihistamines with loratidine cause less drowsiness. They are a good choice for daytime use.  · For severe cases, your provider may prescribe oral steroid medicines. Always take these exactly as prescribed.  Follow-up care  Follow up with your healthcare provider, or as directed. Call your provider if your rash gets worse or you are not starting to get better after 1 week of treatment.  When to seek medical advice  Call your healthcare provider right away if any of these occur:  · Spreading facial rash with swollen mouth or eyelids  · Rash that spreads to the groin and causes swelling of the penis, scrotum, or vaginal area  · Trouble urinating because of swelling in the genital area  Also call your provider if you have signs of infection in the areas of broken blisters:  · Spreading redness  · Pus or fluid draining from the blisters  · Yellow-brown crusts form over the open blisters  · Fever of 1 degree, or higher, above your normal temperature, or as directed by your provider  Call 911  Call 911 if you have severe swelling on your face, eyelids, mouth, throat, or tongue.  Date Last Reviewed: 8/1/2016 © 2000-2017 [employer]. [address] [place] [address]. All rights reserved. This information is not intended as a substitute for professional medical care. Always follow your healthcare professional's instructions.

## 2020-05-06 NOTE — PROGRESS NOTES
"Subjective:       Patient ID: Raquel Kaye is a 59 y.o. female presents to clinic for rash to arms and legs. The patient is new to me. She reports working in her yard and large amount of poison ivy, oak, and sumac fell on both of her arms and legs two days ago. Last exposure two years ago. She stated that she was self treating with an old prescription of Hydroxyzine and Calamine lotion.     Chief Complaint: Rash (posion ivy )    Poison Ivy   This is a new problem. The current episode started in the past 7 days. The problem has been gradually worsening since onset. The affected locations include the left arm, right arm, left lower leg and right lower leg. The rash is characterized by redness, pain, burning and itchiness. She was exposed to plant contact. Pertinent negatives include no anorexia, congestion, cough, diarrhea, eye pain, facial edema, fatigue, fever, joint pain, nail changes, rhinorrhea, shortness of breath, sore throat or vomiting. Past treatments include anti-itch cream and antihistamine. The treatment provided mild relief. Her past medical history is significant for allergies. There is no history of asthma, eczema or varicella.                                       Vitals:    05/06/20 1336   BP: 114/62   Pulse: 65   Resp: 18   Temp: 97.9 °F (36.6 °C)   TempSrc: Oral   SpO2: 98%   Weight: 55.8 kg (123 lb 0.3 oz)   Height: 5' 1" (1.549 m)   PainSc: 0-No pain     Body mass index is 23.24 kg/m².    Past Medical History:   Diagnosis Date    Allergy     Back pain     Disorder of vestibular function of both ears     Fibrocystic breast disease in female     HEARING LOSS     right side - no hearing aid    Hyperlipidemia     Meniere's disease     Osteopenia     PONV (postoperative nausea and vomiting)     PONV (postoperative nausea and vomiting)     Special screening for malignant neoplasms, colon 7/22/2013     Past Surgical History:   Procedure Laterality Date    ADENOIDECTOMY      APPENDECTOMY  "     BREAST BIOPSY      BREAST SURGERY      left breast biopsy    COLONOSCOPY N/A 9/27/2018    Procedure: COLONOSCOPY;  Surgeon: Uche Smiley MD;  Location: [facility] ENDO ([address] [place] [address]);  Service: Endoscopy;  Laterality: N/A;  pt/instructed on prep day before and day of importance    CYST REMOVAL Right     neck    DILATION AND CURETTAGE OF UTERUS      INJECTION OF JOINT Left 10/16/2018    Procedure: INJECTION, JOINT, LEFT SI JOINT INJECTION UNDER FLUORO;  Surgeon: Alirio Lei MD;  Location: [facility] PAIN [facility];  Service: Pain Management;  Laterality: Left;  NEEDS CONSENT    MANDIBLE FRACTURE SURGERY Right     scope    TONSILLECTOMY       Social History     Socioeconomic History    Marital status: Single     Spouse name: Not on file    Number of children: Not on file    Years of education: Not on file    Highest education level: Not on file   Occupational History    Not on file   Social Needs    Financial resource strain: Not on file    Food insecurity:     Worry: Not on file     Inability: Not on file    Transportation needs:     Medical: Not on file     Non-medical: Not on file   Tobacco Use    Smoking status: Never Smoker    Smokeless tobacco: Never Used   Substance and Sexual Activity    Alcohol use: Yes     Comment: social    Drug use: No    Sexual activity: Yes     Partners: Male     Birth control/protection: None   Lifestyle    Physical activity:     Days per week: Not on file     Minutes per session: Not on file    Stress: Not at all   Relationships    Social connections:     Talks on phone: Not on file     Gets together: Not on file     Attends Judaism service: Not on file     Active member of club or organization: Not on file     Attends meetings of clubs or organizations: Not on file     Relationship status: Not on file   Other Topics Concern    Not on file   Social History Narrative    Not on file       Review of patient's allergies indicates:  No Known Allergies    Current  Outpatient Medications:     aspirin 81 MG Chew, , Disp: , Rfl:     b complex vitamins tablet, Take 1 tablet by mouth once daily., Disp: , Rfl:     CALCIUM 600 WITH VITAMIN D3 600 mg(1,500mg) -200 unit Tab, , Disp: , Rfl:     EPIPEN 2-PEDRO 0.3 mg/0.3 mL (1:1,000) AtIn, , Disp: , Rfl: 0    estradiol (ESTRACE) 2 MG tablet, Take 1 tablet (2 mg total) by mouth once daily., Disp: 90 tablet, Rfl: 3    glucosamine-chondroitin 500-400 mg tablet, Take 1 tablet by mouth 3 (three) times daily., Disp: , Rfl:     meclizine (ANTIVERT) 12.5 mg tablet, , Disp: , Rfl:     progesterone (PROMETRIUM) 100 MG capsule, , Disp: , Rfl:     tretinoin (RETIN-A) 0.025 % gel, APPLY SPARINGLY TO AFFECTED AREA(S) ONCE DAILY, Disp: 30 g, Rfl: 11    fexofenadine (ALLEGRA) 180 MG tablet, Take 1 tablet (180 mg total) by mouth once daily., Disp: 30 tablet, Rfl: 11    hydroxyzine HCL (ATARAX) 25 MG tablet, Take 1 tablet (25 mg total) by mouth 3 (three) times daily as needed., Disp: 42 tablet, Rfl: 0    predniSONE (DELTASONE) 20 MG tablet, Take 1 tablet (20 mg total) by mouth 3 (three) times daily with meals for 7 days, THEN 1 tablet (20 mg total) 2 (two) times daily with meals for 7 days, THEN 1 tablet (20 mg total) once daily for 7 days., Disp: 42 tablet, Rfl: 0    PREMARIN vaginal cream, PLACE 1 GRAM VAGINALLY TWICE A WEEK., Disp: 30 g, Rfl: 3  No current facility-administered medications for this visit.     Review of Systems   Constitutional: Negative for activity change, appetite change, chills, fatigue and fever.   HENT: Negative for congestion, ear discharge, ear pain, facial swelling, hearing loss, postnasal drip, rhinorrhea, sinus pressure, sore throat, trouble swallowing and voice change.    Eyes: Negative for pain, discharge and itching.   Respiratory: Negative for cough, chest tightness, shortness of breath and wheezing.    Cardiovascular: Negative for chest pain, palpitations and leg swelling.   Gastrointestinal: Negative for  abdominal distention, abdominal pain, anorexia, constipation, diarrhea, nausea and vomiting.   Endocrine: Negative for polydipsia, polyphagia and polyuria.   Genitourinary: Negative for difficulty urinating, flank pain and urgency.   Musculoskeletal: Negative for back pain, gait problem and joint pain.   Skin: Positive for rash. Negative for color change, nail changes, pallor and wound.   Neurological: Negative for dizziness, syncope, facial asymmetry, numbness and headaches.   Hematological: Negative for adenopathy.   Psychiatric/Behavioral: Negative for agitation, behavioral problems and confusion.       Objective:      Physical Exam   Constitutional: She is oriented to person, place, and time. She appears well-developed and well-nourished. No distress.   HENT:   Head: Normocephalic.   Right Ear: External ear normal.   Left Ear: External ear normal.   Nose: Nose normal.   Mouth/Throat: Oropharynx is clear and moist. No oropharyngeal exudate.   Eyes: Pupils are equal, round, and reactive to light. Conjunctivae and EOM are normal.   Neck: Normal range of motion. Neck supple.   Cardiovascular: Normal rate, regular rhythm, normal heart sounds and intact distal pulses.   No murmur heard.  Pulmonary/Chest: Effort normal and breath sounds normal. No stridor. No respiratory distress. She has no wheezes. She has no rales. She exhibits no tenderness.   Abdominal: Soft. Bowel sounds are normal. She exhibits no distension. There is no tenderness.   Musculoskeletal: Normal range of motion. She exhibits no edema, tenderness or deformity.   Lymphadenopathy:     She has no cervical adenopathy.   Neurological: She is alert and oriented to person, place, and time. No sensory deficit.   Skin: Skin is warm and dry. Capillary refill takes less than 2 seconds. Rash noted. She is not diaphoretic. There is erythema.   Scattered rash to upper and lower extremities. See pictures   Psychiatric: She has a normal mood and affect. Her behavior  is normal. Judgment and thought content normal.   Nursing note and vitals reviewed.      Assessment:       1. Dermatitis due to plants, including poison ivy, sumac, and oak        Plan:     Raquel was seen today for rash.    Diagnoses and all orders for this visit:    Dermatitis due to plants, including poison ivy, sumac, and oak  -     hydroxyzine HCL (ATARAX) 25 MG tablet; Take 1 tablet (25 mg total) by mouth 3 (three) times daily as needed.  -     betamethasone acetate-betamethasone sodium phosphate injection 6 mg  -     predniSONE (DELTASONE) 20 MG tablet; Take 1 tablet (20 mg total) by mouth 3 (three) times daily with meals for 7 days, THEN 1 tablet (20 mg total) 2 (two) times daily with meals for 7 days, THEN 1 tablet (20 mg total) once daily for 7 days.        - Apply Calamine lotion as needed         - Cold compresses may help relieve itching        - Wear gloves, long pants, long sleeves, shoes, and socks.    Follow up if symptoms worsen or fail to improve.

## 2020-05-08 RX ORDER — TRETINOIN 0.25 MG/G
GEL TOPICAL
Qty: 30 G | Refills: 11 | Status: SHIPPED | OUTPATIENT
Start: 2020-05-08 | End: 2021-11-11

## 2020-05-11 ENCOUNTER — TELEPHONE (OUTPATIENT)
Dept: INTERNAL MEDICINE | Facility: CLINIC | Age: 60
End: 2020-05-11

## 2020-05-11 ENCOUNTER — PATIENT MESSAGE (OUTPATIENT)
Dept: INTERNAL MEDICINE | Facility: CLINIC | Age: 60
End: 2020-05-11

## 2020-05-11 DIAGNOSIS — Z00.00 WELL ADULT EXAM: Primary | ICD-10-CM

## 2020-05-11 DIAGNOSIS — Z20.822 CLOSE EXPOSURE TO COVID-19 VIRUS: Primary | ICD-10-CM

## 2020-05-12 ENCOUNTER — PATIENT MESSAGE (OUTPATIENT)
Dept: OBSTETRICS AND GYNECOLOGY | Facility: CLINIC | Age: 60
End: 2020-05-12

## 2020-05-12 NOTE — TELEPHONE ENCOUNTER
See email.  She wants antibody test added to 5/19 annual labs.  Ok?  I think test number is [id number].    Thanks trent   Meenu Álvarez(Attending)

## 2020-05-13 ENCOUNTER — PATIENT MESSAGE (OUTPATIENT)
Dept: INTERNAL MEDICINE | Facility: CLINIC | Age: 60
End: 2020-05-13

## 2020-05-15 ENCOUNTER — PATIENT OUTREACH (OUTPATIENT)
Dept: ADMINISTRATIVE | Facility: OTHER | Age: 60
End: 2020-05-15

## 2020-05-15 NOTE — PROGRESS NOTES
Care Everywhere: updated  Immunization: n/a  Health Maintenance: n/a  Media Review: reviewed for possible outside mammogram report  Legacy Review: n/a  Order placed: n/a  Upcoming appts:n/a

## 2020-05-18 ENCOUNTER — OFFICE VISIT (OUTPATIENT)
Dept: OBSTETRICS AND GYNECOLOGY | Facility: CLINIC | Age: 60
End: 2020-05-18
Attending: OBSTETRICS & GYNECOLOGY
Payer: MEDICARE

## 2020-05-18 ENCOUNTER — TELEPHONE (OUTPATIENT)
Dept: GYNECOLOGIC ONCOLOGY | Facility: CLINIC | Age: 60
End: 2020-05-18

## 2020-05-18 VITALS
WEIGHT: 118.81 LBS | DIASTOLIC BLOOD PRESSURE: 76 MMHG | SYSTOLIC BLOOD PRESSURE: 110 MMHG | BODY MASS INDEX: 22.45 KG/M2

## 2020-05-18 DIAGNOSIS — N95.0 POSTMENOPAUSAL BLEEDING: Primary | ICD-10-CM

## 2020-05-18 PROCEDURE — 3008F PR BODY MASS INDEX (BMI) DOCUMENTED: ICD-10-PCS | Mod: CPTII,S$GLB,, | Performed by: OBSTETRICS & GYNECOLOGY

## 2020-05-18 PROCEDURE — 3078F PR MOST RECENT DIASTOLIC BLOOD PRESSURE < 80 MM HG: ICD-10-PCS | Mod: CPTII,S$GLB,, | Performed by: OBSTETRICS & GYNECOLOGY

## 2020-05-18 PROCEDURE — 99213 OFFICE O/P EST LOW 20 MIN: CPT | Mod: S$GLB,,, | Performed by: OBSTETRICS & GYNECOLOGY

## 2020-05-18 PROCEDURE — 3074F PR MOST RECENT SYSTOLIC BLOOD PRESSURE < 130 MM HG: ICD-10-PCS | Mod: CPTII,S$GLB,, | Performed by: OBSTETRICS & GYNECOLOGY

## 2020-05-18 PROCEDURE — 99213 PR OFFICE/OUTPT VISIT, EST, LEVL III, 20-29 MIN: ICD-10-PCS | Mod: S$GLB,,, | Performed by: OBSTETRICS & GYNECOLOGY

## 2020-05-18 PROCEDURE — 3074F SYST BP LT 130 MM HG: CPT | Mod: CPTII,S$GLB,, | Performed by: OBSTETRICS & GYNECOLOGY

## 2020-05-18 PROCEDURE — 3008F BODY MASS INDEX DOCD: CPT | Mod: CPTII,S$GLB,, | Performed by: OBSTETRICS & GYNECOLOGY

## 2020-05-18 PROCEDURE — 3078F DIAST BP <80 MM HG: CPT | Mod: CPTII,S$GLB,, | Performed by: OBSTETRICS & GYNECOLOGY

## 2020-05-18 NOTE — TELEPHONE ENCOUNTER
Call patient to schedule her appointment  Also advise  the patient that her  temperature will be taken and she will need a mask on she voiced understanding.     --- Message from Alice Valenzuela MD sent at 5/18/2020  3:37 PM CDT -----  Please reach out to patient for consultation for consideration of hysterectomy.   Referred by Dr. Mata.     ----- Message -----  From: Mckenna Mata MD  Sent: 5/18/2020   1:55 PM CDT  To: Alice Valenzuela MD    Definitely- please do.  Thank you for seeing her.   ----- Message -----  From: Alice Valenzuela MD  Sent: 5/18/2020  11:17 AM CDT  To: Mckenna Mata MD, #    Yes, I have availability.   Are we ok to go ahead and reach out.      ----- Message -----  From: Mckenna Mata MD  Sent: 5/18/2020   9:55 AM CDT  To: Alice Valenzuela MD    I spoke to you about this patient right before COVID.  She has had persistent postmenopausal bleeding.  She has a negative endometrial biopsy but has a thickened endometrial stripe.  She had a D and C hysteroscopy in the last 2 years and wants definitive treatment with a hysterectomy.  You had been willing to do this prior to COVID did no few still had any availability-just in case she has pathology.  Thank you!  Emmanuelle

## 2020-05-18 NOTE — PROGRESS NOTES
SUBJECTIVE:   59 y.o. female   presents today to discuss continued vaginal bleeding every month. Patient's last menstrual period was 04/15/2009..  She reports that she generally has daily spotting and that every month she also has bleeding like a..  She has had a D&C in the past and recently had a normal endometrial biopsy  Prior to COVID we were discussing definitive treatment with a hysterectomy and she would like to proceed with this.    She wants to continue with hormone replacement therapy-she does not miss any of her progesterone    Past Medical History:   Diagnosis Date    Allergy     Back pain     Disorder of vestibular function of both ears     Fibrocystic breast disease in female     HEARING LOSS     right side - no hearing aid    Hyperlipidemia     Meniere's disease     Osteopenia     PONV (postoperative nausea and vomiting)     PONV (postoperative nausea and vomiting)     Special screening for malignant neoplasms, colon 2013     Past Surgical History:   Procedure Laterality Date    ADENOIDECTOMY      APPENDECTOMY      BREAST BIOPSY      BREAST SURGERY      left breast biopsy    COLONOSCOPY N/A 2018    Procedure: COLONOSCOPY;  Surgeon: Uche Smiley MD;  Location: [address]);  Service: Endoscopy;  Laterality: N/A;  pt/instructed on prep day before and day of importance    CYST REMOVAL Right     neck    DILATION AND CURETTAGE OF UTERUS      INJECTION OF JOINT Left 10/16/2018    Procedure: INJECTION, JOINT, LEFT SI JOINT INJECTION UNDER FLUORO;  Surgeon: Alirio Lei MD;  Location: [facility];  Service: Pain Management;  Laterality: Left;  NEEDS CONSENT    MANDIBLE FRACTURE SURGERY Right     scope    TONSILLECTOMY       Social History     Socioeconomic History    Marital status: Single     Spouse name: Not on file    Number of children: Not on file    Years of education: Not on file    Highest education level: Not on file   Occupational  History    Not on file   Social Needs    Financial resource strain: Not on file    Food insecurity:     Worry: Not on file     Inability: Not on file    Transportation needs:     Medical: Not on file     Non-medical: Not on file   Tobacco Use    Smoking status: Never Smoker    Smokeless tobacco: Never Used   Substance and Sexual Activity    Alcohol use: Yes     Comment: social    Drug use: No    Sexual activity: Yes     Partners: Male     Birth control/protection: None   Lifestyle    Physical activity:     Days per week: Not on file     Minutes per session: Not on file    Stress: Not at all   Relationships    Social connections:     Talks on phone: Not on file     Gets together: Not on file     Attends Congregation service: Not on file     Active member of club or organization: Not on file     Attends meetings of clubs or organizations: Not on file     Relationship status: Not on file   Other Topics Concern    Not on file   Social History Narrative    Not on file     Family History   Problem Relation Age of Onset    Heart disease Mother     Kidney disease Mother     Diabetes Mother     Hypertension Mother     Hyperlipidemia Mother     COPD Mother     Heart disease Father     Cancer Father 75        lymphoma, bladder tumors    Breast cancer Neg Hx     Ovarian cancer Neg Hx     Colon cancer Neg Hx      OB History    Para Term  AB Living   0   0         SAB TAB Ectopic Multiple Live Births                       Current Outpatient Medications   Medication Sig Dispense Refill    aspirin 81 MG Chew       b complex vitamins tablet Take 1 tablet by mouth once daily.      CALCIUM 600 WITH VITAMIN D3 600 mg(1,500mg) -200 unit Tab       EPIPEN 2-PEDRO 0.3 mg/0.3 mL (1:1,000) AtIn   0    estradiol (ESTRACE) 2 MG tablet Take 1 tablet (2 mg total) by mouth once daily. 90 tablet 3    fexofenadine (ALLEGRA) 180 MG tablet Take 1 tablet (180 mg total) by mouth once daily. 30 tablet 11     glucosamine-chondroitin 500-400 mg tablet Take 1 tablet by mouth 3 (three) times daily.      PREMARIN vaginal cream PLACE 1 GRAM VAGINALLY TWICE A WEEK. 30 g 3    progesterone (PROMETRIUM) 100 MG capsule       tretinoin (RETIN-A) 0.025 % gel Apply to face qhs 30 g 11     No current facility-administered medications for this visit.      Allergies: Patient has no known allergies.     The 10-year ASCVD risk score (Danielabenjamín GUTIERREZ Jr., et al., 2013) is: 2.2%    Values used to calculate the score:      Age: 59 years      Sex: Female      Is Non- : No      Diabetic: No      Tobacco smoker: No      Systolic Blood Pressure: 110 mmHg      Is BP treated: No      HDL Cholesterol: 70 mg/dL      Total Cholesterol: 187 mg/dL      ROS:  Constitutional: no weight loss, weight gain, fever, fatigue    Genitourinary: No blood in urine, painful urination, urgency of urination, frequency of urination, incomplete emptying, incontinence, +abnormal bleeding, painful periods, heavy periods, vaginal discharge, vaginal odor, painful intercourse, sexual problems, bleeding after intercourse.  Musculoskeletal: No muscle weakness  Skin/Breast: No painful breasts, nipple discharge, masses, rash, ulcers  Neurological: No passing out, seizures, numbness, headache  Endocrine: No diabetes, hypothyroid, hyperthyroid, hot flashes, hair loss, abnormal hair growth, acne  Psychiatric: No depression, crying  Hematologic: No bruises, bleeding, swollen lymph nodes, anemia.      Physical Exam  Deferred- per patient request- bleeding    ASSESSMENT:   Postmenopausal bleeding  PLAN:   Counseled patient that I recommend consult with Dr. Alice Valenzuela gyn Oncology  Will send a message to Dr. Valenzuela for patient to get scheduled

## 2020-05-19 ENCOUNTER — LAB VISIT (OUTPATIENT)
Dept: LAB | Facility: HOSPITAL | Age: 60
End: 2020-05-19
Attending: INTERNAL MEDICINE
Payer: MEDICARE

## 2020-05-19 ENCOUNTER — TELEPHONE (OUTPATIENT)
Dept: GYNECOLOGIC ONCOLOGY | Facility: CLINIC | Age: 60
End: 2020-05-19

## 2020-05-19 ENCOUNTER — INITIAL CONSULT (OUTPATIENT)
Dept: GYNECOLOGIC ONCOLOGY | Facility: CLINIC | Age: 60
End: 2020-05-19
Payer: MEDICARE

## 2020-05-19 VITALS
BODY MASS INDEX: 22.31 KG/M2 | DIASTOLIC BLOOD PRESSURE: 91 MMHG | SYSTOLIC BLOOD PRESSURE: 113 MMHG | HEART RATE: 112 BPM | HEIGHT: 61 IN | WEIGHT: 118.19 LBS

## 2020-05-19 DIAGNOSIS — E78.5 HYPERLIPIDEMIA, UNSPECIFIED HYPERLIPIDEMIA TYPE: ICD-10-CM

## 2020-05-19 DIAGNOSIS — M85.9 DISORDER OF BONE DENSITY AND STRUCTURE, UNSPECIFIED: ICD-10-CM

## 2020-05-19 DIAGNOSIS — J30.9 CHRONIC ALLERGIC RHINITIS: ICD-10-CM

## 2020-05-19 DIAGNOSIS — N95.0 POSTMENOPAUSAL BLEEDING: Primary | ICD-10-CM

## 2020-05-19 DIAGNOSIS — Z00.00 WELL ADULT EXAM: ICD-10-CM

## 2020-05-19 LAB
ALBUMIN SERPL BCP-MCNC: 4.4 G/DL (ref 3.5–5.2)
ALP SERPL-CCNC: 73 U/L (ref 55–135)
ALT SERPL W/O P-5'-P-CCNC: 25 U/L (ref 10–44)
ANION GAP SERPL CALC-SCNC: 11 MMOL/L (ref 8–16)
AST SERPL-CCNC: 24 U/L (ref 10–40)
BASOPHILS # BLD AUTO: 0.09 K/UL (ref 0–0.2)
BASOPHILS NFR BLD: 1.1 % (ref 0–1.9)
BILIRUB SERPL-MCNC: 1.2 MG/DL (ref 0.1–1)
BUN SERPL-MCNC: 24 MG/DL (ref 8–23)
CALCIUM SERPL-MCNC: 9.7 MG/DL (ref 8.7–10.5)
CHLORIDE SERPL-SCNC: 103 MMOL/L (ref 95–110)
CHOLEST SERPL-MCNC: 268 MG/DL (ref 120–199)
CHOLEST/HDLC SERPL: 2.8 {RATIO} (ref 2–5)
CO2 SERPL-SCNC: 25 MMOL/L (ref 23–29)
CREAT SERPL-MCNC: 1 MG/DL (ref 0.5–1.4)
DIFFERENTIAL METHOD: ABNORMAL
EOSINOPHIL # BLD AUTO: 0.3 K/UL (ref 0–0.5)
EOSINOPHIL NFR BLD: 4.1 % (ref 0–8)
ERYTHROCYTE [DISTWIDTH] IN BLOOD BY AUTOMATED COUNT: 12.2 % (ref 11.5–14.5)
EST. GFR  (AFRICAN AMERICAN): >60 ML/MIN/1.73 M^2
EST. GFR  (NON AFRICAN AMERICAN): >60 ML/MIN/1.73 M^2
GLUCOSE SERPL-MCNC: 111 MG/DL (ref 70–110)
HCT VFR BLD AUTO: 49.7 % (ref 37–48.5)
HDLC SERPL-MCNC: 97 MG/DL (ref 40–75)
HDLC SERPL: 36.2 % (ref 20–50)
HGB BLD-MCNC: 15.8 G/DL (ref 12–16)
IMM GRANULOCYTES # BLD AUTO: 0.04 K/UL (ref 0–0.04)
IMM GRANULOCYTES NFR BLD AUTO: 0.5 % (ref 0–0.5)
LDLC SERPL CALC-MCNC: 152.4 MG/DL (ref 63–159)
LYMPHOCYTES # BLD AUTO: 2.6 K/UL (ref 1–4.8)
LYMPHOCYTES NFR BLD: 31.4 % (ref 18–48)
MCH RBC QN AUTO: 32.6 PG (ref 27–31)
MCHC RBC AUTO-ENTMCNC: 31.8 G/DL (ref 32–36)
MCV RBC AUTO: 103 FL (ref 82–98)
MONOCYTES # BLD AUTO: 0.7 K/UL (ref 0.3–1)
MONOCYTES NFR BLD: 7.9 % (ref 4–15)
NEUTROPHILS # BLD AUTO: 4.6 K/UL (ref 1.8–7.7)
NEUTROPHILS NFR BLD: 55 % (ref 38–73)
NONHDLC SERPL-MCNC: 171 MG/DL
NRBC BLD-RTO: 0 /100 WBC
PLATELET # BLD AUTO: 293 K/UL (ref 150–350)
PMV BLD AUTO: 10.6 FL (ref 9.2–12.9)
POTASSIUM SERPL-SCNC: 4.3 MMOL/L (ref 3.5–5.1)
PROT SERPL-MCNC: 8.2 G/DL (ref 6–8.4)
RBC # BLD AUTO: 4.85 M/UL (ref 4–5.4)
SARS-COV-2 IGG SERPLBLD QL IA.RAPID: NEGATIVE
SODIUM SERPL-SCNC: 139 MMOL/L (ref 136–145)
TRIGL SERPL-MCNC: 93 MG/DL (ref 30–150)
TSH SERPL DL<=0.005 MIU/L-ACNC: 2.36 UIU/ML (ref 0.4–4)
WBC # BLD AUTO: 8.26 K/UL (ref 3.9–12.7)

## 2020-05-19 PROCEDURE — 99999 PR PBB SHADOW E&M-EST. PATIENT-LVL III: ICD-10-PCS | Mod: PBBFAC,HCNC,, | Performed by: OBSTETRICS & GYNECOLOGY

## 2020-05-19 PROCEDURE — 80053 COMPREHEN METABOLIC PANEL: CPT | Mod: HCNC

## 2020-05-19 PROCEDURE — 3080F DIAST BP >= 90 MM HG: CPT | Mod: HCNC,CPTII,S$GLB, | Performed by: OBSTETRICS & GYNECOLOGY

## 2020-05-19 PROCEDURE — 86769 SARS-COV-2 COVID-19 ANTIBODY: CPT | Mod: HCNC

## 2020-05-19 PROCEDURE — 3008F PR BODY MASS INDEX (BMI) DOCUMENTED: ICD-10-PCS | Mod: HCNC,CPTII,S$GLB, | Performed by: OBSTETRICS & GYNECOLOGY

## 2020-05-19 PROCEDURE — 80061 LIPID PANEL: CPT | Mod: HCNC

## 2020-05-19 PROCEDURE — 99214 OFFICE O/P EST MOD 30 MIN: CPT | Mod: HCNC,S$GLB,, | Performed by: OBSTETRICS & GYNECOLOGY

## 2020-05-19 PROCEDURE — 84443 ASSAY THYROID STIM HORMONE: CPT | Mod: HCNC

## 2020-05-19 PROCEDURE — 3074F PR MOST RECENT SYSTOLIC BLOOD PRESSURE < 130 MM HG: ICD-10-PCS | Mod: HCNC,CPTII,S$GLB, | Performed by: OBSTETRICS & GYNECOLOGY

## 2020-05-19 PROCEDURE — 99214 PR OFFICE/OUTPT VISIT, EST, LEVL IV, 30-39 MIN: ICD-10-PCS | Mod: HCNC,S$GLB,, | Performed by: OBSTETRICS & GYNECOLOGY

## 2020-05-19 PROCEDURE — 3008F BODY MASS INDEX DOCD: CPT | Mod: HCNC,CPTII,S$GLB, | Performed by: OBSTETRICS & GYNECOLOGY

## 2020-05-19 PROCEDURE — 99999 PR PBB SHADOW E&M-EST. PATIENT-LVL III: CPT | Mod: PBBFAC,HCNC,, | Performed by: OBSTETRICS & GYNECOLOGY

## 2020-05-19 PROCEDURE — 36415 COLL VENOUS BLD VENIPUNCTURE: CPT | Mod: HCNC,PO

## 2020-05-19 PROCEDURE — 3080F PR MOST RECENT DIASTOLIC BLOOD PRESSURE >= 90 MM HG: ICD-10-PCS | Mod: HCNC,CPTII,S$GLB, | Performed by: OBSTETRICS & GYNECOLOGY

## 2020-05-19 PROCEDURE — 85025 COMPLETE CBC W/AUTO DIFF WBC: CPT | Mod: HCNC

## 2020-05-19 PROCEDURE — 3074F SYST BP LT 130 MM HG: CPT | Mod: HCNC,CPTII,S$GLB, | Performed by: OBSTETRICS & GYNECOLOGY

## 2020-05-19 NOTE — PROGRESS NOTES
Subjective:      Patient ID: Raquel Kaye is a 59 y.o. female.    Chief Complaint: post menopausal bleeding      HPI     59yr old para 0 referred from Dr. Mata for consideration of a hysterectomy due to persistent PMB. She reports daily spotting with heavier monthly episodes like a period since January.    Pelvic US 2/2020  Uterus 7.9 x 4.5 x 3.2 cm, heterogeneous cystic areas in endometrium measure 10mm. 1.7cm fundal leiomyoma  R ov 2.3 x 2 x 0.8 cm, unremarkable. Blood flow present  L ov NV  No FF    Last EMB 3/4/2020  Scant proliferative endometrium. No hyperplasia or malignancy.    LMP age 48. Combo HRT since that time.    Last pap smear 4/2018  NILM, no ECC    Prior D&C and open appendectomy.    Family history for F - lymphoma/bladder cancer. No breast, uterine, ovarian or colon cancer.      Review of Systems   Constitutional: Negative for appetite change, chills, diaphoresis, fatigue, fever and unexpected weight change.   Respiratory: Negative for cough, chest tightness, shortness of breath and wheezing.    Cardiovascular: Negative for chest pain, palpitations and leg swelling.   Gastrointestinal: Negative for abdominal distention, abdominal pain, blood in stool, constipation, diarrhea, nausea and vomiting.   Genitourinary: Positive for menstrual problem and vaginal bleeding. Negative for difficulty urinating, dysuria, flank pain, frequency, hematuria, pelvic pain, vaginal discharge and vaginal pain.   Musculoskeletal: Negative for arthralgias and back pain.   Skin: Negative for color change and rash.   Neurological: Negative for dizziness, weakness, numbness and headaches.   Hematological: Negative for adenopathy.   Psychiatric/Behavioral: Negative for confusion and sleep disturbance. The patient is not nervous/anxious.        Past Medical History:   Diagnosis Date    Allergy     Back pain     Disorder of vestibular function of both ears     Fibrocystic breast disease in female     HEARING LOSS      right side - no hearing aid    Hyperlipidemia     Meniere's disease     Osteopenia     PONV (postoperative nausea and vomiting)     PONV (postoperative nausea and vomiting)     Special screening for malignant neoplasms, colon 7/22/2013     Past Surgical History:   Procedure Laterality Date    ADENOIDECTOMY      APPENDECTOMY      BREAST BIOPSY      BREAST SURGERY      left breast biopsy    COLONOSCOPY N/A 9/27/2018    Procedure: COLONOSCOPY;  Surgeon: Uche Smiley MD;  Location: [facility] ENDO ([address] [place] [address]);  Service: Endoscopy;  Laterality: N/A;  pt/instructed on prep day before and day of importance    CYST REMOVAL Right     neck    DILATION AND CURETTAGE OF UTERUS      INJECTION OF JOINT Left 10/16/2018    Procedure: INJECTION, JOINT, LEFT SI JOINT INJECTION UNDER FLUORO;  Surgeon: Alirio Lei MD;  Location: [facility]IN [facility]GT;  Service: Pain Management;  Laterality: Left;  NEEDS CONSENT    MANDIBLE FRACTURE SURGERY Right     scope    TONSILLECTOMY       Family History   Problem Relation Age of Onset    Heart disease Mother     Kidney disease Mother     Diabetes Mother     Hypertension Mother     Hyperlipidemia Mother     COPD Mother     Heart disease Father     Cancer Father 75        lymphoma, bladder tumors    Breast cancer Neg Hx     Ovarian cancer Neg Hx     Colon cancer Neg Hx      Social History     Socioeconomic History    Marital status: Single     Spouse name: Not on file    Number of children: Not on file    Years of education: Not on file    Highest education level: Not on file   Occupational History    Not on file   Social Needs    Financial resource strain: Not on file    Food insecurity:     Worry: Not on file     Inability: Not on file    Transportation needs:     Medical: Not on file     Non-medical: Not on file   Tobacco Use    Smoking status: Never Smoker    Smokeless tobacco: Never Used   Substance and Sexual Activity    Alcohol use: Yes     Comment: social  "   Drug use: No    Sexual activity: Yes     Partners: Male     Birth control/protection: None   Lifestyle    Physical activity:     Days per week: Not on file     Minutes per session: Not on file    Stress: Not at all   Relationships    Social connections:     Talks on phone: Not on file     Gets together: Not on file     Attends Shinto service: Not on file     Active member of club or organization: Not on file     Attends meetings of clubs or organizations: Not on file     Relationship status: Not on file   Other Topics Concern    Not on file   Social History Narrative    Not on file     Current Outpatient Medications   Medication Sig    aspirin 81 MG Chew     b complex vitamins tablet Take 1 tablet by mouth once daily.    CALCIUM 600 WITH VITAMIN D3 600 mg(1,500mg) -200 unit Tab     EPIPEN 2-PEDRO 0.3 mg/0.3 mL (1:1,000) AtIn     estradiol (ESTRACE) 2 MG tablet Take 1 tablet (2 mg total) by mouth once daily.    glucosamine-chondroitin 500-400 mg tablet Take 1 tablet by mouth 3 (three) times daily.    PREMARIN vaginal cream PLACE 1 GRAM VAGINALLY TWICE A WEEK.    progesterone (PROMETRIUM) 100 MG capsule     tretinoin (RETIN-A) 0.025 % gel Apply to face qhs    fexofenadine (ALLEGRA) 180 MG tablet Take 1 tablet (180 mg total) by mouth once daily.     No current facility-administered medications for this visit.      Review of patient's allergies indicates:  No Known Allergies    BP (!) 113/91   Pulse (!) 112   Ht 5' 1" (1.549 m)   Wt 53.6 kg (118 lb 2.7 oz)   LMP 04/15/2009   BMI 22.33 kg/m²     Objective:   Physical Exam:   Constitutional: She is oriented to person, place, and time. She appears well-developed and well-nourished. No distress.    HENT:   Head: Normocephalic and atraumatic.    Eyes: No scleral icterus.    Neck: Normal range of motion.    Cardiovascular: Exam reveals no cyanosis and no edema.     Pulmonary/Chest: Effort normal. No respiratory distress. She exhibits no tenderness.   "      Abdominal: Soft. Normal appearance. She exhibits no distension, no fluid wave, no ascites and no mass. There is no tenderness. There is no rigidity, no rebound and no guarding. No hernia.     Genitourinary: Uterus normal. Pelvic exam was performed with patient supine. There is no rash, tenderness or lesion on the right labia. There is no rash, tenderness or lesion on the left labia. Uterus is not tender. Cervix is normal. Right adnexum displays no mass, no tenderness and no fullness. Left adnexum displays no mass, no tenderness and no fullness. There is bleeding in the vagina. No vaginal discharge found.   Genitourinary Comments: Mobile pelvic structures           Musculoskeletal: Normal range of motion and moves all extremeties. She exhibits no edema.      Lymphadenopathy:        Right: No inguinal adenopathy present.        Left: No inguinal adenopathy present.    Neurological: She is alert and oriented to person, place, and time.    Skin: Skin is warm and dry. No rash noted. No cyanosis or erythema. No pallor.    Psychiatric: She has a normal mood and affect. Thought content normal.       Assessment:     1. Postmenopausal bleeding        Plan:       Symptoms, EMBx and today's exam reviewed with patient. Discussed definitive management for persistent postmenopausal bleeding with hysterectomy. She is candidate for minimally invasive approach. Plan for RALH/BSO, any other indicated procedures pending intraoperative findings    The risks, benefits, and indications for surgery were discussed with the patient. These included bleeding, infection, damage to surrounding tissues, the possibility of bowel or urologic resection and reconstruction, and the possibility of major complications including death. She voiced understanding, all questions were answered and consents were signed.    Surgery 6/12/2020  Pre op anesthesia  Discussed COVID pandemic and risks and she desires to proceed. COVID testing 48h prior to  surgery.

## 2020-05-19 NOTE — H&P (VIEW-ONLY)
Subjective:      Patient ID: Raquel Kaye is a 59 y.o. female.    Chief Complaint: post menopausal bleeding      HPI     59yr old para 0 referred from Dr. Mata for consideration of a hysterectomy due to persistent PMB. She reports daily spotting with heavier monthly episodes like a period since January.    Pelvic US 2/2020  Uterus 7.9 x 4.5 x 3.2 cm, heterogeneous cystic areas in endometrium measure 10mm. 1.7cm fundal leiomyoma  R ov 2.3 x 2 x 0.8 cm, unremarkable. Blood flow present  L ov NV  No FF    Last EMB 3/4/2020  Scant proliferative endometrium. No hyperplasia or malignancy.    LMP age 48. Combo HRT since that time.    Last pap smear 4/2018  NILM, no ECC    Prior D&C and open appendectomy.    Family history for F - lymphoma/bladder cancer. No breast, uterine, ovarian or colon cancer.      Review of Systems   Constitutional: Negative for appetite change, chills, diaphoresis, fatigue, fever and unexpected weight change.   Respiratory: Negative for cough, chest tightness, shortness of breath and wheezing.    Cardiovascular: Negative for chest pain, palpitations and leg swelling.   Gastrointestinal: Negative for abdominal distention, abdominal pain, blood in stool, constipation, diarrhea, nausea and vomiting.   Genitourinary: Positive for menstrual problem and vaginal bleeding. Negative for difficulty urinating, dysuria, flank pain, frequency, hematuria, pelvic pain, vaginal discharge and vaginal pain.   Musculoskeletal: Negative for arthralgias and back pain.   Skin: Negative for color change and rash.   Neurological: Negative for dizziness, weakness, numbness and headaches.   Hematological: Negative for adenopathy.   Psychiatric/Behavioral: Negative for confusion and sleep disturbance. The patient is not nervous/anxious.        Past Medical History:   Diagnosis Date    Allergy     Back pain     Disorder of vestibular function of both ears     Fibrocystic breast disease in female     HEARING LOSS      right side - no hearing aid    Hyperlipidemia     Meniere's disease     Osteopenia     PONV (postoperative nausea and vomiting)     PONV (postoperative nausea and vomiting)     Special screening for malignant neoplasms, colon 7/22/2013     Past Surgical History:   Procedure Laterality Date    ADENOIDECTOMY      APPENDECTOMY      BREAST BIOPSY      BREAST SURGERY      left breast biopsy    COLONOSCOPY N/A 9/27/2018    Procedure: COLONOSCOPY;  Surgeon: Uche Smiley MD;  Location: [facility] ENDO ([address] [place] [address]);  Service: Endoscopy;  Laterality: N/A;  pt/instructed on prep day before and day of importance    CYST REMOVAL Right     neck    DILATION AND CURETTAGE OF UTERUS      INJECTION OF JOINT Left 10/16/2018    Procedure: INJECTION, JOINT, LEFT SI JOINT INJECTION UNDER FLUORO;  Surgeon: Alirio Lei MD;  Location: [facility] PAIN [facility]GT;  Service: Pain Management;  Laterality: Left;  NEEDS CONSENT    MANDIBLE FRACTURE SURGERY Right     scope    TONSILLECTOMY       Family History   Problem Relation Age of Onset    Heart disease Mother     Kidney disease Mother     Diabetes Mother     Hypertension Mother     Hyperlipidemia Mother     COPD Mother     Heart disease Father     Cancer Father 75        lymphoma, bladder tumors    Breast cancer Neg Hx     Ovarian cancer Neg Hx     Colon cancer Neg Hx      Social History     Socioeconomic History    Marital status: Single     Spouse name: Not on file    Number of children: Not on file    Years of education: Not on file    Highest education level: Not on file   Occupational History    Not on file   Social Needs    Financial resource strain: Not on file    Food insecurity:     Worry: Not on file     Inability: Not on file    Transportation needs:     Medical: Not on file     Non-medical: Not on file   Tobacco Use    Smoking status: Never Smoker    Smokeless tobacco: Never Used   Substance and Sexual Activity    Alcohol use: Yes     Comment: social  "   Drug use: No    Sexual activity: Yes     Partners: Male     Birth control/protection: None   Lifestyle    Physical activity:     Days per week: Not on file     Minutes per session: Not on file    Stress: Not at all   Relationships    Social connections:     Talks on phone: Not on file     Gets together: Not on file     Attends Bahai service: Not on file     Active member of club or organization: Not on file     Attends meetings of clubs or organizations: Not on file     Relationship status: Not on file   Other Topics Concern    Not on file   Social History Narrative    Not on file     Current Outpatient Medications   Medication Sig    aspirin 81 MG Chew     b complex vitamins tablet Take 1 tablet by mouth once daily.    CALCIUM 600 WITH VITAMIN D3 600 mg(1,500mg) -200 unit Tab     EPIPEN 2-PEDRO 0.3 mg/0.3 mL (1:1,000) AtIn     estradiol (ESTRACE) 2 MG tablet Take 1 tablet (2 mg total) by mouth once daily.    glucosamine-chondroitin 500-400 mg tablet Take 1 tablet by mouth 3 (three) times daily.    PREMARIN vaginal cream PLACE 1 GRAM VAGINALLY TWICE A WEEK.    progesterone (PROMETRIUM) 100 MG capsule     tretinoin (RETIN-A) 0.025 % gel Apply to face qhs    fexofenadine (ALLEGRA) 180 MG tablet Take 1 tablet (180 mg total) by mouth once daily.     No current facility-administered medications for this visit.      Review of patient's allergies indicates:  No Known Allergies    BP (!) 113/91   Pulse (!) 112   Ht 5' 1" (1.549 m)   Wt 53.6 kg (118 lb 2.7 oz)   LMP 04/15/2009   BMI 22.33 kg/m²     Objective:   Physical Exam:   Constitutional: She is oriented to person, place, and time. She appears well-developed and well-nourished. No distress.    HENT:   Head: Normocephalic and atraumatic.    Eyes: No scleral icterus.    Neck: Normal range of motion.    Cardiovascular: Exam reveals no cyanosis and no edema.     Pulmonary/Chest: Effort normal. No respiratory distress. She exhibits no tenderness.   "      Abdominal: Soft. Normal appearance. She exhibits no distension, no fluid wave, no ascites and no mass. There is no tenderness. There is no rigidity, no rebound and no guarding. No hernia.     Genitourinary: Uterus normal. Pelvic exam was performed with patient supine. There is no rash, tenderness or lesion on the right labia. There is no rash, tenderness or lesion on the left labia. Uterus is not tender. Cervix is normal. Right adnexum displays no mass, no tenderness and no fullness. Left adnexum displays no mass, no tenderness and no fullness. There is bleeding in the vagina. No vaginal discharge found.   Genitourinary Comments: Mobile pelvic structures           Musculoskeletal: Normal range of motion and moves all extremeties. She exhibits no edema.      Lymphadenopathy:        Right: No inguinal adenopathy present.        Left: No inguinal adenopathy present.    Neurological: She is alert and oriented to person, place, and time.    Skin: Skin is warm and dry. No rash noted. No cyanosis or erythema. No pallor.    Psychiatric: She has a normal mood and affect. Thought content normal.       Assessment:     1. Postmenopausal bleeding        Plan:       Symptoms, EMBx and today's exam reviewed with patient. Discussed definitive management for persistent postmenopausal bleeding with hysterectomy. She is candidate for minimally invasive approach. Plan for RALH/BSO, any other indicated procedures pending intraoperative findings    The risks, benefits, and indications for surgery were discussed with the patient. These included bleeding, infection, damage to surrounding tissues, the possibility of bowel or urologic resection and reconstruction, and the possibility of major complications including death. She voiced understanding, all questions were answered and consents were signed.    Surgery 6/12/2020  Pre op anesthesia  Discussed COVID pandemic and risks and she desires to proceed. COVID testing 48h prior to  surgery.

## 2020-05-19 NOTE — LETTER
May 21, 2020      Mckenna Mata MD  [address]  [address]  [facility] [address]           [facility] [address]  [address], [address]  [facility] [address]  Phone: [phone]  Fax: [phone]          Patient: Raquel Kaye   MR Number: [id number]   YOB: 1960   Date of Visit: 5/19/2020       Dear Dr. Mckenna Mata:    Thank you for referring Raquel Kaye to me for evaluation. Attached you will find relevant portions of my assessment and plan of care.    If you have questions, please do not hesitate to call me. I look forward to following Raquel Kaye along with you.    Sincerely,    Alice Valenzuela MD    Enclosure  CC:  No Recipients    If you would like to receive this communication electronically, please contact [email or web address] or [phone] to request more information on [employer] access.    For providers and/or their staff who would like to refer a patient to [facility], please contact us through our one-stop-shop provider referral line, [facility], at [phone].    If you feel you have received this communication in error or would no longer like to receive these types of communications, please e-mail [email or web address]

## 2020-05-21 ENCOUNTER — HOSPITAL ENCOUNTER (OUTPATIENT)
Dept: RADIOLOGY | Facility: HOSPITAL | Age: 60
Discharge: HOME | End: 2020-05-21
Attending: OBSTETRICS & GYNECOLOGY
Payer: MEDICARE

## 2020-05-21 DIAGNOSIS — Z12.31 ENCOUNTER FOR SCREENING MAMMOGRAM FOR MALIGNANT NEOPLASM OF BREAST: ICD-10-CM

## 2020-05-21 PROCEDURE — 77063 MAMMO DIGITAL SCREENING BILAT WITH TOMOSYNTHESIS_CAD: ICD-10-PCS | Mod: 26,HCNC,, | Performed by: RADIOLOGY

## 2020-05-21 PROCEDURE — 77067 SCR MAMMO BI INCL CAD: CPT | Mod: 26,HCNC,, | Performed by: RADIOLOGY

## 2020-05-21 PROCEDURE — 77067 MAMMO DIGITAL SCREENING BILAT WITH TOMOSYNTHESIS_CAD: ICD-10-PCS | Mod: 26,HCNC,, | Performed by: RADIOLOGY

## 2020-05-21 PROCEDURE — 77063 BREAST TOMOSYNTHESIS BI: CPT | Mod: 26,HCNC,, | Performed by: RADIOLOGY

## 2020-05-21 PROCEDURE — 77067 SCR MAMMO BI INCL CAD: CPT | Mod: TC,HCNC

## 2020-05-21 RX ORDER — LIDOCAINE HYDROCHLORIDE 10 MG/ML
1 INJECTION, SOLUTION EPIDURAL; INFILTRATION; INTRACAUDAL; PERINEURAL ONCE
Status: CANCELLED | OUTPATIENT
Start: 2020-05-21 | End: 2020-05-21

## 2020-05-21 RX ORDER — SODIUM CHLORIDE 9 MG/ML
INJECTION, SOLUTION INTRAVENOUS CONTINUOUS
Status: CANCELLED | OUTPATIENT
Start: 2020-05-21

## 2020-05-21 RX ORDER — MUPIROCIN 20 MG/G
OINTMENT TOPICAL
Status: CANCELLED | OUTPATIENT
Start: 2020-05-21

## 2020-05-26 ENCOUNTER — PATIENT MESSAGE (OUTPATIENT)
Dept: SURGERY | Facility: OTHER | Age: 60
End: 2020-05-26

## 2020-06-05 ENCOUNTER — OFFICE VISIT (OUTPATIENT)
Dept: INTERNAL MEDICINE | Facility: CLINIC | Age: 60
End: 2020-06-05
Payer: MEDICARE

## 2020-06-05 VITALS
HEART RATE: 63 BPM | HEIGHT: 61 IN | WEIGHT: 121.06 LBS | TEMPERATURE: 98 F | DIASTOLIC BLOOD PRESSURE: 76 MMHG | SYSTOLIC BLOOD PRESSURE: 118 MMHG | RESPIRATION RATE: 18 BRPM | BODY MASS INDEX: 22.86 KG/M2

## 2020-06-05 DIAGNOSIS — Z01.818 PREOP EXAM FOR INTERNAL MEDICINE: ICD-10-CM

## 2020-06-05 DIAGNOSIS — N95.0 POSTMENOPAUSAL BLEEDING: ICD-10-CM

## 2020-06-05 DIAGNOSIS — J30.9 CHRONIC ALLERGIC RHINITIS: ICD-10-CM

## 2020-06-05 DIAGNOSIS — Z00.00 WELL ADULT EXAM: Primary | ICD-10-CM

## 2020-06-05 DIAGNOSIS — E78.5 HYPERLIPIDEMIA, UNSPECIFIED HYPERLIPIDEMIA TYPE: ICD-10-CM

## 2020-06-05 PROCEDURE — 93010 ELECTROCARDIOGRAM REPORT: CPT | Mod: HCNC,S$GLB,, | Performed by: INTERNAL MEDICINE

## 2020-06-05 PROCEDURE — 99499 UNLISTED E&M SERVICE: CPT | Mod: HCNC,S$GLB,, | Performed by: INTERNAL MEDICINE

## 2020-06-05 PROCEDURE — 99396 PREV VISIT EST AGE 40-64: CPT | Mod: HCNC,S$GLB,, | Performed by: INTERNAL MEDICINE

## 2020-06-05 PROCEDURE — 93010 EKG 12-LEAD: ICD-10-PCS | Mod: HCNC,S$GLB,, | Performed by: INTERNAL MEDICINE

## 2020-06-05 PROCEDURE — 93005 EKG 12-LEAD: ICD-10-PCS | Mod: HCNC,S$GLB,, | Performed by: INTERNAL MEDICINE

## 2020-06-05 PROCEDURE — 3074F PR MOST RECENT SYSTOLIC BLOOD PRESSURE < 130 MM HG: ICD-10-PCS | Mod: HCNC,CPTII,S$GLB, | Performed by: INTERNAL MEDICINE

## 2020-06-05 PROCEDURE — 3078F PR MOST RECENT DIASTOLIC BLOOD PRESSURE < 80 MM HG: ICD-10-PCS | Mod: HCNC,CPTII,S$GLB, | Performed by: INTERNAL MEDICINE

## 2020-06-05 PROCEDURE — 99999 PR PBB SHADOW E&M-EST. PATIENT-LVL III: CPT | Mod: PBBFAC,HCNC,, | Performed by: INTERNAL MEDICINE

## 2020-06-05 PROCEDURE — 3078F DIAST BP <80 MM HG: CPT | Mod: HCNC,CPTII,S$GLB, | Performed by: INTERNAL MEDICINE

## 2020-06-05 PROCEDURE — 93005 ELECTROCARDIOGRAM TRACING: CPT | Mod: HCNC,S$GLB,, | Performed by: INTERNAL MEDICINE

## 2020-06-05 PROCEDURE — 99499 RISK ADDL DX/OHS AUDIT: ICD-10-PCS | Mod: HCNC,S$GLB,, | Performed by: INTERNAL MEDICINE

## 2020-06-05 PROCEDURE — 3074F SYST BP LT 130 MM HG: CPT | Mod: HCNC,CPTII,S$GLB, | Performed by: INTERNAL MEDICINE

## 2020-06-05 PROCEDURE — 99999 PR PBB SHADOW E&M-EST. PATIENT-LVL III: ICD-10-PCS | Mod: PBBFAC,HCNC,, | Performed by: INTERNAL MEDICINE

## 2020-06-05 PROCEDURE — 99396 PR PREVENTIVE VISIT,EST,40-64: ICD-10-PCS | Mod: HCNC,S$GLB,, | Performed by: INTERNAL MEDICINE

## 2020-06-05 NOTE — PROGRESS NOTES
Subjective:       Patient ID: Raquel Kaye is a 60 y.o. female.    Chief Complaint: Annual Exam    HPI   The patient presents for annual physical examination and for preop clearance for total hysterectomy planned for 06/12/2020.  Patient has persistent postmenopausal bleeding.  Surgery is to be performed by Dr. Alice Valenzuela.  The patient does not have a history of any anesthesia allergies although she has had some postop nausea vomiting following some of her surgeries.    Past surgeries have included cyst removal from neck, appendectomy, tonsillectomy and  adenoidectomy, breast biopsy, D&C, and mandibular fracture surgery.    Medical conditions include chronic allergic rhinitis and hyperlipidemia.  The patient reports her exercise tolerance has remained good;she has not experienced any exertional chest pain or dyspnea.    The patient is recovering from an episode of severe poison ivy dermatitis involving both upper extremities.  She was treated steroid therapy.  Her severe rash has essentially cleared.  She denies having persistent pruritus.    Review of Systems   Constitutional: Negative for activity change, appetite change, fatigue and unexpected weight change.   Eyes: Negative for visual disturbance.   Respiratory: Negative for cough and shortness of breath.    Cardiovascular: Negative for chest pain, palpitations and leg swelling.   Gastrointestinal: Negative for abdominal pain, blood in stool, constipation and diarrhea.   Genitourinary: Positive for vaginal bleeding. Negative for dysuria and hematuria.   Musculoskeletal: Positive for arthralgias. Negative for neck pain and neck stiffness.   Skin: Negative for rash.   Neurological: Negative for dizziness, syncope and headaches.   Psychiatric/Behavioral: Negative for sleep disturbance.       Objective:      Physical Exam   Constitutional: She is oriented to person, place, and time. Vital signs are normal. She appears well-developed and well-nourished. No  distress.   HENT:   Head: Normocephalic and atraumatic.   Right Ear: External ear normal.   Left Ear: External ear normal.   Nose: Nose normal.   Mouth/Throat: Oropharynx is clear and moist. No oropharyngeal exudate.   Eyes: Pupils are equal, round, and reactive to light. Conjunctivae and EOM are normal. No scleral icterus.   Neck: Normal range of motion. Neck supple. No JVD present. Carotid bruit is not present. No thyromegaly present.   Cardiovascular: Normal rate, regular rhythm, normal heart sounds, intact distal pulses and normal pulses. Exam reveals no gallop and no friction rub.   No murmur heard.  Pulmonary/Chest: Effort normal and breath sounds normal. No respiratory distress. She has no wheezes. She has no rales.   Abdominal: Soft. Bowel sounds are normal. She exhibits no abdominal bruit and no mass. There is no splenomegaly or hepatomegaly. There is no tenderness. No hernia.   Musculoskeletal: Normal range of motion. She exhibits no edema or tenderness.        Right shoulder: She exhibits no effusion and no deformity.   Lymphadenopathy:     She has no cervical adenopathy.     She has no axillary adenopathy.        Right: No supraclavicular adenopathy present.        Left: No supraclavicular adenopathy present.   Neurological: She is alert and oriented to person, place, and time. She has normal strength. No cranial nerve deficit.   Skin: Skin is warm and dry. No rash noted.   Psychiatric: She has a normal mood and affect. Her speech is normal and behavior is normal.   Nursing note and vitals reviewed.      Lab Visit on 05/19/2020   Component Date Value Ref Range Status    Specimen UA 05/19/2020 Urine, Unspecified   Final    Color, UA 05/19/2020 Yellow  Yellow, Straw, Genie Final    Appearance, UA 05/19/2020 Hazy* Clear Final    pH, UA 05/19/2020 5.0  5.0 - 8.0 Final    Specific Gravity, UA 05/19/2020 1.025  1.005 - 1.030 Final    Protein, UA 05/19/2020 Negative  Negative Final    Comment: Recommend a  24 hour urine protein or a urine   protein/creatinine ratio if globulin induced proteinuria is  clinically suspected.      Glucose, UA 05/19/2020 Negative  Negative Final    Ketones, UA 05/19/2020 Negative  Negative Final    Bilirubin (UA) 05/19/2020 Negative  Negative Final    Occult Blood UA 05/19/2020 2+* Negative Final    Nitrite, UA 05/19/2020 Negative  Negative Final    Leukocytes, UA 05/19/2020 Trace* Negative Final    RBC, UA 05/19/2020 1  0 - 4 /hpf Final    WBC, UA 05/19/2020 4  0 - 5 /hpf Final    Bacteria 05/19/2020 Occasional  None-Occ /hpf Final    Squam Epithel, UA 05/19/2020 3  /hpf Final    Non-Squam Epith 05/19/2020 <1  <1/hpf /hpf Final    Microscopic Comment 05/19/2020 SEE COMMENT   Final    Comment: Other formed elements not mentioned in the report are not   present in the microscopic examination.      Lab Visit on 05/19/2020   Component Date Value Ref Range Status    Cholesterol 05/19/2020 268* 120 - 199 mg/dL Final    Comment: The National Cholesterol Education Program (NCEP) has set the  following guidelines (reference ranges) for Cholesterol:  Optimal.....................<200 mg/dL  Borderline High.............200-239 mg/dL  High........................> or = 240 mg/dL      Triglycerides 05/19/2020 93  30 - 150 mg/dL Final    Comment: The National Cholesterol Education Program (NCEP) has set the  following guidelines (reference values) for triglycerides:  Normal......................<150 mg/dL  Borderline High.............150-199 mg/dL  High........................200-499 mg/dL      HDL 05/19/2020 97* 40 - 75 mg/dL Final    Comment: The National Cholesterol Education Program (NCEP) has set the  following guidelines (reference values) for HDL Cholesterol:  Low...............<40 mg/dL  Optimal...........>60 mg/dL      LDL Cholesterol 05/19/2020 152.4  63.0 - 159.0 mg/dL Final    Comment: The National Cholesterol Education Program (NCEP) has set the  following guidelines  (reference values) for LDL Cholesterol:  Optimal.......................<130 mg/dL  Borderline High...............130-159 mg/dL  High..........................160-189 mg/dL  Very High.....................>190 mg/dL      Hdl/Cholesterol Ratio 05/19/2020 36.2  20.0 - 50.0 % Final    Total Cholesterol/HDL Ratio 05/19/2020 2.8  2.0 - 5.0 Final    Non-HDL Cholesterol 05/19/2020 171  mg/dL Final    Comment: Risk category and Non-HDL cholesterol goals:  Coronary heart disease (CHD)or equivalent (10-year risk of CHD >20%):  Non-HDL cholesterol goal     <130 mg/dL  Two or more CHD risk factors and 10-year risk of CHD <= 20%:  Non-HDL cholesterol goal     <160 mg/dL  0 to 1 CHD risk factor:  Non-HDL cholesterol goal     <190 mg/dL      TSH 05/19/2020 2.361  0.400 - 4.000 uIU/mL Final    Sodium 05/19/2020 139  136 - 145 mmol/L Final    Potassium 05/19/2020 4.3  3.5 - 5.1 mmol/L Final    Chloride 05/19/2020 103  95 - 110 mmol/L Final    CO2 05/19/2020 25  23 - 29 mmol/L Final    Glucose 05/19/2020 111* 70 - 110 mg/dL Final    BUN, Bld 05/19/2020 24* 8 - 23 mg/dL Final    Creatinine 05/19/2020 1.0  0.5 - 1.4 mg/dL Final    Calcium 05/19/2020 9.7  8.7 - 10.5 mg/dL Final    Total Protein 05/19/2020 8.2  6.0 - 8.4 g/dL Final    Albumin 05/19/2020 4.4  3.5 - 5.2 g/dL Final    Total Bilirubin 05/19/2020 1.2* 0.1 - 1.0 mg/dL Final    Comment: For infants and newborns, interpretation of results should be based  on gestational age, weight and in agreement with clinical  observations.  Premature Infant recommended reference ranges:  Up to 24 hours.............<8.0 mg/dL  Up to 48 hours............<12.0 mg/dL  3-5 days..................<15.0 mg/dL  6-29 days.................<15.0 mg/dL      Alkaline Phosphatase 05/19/2020 73  55 - 135 U/L Final    AST 05/19/2020 24  10 - 40 U/L Final    ALT 05/19/2020 25  10 - 44 U/L Final    Anion Gap 05/19/2020 11  8 - 16 mmol/L Final    eGFR if African American 05/19/2020 >60.0  >60  mL/min/1.73 m^2 Final    eGFR if non African American 05/19/2020 >60.0  >60 mL/min/1.73 m^2 Final    Comment: Calculation used to obtain the estimated glomerular filtration  rate (eGFR) is the CKD-EPI equation.       WBC 05/19/2020 8.26  3.90 - 12.70 K/uL Final    RBC 05/19/2020 4.85  4.00 - 5.40 M/uL Final    Hemoglobin 05/19/2020 15.8  12.0 - 16.0 g/dL Final    Hematocrit 05/19/2020 49.7* 37.0 - 48.5 % Final    Mean Corpuscular Volume 05/19/2020 103* 82 - 98 fL Final    Mean Corpuscular Hemoglobin 05/19/2020 32.6* 27.0 - 31.0 pg Final    Mean Corpuscular Hemoglobin Conc 05/19/2020 31.8* 32.0 - 36.0 g/dL Final    RDW 05/19/2020 12.2  11.5 - 14.5 % Final    Platelets 05/19/2020 293  150 - 350 K/uL Final    MPV 05/19/2020 10.6  9.2 - 12.9 fL Final    Immature Granulocytes 05/19/2020 0.5  0.0 - 0.5 % Final    Gran # (ANC) 05/19/2020 4.6  1.8 - 7.7 K/uL Final    Immature Grans (Abs) 05/19/2020 0.04  0.00 - 0.04 K/uL Final    Comment: Mild elevation in immature granulocytes is non specific and   can be seen in a variety of conditions including stress response,   acute inflammation, trauma and pregnancy. Correlation with other   laboratory and clinical findings is essential.      Lymph # 05/19/2020 2.6  1.0 - 4.8 K/uL Final    Mono # 05/19/2020 0.7  0.3 - 1.0 K/uL Final    Eos # 05/19/2020 0.3  0.0 - 0.5 K/uL Final    Baso # 05/19/2020 0.09  0.00 - 0.20 K/uL Final    nRBC 05/19/2020 0  0 /100 WBC Final    Gran% 05/19/2020 55.0  38.0 - 73.0 % Final    Lymph% 05/19/2020 31.4  18.0 - 48.0 % Final    Mono% 05/19/2020 7.9  4.0 - 15.0 % Final    Eosinophil% 05/19/2020 4.1  0.0 - 8.0 % Final    Basophil% 05/19/2020 1.1  0.0 - 1.9 % Final    Differential Method 05/19/2020 Automated   Final    COVID-19 (SARS CoV-2) IgG Ab 05/19/2020 Negative  Negative Final    Comment: This test is only for use under Food and Drug Administration's   Emergency Use Authorization (EUA). Commercial reagents are   provided by  [facility] for use with the  i  system. Performance characteristics of the EUA have been  independently verified by [facility] Department  of Pathology and Laboratory Medicine.  Results from antibody testing should not be used as the sole basis  to diagnose or exclude SARS-CoV-2 infection or to determine infection   status. This test is not for the screening of donated blood.  __________________________________________________________________  The [employer] IgG Antibody testing COVID-19 Letter of Authorization,  along with the authorized Fact Sheet for Healthcare Providers,  the authorized Fact Sheet for Patients, and authorized labeling are   available on the FDA website:  https://www.fda.gov/medical-devices/emergency-situations-medical-devic  es/faq  s-diagnostic-testing-sars-cov-2  No IgG antibodies to SARS-CoV-2 are dete                           cted.  Negative results do not rule out SARS-CoV-2 infection, particularly   in immunosuppressed patients and/or those who have been in close  contact with the virus. Follow up testing with a molecular assay  should be considered to rule out infection in those patients.       EKG today shows normal sinus rhythm ventricular rate of 71.  Low-voltage QRS is noted the precordial leads.  When compared to prior tracing from 03/26/2015 no significant changes noted.  Normal EKG.  Assessment:       1. Well adult exam    2. Postmenopausal bleeding    3. Chronic allergic rhinitis    4. Hyperlipidemia, unspecified hyperlipidemia type    5. Preop exam for internal medicine        Plan:       Raquel was seen today for annual exam.  The patient is medically cleared for surgery as planned.  She should decrease fat intake in the diet is discussed.  The patient is to return to clinic annually as discussed.    Diagnoses and all orders for this visit:    Well adult exam    Postmenopausal bleeding    Chronic allergic rhinitis    Hyperlipidemia, unspecified  hyperlipidemia type    Preop exam for internal medicine  -     IN OFFICE EKG 12-LEAD (to [place])

## 2020-06-10 ENCOUNTER — HOSPITAL ENCOUNTER (OUTPATIENT)
Dept: PREADMISSION TESTING | Facility: OTHER | Age: 60
Discharge: HOME | End: 2020-06-10
Attending: ANESTHESIOLOGY
Payer: MEDICARE

## 2020-06-10 ENCOUNTER — HOSPITAL ENCOUNTER (OUTPATIENT)
Dept: PREADMISSION TESTING | Facility: OTHER | Age: 60
Discharge: HOME | End: 2020-06-10
Attending: OBSTETRICS & GYNECOLOGY
Payer: MEDICARE

## 2020-06-10 ENCOUNTER — ANESTHESIA EVENT (OUTPATIENT)
Dept: SURGERY | Facility: OTHER | Age: 60
End: 2020-06-10
Payer: MEDICARE

## 2020-06-10 VITALS
TEMPERATURE: 97 F | BODY MASS INDEX: 22.66 KG/M2 | WEIGHT: 120 LBS | HEIGHT: 61 IN | SYSTOLIC BLOOD PRESSURE: 122 MMHG | HEART RATE: 74 BPM | OXYGEN SATURATION: 98 % | DIASTOLIC BLOOD PRESSURE: 75 MMHG

## 2020-06-10 DIAGNOSIS — N95.0 POSTMENOPAUSAL BLEEDING: ICD-10-CM

## 2020-06-10 LAB
ABO + RH BLD: NORMAL
BLD GP AB SCN CELLS X3 SERPL QL: NORMAL

## 2020-06-10 PROCEDURE — 86901 BLOOD TYPING SEROLOGIC RH(D): CPT | Mod: HCNC

## 2020-06-10 PROCEDURE — U0003 INFECTIOUS AGENT DETECTION BY NUCLEIC ACID (DNA OR RNA); SEVERE ACUTE RESPIRATORY SYNDROME CORONAVIRUS 2 (SARS-COV-2) (CORONAVIRUS DISEASE [COVID-19]), AMPLIFIED PROBE TECHNIQUE, MAKING USE OF HIGH THROUGHPUT TECHNOLOGIES AS DESCRIBED BY CMS-2020-01-R: HCPCS | Mod: HCNC

## 2020-06-10 PROCEDURE — 36415 COLL VENOUS BLD VENIPUNCTURE: CPT | Mod: HCNC

## 2020-06-10 RX ORDER — MIDAZOLAM HYDROCHLORIDE 1 MG/ML
2 INJECTION INTRAMUSCULAR; INTRAVENOUS
Status: CANCELLED | OUTPATIENT
Start: 2020-06-10 | End: 2020-06-10

## 2020-06-10 RX ORDER — SCOLOPAMINE TRANSDERMAL SYSTEM 1 MG/1
1 PATCH, EXTENDED RELEASE TRANSDERMAL ONCE
Status: CANCELLED | OUTPATIENT
Start: 2020-06-10 | End: 2020-06-10

## 2020-06-10 RX ORDER — AMOXICILLIN 500 MG
1 CAPSULE ORAL DAILY
COMMUNITY

## 2020-06-10 RX ORDER — CELECOXIB 200 MG/1
400 CAPSULE ORAL
Status: CANCELLED | OUTPATIENT
Start: 2020-06-10 | End: 2020-06-10

## 2020-06-10 RX ORDER — SODIUM CHLORIDE, SODIUM LACTATE, POTASSIUM CHLORIDE, CALCIUM CHLORIDE 600; 310; 30; 20 MG/100ML; MG/100ML; MG/100ML; MG/100ML
INJECTION, SOLUTION INTRAVENOUS CONTINUOUS
Status: CANCELLED | OUTPATIENT
Start: 2020-06-10

## 2020-06-10 RX ORDER — CYCLOSPORINE 0.5 MG/ML
1 EMULSION OPHTHALMIC 2 TIMES DAILY
COMMUNITY
End: 2022-07-28

## 2020-06-10 RX ORDER — PREGABALIN 50 MG/1
50 CAPSULE ORAL
Status: CANCELLED | OUTPATIENT
Start: 2020-06-10 | End: 2020-06-10

## 2020-06-10 RX ORDER — ACETAMINOPHEN 500 MG
1000 TABLET ORAL
Status: CANCELLED | OUTPATIENT
Start: 2020-06-10 | End: 2020-06-10

## 2020-06-10 RX ORDER — LIDOCAINE HYDROCHLORIDE 10 MG/ML
0.5 INJECTION, SOLUTION EPIDURAL; INFILTRATION; INTRACAUDAL; PERINEURAL ONCE
Status: CANCELLED | OUTPATIENT
Start: 2020-06-10 | End: 2020-06-10

## 2020-06-10 NOTE — DISCHARGE INSTRUCTIONS
Information to Prepare you for your Surgery    PRE-ADMIT TESTING -  [phone]    [address]  [place] [facility]          Your surgery has been scheduled at [facility]. We are pleased to have the opportunity to serve you. For Further Information please call [phone].    On the day of surgery please report to the Information Desk on the 1st floor.    · CONTACT YOUR PHYSICIAN'S OFFICE THE DAY PRIOR TO YOUR SURGERY TO OBTAIN YOUR ARRIVAL TIME.     · The evening before surgery do not eat anything after 9 p.m. ( this includes hard candy, chewing gum and mints).  You may only have GATORADE, POWERADE AND WATER  from 9 p.m. until you leave your home.   DO NOT DRINK ANY LIQUIDS ON THE WAY TO THE HOSPITAL.      SPECIAL MEDICATION INSTRUCTIONS: TAKE medications checked off by the Anesthesiologist on your Medication List.    Angiogram Patients: Take medications as instructed by your physician, including aspirin.     Surgery Patients:    If you take ASPIRIN - Your PHYSICIAN/SURGEON will need to inform you IF/OR when you need to stop taking aspirin prior to your surgery.     Do Not take any medications containing IBUPROFEN.  Do Not Wear any make-up or dark nail polish   (especially eye make-up) to surgery. If you come to surgery with makeup on you will be required to remove the makeup or nail polish.    Do not shave your surgical area at least 5 days prior to your surgery. The surgical prep will be performed at the hospital according to Infection Control regulations.    Leave all valuables at home.   Do Not wear any jewelry or watches, including any metal in body piercings. Jewelry must be removed prior to coming to the hospital.  There is a possibility that rings that are unable to be removed may be cut off if they are on the surgical extremity.    Contact Lens must be removed before surgery. Either do not wear the contact lens or bring a case and solution for  storage.  Please bring a container for eyeglasses or dentures as required.  Bring any paperwork your physician has provided, such as consent forms,  history and physicals, doctor's orders, etc.   Bring comfortable clothes that are loose fitting to wear upon discharge. Take into consideration the type of surgery being performed.  Maintain your diet as advised per your physician the day prior to surgery.      Adequate rest the night before surgery is advised.   Park in the Parking lot behind the hospital or in the [facility] across the street from the parking lot. Parking is complimentary.  If you will be discharged the same day as your procedure, please arrange for a responsible adult to drive you home or to accompany you if traveling by taxi.   YOU WILL NOT BE PERMITTED TO DRIVE OR TO LEAVE THE HOSPITAL ALONE AFTER SURGERY.   If you are being discharged the same day, it is strongly recommended that you arrange for someone to remain with you for the first 24 hrs following your surgery.    The Surgeon will speak to your family/visitor after your surgery regarding the outcome of your surgery and post op care.  The Surgeon may speak to you after your surgery, but there is a possibility you may not remember the details.  Please check with your family members regarding the conversation with the Surgeon.    We strongly recommend whoever is bringing you home be present for discharge instructions.  This will ensure a thorough understanding for your post op home care.    ALL CHILDREN MUST ALWAYS BE ACCOMPANIED BY AN ADULT.    Visitors-Refer to current Visitor policy handouts.    Thank you for your cooperation.  The Staff of [facility].                Bathing Instructions with Hibiclens     Shower the evening before and morning of your procedure with Hibiclens:   Wash your face with water and your regular face wash/soap   Apply Hibiclens directly on your skin or on a wet washcloth and wash  gently. When showering: Move away from the shower stream when applying Hibiclens to avoid rinsing off too soon.   Rinse thoroughly with warm water   Do not dilute Hibiclens         Dry off as usual, do not use any deodorant, powder, body lotions, perfume, after shave or cologne.
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
## 2020-06-11 LAB — SARS-COV-2 RNA RESP QL NAA+PROBE: NOT DETECTED

## 2020-06-12 ENCOUNTER — HOSPITAL ENCOUNTER (OUTPATIENT)
Facility: OTHER | Age: 60
Discharge: HOME | End: 2020-06-12
Attending: OBSTETRICS & GYNECOLOGY | Admitting: OBSTETRICS & GYNECOLOGY
Payer: MEDICARE

## 2020-06-12 ENCOUNTER — ANESTHESIA (OUTPATIENT)
Dept: SURGERY | Facility: OTHER | Age: 60
End: 2020-06-12
Payer: MEDICARE

## 2020-06-12 VITALS
WEIGHT: 120 LBS | TEMPERATURE: 98 F | HEIGHT: 61 IN | HEART RATE: 64 BPM | DIASTOLIC BLOOD PRESSURE: 67 MMHG | BODY MASS INDEX: 22.66 KG/M2 | RESPIRATION RATE: 18 BRPM | SYSTOLIC BLOOD PRESSURE: 133 MMHG | OXYGEN SATURATION: 98 %

## 2020-06-12 DIAGNOSIS — N95.0 POSTMENOPAUSAL BLEEDING: ICD-10-CM

## 2020-06-12 DIAGNOSIS — Z90.710 S/P LAPAROSCOPIC HYSTERECTOMY: Primary | ICD-10-CM

## 2020-06-12 LAB — POCT GLUCOSE: 82 MG/DL (ref 70–110)

## 2020-06-12 PROCEDURE — 71000039 HC RECOVERY, EACH ADD'L HOUR: Mod: HCNC | Performed by: OBSTETRICS & GYNECOLOGY

## 2020-06-12 PROCEDURE — 88307 PR  SURG PATH,LEVEL V: ICD-10-PCS | Mod: 26,HCNC,, | Performed by: PATHOLOGY

## 2020-06-12 PROCEDURE — 58571 PR LAPAROSCOPY W TOT HYSTERECTUTERUS <=250 GRAM  W TUBE/OVARY: ICD-10-PCS | Mod: AS,HCNC,, | Performed by: STUDENT IN AN ORGANIZED HEALTH CARE EDUCATION/TRAINING PROGRAM

## 2020-06-12 PROCEDURE — 71000033 HC RECOVERY, INTIAL HOUR: Mod: HCNC | Performed by: OBSTETRICS & GYNECOLOGY

## 2020-06-12 PROCEDURE — 63600175 PHARM REV CODE 636 W HCPCS: Mod: HCNC | Performed by: ANESTHESIOLOGY

## 2020-06-12 PROCEDURE — 63600175 PHARM REV CODE 636 W HCPCS: Mod: HCNC | Performed by: NURSE ANESTHETIST, CERTIFIED REGISTERED

## 2020-06-12 PROCEDURE — 37000009 HC ANESTHESIA EA ADD 15 MINS: Mod: HCNC | Performed by: OBSTETRICS & GYNECOLOGY

## 2020-06-12 PROCEDURE — P9045 ALBUMIN (HUMAN), 5%, 250 ML: HCPCS | Mod: JG,HCNC | Performed by: NURSE ANESTHETIST, CERTIFIED REGISTERED

## 2020-06-12 PROCEDURE — 63600175 PHARM REV CODE 636 W HCPCS: Mod: HCNC | Performed by: OBSTETRICS & GYNECOLOGY

## 2020-06-12 PROCEDURE — 58571 TLH W/T/O 250 G OR LESS: CPT | Mod: HCNC,,, | Performed by: OBSTETRICS & GYNECOLOGY

## 2020-06-12 PROCEDURE — 25000003 PHARM REV CODE 250: Mod: HCNC | Performed by: ANESTHESIOLOGY

## 2020-06-12 PROCEDURE — 58571 TLH W/T/O 250 G OR LESS: CPT | Mod: AS,HCNC,, | Performed by: STUDENT IN AN ORGANIZED HEALTH CARE EDUCATION/TRAINING PROGRAM

## 2020-06-12 PROCEDURE — 58571 PR LAPAROSCOPY W TOT HYSTERECTUTERUS <=250 GRAM  W TUBE/OVARY: ICD-10-PCS | Mod: HCNC,,, | Performed by: OBSTETRICS & GYNECOLOGY

## 2020-06-12 PROCEDURE — 37000008 HC ANESTHESIA 1ST 15 MINUTES: Mod: HCNC | Performed by: OBSTETRICS & GYNECOLOGY

## 2020-06-12 PROCEDURE — 36000712 HC OR TIME LEV V 1ST 15 MIN: Mod: HCNC | Performed by: OBSTETRICS & GYNECOLOGY

## 2020-06-12 PROCEDURE — 27201423 OPTIME MED/SURG SUP & DEVICES STERILE SUPPLY: Mod: HCNC | Performed by: OBSTETRICS & GYNECOLOGY

## 2020-06-12 PROCEDURE — 25000003 PHARM REV CODE 250: Mod: HCNC | Performed by: NURSE ANESTHETIST, CERTIFIED REGISTERED

## 2020-06-12 PROCEDURE — 88307 TISSUE EXAM BY PATHOLOGIST: CPT | Mod: HCNC | Performed by: PATHOLOGY

## 2020-06-12 PROCEDURE — 71000015 HC POSTOP RECOV 1ST HR: Mod: HCNC | Performed by: OBSTETRICS & GYNECOLOGY

## 2020-06-12 PROCEDURE — 88307 TISSUE EXAM BY PATHOLOGIST: CPT | Mod: 26,HCNC,, | Performed by: PATHOLOGY

## 2020-06-12 PROCEDURE — 36000713 HC OR TIME LEV V EA ADD 15 MIN: Mod: HCNC | Performed by: OBSTETRICS & GYNECOLOGY

## 2020-06-12 PROCEDURE — 71000016 HC POSTOP RECOV ADDL HR: Mod: HCNC | Performed by: OBSTETRICS & GYNECOLOGY

## 2020-06-12 PROCEDURE — 25000003 PHARM REV CODE 250: Mod: HCNC | Performed by: OBSTETRICS & GYNECOLOGY

## 2020-06-12 PROCEDURE — 82962 GLUCOSE BLOOD TEST: CPT | Mod: HCNC | Performed by: OBSTETRICS & GYNECOLOGY

## 2020-06-12 RX ORDER — KETOROLAC TROMETHAMINE 30 MG/ML
INJECTION, SOLUTION INTRAMUSCULAR; INTRAVENOUS
Status: DISCONTINUED | OUTPATIENT
Start: 2020-06-12 | End: 2020-06-12

## 2020-06-12 RX ORDER — CEFAZOLIN SODIUM 2 G/50ML
2 SOLUTION INTRAVENOUS
Status: DISCONTINUED | OUTPATIENT
Start: 2020-06-12 | End: 2020-06-12

## 2020-06-12 RX ORDER — DEXTROSE MONOHYDRATE AND SODIUM CHLORIDE 5; .45 G/100ML; G/100ML
INJECTION, SOLUTION INTRAVENOUS CONTINUOUS
Status: CANCELLED | OUTPATIENT
Start: 2020-06-12

## 2020-06-12 RX ORDER — SIMETHICONE 80 MG
80 TABLET,CHEWABLE ORAL EVERY 4 HOURS PRN
Status: CANCELLED | OUTPATIENT
Start: 2020-06-12

## 2020-06-12 RX ORDER — ONDANSETRON 2 MG/ML
4 INJECTION INTRAMUSCULAR; INTRAVENOUS DAILY PRN
Status: DISCONTINUED | OUTPATIENT
Start: 2020-06-12 | End: 2020-06-12 | Stop reason: HOSPADM

## 2020-06-12 RX ORDER — HYDROCODONE BITARTRATE AND ACETAMINOPHEN 5; 325 MG/1; MG/1
1 TABLET ORAL EVERY 4 HOURS PRN
Status: CANCELLED | OUTPATIENT
Start: 2020-06-12

## 2020-06-12 RX ORDER — IBUPROFEN 600 MG/1
600 TABLET ORAL EVERY 8 HOURS PRN
Qty: 30 TABLET | Refills: 1 | Status: SHIPPED | OUTPATIENT
Start: 2020-06-12 | End: 2020-07-07 | Stop reason: ALTCHOICE

## 2020-06-12 RX ORDER — DIPHENHYDRAMINE HCL 25 MG
25 CAPSULE ORAL EVERY 4 HOURS PRN
Status: CANCELLED | OUTPATIENT
Start: 2020-06-12

## 2020-06-12 RX ORDER — GLYCOPYRROLATE 0.2 MG/ML
INJECTION INTRAMUSCULAR; INTRAVENOUS
Status: DISCONTINUED | OUTPATIENT
Start: 2020-06-12 | End: 2020-06-12

## 2020-06-12 RX ORDER — HYDROMORPHONE HYDROCHLORIDE 1 MG/ML
1 INJECTION, SOLUTION INTRAMUSCULAR; INTRAVENOUS; SUBCUTANEOUS EVERY 4 HOURS PRN
Status: CANCELLED | OUTPATIENT
Start: 2020-06-12

## 2020-06-12 RX ORDER — DEXAMETHASONE SODIUM PHOSPHATE 4 MG/ML
INJECTION, SOLUTION INTRA-ARTICULAR; INTRALESIONAL; INTRAMUSCULAR; INTRAVENOUS; SOFT TISSUE
Status: DISCONTINUED | OUTPATIENT
Start: 2020-06-12 | End: 2020-06-12

## 2020-06-12 RX ORDER — CELECOXIB 200 MG/1
400 CAPSULE ORAL
Status: COMPLETED | OUTPATIENT
Start: 2020-06-12 | End: 2020-06-12

## 2020-06-12 RX ORDER — MUPIROCIN 20 MG/G
OINTMENT TOPICAL
Status: DISCONTINUED | OUTPATIENT
Start: 2020-06-12 | End: 2020-06-12 | Stop reason: HOSPADM

## 2020-06-12 RX ORDER — SODIUM CHLORIDE, SODIUM LACTATE, POTASSIUM CHLORIDE, CALCIUM CHLORIDE 600; 310; 30; 20 MG/100ML; MG/100ML; MG/100ML; MG/100ML
INJECTION, SOLUTION INTRAVENOUS CONTINUOUS
Status: DISCONTINUED | OUTPATIENT
Start: 2020-06-12 | End: 2020-06-12 | Stop reason: HOSPADM

## 2020-06-12 RX ORDER — LIDOCAINE HYDROCHLORIDE 20 MG/ML
INJECTION INTRAVENOUS
Status: DISCONTINUED | OUTPATIENT
Start: 2020-06-12 | End: 2020-06-12

## 2020-06-12 RX ORDER — ACETAMINOPHEN 500 MG
1000 TABLET ORAL
Status: COMPLETED | OUTPATIENT
Start: 2020-06-12 | End: 2020-06-12

## 2020-06-12 RX ORDER — HYDROMORPHONE HYDROCHLORIDE 2 MG/ML
0.4 INJECTION, SOLUTION INTRAMUSCULAR; INTRAVENOUS; SUBCUTANEOUS EVERY 5 MIN PRN
Status: DISCONTINUED | OUTPATIENT
Start: 2020-06-12 | End: 2020-06-12 | Stop reason: HOSPADM

## 2020-06-12 RX ORDER — PROPOFOL 10 MG/ML
VIAL (ML) INTRAVENOUS
Status: DISCONTINUED | OUTPATIENT
Start: 2020-06-12 | End: 2020-06-12

## 2020-06-12 RX ORDER — ONDANSETRON 2 MG/ML
INJECTION INTRAMUSCULAR; INTRAVENOUS
Status: DISCONTINUED | OUTPATIENT
Start: 2020-06-12 | End: 2020-06-12

## 2020-06-12 RX ORDER — CEFAZOLIN SODIUM 1 G/3ML
2 INJECTION, POWDER, FOR SOLUTION INTRAMUSCULAR; INTRAVENOUS
Status: COMPLETED | OUTPATIENT
Start: 2020-06-12 | End: 2020-06-12

## 2020-06-12 RX ORDER — ONDANSETRON 8 MG/1
8 TABLET, ORALLY DISINTEGRATING ORAL EVERY 8 HOURS PRN
Status: DISCONTINUED | OUTPATIENT
Start: 2020-06-12 | End: 2020-06-12 | Stop reason: HOSPADM

## 2020-06-12 RX ORDER — SCOLOPAMINE TRANSDERMAL SYSTEM 1 MG/1
1 PATCH, EXTENDED RELEASE TRANSDERMAL ONCE
Status: COMPLETED | OUTPATIENT
Start: 2020-06-12 | End: 2020-06-12

## 2020-06-12 RX ORDER — OXYCODONE HYDROCHLORIDE 5 MG/1
5 TABLET ORAL
Status: DISCONTINUED | OUTPATIENT
Start: 2020-06-12 | End: 2020-06-12 | Stop reason: HOSPADM

## 2020-06-12 RX ORDER — FENTANYL CITRATE 50 UG/ML
INJECTION, SOLUTION INTRAMUSCULAR; INTRAVENOUS
Status: DISCONTINUED | OUTPATIENT
Start: 2020-06-12 | End: 2020-06-12

## 2020-06-12 RX ORDER — MUPIROCIN 20 MG/G
1 OINTMENT TOPICAL 2 TIMES DAILY
Status: CANCELLED | OUTPATIENT
Start: 2020-06-12 | End: 2020-06-17

## 2020-06-12 RX ORDER — ROCURONIUM BROMIDE 10 MG/ML
INJECTION, SOLUTION INTRAVENOUS
Status: DISCONTINUED | OUTPATIENT
Start: 2020-06-12 | End: 2020-06-12

## 2020-06-12 RX ORDER — MIDAZOLAM HYDROCHLORIDE 1 MG/ML
2 INJECTION INTRAMUSCULAR; INTRAVENOUS
Status: COMPLETED | OUTPATIENT
Start: 2020-06-12 | End: 2020-06-12

## 2020-06-12 RX ORDER — OXYCODONE AND ACETAMINOPHEN 5; 325 MG/1; MG/1
1 TABLET ORAL EVERY 4 HOURS PRN
Qty: 20 TABLET | Refills: 0 | Status: SHIPPED | OUTPATIENT
Start: 2020-06-12 | End: 2020-07-07 | Stop reason: ALTCHOICE

## 2020-06-12 RX ORDER — LIDOCAINE HYDROCHLORIDE 10 MG/ML
1 INJECTION, SOLUTION EPIDURAL; INFILTRATION; INTRACAUDAL; PERINEURAL ONCE
Status: DISCONTINUED | OUTPATIENT
Start: 2020-06-12 | End: 2020-06-12 | Stop reason: HOSPADM

## 2020-06-12 RX ORDER — NEOSTIGMINE METHYLSULFATE 1 MG/ML
INJECTION, SOLUTION INTRAVENOUS
Status: DISCONTINUED | OUTPATIENT
Start: 2020-06-12 | End: 2020-06-12

## 2020-06-12 RX ORDER — ALBUMIN HUMAN 50 G/1000ML
SOLUTION INTRAVENOUS CONTINUOUS PRN
Status: DISCONTINUED | OUTPATIENT
Start: 2020-06-12 | End: 2020-06-12

## 2020-06-12 RX ORDER — PREGABALIN 50 MG/1
50 CAPSULE ORAL
Status: COMPLETED | OUTPATIENT
Start: 2020-06-12 | End: 2020-06-12

## 2020-06-12 RX ORDER — SODIUM CHLORIDE 0.9 % (FLUSH) 0.9 %
3 SYRINGE (ML) INJECTION
Status: DISCONTINUED | OUTPATIENT
Start: 2020-06-12 | End: 2020-06-12 | Stop reason: HOSPADM

## 2020-06-12 RX ORDER — MEPERIDINE HYDROCHLORIDE 25 MG/ML
12.5 INJECTION INTRAMUSCULAR; INTRAVENOUS; SUBCUTANEOUS ONCE AS NEEDED
Status: DISCONTINUED | OUTPATIENT
Start: 2020-06-12 | End: 2020-06-12 | Stop reason: HOSPADM

## 2020-06-12 RX ORDER — IBUPROFEN 600 MG/1
600 TABLET ORAL EVERY 6 HOURS PRN
Status: CANCELLED | OUTPATIENT
Start: 2020-06-12

## 2020-06-12 RX ORDER — SODIUM CHLORIDE 9 MG/ML
INJECTION, SOLUTION INTRAVENOUS CONTINUOUS
Status: DISCONTINUED | OUTPATIENT
Start: 2020-06-12 | End: 2020-06-12 | Stop reason: HOSPADM

## 2020-06-12 RX ORDER — LIDOCAINE HYDROCHLORIDE 10 MG/ML
0.5 INJECTION, SOLUTION EPIDURAL; INFILTRATION; INTRACAUDAL; PERINEURAL ONCE
Status: DISCONTINUED | OUTPATIENT
Start: 2020-06-12 | End: 2020-06-12 | Stop reason: HOSPADM

## 2020-06-12 RX ADMIN — FENTANYL CITRATE 50 MCG: 50 INJECTION, SOLUTION INTRAMUSCULAR; INTRAVENOUS at 10:06

## 2020-06-12 RX ADMIN — PREGABALIN 50 MG: 50 CAPSULE ORAL at 07:06

## 2020-06-12 RX ADMIN — KETOROLAC TROMETHAMINE 30 MG: 30 INJECTION, SOLUTION INTRAMUSCULAR; INTRAVENOUS at 11:06

## 2020-06-12 RX ADMIN — ACETAMINOPHEN 1000 MG: 500 TABLET, FILM COATED ORAL at 07:06

## 2020-06-12 RX ADMIN — LIDOCAINE HYDROCHLORIDE 75 MG: 20 INJECTION, SOLUTION INTRAVENOUS at 10:06

## 2020-06-12 RX ADMIN — FENTANYL CITRATE 100 MCG: 50 INJECTION, SOLUTION INTRAMUSCULAR; INTRAVENOUS at 10:06

## 2020-06-12 RX ADMIN — SODIUM CHLORIDE, SODIUM LACTATE, POTASSIUM CHLORIDE, AND CALCIUM CHLORIDE: 600; 310; 30; 20 INJECTION, SOLUTION INTRAVENOUS at 09:06

## 2020-06-12 RX ADMIN — SCOPALAMINE 1 PATCH: 1 PATCH, EXTENDED RELEASE TRANSDERMAL at 07:06

## 2020-06-12 RX ADMIN — ONDANSETRON 4 MG: 2 INJECTION INTRAMUSCULAR; INTRAVENOUS at 11:06

## 2020-06-12 RX ADMIN — ALBUMIN (HUMAN): 2.5 SOLUTION INTRAVENOUS at 10:06

## 2020-06-12 RX ADMIN — CELECOXIB 400 MG: 200 CAPSULE ORAL at 07:06

## 2020-06-12 RX ADMIN — ROCURONIUM BROMIDE 50 MG: 10 INJECTION, SOLUTION INTRAVENOUS at 10:06

## 2020-06-12 RX ADMIN — DEXAMETHASONE SODIUM PHOSPHATE 8 MG: 4 INJECTION, SOLUTION INTRAMUSCULAR; INTRAVENOUS at 11:06

## 2020-06-12 RX ADMIN — GLYCOPYRROLATE 0.8 MG: 0.2 INJECTION, SOLUTION INTRAMUSCULAR; INTRAVENOUS at 11:06

## 2020-06-12 RX ADMIN — FENTANYL CITRATE 50 MCG: 50 INJECTION, SOLUTION INTRAMUSCULAR; INTRAVENOUS at 11:06

## 2020-06-12 RX ADMIN — ONDANSETRON 4 MG: 2 INJECTION INTRAMUSCULAR; INTRAVENOUS at 03:06

## 2020-06-12 RX ADMIN — MIDAZOLAM HYDROCHLORIDE 2 MG: 1 INJECTION, SOLUTION INTRAMUSCULAR; INTRAVENOUS at 09:06

## 2020-06-12 RX ADMIN — CEFAZOLIN 2 G: 330 INJECTION, POWDER, FOR SOLUTION INTRAMUSCULAR; INTRAVENOUS at 10:06

## 2020-06-12 RX ADMIN — PROPOFOL 30 MG: 10 INJECTION, EMULSION INTRAVENOUS at 11:06

## 2020-06-12 RX ADMIN — PROPOFOL 160 MG: 10 INJECTION, EMULSION INTRAVENOUS at 10:06

## 2020-06-12 RX ADMIN — NEOSTIGMINE METHYLSULFATE 5 MG: 1 INJECTION INTRAVENOUS at 11:06

## 2020-06-12 RX ADMIN — MUPIROCIN: 20 OINTMENT TOPICAL at 07:06

## 2020-06-12 RX ADMIN — HYDROMORPHONE HYDROCHLORIDE 0.4 MG: 2 INJECTION INTRAMUSCULAR; INTRAVENOUS; SUBCUTANEOUS at 12:06

## 2020-06-12 NOTE — TRANSFER OF CARE
"Anesthesia Transfer of Care Note    Patient: Raquel Kaye    Procedure(s) Performed: Procedure(s) (LRB):  XI ROBOTIC SALPINGO-OOPHORECTOMY (N/A)  XI ROBOTIC HYSTERECTOMY (N/A)    Patient location: PACU    Anesthesia Type: general    Transport from OR: Transported from OR on 2-3 L/min O2 by NC with adequate spontaneous ventilation    Post pain: adequate analgesia    Post assessment: no apparent anesthetic complications    Post vital signs: stable    Level of consciousness: responds to stimulation    Nausea/Vomiting: no nausea/vomiting    Complications: none    Transfer of care protocol was followed      Last vitals:   Visit Vitals  /69 (BP Location: Right arm, Patient Position: Lying)   Pulse 79   Temp 36.6 °C (97.8 °F) (Oral)   Resp 16   Ht 5' 1" (1.549 m)   Wt 54.4 kg (120 lb)   LMP 04/15/2009   SpO2 100%   Breastfeeding? No   BMI 22.67 kg/m²     "

## 2020-06-12 NOTE — ANESTHESIA POSTPROCEDURE EVALUATION
Anesthesia Post Evaluation    Patient: Raquel Kaye    Procedure(s) Performed: Procedure(s) (LRB):  XI ROBOTIC SALPINGO-OOPHORECTOMY (N/A)  XI ROBOTIC HYSTERECTOMY (N/A)    Final Anesthesia Type: general    Patient location during evaluation: PACU  Patient participation: Yes- Able to Participate  Level of consciousness: awake and alert  Post-procedure vital signs: reviewed and stable  Pain management: adequate  Airway patency: patent    PONV status at discharge: No PONV  Anesthetic complications: no      Cardiovascular status: blood pressure returned to baseline  Respiratory status: unassisted and spontaneous ventilation  Hydration status: euvolemic  Follow-up not needed.          Vitals Value Taken Time   /59 6/12/2020 12:57 PM   Temp 36.6 °C (97.8 °F) 6/12/2020  1:10 PM   Pulse 62 6/12/2020  1:10 PM   Resp 18 6/12/2020  1:05 PM   SpO2 94 % 6/12/2020  1:09 PM   Vitals shown include unvalidated device data.      Event Time     Out of Recovery 13:10:00          Pain/Abelardo Score: Pain Rating Prior to Med Admin: 7 (6/12/2020 12:15 PM)  Pain Rating Post Med Admin: 2 (6/12/2020 12:38 PM)  Abelardo Score: 9 (6/12/2020 12:53 PM)

## 2020-06-12 NOTE — INTERVAL H&P NOTE
The patient has been examined and the H&P has been reviewed:    I concur with the findings and no changes have occurred since H&P was written.    Anesthesia/Surgery risks, benefits and alternative options discussed and understood by patient/family.    Ok to proceed to Robotic Assisted Laparoscopic Hysterectomy with Bilateral Salpingo-oophorectomy.    Active Hospital Problems    Diagnosis  POA    Postmenopausal bleeding [N95.0]  Yes      Resolved Hospital Problems   No resolved problems to display.

## 2020-06-12 NOTE — DISCHARGE INSTRUCTIONS
DISCHARGE INSTRUCTIONS:  GYN SURGERY      Discharge Instruction:      Walking:  You may walk a reasonable amount periodically during the day. Do Not exhaust yourself, Let your body be the  of how much you can do.    Stairs:  Try to avoid climbing stairs during the first 1-2 weeks after surgery.  If you must go up stairs, take it one step at a time and have someone help during the first two weeks.  After two weeks, you may gradually increase your activity    Lifting:  No heavy lifting or pushing/pulling of furniture    Driving:  You should not drive a car for two weeks following surgery.  After two weeks, you should sit in the car and push hard on the brake with your foot.  If this does not hurt, then you may start driving.  If this does hurt, wait one more week and try again.  You should always wear a seatbelt to protect you and your incision.    Housework:  You should avoid housework for two weeks following surgery.  You may gradually increase your activities after two weeks.    Rest:  You should have frequent rest periods throughout the day (at least twice).    Pelvic Rest:  No sex, douching, or tampons until after your six-week postoperative examination.    Occupation: You may return to work in four to six weeks, depending on your job type.  If you desire to return to work sooner, please discuss this with your doctor.    Traveling:  If it is necessary to travel long distances, you should stop at least every hour and walk around for approximately five minutes.  Try not to sit for long periods of time as this can cause a pooling of blood in your lower body.  Do not wear constricting garments around your thighs during the first six weeks after surgery.    Diet:  Most patients may return to a full, regular diet following surgery.  In certain cases, your diet may be restricted, depending on the surgical findings or other medical problems.  Please discuss this with your doctor.      Medications:  You will be given  required prescriptions when you are discharged from the Hospital.  If you take medications from medical problems, such as heart trouble or high blood pressure, these can generally be resumed after you return home from surgery.  Please make sure that we are aware of your other medications-including herbals and over the counter; this is generally discussed during your preoperative visit.    DO NOT SIT FOR LONG PERIODS OF TIME. THIS CAN CAUSE POOLING OF BLOOD IN YOUR LOWER EXTREMITIES.    Vaginal Bleeding:  If you had a hysterectomy, it is normal to have some spotting or very light bleeding from the vagina at times during the first 2-6 weeks after surgery.  Any heavy bleeding, that is more than a period should be reported.      Pain management:  You will be given a prescription for pain pills when you are discharged from the Hospital.  You should take these, as needed, for pain.  It is much better to try to alleviate the pain with pain medicine and remain active rather than lying around in pain, unable to do anything because of the pain.  Report any persistent pain or new pain,  Especially in the wound or in the legs.  Soreness should be relieved by local heat or analgesics, such as Aspirin or Tylenol.  Do not mix Aspirin or Tylenol with prescription pain medicine at the same time, unless instructed by your doctor.      Urinary Function:  Pain in the bladder area or burning with urination should be reported. The sensation of a full bladder sometimes takes several weeks to return to normal.  If you have difficulty sensing that your bladder is full, please discuss this with your doctor.  You may need to empty your bladder every four hours, by the clock, until this sensation returns.  Some patients require a urinary catheter for a period of time after surgery.  If this need arises, your doctor will discuss this with you.    Constipation:  Constipation is common after surgery.  This can be managed with most over the  counter medications such as Senokot-S or Milk of Magnesia.  Drinking 8-10 glasses of water per day will also help decrease constipation.      ANY HEAVY BLEEDING SHOULD BE REPORTED.    Wound Care:  If the incision begins to have any drainage, especially pus or drainage with a foul odor, call your doctor.  Otherwise, clean your incision daily with soap and water at each bathing.  It is normal for the staples to be associated with some localized redness of the skin where they enter the skin.  If this becomes worse or severe over time you should call your doctor.      Gas Pains:  Gas pains are also common following surgery.  Avoid carbonated  Beverages until passing gas freely.  You may use over the counter gas preparations such as Mylicon,  Gas-X, or Charcoal-Plus.  If the gas pains are persistent, and especially if they are associated with nausea and vomiting, you should call your doctor immediately.      Self Care:  You should shower for the first 10 days following surgery.  If you must sit in a tub, clean the tub before each use and fill the tube with a small amount of water for bathing.  You may wash your hair.  The incision and staples may be cleaned during a shower or Bath.  The staples will not rust.      Emotions:  It is normal to have emotional feelings after major surgery.  We encourage you to address these changes and discuss them, either with your doctor, nurse, or family.      Follow-up:  Please make an appointment 6 weeks following surgery.  You may also be instructed to return one to two weeks following surgery to have your staples removed.  These instructions will be given to you at discharge      CALL YOUR DOCTOR   IF ANY OF THESE PROBLEMS ARISES:    -Fever of 101 degrees or greater  -Nausea and vomiting, especially associated with abdominal pain  -Pain that is getting worse with time or not relieved by the pain medication  -Drainage of pus or blood from the incision  -Abdominal pain that worsens with  time rather than improving  -Heavy bleeding (more than a period)  -Significant swelling of one leg, but not the other leg  -Watery diarrhea, more than 5 times in one day  -Burring with urination that is getting worse  -Pain in the lower leg or calf      Call your doctor (or their nurse)  in the GYN clinic for any   questions or concerns    [phone] (outside [place].)  [phone] (inside [place].)  [phone]

## 2020-06-12 NOTE — OPERATIVE NOTE ADDENDUM
Certification of Assistant at Surgery       Surgery Date: 6/12/2020     Participating Surgeons:  Surgeon(s) and Role:     * Alice Valenzuela MD - Primary     * Shahid Manzo MD - Resident - Assisting     * Marielos Mcgee MD - Resident - Assisting    Procedures:  Procedure(s) (LRB):  XI ROBOTIC SALPINGO-OOPHORECTOMY (N/A)  XI ROBOTIC HYSTERECTOMY (N/A)    Assistant Surgeon's Certification of Necessity:  I understand that section 1842 (b) (6) (d) of the Social Security Act generally prohibits Medicare Part B reasonable charge payment for the services of assistants at surgery in teaching hospitals when qualified residents are available to furnish such services. I certify that the services for which payment is claimed were medically necessary, and that no qualified resident was available to perform the services. I further understand that these services are subject to post-payment review by the Medicare carrier.      Elicia Watts PA-C    06/12/2020  12:09 PM

## 2020-06-12 NOTE — DISCHARGE SUMMARY
[facility]  Gynecological Oncology  Discharge Summary    Patient Name: Raquel Kaye  MRN: [id number]  Admission Date: 6/12/2020  Hospital Length of Stay: 0 days  Discharge Date and Time:  06/12/2020   Attending Physician: Alice Valenzuela MD   Discharging Provider: Elicia Watts PA-C  Primary Care Provider: Salazar Bird MD    Reason for Admission: See H&P note    Hospital Course:  is a 60 yr old who presented to [facility] for a scheduled Davinci assisted laparoscopic total hysterectomy and bilateral salpingo-oophorectomy for post menopausal bleeding. She was taken to the OR. See operative note for a detailed report of the procedure. She was taken to recovery in stable condition and will be discharged home later this evening when able to tolerate po, void, and ambulate. Prescriptions given for Ibuprofen and Percocet. Discharge instructions include follow up with Dr. Valenzuela in 2 weeks.      Procedure(s) (LRB):  XI ROBOTIC SALPINGO-OOPHORECTOMY (N/A)  XI ROBOTIC HYSTERECTOMY (N/A)         Significant Diagnostic Studies: None    Pending Diagnostic Studies:     Procedure Component Value Units Date/Time    Specimen to Pathology, Surgery Other [[id number]] Collected:  06/12/20 1132    Order Status:  Sent Lab Status:  In process Updated:  06/12/20 1133        Final Active Diagnoses:    Diagnosis Date Noted POA    S/P robotic assisted laparoscopic hysterectomy and BSO [Z90.710] 06/12/2020 No    Postmenopausal bleeding [N95.0] 04/22/2015 Yes      Problems Resolved During this Admission:        Does this patient meet criteria for extended DVT prophylaxis? No, because no medical history indicated.    Discharged Condition: good    Disposition:     Follow Up:  Follow-up Information     Alice Valenzuela MD. Schedule an appointment as soon as possible for a visit in 2 weeks.    Specialty:  Gynecologic Oncology  Why:  For wound re-check and post-op follow up.  Contact information:  [address]  [facility] [address]  [phone]                 Patient Instructions:      Diet general     Lifting restrictions     Call MD for:  temperature >100.4     Call MD for:  persistent nausea and vomiting     Call MD for:  severe uncontrolled pain     Call MD for:  difficulty breathing, headache or visual disturbances     Call MD for:  redness, tenderness, or signs of infection (pain, swelling, redness, odor or green/yellow discharge around incision site)     Call MD for:  hives     Call MD for:  persistent dizziness or light-headedness     Call MD for:  extreme fatigue     Call MD for:     No dressing needed     Activity as tolerated     Shower on day dressing removed (No bath)     Medications:  Reconciled Home Medications:      Medication List      ASK your doctor about these medications    aspirin 81 MG Chew  once daily.     b complex vitamins tablet  Take 1 tablet by mouth once daily.     CALCIUM 600 WITH VITAMIN D3 600 mg(1,500mg) -200 unit Tab  Generic drug:  calcium-vitamin D3  once daily.     cycloSPORINE 0.05 % ophthalmic emulsion  Commonly known as:  RESTASIS  1 drop 2 (two) times daily.     EPIPEN 2-PEDRO 0.3 mg/0.3 mL Atin  Generic drug:  EPINEPHrine     estradioL 2 MG tablet  Commonly known as:  ESTRACE  Take 1 tablet (2 mg total) by mouth once daily.     fexofenadine 180 MG tablet  Commonly known as:  ALLEGRA  Take 1 tablet (180 mg total) by mouth once daily.     fish oil-omega-3 fatty acids 300-1,000 mg capsule  Take by mouth once daily.     glucosamine-chondroitin 500-400 mg tablet  Take 1 tablet by mouth 3 (three) times daily.     ibuprofen 600 MG tablet  Commonly known as:  ADVIL,MOTRIN  Take 1 tablet (600 mg total) by mouth every 8 (eight) hours as needed for Pain.     oxyCODONE-acetaminophen 5-325 mg per tablet  Commonly known as:  PERCOCET  Take 1 tablet by mouth every 4 (four) hours as needed for Pain.     PREMARIN vaginal cream  Generic drug:  conjugated estrogens  PLACE 1 GRAM  VAGINALLY TWICE A WEEK.     tretinoin 0.025 % gel  Commonly known as:  RETIN-A  Apply to face qhs            Elicia Watts PA-C  Gynecologic Oncology  [facility]

## 2020-06-12 NOTE — ANESTHESIA PROCEDURE NOTES
Intubation  Performed by: Nurys Olvera CRNA  Authorized by: Freddie Loomis MD     Intubation:     Induction:  Inhalational - mask    Intubated:  Postinduction    Mask Ventilation:  Easy mask    Attempts:  1    Attempted By:  CRNA    Method of Intubation:  Video laryngoscopy    Blade:  Chepe 3    Laryngeal View Grade: Grade I - full view of chords      Difficult Airway Encountered?: No      Airway Device:  Oral endotracheal tube    Airway Device Size:  7.5    Style/Cuff Inflation:  Cuffed    Inflation Amount (mL):  5    Tube secured:  21    Secured at:  The lips    Placement Verified By:  Capnometry    Complicating Factors:  None    Findings Post-Intubation:  BS equal bilateral

## 2020-06-12 NOTE — PLAN OF CARE
Raquel LUIS Kaye has met all discharge criteria from Phase II. Vital Signs are stable, ambulating  without difficulty. Discharge instructions given, patient verbalized understanding. Discharged from facility via wheelchair in stable condition.

## 2020-06-15 ENCOUNTER — PATIENT MESSAGE (OUTPATIENT)
Dept: GYNECOLOGIC ONCOLOGY | Facility: CLINIC | Age: 60
End: 2020-06-15

## 2020-06-15 ENCOUNTER — TELEPHONE (OUTPATIENT)
Dept: GYNECOLOGIC ONCOLOGY | Facility: CLINIC | Age: 60
End: 2020-06-15

## 2020-06-15 NOTE — TELEPHONE ENCOUNTER
Called patient to clarify her question on the portal regarding traveling in the car to [place] after surgery last Friday. Let her know it is ok. She voiced understanding, all questions answered.

## 2020-06-17 LAB
FINAL PATHOLOGIC DIAGNOSIS: NORMAL
GROSS: NORMAL

## 2020-06-17 NOTE — OP NOTE
DATE OF PROCEDURE:  06/12/2020     SURGEON:  Alice Valenzuela M.D.     ASSISTANTS: Elicia Watts PA-C [facility]-- No qualified resident was available for the procedure. Shahid Manzo MD - Resident - Assisting and Marielos Mcgee MD - Resident - Assisting         PREOPERATIVE DIAGNOSIS:   1. Persistent postmenopausal bleeding     POSTOPERATIVE DIAGNOSES:    1. Persistent postmenopausal bleeding       PROCEDURE PERFORMED:  Robotic-assisted total laparoscopic hysterectomy,   bilateral salpingo-oophorectomy     ANESTHESIA:  General endotracheal anesthesia.     SPECIMENS REMOVED:  1.  Uterus and cervix.  2.  Bilateral fallopian tubes and ovaries.     ESTIMATED BLOOD LOSS:  <20 mL.     COMPLICATIONS:  None.     FINDINGS: 8cm uterus. Normal fallopian tubes and ovaries bilaterally.      PROCEDURE IN DETAIL:  The patient was taken to the Operating Room.  Informed consent had been obtained.  She underwent general endotracheal anesthesia without difficulty, was prepped and draped in the normal sterile fashion in a dorsal lithotomy position.  Timeout was performed.  All parties agreed to the planned procedure.  Perioperative antibiotics were administered.  Lindquist catheter was placed under sterile conditions.  The [email or web address] uterine manipulator was secured in place in a standard fashion for uterine manipulation.  Gloves were changed and attention was turned to the patient's abdomen.       The umbilicus was inverted. Veress needle was gently inserted.  Intra-abdominal placement was confirmed with low CO2 pressure and water drop test.  Abdomen was insufflated and pneumoperitoneum was obtained.  Skin incision was made superior to the umbilicus.  Robotic trocar was introduced.  Intra-abdominal placement was confirmed.  Additional trocars were placed, 2 robotic trocars to the left of the camera, 1 robotic trocar to the right of the camera and an additional 8 mm assist port to the right of the camera.  The patient was placed in  steep Trendelenburg. Robot was docked and operating surgeon reported to the console.       Survey of the abdomen and pelvis revealed the above findings. Bilateral round ligaments were identified. These were cauterized and transected.  The posterior leaf of the broad ligament was then opened bilaterally facilitating access to the retroperitoneum.  The infundibulopelvic ligaments were then skeletonized with good visualization of the ureter beneath.  These were cauterized and transected.  The anterior leaf of the broad ligament was then opened circumferentially.  Proper plane for the bladder flap was identified and the bladder was gently reflected off of the anterior aspect of the uterus and cervix to the level of the cervicovaginal junction. Bilateral uterine arteries were then skeletonized.  These were cauterized and transected.  The remainder of the uterosacral and cardinal ligaments were then also serially cauterized and transected. Posterior colpotomy was initiated in the 6 o'clock position and carried around circumferentially.  The uterus, cervix, bilateral fallopian tubes and ovaries were then removed through the vagina.       We then closed the vaginal cuff with a V-Loc running suture in a running fashion. Ureters were reinspected and both noted to vermiculate equally and briskly. The robotic trocars were then removed under direct visualization and the robot was undocked. Skin incisions were then rendered hemostatic.  These were closed with 4-0 Monocryl in a subcuticular fashion and topped with sterile Dermabond.  The patient tolerated the procedure well.  Sponge, lap, needle and instrument counts were correct x2 as reported by the circulating nurse.  She was awakened from anesthesia and taken to recovery in stable condition.

## 2020-06-23 ENCOUNTER — TELEPHONE (OUTPATIENT)
Dept: DERMATOLOGY | Facility: CLINIC | Age: 60
End: 2020-06-23

## 2020-06-23 NOTE — TELEPHONE ENCOUNTER
----- Message from Lili Jacobsen sent at 6/23/2020 11:26 AM CDT -----  Pt is requesting a call from Rg. Please give pt a call back at [phone]

## 2020-07-07 ENCOUNTER — PROCEDURE VISIT (OUTPATIENT)
Dept: DERMATOLOGY | Facility: CLINIC | Age: 60
End: 2020-07-07
Payer: MEDICARE

## 2020-07-07 ENCOUNTER — OFFICE VISIT (OUTPATIENT)
Dept: GYNECOLOGIC ONCOLOGY | Facility: CLINIC | Age: 60
End: 2020-07-07
Payer: MEDICARE

## 2020-07-07 VITALS
SYSTOLIC BLOOD PRESSURE: 125 MMHG | WEIGHT: 120.13 LBS | HEART RATE: 93 BPM | DIASTOLIC BLOOD PRESSURE: 70 MMHG | BODY MASS INDEX: 22.7 KG/M2

## 2020-07-07 DIAGNOSIS — N95.0 POSTMENOPAUSAL BLEEDING: ICD-10-CM

## 2020-07-07 DIAGNOSIS — Z41.1 ELECTIVE PROCEDURE FOR UNACCEPTABLE COSMETIC APPEARANCE: Primary | ICD-10-CM

## 2020-07-07 DIAGNOSIS — Z90.710 S/P LAPAROSCOPIC HYSTERECTOMY: Primary | ICD-10-CM

## 2020-07-07 PROCEDURE — 99499 NO LOS: ICD-10-PCS | Mod: CSM,S$GLB,, | Performed by: DERMATOLOGY

## 2020-07-07 PROCEDURE — 99499 UNLISTED E&M SERVICE: CPT | Mod: CSM,S$GLB,, | Performed by: DERMATOLOGY

## 2020-07-07 PROCEDURE — 99999 PR PBB SHADOW E&M-EST. PATIENT-LVL III: CPT | Mod: PBBFAC,HCNC,, | Performed by: OBSTETRICS & GYNECOLOGY

## 2020-07-07 PROCEDURE — 99024 POSTOP FOLLOW-UP VISIT: CPT | Mod: HCNC,S$GLB,, | Performed by: OBSTETRICS & GYNECOLOGY

## 2020-07-07 PROCEDURE — 99999 PR PBB SHADOW E&M-EST. PATIENT-LVL III: ICD-10-PCS | Mod: PBBFAC,HCNC,, | Performed by: OBSTETRICS & GYNECOLOGY

## 2020-07-07 PROCEDURE — 99024 PR POST-OP FOLLOW-UP VISIT: ICD-10-PCS | Mod: HCNC,S$GLB,, | Performed by: OBSTETRICS & GYNECOLOGY

## 2020-07-07 NOTE — LETTER
July 12, 2020        Mckenna Mata MD  [address]  [address]  [facility] [address]             [facility] [place], [facility] by [facility] [address]  [address], [address]  [facility] [address]  Phone: [phone]  Fax: [phone]   Patient: Raquel Kaye   MR Number: [id number]   YOB: 1960   Date of Visit: 7/7/2020       Dear Dr. Mata:    Thank you for referring Raquel Kaye to me for evaluation. Below are the relevant portions of my assessment and plan of care.            If you have questions, please do not hesitate to call me. I look forward to following Raquel along with you.    Sincerely,      Alice Valenzuela MD           CC  No Recipients

## 2020-07-07 NOTE — PROGRESS NOTES
Patient is here today for cosmetic Botox treatment.   She denies any new medical problems or medications.   She denies any history of adverse reaction to Botox or history of neuromuscular disease.     Discussed benefits and risks of Botox injections, including headache, weakness/paralysis of muscles, asymmetry, eyebrow/lid drooping, pain, bruising, swelling, infection, and rare risk of systemic botulism. Verbal and written consent was obtained.    Patient agreed to proceed with treatment. 23 units total were injected today:  8 in frontalis  15 in glabella (2-3-5-3-2)    Patient tolerated well with no complications. She was instructed not to massage the treated areas and that she should avoid exercise today.    Botox dilution: 2:1 (4:1 frontalis)  Lot #: [id number]  Exp date: 09/2022

## 2020-07-12 NOTE — PROGRESS NOTES
Subjective:      Patient ID: Raquel Kaye is a 60 y.o. female.    Chief Complaint: Post-op Evaluation      HPI  S/p RTLH/BSO 6/12/2020  Final Pathologic Diagnosis UTERUS, CERVIX AND BILATERAL ADNEXA WEIGHING 60 G SHOWING:   COMPLEX ENDOMETRIAL HYPERPLASIA WITHOUT ATYPIA PRESENT IN AN ENDOMETRIAL   POLYP.  THE SURROUNDING ENDOMETRIUM IS INACTIVE.   NEGATIVE CERVIX   MULTIPLE SUBMUCOSAL AND INTRAMURAL LEIOMYOMAS   NEGATIVE OVARIES AND FALLOPIAN TUBES.      Presents today for post operative visit. Recovering appropriately from surgery. Up and about, eating, +BM.  Final pathology shows complex hyperplasia without atypia in a polyp. No further treatment is required following hysterectomy.     Referral history:  59yr old para 0 referred from Dr. Mata for consideration of a hysterectomy due to persistent PMB. She reports daily spotting with heavier monthly episodes like a period since January.     Pelvic US 2/2020  Uterus 7.9 x 4.5 x 3.2 cm, heterogeneous cystic areas in endometrium measure 10mm. 1.7cm fundal leiomyoma  R ov 2.3 x 2 x 0.8 cm, unremarkable. Blood flow present  L ov NV  No FF     Last EMB 3/4/2020  Scant proliferative endometrium. No hyperplasia or malignancy.     LMP age 48. Combo HRT since that time.     Last pap smear 4/2018  NILM, no ECC     Prior D&C and open appendectomy.     Family history for F - lymphoma/bladder cancer. No breast, uterine, ovarian or colon cancer.  Review of Systems   Constitutional: Negative for appetite change, chills, fatigue and fever.   HENT: Negative for mouth sores.    Respiratory: Negative for cough and shortness of breath.    Cardiovascular: Negative for leg swelling.   Gastrointestinal: Negative for abdominal pain, blood in stool, constipation and diarrhea.   Endocrine: Negative for cold intolerance.   Genitourinary: Negative for dysuria and vaginal bleeding.   Musculoskeletal: Negative for myalgias.   Skin: Negative for rash.   Allergic/Immunologic: Negative.     Neurological: Negative for weakness and numbness.   Hematological: Negative for adenopathy. Does not bruise/bleed easily.   Psychiatric/Behavioral: Negative for confusion.       Objective:   Physical Exam:   Constitutional: She is oriented to person, place, and time. She appears well-developed and well-nourished.    HENT:   Head: Normocephalic and atraumatic.    Eyes: Pupils are equal, round, and reactive to light. EOM are normal.    Neck: Normal range of motion. Neck supple. No thyromegaly present.    Cardiovascular: Normal rate, regular rhythm and intact distal pulses.     Pulmonary/Chest: Effort normal and breath sounds normal. No respiratory distress. She has no wheezes.        Abdominal: Soft. Bowel sounds are normal. She exhibits abdominal incision. She exhibits no distension and no mass. There is no abdominal tenderness.             Musculoskeletal: Normal range of motion and moves all extremeties.      Lymphadenopathy:     She has no cervical adenopathy.        Right: No supraclavicular adenopathy present.        Left: No supraclavicular adenopathy present.    Neurological: She is alert and oriented to person, place, and time.    Skin: Skin is warm and dry. No rash noted.    Psychiatric: She has a normal mood and affect.       Assessment:     1. S/P robotic assisted laparoscopic hysterectomy and BSO    2. Postmenopausal bleeding        Plan:   No orders of the defined types were placed in this encounter.    Recovering appropriately from surgery.   Final pathology shows complex hyperplasia without atypia in a polyp. No further treatment is required following hysterectomy.    RTC 4-5 weeks for follow up post operative visit.   Will plan to return care to Dr. Mata for ongoing gyn health maintenance once recovered fully from surgery.

## 2020-07-20 RX ORDER — ESTRADIOL 1 MG/1
1 TABLET ORAL DAILY
Qty: 90 TABLET | Refills: 3 | Status: SHIPPED | OUTPATIENT
Start: 2020-07-20 | End: 2021-05-17 | Stop reason: SDUPTHER

## 2020-07-20 NOTE — TELEPHONE ENCOUNTER
Pt had hysterectomy and now starting back on estradiol 1mg per Dr Mata the patient needs a 90 days supply sent to [facility]

## 2020-07-24 ENCOUNTER — TELEPHONE (OUTPATIENT)
Dept: PAIN MEDICINE | Facility: CLINIC | Age: 60
End: 2020-07-24

## 2020-07-24 NOTE — TELEPHONE ENCOUNTER
----- Message from Gita Rodriguez, Patient Care Assistant sent at 7/24/2020  2:51 PM CDT -----  Can the clinic reply in [facility]: No    Please refill the medication(s) listed below. The patient can be reached at this phone number once it is called into the pharmacy.[phone]    Medication #1ibuprofen (ADVIL,MOTRIN) 800 MG tablet    Medication #2    Preferred Pharmacy:   [id number] - [place], [place] - [address]

## 2020-07-24 NOTE — TELEPHONE ENCOUNTER
Last office visit:08/28/19  Last filled:06/12/20    Please review and advise. - Can continue 800 mg Ibuprofen sparingly.    Please review and advise. Medication is not attached.

## 2020-07-27 DIAGNOSIS — M50.30 DEGENERATIVE CERVICAL DISC: ICD-10-CM

## 2020-07-27 DIAGNOSIS — M54.2 NECK PAIN ON LEFT SIDE: ICD-10-CM

## 2020-07-27 RX ORDER — IBUPROFEN 800 MG/1
800 TABLET ORAL 3 TIMES DAILY
Qty: 90 TABLET | Refills: 1 | Status: SHIPPED | OUTPATIENT
Start: 2020-07-27 | End: 2020-09-25

## 2020-07-30 ENCOUNTER — LAB VISIT (OUTPATIENT)
Dept: PRIMARY CARE CLINIC | Facility: OTHER | Age: 60
End: 2020-07-30
Attending: INTERNAL MEDICINE
Payer: MEDICARE

## 2020-07-30 DIAGNOSIS — Z11.59 SPECIAL SCREENING EXAMINATION FOR UNSPECIFIED VIRAL DISEASE: Primary | ICD-10-CM

## 2020-07-30 PROCEDURE — U0003 INFECTIOUS AGENT DETECTION BY NUCLEIC ACID (DNA OR RNA); SEVERE ACUTE RESPIRATORY SYNDROME CORONAVIRUS 2 (SARS-COV-2) (CORONAVIRUS DISEASE [COVID-19]), AMPLIFIED PROBE TECHNIQUE, MAKING USE OF HIGH THROUGHPUT TECHNOLOGIES AS DESCRIBED BY CMS-2020-01-R: HCPCS | Mod: HCNC

## 2020-08-03 LAB — SARS-COV-2 RNA RESP QL NAA+PROBE: NORMAL

## 2020-08-04 ENCOUNTER — OFFICE VISIT (OUTPATIENT)
Dept: GYNECOLOGIC ONCOLOGY | Facility: CLINIC | Age: 60
End: 2020-08-04
Payer: MEDICARE

## 2020-08-04 VITALS
SYSTOLIC BLOOD PRESSURE: 117 MMHG | BODY MASS INDEX: 22.98 KG/M2 | HEART RATE: 87 BPM | HEIGHT: 61 IN | DIASTOLIC BLOOD PRESSURE: 56 MMHG | WEIGHT: 121.69 LBS

## 2020-08-04 DIAGNOSIS — Z90.710 S/P LAPAROSCOPIC HYSTERECTOMY: Primary | ICD-10-CM

## 2020-08-04 PROCEDURE — 99999 PR PBB SHADOW E&M-EST. PATIENT-LVL III: ICD-10-PCS | Mod: PBBFAC,HCNC,, | Performed by: OBSTETRICS & GYNECOLOGY

## 2020-08-04 PROCEDURE — 99024 PR POST-OP FOLLOW-UP VISIT: ICD-10-PCS | Mod: HCNC,S$GLB,, | Performed by: OBSTETRICS & GYNECOLOGY

## 2020-08-04 PROCEDURE — 99024 POSTOP FOLLOW-UP VISIT: CPT | Mod: HCNC,S$GLB,, | Performed by: OBSTETRICS & GYNECOLOGY

## 2020-08-04 PROCEDURE — 99999 PR PBB SHADOW E&M-EST. PATIENT-LVL III: CPT | Mod: PBBFAC,HCNC,, | Performed by: OBSTETRICS & GYNECOLOGY

## 2020-08-04 NOTE — PROGRESS NOTES
Subjective:      Patient ID: Raquel Kaye is a 60 y.o. female.    Chief Complaint: Post-op Evaluation (5 wk po r/s 11th)      HPI  S/p RTLH/BSO 6/12/2020  Final Pathologic Diagnosis UTERUS, CERVIX AND BILATERAL ADNEXA WEIGHING 60 G SHOWING:   COMPLEX ENDOMETRIAL HYPERPLASIA WITHOUT ATYPIA PRESENT IN AN ENDOMETRIAL   POLYP.  THE SURROUNDING ENDOMETRIUM IS INACTIVE.   NEGATIVE CERVIX   MULTIPLE SUBMUCOSAL AND INTRAMURAL LEIOMYOMAS   NEGATIVE OVARIES AND FALLOPIAN TUBES.     Final pathology shows complex hyperplasia without atypia in a polyp. No further treatment is required following hysterectomy.      Presents today for follow up post operative visit. Continues to recover appropriately from surgery.      Referral history:  59yr old para 0 referred from Dr. Mata for consideration of a hysterectomy due to persistent PMB. She reports daily spotting with heavier monthly episodes like a period since January.     Pelvic US 2/2020  Uterus 7.9 x 4.5 x 3.2 cm, heterogeneous cystic areas in endometrium measure 10mm. 1.7cm fundal leiomyoma  R ov 2.3 x 2 x 0.8 cm, unremarkable. Blood flow present  L ov NV  No FF     Last EMB 3/4/2020  Scant proliferative endometrium. No hyperplasia or malignancy.     LMP age 48. Combo HRT since that time.     Last pap smear 4/2018  NILM, no ECC     Prior D&C and open appendectomy.     Family history for F - lymphoma/bladder cancer. No breast, uterine, ovarian or colon cancer.    Presents today for follow up post operative visit. Continues to recover appropriately from surgery.   X     Referral history:  59yr old para 0 referred from Dr. Mata for consideration of a hysterectomy due to persistent PMB. She reports daily spotting with heavier monthly episodes like a period since January.     Pelvic US 2/2020  Uterus 7.9 x 4.5 x 3.2 cm, heterogeneous cystic areas in endometrium measure 10mm. 1.7cm fundal leiomyoma  R ov 2.3 x 2 x 0.8 cm, unremarkable. Blood flow present  L ov NV  No  FF     Last EMB 3/4/2020  Scant proliferative endometrium. No hyperplasia or malignancy.     LMP age 48. Combo HRT since that time.     Last pap smear 4/2018  NILM, no ECC     Prior D&C and open appendectomy.     Family history for F - lymphoma/bladder cancer. No breast, uterine, ovarian or colon cancer.    Review of Systems   Constitutional: Negative for appetite change, chills, fatigue and fever.   HENT: Negative for mouth sores.    Respiratory: Negative for cough and shortness of breath.    Cardiovascular: Negative for leg swelling.   Gastrointestinal: Negative for abdominal pain, blood in stool, constipation and diarrhea.   Endocrine: Negative for cold intolerance.   Genitourinary: Negative for dysuria and vaginal bleeding.   Musculoskeletal: Negative for myalgias.   Skin: Negative for rash.   Allergic/Immunologic: Negative.    Neurological: Negative for weakness and numbness.   Hematological: Negative for adenopathy. Does not bruise/bleed easily.   Psychiatric/Behavioral: Negative for confusion.       Objective:   Physical Exam:   Constitutional: She is oriented to person, place, and time. She appears well-developed and well-nourished.    HENT:   Head: Normocephalic and atraumatic.    Eyes: Pupils are equal, round, and reactive to light. EOM are normal.    Neck: Normal range of motion. Neck supple. No thyromegaly present.    Cardiovascular: Normal rate, regular rhythm and intact distal pulses.     Pulmonary/Chest: Effort normal and breath sounds normal. No respiratory distress. She has no wheezes.        Abdominal: Soft. Bowel sounds are normal. She exhibits no distension and no mass. There is no abdominal tenderness.     Genitourinary:    Vagina and rectum normal.      Pelvic exam was performed with patient supine.   There is no lesion on the right labia. There is no lesion on the left labia. Uterus is absent. Right adnexum displays no mass. Left adnexum displays no mass. Vaginal cuff normal.Cervix exhibits  absence.    Genitourinary Comments: Vaginal cuff healing well             Musculoskeletal: Normal range of motion and moves all extremeties.      Lymphadenopathy:     She has no cervical adenopathy.        Right: No supraclavicular adenopathy present.        Left: No supraclavicular adenopathy present.    Neurological: She is alert and oriented to person, place, and time.    Skin: Skin is warm and dry. No rash noted.    Psychiatric: She has a normal mood and affect.       Assessment:     1. S/P robotic assisted laparoscopic hysterectomy and BSO        Plan:   No orders of the defined types were placed in this encounter.    Continues to recover appropriately from surgery. Vaginal cuff healing well.   Final pathology shows complex hyperplasia without atypia in a polyp. No further treatment is required following hysterectomy.    Will plan to return care to Dr. Mata for ongoing gyn health maintenance.  May follow up with me as needed.

## 2020-09-21 ENCOUNTER — PATIENT MESSAGE (OUTPATIENT)
Dept: INTERNAL MEDICINE | Facility: CLINIC | Age: 60
End: 2020-09-21

## 2020-09-30 ENCOUNTER — OFFICE VISIT (OUTPATIENT)
Dept: INTERNAL MEDICINE | Facility: CLINIC | Age: 60
End: 2020-09-30
Payer: MEDICARE

## 2020-09-30 ENCOUNTER — LAB VISIT (OUTPATIENT)
Dept: LAB | Facility: HOSPITAL | Age: 60
End: 2020-09-30
Attending: NURSE PRACTITIONER
Payer: MEDICARE

## 2020-09-30 VITALS
BODY MASS INDEX: 23.47 KG/M2 | HEIGHT: 61 IN | RESPIRATION RATE: 16 BRPM | SYSTOLIC BLOOD PRESSURE: 102 MMHG | TEMPERATURE: 97 F | OXYGEN SATURATION: 98 % | DIASTOLIC BLOOD PRESSURE: 84 MMHG | HEART RATE: 72 BPM | WEIGHT: 124.31 LBS

## 2020-09-30 DIAGNOSIS — M25.549 ARTHRALGIA OF HAND, UNSPECIFIED LATERALITY: Primary | ICD-10-CM

## 2020-09-30 DIAGNOSIS — M25.40 JOINT SWELLING: ICD-10-CM

## 2020-09-30 DIAGNOSIS — M25.549 ARTHRALGIA OF HAND, UNSPECIFIED LATERALITY: ICD-10-CM

## 2020-09-30 LAB
BASOPHILS # BLD AUTO: 0.08 K/UL (ref 0–0.2)
BASOPHILS NFR BLD: 1.3 % (ref 0–1.9)
CRP SERPL-MCNC: 3.1 MG/L (ref 0–8.2)
DIFFERENTIAL METHOD: ABNORMAL
EOSINOPHIL # BLD AUTO: 0.3 K/UL (ref 0–0.5)
EOSINOPHIL NFR BLD: 5.3 % (ref 0–8)
ERYTHROCYTE [DISTWIDTH] IN BLOOD BY AUTOMATED COUNT: 12.3 % (ref 11.5–14.5)
ERYTHROCYTE [SEDIMENTATION RATE] IN BLOOD BY WESTERGREN METHOD: 3 MM/HR (ref 0–36)
HCT VFR BLD AUTO: 43.8 % (ref 37–48.5)
HGB BLD-MCNC: 13.5 G/DL (ref 12–16)
IMM GRANULOCYTES # BLD AUTO: 0.02 K/UL (ref 0–0.04)
IMM GRANULOCYTES NFR BLD AUTO: 0.3 % (ref 0–0.5)
LYMPHOCYTES # BLD AUTO: 2.1 K/UL (ref 1–4.8)
LYMPHOCYTES NFR BLD: 33.2 % (ref 18–48)
MCH RBC QN AUTO: 31.9 PG (ref 27–31)
MCHC RBC AUTO-ENTMCNC: 30.8 G/DL (ref 32–36)
MCV RBC AUTO: 104 FL (ref 82–98)
MONOCYTES # BLD AUTO: 0.6 K/UL (ref 0.3–1)
MONOCYTES NFR BLD: 8.8 % (ref 4–15)
NEUTROPHILS # BLD AUTO: 3.3 K/UL (ref 1.8–7.7)
NEUTROPHILS NFR BLD: 51.1 % (ref 38–73)
NRBC BLD-RTO: 0 /100 WBC
PLATELET # BLD AUTO: 265 K/UL (ref 150–350)
PMV BLD AUTO: 11.1 FL (ref 9.2–12.9)
RBC # BLD AUTO: 4.23 M/UL (ref 4–5.4)
RHEUMATOID FACT SERPL-ACNC: 17 IU/ML (ref 0–15)
WBC # BLD AUTO: 6.38 K/UL (ref 3.9–12.7)

## 2020-09-30 PROCEDURE — 99999 PR PBB SHADOW E&M-EST. PATIENT-LVL IV: ICD-10-PCS | Mod: PBBFAC,HCNC,, | Performed by: NURSE PRACTITIONER

## 2020-09-30 PROCEDURE — 99214 PR OFFICE/OUTPT VISIT, EST, LEVL IV, 30-39 MIN: ICD-10-PCS | Mod: HCNC,S$GLB,, | Performed by: NURSE PRACTITIONER

## 2020-09-30 PROCEDURE — 86038 ANTINUCLEAR ANTIBODIES: CPT | Mod: HCNC

## 2020-09-30 PROCEDURE — 36415 COLL VENOUS BLD VENIPUNCTURE: CPT | Mod: HCNC,PO

## 2020-09-30 PROCEDURE — 85652 RBC SED RATE AUTOMATED: CPT | Mod: HCNC

## 2020-09-30 PROCEDURE — 86431 RHEUMATOID FACTOR QUANT: CPT | Mod: HCNC

## 2020-09-30 PROCEDURE — 85025 COMPLETE CBC W/AUTO DIFF WBC: CPT | Mod: HCNC

## 2020-09-30 PROCEDURE — 99999 PR PBB SHADOW E&M-EST. PATIENT-LVL IV: CPT | Mod: PBBFAC,HCNC,, | Performed by: NURSE PRACTITIONER

## 2020-09-30 PROCEDURE — 86140 C-REACTIVE PROTEIN: CPT | Mod: HCNC

## 2020-09-30 PROCEDURE — 99214 OFFICE O/P EST MOD 30 MIN: CPT | Mod: HCNC,S$GLB,, | Performed by: NURSE PRACTITIONER

## 2020-09-30 NOTE — PROGRESS NOTES
[facility] Note    Chief Complaint      Chief Complaint   Patient presents with    Joint Pain     previously in hip and now multiple joints with some swelling      History of Present Illness      Raquel Kaye is a 60 y.o. female patient of Dr. Lacey who is new to me and presents today for right hand index finger proximal phalange joint pain for a few weeks. Reports that she dropped something on her hand a while ago, joint became red, swollen and painful, resolved, but still has pain and swelling most of the time.   Reports that since she has had her hysterectomy in June, she is starting to gain weight, having different areas of her body with joint pain and stiffness. Sometimes feels debilitating. Has been taking glucosamine, fish oil, tumeric, calcium w/ vit d, B complex and Vit C to combat the joint aches but no relief. Is now having trouble opening jars and using her hand.   Pt has Ibuprofen 800mg that she has taken w/ temporary relief, but pt states that she wants to do the right things to eleviate the joint pain.    She thinks maybe her mother had arthritis issues    Pt used to exercise prior to her surgery, but has been unable to restart due to joint pain. Reports that when she exerts herself, it makes the joint pain worse.     Problem List Items Addressed This Visit     None      Visit Diagnoses     Arthralgia of hand, unspecified laterality    -  Primary    Relevant Orders    GERRY Screen w/Reflex    Rheumatoid factor (Completed)    C-reactive protein    Sedimentation rate (Completed)    Ambulatory referral/consult to Rheumatology    CBC auto differential (Completed)    Joint swelling        Relevant Orders    CBC auto differential (Completed)          Health Maintenance   Topic Date Due    Mammogram  05/21/2021    Lipid Panel  05/19/2025    TETANUS VACCINE  11/26/2028    Hepatitis C Screening  Completed       Past Medical History:   Diagnosis Date    Allergy     Back pain      Disorder of vestibular function of both ears     Fibrocystic breast disease in female     HEARING LOSS     right side - no hearing aid    Hyperlipidemia     Meniere's disease     Osteopenia     PONV (postoperative nausea and vomiting)     PONV (postoperative nausea and vomiting)     Special screening for malignant neoplasms, colon 7/22/2013       Past Surgical History:   Procedure Laterality Date    ADENOIDECTOMY      APPENDECTOMY      BREAST BIOPSY      BREAST SURGERY      left breast biopsy    COLONOSCOPY N/A 9/27/2018    Procedure: COLONOSCOPY;  Surgeon: Uche Smiley MD;  Location: [address]);  Service: Endoscopy;  Laterality: N/A;  pt/instructed on prep day before and day of importance    CYST REMOVAL Right     neck    DILATION AND CURETTAGE OF UTERUS      INJECTION OF JOINT Left 10/16/2018    Procedure: INJECTION, JOINT, LEFT SI JOINT INJECTION UNDER FLUORO;  Surgeon: Alirio Lei MD;  Location: [facility];  Service: Pain Management;  Laterality: Left;  NEEDS CONSENT    MANDIBLE FRACTURE SURGERY Right     scope    ROBOT-ASSISTED LAPAROSCOPIC ABDOMINAL HYSTERECTOMY USING DA ERICA XI N/A 6/12/2020    Procedure: XI ROBOTIC HYSTERECTOMY;  Surgeon: Alice Valenzuela MD;  Location: [facility];  Service: OB/GYN;  Laterality: N/A;    ROBOT-ASSISTED LAPAROSCOPIC SALPINGO-OOPHORECTOMY USING DA ERICA XI N/A 6/12/2020    Procedure: XI ROBOTIC SALPINGO-OOPHORECTOMY;  Surgeon: Alice Valenzuela MD;  Location: [facility];  Service: OB/GYN;  Laterality: N/A;    TONSILLECTOMY         family history includes COPD in her mother; Cancer (age of onset: 75) in her father; Diabetes in her mother; Heart disease in her father and mother; Hyperlipidemia in her mother; Hypertension in her mother; Kidney disease in her mother.    Social History     Tobacco Use    Smoking status: Never Smoker    Smokeless tobacco: Never Used   Substance Use Topics    Alcohol use: Yes     Frequency: 2-3 times a week  "    Drinks per session: 1 or 2     Binge frequency: Never     Comment: social    Drug use: No       Review of Systems   Constitutional: Negative for chills and fever.   Respiratory: Negative for shortness of breath.    Cardiovascular: Negative for chest pain and palpitations.   Gastrointestinal: Negative for nausea.   Genitourinary: Negative for dysuria.   Musculoskeletal: Positive for joint pain. Negative for myalgias.   Neurological: Negative for dizziness and weakness.   Psychiatric/Behavioral: Negative for depression. The patient is not nervous/anxious.         Outpatient Encounter Medications as of 9/30/2020   Medication Sig Note Dispense Refill    aspirin 81 MG Chew once daily.  4/15/2015: Received from: External Pharmacy      b complex vitamins tablet Take 1 tablet by mouth once daily.       CALCIUM 600 WITH VITAMIN D3 600 mg(1,500mg) -200 unit Tab once daily.        cycloSPORINE (RESTASIS) 0.05 % ophthalmic emulsion 1 drop 2 (two) times daily.       EPIPEN 2-PEDRO 0.3 mg/0.3 mL (1:1,000) AtIn    0    estradioL (ESTRACE) 1 MG tablet Take 1 tablet (1 mg total) by mouth once daily.  90 tablet 3    glucosamine-chondroitin 500-400 mg tablet Take 1 tablet by mouth 3 (three) times daily.       omega-3 fatty acids/fish oil (FISH OIL-OMEGA-3 FATTY ACIDS) 300-1,000 mg capsule Take by mouth once daily.       PREMARIN vaginal cream PLACE 1 GRAM VAGINALLY TWICE A WEEK.  30 g 3    tretinoin (RETIN-A) 0.025 % gel Apply to face qhs  30 g 11    fexofenadine (ALLEGRA) 180 MG tablet Take 1 tablet (180 mg total) by mouth once daily.  30 tablet 11     No facility-administered encounter medications on file as of 9/30/2020.         Review of patient's allergies indicates:  No Known Allergies    Physical Exam      Vital Signs  Temp: 97.3 °F (36.3 °C)  Temp src: Temporal  Pulse: 72  Resp: 16  SpO2: 98 %  BP: 102/84  BP Location: Left arm  Patient Position: Sitting  Pain Score:   6  Height and Weight  Height: 5' 1" (154.9 " cm)  Weight: 56.4 kg (124 lb 5.4 oz)  BSA (Calculated - sq m): 1.56 sq meters  BMI (Calculated): 23.5  Weight in (lb) to have BMI = 25: 132]    Physical Exam  Vitals signs and nursing note reviewed.   Constitutional:       Appearance: Normal appearance. She is well-developed.   HENT:      Head: Normocephalic and atraumatic.      Right Ear: External ear normal.      Left Ear: External ear normal.   Eyes:      Conjunctiva/sclera: Conjunctivae normal.   Neck:      Musculoskeletal: Normal range of motion and neck supple.      Thyroid: No thyromegaly.      Vascular: No JVD.      Trachea: No tracheal deviation.   Cardiovascular:      Rate and Rhythm: Normal rate and regular rhythm.      Heart sounds: Normal heart sounds.   Pulmonary:      Effort: Pulmonary effort is normal.      Breath sounds: Normal breath sounds.   Abdominal:      Palpations: Abdomen is soft.   Musculoskeletal: Normal range of motion.         General: Swelling and tenderness present.      Comments: Right hand proximal 2nd digit joint pain and swelling.   Skin:     General: Skin is warm and dry.   Neurological:      Mental Status: She is alert and oriented to person, place, and time.   Psychiatric:         Behavior: Behavior normal.         Thought Content: Thought content normal.         Judgment: Judgment normal.          Laboratory:  CBC:  Recent Labs   Lab Result Units 09/30/20  0919   WBC K/uL 6.38   RBC M/uL 4.23   Hemoglobin g/dL 13.5   Hematocrit % 43.8   Platelets K/uL 265   Mean Corpuscular Volume fL 104*   Mean Corpuscular Hemoglobin pg 31.9*   Mean Corpuscular Hemoglobin Conc g/dL 30.8*     CMP:  No results for input(s): GLU, CALCIUM, ALBUMIN, PROT, NA, K, CO2, CL, BUN, ALKPHOS, ALT, AST, BILITOT in the last 2160 hours.    Invalid input(s): CREATININ  URINALYSIS:  No results for input(s): COLORU, CLARITYU, SPECGRAV, PHUR, PROTEINUA, GLUCOSEU, BILIRUBINCON, BLOODU, WBCU, RBCU, BACTERIA, MUCUS, NITRITE, LEUKOCYTESUR, UROBILINOGEN, HYALINECASTS  in the last 2160 hours.   LIPIDS:  No results for input(s): TSH, HDL, CHOL, TRIG, LDLCALC, CHOLHDL, NONHDLCHOL, TOTALCHOLEST in the last 2160 hours.  TSH:  No results for input(s): TSH in the last 2160 hours.  A1C:  No results for input(s): HGBA1C in the last 2160 hours.      Assessment/Plan     Raquel Kaye is a 60 y.o.female with:    1. Arthralgia of hand, unspecified laterality  - GERRY Screen w/Reflex; Future  - Rheumatoid factor; Future  - C-reactive protein; Future  - Sedimentation rate; Future  - Ambulatory referral/consult to Rheumatology; Future  - CBC auto differential; Future    2. Joint swelling  - CBC auto differential; Future    Continue ibuprofen 800 mg p.r.n.  Can also alternate Tylenol arthritis OTC p.r.n.  -Continue current medications and maintain follow up with specialists.  Return to clinic as needed for any concerns.       Erin Jiminez, NP-C [facility] [place] [place]

## 2020-10-01 ENCOUNTER — PATIENT MESSAGE (OUTPATIENT)
Dept: INTERNAL MEDICINE | Facility: CLINIC | Age: 60
End: 2020-10-01

## 2020-10-01 LAB — ANA SER QL IF: NORMAL

## 2020-10-01 NOTE — TELEPHONE ENCOUNTER
No infection seen, no decrease in H&H levels-anemia  Inflammatory levels are normal  One of the autoimmune tests were slightly elevated, so this may possibly be due to an arthritic problem with joint pain, so continue to f/u with rheumatology for further eval.

## 2020-11-02 ENCOUNTER — TELEPHONE (OUTPATIENT)
Dept: DERMATOLOGY | Facility: CLINIC | Age: 60
End: 2020-11-02

## 2020-11-02 NOTE — TELEPHONE ENCOUNTER
----- Message from Celestine Ng sent at 11/2/2020 12:26 PM CST -----  Contact: @[phone]  Patient requesting a return call from Rg she didn't get into details, pls call

## 2020-11-03 ENCOUNTER — PATIENT MESSAGE (OUTPATIENT)
Dept: DERMATOLOGY | Facility: CLINIC | Age: 60
End: 2020-11-03

## 2020-11-05 ENCOUNTER — OFFICE VISIT (OUTPATIENT)
Dept: DERMATOLOGY | Facility: CLINIC | Age: 60
End: 2020-11-05
Payer: MEDICARE

## 2020-11-05 ENCOUNTER — PROCEDURE VISIT (OUTPATIENT)
Dept: DERMATOLOGY | Facility: CLINIC | Age: 60
End: 2020-11-05
Payer: MEDICARE

## 2020-11-05 VITALS — TEMPERATURE: 98 F

## 2020-11-05 DIAGNOSIS — D48.5 NEOPLASM OF UNCERTAIN BEHAVIOR OF SKIN: Primary | ICD-10-CM

## 2020-11-05 DIAGNOSIS — Z41.1 ELECTIVE PROCEDURE FOR UNACCEPTABLE COSMETIC APPEARANCE: Primary | ICD-10-CM

## 2020-11-05 DIAGNOSIS — L81.4 LENTIGINES: ICD-10-CM

## 2020-11-05 DIAGNOSIS — D22.5 MELANOCYTIC NEVI OF TRUNK: ICD-10-CM

## 2020-11-05 DIAGNOSIS — L82.1 SEBORRHEIC KERATOSIS: ICD-10-CM

## 2020-11-05 DIAGNOSIS — D18.01 ANGIOMA OF SKIN: ICD-10-CM

## 2020-11-05 PROCEDURE — 88305 TISSUE EXAM BY PATHOLOGIST: CPT | Mod: HCNC | Performed by: PATHOLOGY

## 2020-11-05 PROCEDURE — 11102 TANGNTL BX SKIN SINGLE LES: CPT | Mod: S$GLB,,, | Performed by: DERMATOLOGY

## 2020-11-05 PROCEDURE — 88305 TISSUE EXAM BY PATHOLOGIST: CPT | Mod: 26,,, | Performed by: PATHOLOGY

## 2020-11-05 PROCEDURE — 11102 PR TANGENTIAL BIOPSY, SKIN, SINGLE LESION: ICD-10-PCS | Mod: S$GLB,,, | Performed by: DERMATOLOGY

## 2020-11-05 PROCEDURE — 99214 OFFICE O/P EST MOD 30 MIN: CPT | Mod: 25,S$GLB,, | Performed by: DERMATOLOGY

## 2020-11-05 PROCEDURE — 88305 TISSUE EXAM BY PATHOLOGIST: ICD-10-PCS | Mod: 26,,, | Performed by: PATHOLOGY

## 2020-11-05 PROCEDURE — 99499 NO LOS: ICD-10-PCS | Mod: CSM,S$GLB,, | Performed by: DERMATOLOGY

## 2020-11-05 PROCEDURE — 99499 UNLISTED E&M SERVICE: CPT | Mod: CSM,S$GLB,, | Performed by: DERMATOLOGY

## 2020-11-05 PROCEDURE — 99214 PR OFFICE/OUTPT VISIT, EST, LEVL IV, 30-39 MIN: ICD-10-PCS | Mod: 25,S$GLB,, | Performed by: DERMATOLOGY

## 2020-11-05 NOTE — PROGRESS NOTES
Patient is here today for cosmetic Botox treatment.   She denies any new medical problems or medications.   She denies any history of adverse reaction to Botox or history of neuromuscular disease.     Discussed benefits and risks of Botox injections, including headache, weakness/paralysis of muscles, asymmetry, eyebrow/lid drooping, pain, bruising, swelling, infection, and rare risk of systemic botulism. Verbal and written consent was obtained.    Patient agreed to proceed with treatment. 23 units total were injected today:  8 in frontalis  15 in glabella (2-3-5-3-2)    Patient tolerated well with no complications. She was instructed not to rub or massage the treated areas and that she should avoid lying down, bending upside down, and strenuous exercise for the rest of the day.    Botox dilution: 10u / 0.2mL (10u / 0.4mL for frontalis)  Lot #: [id number]  Exp date: 04/2023

## 2020-11-05 NOTE — PROGRESS NOTES
Subjective:       Patient ID:  Raquel Kaye is a 60 y.o. female who presents for   Chief Complaint   Patient presents with    Mole     diffuse    Spot     chest     Mole - Initial  Affected locations: diffuse  Duration: years.  Signs / symptoms: asymptomatic  Severity: mild  Timing: constant  Aggravated by: nothing  Relieving factors/Treatments tried: nothing    Spot - Initial  Affected locations: chest  Duration: months.  Signs and Symptoms: bumps.  Severity: mild  Timing: constant  Aggravated by: nothing  Relieving factors/Treatments tried: nothing      Review of Systems   Skin: Positive for daily sunscreen use, activity-related sunscreen use and wears hat. Negative for itching and rash.   Hematologic/Lymphatic: Does not bruise/bleed easily.        Objective:    Physical Exam   Constitutional: She appears well-developed and well-nourished. No distress.   HENT:   Mouth/Throat: Lips normal.    Eyes: Lids are normal.  No conjunctival no injection.   Neurological: She is alert and oriented to person, place, and time. She is not disoriented.   Psychiatric: She has a normal mood and affect.   Skin:   Areas Examined (abnormalities noted in diagram):   Scalp / Hair Palpated and Inspected  Head / Face Inspection Performed  Neck Inspection Performed  Chest / Axilla Inspection Performed  Abdomen Inspection Performed  Genitals / Buttocks / Groin Inspection Performed  Back Inspection Performed  RUE Inspected  LUE Inspection Performed  RLE Inspected  LLE Inspection Performed  Nails and Digits Inspection Performed                   Diagram Legend     Erythematous scaling macule/papule c/w actinic keratosis       Vascular papule c/w angioma      Pigmented verrucoid papule/plaque c/w seborrheic keratosis      Yellow umbilicated papule c/w sebaceous hyperplasia      Irregularly shaped tan macule c/w lentigo     1-2 mm smooth white papules consistent with Milia      Movable subcutaneous cyst with punctum c/w epidermal  inclusion cyst      Subcutaneous movable cyst c/w pilar cyst      Firm pink to brown papule c/w dermatofibroma      Pedunculated fleshy papule(s) c/w skin tag(s)      Evenly pigmented macule c/w junctional nevus     Mildly variegated pigmented, slightly irregular-bordered macule c/w mildly atypical nevus      Flesh colored to evenly pigmented papule c/w intradermal nevus       Pink pearly papule/plaque c/w basal cell carcinoma      Erythematous hyperkeratotic cursted plaque c/w SCC      Surgical scar with no sign of skin cancer recurrence      Open and closed comedones      Inflammatory papules and pustules      Verrucoid papule consistent consistent with wart     Erythematous eczematous patches and plaques     Dystrophic onycholytic nail with subungual debris c/w onychomycosis     Umbilicated papule    Erythematous-base heme-crusted tan verrucoid plaque consistent with inflamed seborrheic keratosis     Erythematous Silvery Scaling Plaque c/w Psoriasis     See annotation      Assessment / Plan:      Pathology Orders:     Normal Orders This Visit    Specimen to Pathology, Dermatology     Questions:    Procedure Type: Dermatology and skin neoplasms    Number of Specimens: 1    ------------------------: -------------------------    Spec 1 Procedure: Biopsy    Spec 1 Clinical Impression: r/o inflamed cyst vs BCC    Spec 1 Source: left chest        Neoplasm of uncertain behavior of skin  -     Specimen to Pathology, Dermatology  Shave biopsy procedure note:  Risk, benefits, and alternatives of biopsy are discussed with the patient, including risk of infection, scar, recurrence, and need for additional treatment of site. The patient agrees to the procedure by verbal consent. The area is marked and prepped with alcohol.  Approximately 1 mL of lidocaine 1% with epinephrine is used for local anesthesia. A sharp blade is used to remove a portion of the lesion. The specimen is sent for pathology. Hemostasis is obtained with  aluminum chloride and/or monopolar hyfrecation if needed. The area is then dressed and bandaged. The patient tolerated the procedure well without adverse event. Written instructions on wound care were given and were reviewed with the patient, who is to call for any signs of bleeding or infection. The patient will be notified of the pathology results.    Seborrheic keratosis  These are benign, inherited growths without a malignant potential. Reassurance given to patient. No treatment is necessary. Handout was provided.    Melanocytic nevi of trunk  Multiple benign-appearing nevi present on exam today. Reassurance provided. Counseled patient to periodically examine moles and return to clinic if any changes in size, shape, or color are noted or if it becomes symptomatic (bleeding, itching, pain, etc).    Lentigines  These are benign sun spots which should be monitored for changes. Daily sun protection will reduce the number of new lesions.   Patient instructed in importance of daily broad-spectrum sunscreen use with SPF of at least 30. Sun avoidance and topical protection/protective clothing discussed.    Angioma of skin  This is a benign vascular lesion. Reassurance given. No treatment required.      Follow up in about 1 year (around 11/5/2021) for TBSE, or sooner dependent on pathology results.

## 2020-11-09 LAB
FINAL PATHOLOGIC DIAGNOSIS: NORMAL
GROSS: NORMAL
MICROSCOPIC EXAM: NORMAL

## 2020-11-17 ENCOUNTER — PATIENT MESSAGE (OUTPATIENT)
Dept: OBSTETRICS AND GYNECOLOGY | Facility: CLINIC | Age: 60
End: 2020-11-17

## 2020-11-18 ENCOUNTER — PATIENT MESSAGE (OUTPATIENT)
Dept: OBSTETRICS AND GYNECOLOGY | Facility: CLINIC | Age: 60
End: 2020-11-18

## 2020-11-18 ENCOUNTER — TELEPHONE (OUTPATIENT)
Dept: OBSTETRICS AND GYNECOLOGY | Facility: CLINIC | Age: 60
End: 2020-11-18

## 2020-11-18 DIAGNOSIS — N95.1 MENOPAUSAL SYMPTOM: Primary | ICD-10-CM

## 2020-11-18 NOTE — TELEPHONE ENCOUNTER
Pt c/o menopausal symptoms per Dr Mata will order estradiol and free testosterone. Lab orders placed. Pt will be scheduled for labs and a appointment

## 2020-12-03 ENCOUNTER — OFFICE VISIT (OUTPATIENT)
Dept: RHEUMATOLOGY | Facility: CLINIC | Age: 60
End: 2020-12-03
Payer: MEDICARE

## 2020-12-03 ENCOUNTER — HOSPITAL ENCOUNTER (OUTPATIENT)
Dept: RADIOLOGY | Facility: HOSPITAL | Age: 60
Discharge: HOME | End: 2020-12-03
Attending: INTERNAL MEDICINE
Payer: MEDICARE

## 2020-12-03 VITALS
TEMPERATURE: 98 F | HEIGHT: 61 IN | SYSTOLIC BLOOD PRESSURE: 131 MMHG | DIASTOLIC BLOOD PRESSURE: 65 MMHG | HEART RATE: 76 BPM | WEIGHT: 126.75 LBS | BODY MASS INDEX: 23.93 KG/M2

## 2020-12-03 DIAGNOSIS — M25.549 ARTHRALGIA OF HAND, UNSPECIFIED LATERALITY: ICD-10-CM

## 2020-12-03 DIAGNOSIS — R76.8 RHEUMATOID FACTOR POSITIVE: Primary | ICD-10-CM

## 2020-12-03 DIAGNOSIS — R53.83 FATIGUE, UNSPECIFIED TYPE: ICD-10-CM

## 2020-12-03 DIAGNOSIS — M85.80 OSTEOPENIA, UNSPECIFIED LOCATION: ICD-10-CM

## 2020-12-03 DIAGNOSIS — M85.852 OTHER SPECIFIED DISORDERS OF BONE DENSITY AND STRUCTURE, LEFT THIGH: ICD-10-CM

## 2020-12-03 DIAGNOSIS — R76.8 RHEUMATOID FACTOR POSITIVE: ICD-10-CM

## 2020-12-03 PROCEDURE — 99204 OFFICE O/P NEW MOD 45 MIN: CPT | Mod: HCNC,S$GLB,, | Performed by: INTERNAL MEDICINE

## 2020-12-03 PROCEDURE — 99499 RISK ADDL DX/OHS AUDIT: ICD-10-PCS | Mod: S$GLB,,, | Performed by: INTERNAL MEDICINE

## 2020-12-03 PROCEDURE — 71046 X-RAY EXAM CHEST 2 VIEWS: CPT | Mod: 26,HCNC,, | Performed by: RADIOLOGY

## 2020-12-03 PROCEDURE — 3078F PR MOST RECENT DIASTOLIC BLOOD PRESSURE < 80 MM HG: ICD-10-PCS | Mod: HCNC,CPTII,S$GLB, | Performed by: INTERNAL MEDICINE

## 2020-12-03 PROCEDURE — 3008F PR BODY MASS INDEX (BMI) DOCUMENTED: ICD-10-PCS | Mod: HCNC,CPTII,S$GLB, | Performed by: INTERNAL MEDICINE

## 2020-12-03 PROCEDURE — 77077 XR ARTHRITIS SURVEY: ICD-10-PCS | Mod: 26,HCNC,, | Performed by: RADIOLOGY

## 2020-12-03 PROCEDURE — 3078F DIAST BP <80 MM HG: CPT | Mod: HCNC,CPTII,S$GLB, | Performed by: INTERNAL MEDICINE

## 2020-12-03 PROCEDURE — 99204 PR OFFICE/OUTPT VISIT, NEW, LEVL IV, 45-59 MIN: ICD-10-PCS | Mod: HCNC,S$GLB,, | Performed by: INTERNAL MEDICINE

## 2020-12-03 PROCEDURE — 77077 JOINT SURVEY SINGLE VIEW: CPT | Mod: TC,HCNC

## 2020-12-03 PROCEDURE — 71046 XR CHEST PA AND LATERAL: ICD-10-PCS | Mod: 26,HCNC,, | Performed by: RADIOLOGY

## 2020-12-03 PROCEDURE — 99999 PR PBB SHADOW E&M-EST. PATIENT-LVL V: ICD-10-PCS | Mod: PBBFAC,HCNC,, | Performed by: INTERNAL MEDICINE

## 2020-12-03 PROCEDURE — 71046 X-RAY EXAM CHEST 2 VIEWS: CPT | Mod: TC,HCNC

## 2020-12-03 PROCEDURE — 99999 PR PBB SHADOW E&M-EST. PATIENT-LVL V: CPT | Mod: PBBFAC,HCNC,, | Performed by: INTERNAL MEDICINE

## 2020-12-03 PROCEDURE — 99499 UNLISTED E&M SERVICE: CPT | Mod: S$GLB,,, | Performed by: INTERNAL MEDICINE

## 2020-12-03 PROCEDURE — 1125F PR PAIN SEVERITY QUANTIFIED, PAIN PRESENT: ICD-10-PCS | Mod: HCNC,S$GLB,, | Performed by: INTERNAL MEDICINE

## 2020-12-03 PROCEDURE — 1125F AMNT PAIN NOTED PAIN PRSNT: CPT | Mod: HCNC,S$GLB,, | Performed by: INTERNAL MEDICINE

## 2020-12-03 PROCEDURE — 3075F PR MOST RECENT SYSTOLIC BLOOD PRESS GE 130-139MM HG: ICD-10-PCS | Mod: HCNC,CPTII,S$GLB, | Performed by: INTERNAL MEDICINE

## 2020-12-03 PROCEDURE — 3075F SYST BP GE 130 - 139MM HG: CPT | Mod: HCNC,CPTII,S$GLB, | Performed by: INTERNAL MEDICINE

## 2020-12-03 PROCEDURE — 77077 JOINT SURVEY SINGLE VIEW: CPT | Mod: 26,HCNC,, | Performed by: RADIOLOGY

## 2020-12-03 PROCEDURE — 3008F BODY MASS INDEX DOCD: CPT | Mod: HCNC,CPTII,S$GLB, | Performed by: INTERNAL MEDICINE

## 2020-12-03 RX ORDER — IBUPROFEN 800 MG/1
800 TABLET ORAL
COMMUNITY
End: 2021-08-03 | Stop reason: SDUPTHER

## 2020-12-03 RX ORDER — DEXTROMETHORPHAN HYDROBROMIDE, GUAIFENESIN 5; 100 MG/5ML; MG/5ML
650 LIQUID ORAL
COMMUNITY
End: 2022-07-06

## 2020-12-03 ASSESSMENT — ROUTINE ASSESSMENT OF PATIENT INDEX DATA (RAPID3)
PSYCHOLOGICAL DISTRESS SCORE: 1.1
WHEN YOU AWAKENED IN THE MORNING OVER THE LAST WEEK, PLEASE INDICATE THE AMOUNT OF TIME IT TAKES UNTIL YOU ARE AS LIMBER AS YOU WILL BE FOR THE DAY: 1 HOUR
MDHAQ FUNCTION SCORE: 0.3
PATIENT GLOBAL ASSESSMENT SCORE: 7
AM STIFFNESS SCORE: 1, YES
TOTAL RAPID3 SCORE: 4.33
FATIGUE SCORE: 9
PAIN SCORE: 5

## 2020-12-03 NOTE — PROGRESS NOTES
Rapid3 Question Responses and Scores 11/29/2020   MDHAQ Score 0.3   Psychologic Score 1.1   Pain Score 5   When you awakened in the morning OVER THE LAST WEEK, did you feel stiff? Yes   If Yes, please indicate the number of hours until you are as limber as you will be for the day 1   Fatigue Score 9   Global Health Score 7   RAPID3 Score 4.33       Answers for HPI/ROS submitted by the patient on 11/29/2020   fever: No  eye redness: No  mouth sores: No  headaches: No  shortness of breath: No  chest pain: No  trouble swallowing: No  diarrhea: No  constipation: No  unexpected weight change: Yes  genital sore: No  dysuria: No  During the last 3 days, have you had a skin rash?: No  Bruises or bleeds easily: No  cough: No

## 2020-12-03 NOTE — LETTER
December 3, 2020      Amy Mi NP  [address]  [place] [address]           [id number]  [address]  [place] [address]  Phone: [phone]  Fax: [phone]          Patient: Raquel Kaye   MR Number: [id number]   YOB: 1960   Date of Visit: 12/3/2020       Dear Amy Mi:    Thank you for referring Raquel Kaye to me for evaluation. Attached you will find relevant portions of my assessment and plan of care.    If you have questions, please do not hesitate to call me. I look forward to following Raquel Kaye along with you.    Sincerely,    Nayana Sears MD    Enclosure  CC:  No Recipients    If you would like to receive this communication electronically, please contact [email or web address] or [phone] to request more information on [employer] access.    For providers and/or their staff who would like to refer a patient to [facility], please contact us through our one-stop-shop provider referral line, [facility], at [phone].    If you feel you have received this communication in error or would no longer like to receive these types of communications, please e-mail [email or web address]

## 2020-12-03 NOTE — PROGRESS NOTES
"Subjective:       Patient ID: Raquel Kaye is a 60 y.o. female.    Chief Complaint: Joint pain    HPI:  Raquel Kaye is a 60 y.o. female with history D&C in 2016 and at that time had to stop hormones.  She developed joint enlargement especial right 2nd MCP and had joint pain.    She restarted hormones after total hysterectomy and it helped.    Pain in right hand at thumb and index that is intermittent up to 7/10 ache.  Improve with ibuprofen but worse with activity.  Pain today 3-4/10 ache in lower back.  Received SI joint injections but decided to just use NSAIDs.  Exercise worsens SI joint pain.  Improves with ice.     On disability since age 49 due to balance issue.  Previous worked in sales for [employer].       Review of Systems   Constitutional: Positive for fatigue and unexpected weight change. Negative for fever.   HENT: Negative for mouth sores and trouble swallowing.    Eyes: Negative for redness.   Respiratory: Negative for cough and shortness of breath.    Cardiovascular: Negative for chest pain.   Gastrointestinal: Negative for constipation and diarrhea.   Endocrine: Negative.    Genitourinary: Negative for dysuria and genital sores.   Musculoskeletal: Positive for arthralgias.   Skin: Negative for rash.   Allergic/Immunologic: Negative.    Neurological: Negative for headaches.   Hematological: Does not bruise/bleed easily.   Psychiatric/Behavioral: Negative.          Objective:   /65 (BP Location: Right arm, Patient Position: Sitting, BP Method: Medium (Automatic))   Pulse 76   Temp 98.3 °F (36.8 °C) (Oral)   Ht 5' 1" (1.549 m)   Wt 57.5 kg (126 lb 12.2 oz)   LMP 04/15/2009   BMI 23.95 kg/m²      Physical Exam   Constitutional: She is oriented to person, place, and time and well-developed, well-nourished, and in no distress.   HENT:   Head: Normocephalic and atraumatic.   Eyes: Conjunctivae and EOM are normal.   Neck: Neck supple.   Cardiovascular: Normal rate, regular rhythm and normal " heart sounds.    Pulmonary/Chest: Effort normal and breath sounds normal.   Abdominal: Soft. Bowel sounds are normal.   Neurological: She is alert and oriented to person, place, and time. Gait normal.   Skin: Skin is warm and dry.     Psychiatric: Mood and affect normal.   Musculoskeletal:      Comments: 28 joint count: 0 swollen and 0 tender           LABS    Component      Latest Ref Rng & Units 9/30/2020   WBC      3.90 - 12.70 K/uL 6.38   RBC      4.00 - 5.40 M/uL 4.23   Hemoglobin      12.0 - 16.0 g/dL 13.5   Hematocrit      37.0 - 48.5 % 43.8   MCV      82 - 98 fL 104 (H)   MCH      27.0 - 31.0 pg 31.9 (H)   MCHC      32.0 - 36.0 g/dL 30.8 (L)   RDW      11.5 - 14.5 % 12.3   Platelets      150 - 350 K/uL 265   MPV      9.2 - 12.9 fL 11.1   Immature Granulocytes      0.0 - 0.5 % 0.3   Gran # (ANC)      1.8 - 7.7 K/uL 3.3   Immature Grans (Abs)      0.00 - 0.04 K/uL 0.02   Lymph #      1.0 - 4.8 K/uL 2.1   Mono #      0.3 - 1.0 K/uL 0.6   Eos #      0.0 - 0.5 K/uL 0.3   Baso #      0.00 - 0.20 K/uL 0.08   nRBC      0 /100 WBC 0   Gran %      38.0 - 73.0 % 51.1   Lymph %      18.0 - 48.0 % 33.2   Mono %      4.0 - 15.0 % 8.8   Eosinophil %      0.0 - 8.0 % 5.3   Basophil %      0.0 - 1.9 % 1.3   Differential Method       Automated   GERRY Screen      Negative <1:80 Negative <1:80   Rheumatoid Factor      0.0 - 15.0 IU/mL 17.0 (H)   CRP      0.0 - 8.2 mg/L 3.1   Sed Rate      0 - 36 mm/Hr 3        Assessment:       1.  Joint pains  2.  Positive Rheumatoid Arthritis  3.  Fatigue  4.  Weight gain.  May be related to hormone changes  Plan:       1.  Labs  2.  X-ray   3.  NSAIDs not to exceed recommended dose and can alternate with acetaminophen not to exceed recommended dose    RTO 3 months/prn

## 2020-12-07 ENCOUNTER — OFFICE VISIT (OUTPATIENT)
Dept: URGENT CARE | Facility: CLINIC | Age: 60
End: 2020-12-07
Payer: MEDICARE

## 2020-12-07 VITALS
TEMPERATURE: 99 F | OXYGEN SATURATION: 98 % | HEIGHT: 61 IN | RESPIRATION RATE: 14 BRPM | BODY MASS INDEX: 23.6 KG/M2 | HEART RATE: 81 BPM | WEIGHT: 125 LBS | SYSTOLIC BLOOD PRESSURE: 108 MMHG | DIASTOLIC BLOOD PRESSURE: 72 MMHG

## 2020-12-07 DIAGNOSIS — J06.9 VIRAL URI WITH COUGH: Primary | ICD-10-CM

## 2020-12-07 DIAGNOSIS — R05.9 COUGH: ICD-10-CM

## 2020-12-07 DIAGNOSIS — R09.81 NASAL CONGESTION: ICD-10-CM

## 2020-12-07 LAB
CTP QC/QA: YES
SARS-COV-2 RDRP RESP QL NAA+PROBE: NEGATIVE

## 2020-12-07 PROCEDURE — 96372 PR INJECTION,THERAP/PROPH/DIAG2ST, IM OR SUBCUT: ICD-10-PCS | Mod: S$GLB,,, | Performed by: NURSE PRACTITIONER

## 2020-12-07 PROCEDURE — 99214 PR OFFICE/OUTPT VISIT, EST, LEVL IV, 30-39 MIN: ICD-10-PCS | Mod: 25,S$GLB,, | Performed by: NURSE PRACTITIONER

## 2020-12-07 PROCEDURE — 3008F BODY MASS INDEX DOCD: CPT | Mod: CPTII,S$GLB,, | Performed by: NURSE PRACTITIONER

## 2020-12-07 PROCEDURE — 99214 OFFICE O/P EST MOD 30 MIN: CPT | Mod: 25,S$GLB,, | Performed by: NURSE PRACTITIONER

## 2020-12-07 PROCEDURE — U0002 COVID-19 LAB TEST NON-CDC: HCPCS | Mod: QW,S$GLB,, | Performed by: NURSE PRACTITIONER

## 2020-12-07 PROCEDURE — U0002: ICD-10-PCS | Mod: QW,S$GLB,, | Performed by: NURSE PRACTITIONER

## 2020-12-07 PROCEDURE — 96372 THER/PROPH/DIAG INJ SC/IM: CPT | Mod: S$GLB,,, | Performed by: NURSE PRACTITIONER

## 2020-12-07 PROCEDURE — 3008F PR BODY MASS INDEX (BMI) DOCUMENTED: ICD-10-PCS | Mod: CPTII,S$GLB,, | Performed by: NURSE PRACTITIONER

## 2020-12-07 RX ORDER — DEXAMETHASONE SODIUM PHOSPHATE 100 MG/10ML
6 INJECTION INTRAMUSCULAR; INTRAVENOUS
Status: COMPLETED | OUTPATIENT
Start: 2020-12-07 | End: 2020-12-07

## 2020-12-07 RX ADMIN — DEXAMETHASONE SODIUM PHOSPHATE 6 MG: 100 INJECTION INTRAMUSCULAR; INTRAVENOUS at 06:12

## 2020-12-07 NOTE — PROGRESS NOTES
"Subjective:       Patient ID: Raquel Kaye is a 60 y.o. female.    Vitals:  height is 5' 1" (1.549 m) and weight is 56.7 kg (125 lb). Her oral temperature is 98.8 °F (37.1 °C). Her blood pressure is 108/72 and her pulse is 81. Her respiration is 14 and oxygen saturation is 98%.     Chief Complaint: Cough    Patient presents to urgent care with cough, congestion, postnasal drip, runny nose, and sore throat, since yesterday.  Patient denies fever, chills, diaphoresis, body aches, shortness of breath, nausea, vomiting, diarrhea, loss of taste/smell, or contact with known sick individuals.  Patient has tried over-the-counter Benadryl and Sudafed with some relief.    Cough  This is a new problem. The current episode started yesterday. The problem has been gradually worsening. The problem occurs hourly. The cough is non-productive. Associated symptoms include nasal congestion and postnasal drip. Pertinent negatives include no chest pain, chills, fever, headaches, myalgias, rash, sore throat or shortness of breath. The symptoms are aggravated by cold air. Treatments tried: benadryl and SUDAFED. The treatment provided mild relief.       Constitution: Negative for chills, fatigue and fever.   HENT: Positive for postnasal drip. Negative for congestion and sore throat.    Neck: Negative for painful lymph nodes.   Cardiovascular: Negative for chest pain and leg swelling.   Eyes: Negative for double vision and blurred vision.   Respiratory: Positive for cough. Negative for shortness of breath.    Gastrointestinal: Negative for nausea, vomiting and diarrhea.   Genitourinary: Negative for dysuria, frequency, urgency and history of kidney stones.   Musculoskeletal: Negative for joint pain, joint swelling, muscle cramps and muscle ache.   Skin: Negative for color change, pale, rash and bruising.   Allergic/Immunologic: Negative for seasonal allergies.   Neurological: Negative for dizziness, history of vertigo, light-headedness, " passing out and headaches.   Hematologic/Lymphatic: Negative for swollen lymph nodes.   Psychiatric/Behavioral: Negative for nervous/anxious, sleep disturbance and depression. The patient is not nervous/anxious.        Objective:      Physical Exam   Constitutional: She is oriented to person, place, and time. She appears well-developed. She is cooperative.  Non-toxic appearance. She does not appear ill. No distress.   HENT:   Head: Normocephalic and atraumatic.   Ears:   Right Ear: Hearing, external ear and ear canal normal. Tympanic membrane is scarred.   Left Ear: Hearing, external ear and ear canal normal. Tympanic membrane is scarred.   Nose: Rhinorrhea present. No mucosal edema or nasal deformity. No epistaxis. Right sinus exhibits no maxillary sinus tenderness and no frontal sinus tenderness. Left sinus exhibits no maxillary sinus tenderness and no frontal sinus tenderness.   Mouth/Throat: Uvula is midline, oropharynx is clear and moist and mucous membranes are normal. No trismus in the jaw. Normal dentition. No uvula swelling. Cobblestoning present. No oropharyngeal exudate, posterior oropharyngeal edema or posterior oropharyngeal erythema. Tonsils are 0 on the right. Tonsils are 0 on the left.   Eyes: Conjunctivae and lids are normal. No scleral icterus.   Neck: Trachea normal, full passive range of motion without pain and phonation normal. Neck supple. No neck rigidity. No edema and no erythema present.   Cardiovascular: Normal rate, regular rhythm, normal heart sounds and normal pulses.   Pulmonary/Chest: Effort normal and breath sounds normal. No accessory muscle usage. No respiratory distress. She has no decreased breath sounds. She has no wheezes. She has no rhonchi. She has no rales.   Abdominal: Normal appearance.   Musculoskeletal: Normal range of motion.         General: No deformity.   Lymphadenopathy:     She has cervical adenopathy.        Right cervical: Superficial cervical adenopathy present.  No deep cervical and no posterior cervical adenopathy present.       Left cervical: Superficial cervical adenopathy present. No deep cervical and no posterior cervical adenopathy present.   Neurological: She is alert and oriented to person, place, and time. She exhibits normal muscle tone. Coordination normal.   Skin: Skin is warm, dry, intact, not diaphoretic and not pale. Psychiatric: Her speech is normal and behavior is normal. Judgment and thought content normal.   Nursing note and vitals reviewed.    Results for orders placed or performed in visit on 12/07/20   POCT COVID-19 Rapid Screening   Result Value Ref Range    POC Rapid COVID Negative Negative     Acceptable Yes            Assessment:       1. Viral URI with cough    2. Cough    3. Nasal congestion        Plan:         Viral URI with cough        -     Discussed home care and OTC medications  for symptomatic relief        -     Discussed f/u if symptoms persist or worsen    Cough  -     POCT COVID-19 Rapid Screening    Nasal congestion  -     dexamethasone injection 6 mg    Discussed risks vs benefit of IM steroids.  Patient insistent that she receive a steroid shot due to having plans for tomorrow that she states that she cannot miss.     Patient Instructions   Please follow up with your Primary care provider within 3-5 days if your signs and symptoms have not resolved or worsen.     If your condition worsens or fails to improve we recommend that you receive another evaluation at the emergency room immediately or contact your primary medical clinic to discuss your concerns.     You must understand that you have received an Urgent Care treatment only and that you may be released before all of your medical problems are known or treated. You, the patient, will arrange for follow up care as instructed.     You received a steroid shot today - this can elevate your blood pressure, elevate your blood sugar, water weight gain, nervous energy,  redness to the face and dimpling of the skin where the shot goes in.     You have tested negative for COVID-19 today.  If you did not have any close exposure as defined below, then effective today, you can return to your normal daily activities including social distancing, wearing masks, and frequent handwashing.    A close exposure is defined as anyone who had a masked or an unmasked exposure to a known COVID -19 positive person, at less than 6 ft for more than 15 minutes.  If your exposure meets this definition, then you are required to quarantine for 10 days per the CDC.    The 10 day quarantine begins from the day you were exposed, not the day of your test.  For example, if your exposure was on a Monday, and you waited until Friday of the same week to get tested and it was negative, your 10 day quarantine begins from that Monday, not the Friday you tested negative.    If you developed symptoms since the exposure, and your test was negative today, you still have to quarantine for 10 days from the date of the exposure.    So if you meet the definition of a close exposure, A NEGATIVE TEST DOES NOT GET YOU OUT OF 10 DAYS OF QUARANTINE!      PLEASE READ YOUR DISCHARGE INSTRUCTIONS ENTIRELY AS IT CONTAINS IMPORTANT INFORMATION.    Please drink plenty of fluids.    Please get plenty of rest.    Please return here or go to the Emergency Department for any concerns or worsening of condition.    Please take an over the counter antihistamine medication (Claritin/Zyrtec/Allegra) of your choice as directed.    Try an over the counter decongestant like Sudafed. You buy this behind the pharmacy counter.  You can also buy combination OTC antihistamine plus decongestant medications such at Zyrtec-D or Claritin-D.  Do not take decongestant if you suffer from high blood pressure.    If you do have high blood pressure or palpitations, it is safe to take Coricidin HBP for relief of sinus symptoms.    If not allergic, please take  over the counter Tylenol (Acetaminophen) and/or Motrin (Ibuprofen) as directed for control of pain and/or fever.  Please follow up with your primary care doctor or specialist as needed.    Sore throat recommendations: Warm fluids, warm salt water gargles, throat lozenges, tea, honey, soup, rest, hydration.    Use over the counter flonase: one spray each nostril twice daily OR two sprays each nostril once daily. You must use this medication for at least 7 consecutive days to experience the full benefits of the intranasal steroid.    Sinus rinses DO NOT USE TAP WATER, if you must, water must be a rolling boil for 1 minute, let it cool, then use.  May use distilled water, or over the counter nasal saline rinses.  Vics vapor rub in shower to help open nasal passages.  May use nasal gel to keep passages moisturized.  May use Nasal saline sprays during the day for added relief of congestion.   For those who go to the gym, please do not use the sauna or steam room now to clear sinuses.    If you  smoke, please stop smoking.      Please return or see your primary care doctor if you develop new or worsening symptoms.     Please arrange follow up with your primary medical clinic as soon as possible. You must understand that you've received an Urgent Care treatment only and that you may be released before all of your medical problems are known or treated. You, the patient, will arrange for follow up as instructed. If your symptoms worsen or fail to improve you should go to the Emergency Room.      Viral Upper Respiratory Illness (Adult)  You have a viral upper respiratory illness (URI), which is another term for the common cold. This illness is contagious during the first few days. It is spread through the air by coughing and sneezing. It may also be spread by direct contact (touching the sick person and then touching your own eyes, nose, or mouth). Frequent handwashing will decrease risk of spread. Most viral illnesses go  away within 7 to 10 days with rest and simple home remedies. Sometimes the illness may last for several weeks. Antibiotics will not kill a virus, and they are generally not prescribed for this condition.    Home care  · If symptoms are severe, rest at home for the first 2 to 3 days. When you resume activity, don't let yourself get too tired.  · Avoid being exposed to cigarette smoke (yours or others).  · You may use acetaminophen or ibuprofen to control pain and fever, unless another medicine was prescribed. (Note: If you have chronic liver or kidney disease, have ever had a stomach ulcer or gastrointestinal bleeding, or are taking blood-thinning medicines, talk with your healthcare provider before using these medicines.) Aspirin should never be given to anyone under 18 years of age who is ill with a viral infection or fever. It may cause severe liver or brain damage.  · Your appetite may be poor, so a light diet is fine. Avoid dehydration by drinking 6 to 8 glasses of fluids per day (water, soft drinks, juices, tea, or soup). Extra fluids will help loosen secretions in the nose and lungs.  · Over-the-counter cold medicines will not shorten the length of time youre sick, but they may be helpful for the following symptoms: cough, sore throat, and nasal and sinus congestion. (Note: Do not use decongestants if you have high blood pressure.)  Follow-up care  Follow up with your healthcare provider, or as advised.  When to seek medical advice  Call your healthcare provider right away if any of these occur:  · Cough with lots of colored sputum (mucus)  · Severe headache; face, neck, or ear pain  · Difficulty swallowing due to throat pain  · Fever of 100.4°F (38°C)  Call 911, or get immediate medical care  Call emergency services right away if any of these occur:  · Chest pain, shortness of breath, wheezing, or difficulty breathing  · Coughing up blood  · Inability to swallow due to throat pain  Date Last Reviewed:  9/13/2015  © 8108-6016 [employer], [employer]. [address] [place] [address], [place] [address]. All rights reserved. This information is not intended as a substitute for professional medical care. Always follow your healthcare professional's instructions.

## 2020-12-08 NOTE — PATIENT INSTRUCTIONS
Please follow up with your Primary care provider within 3-5 days if your signs and symptoms have not resolved or worsen.     If your condition worsens or fails to improve we recommend that you receive another evaluation at the emergency room immediately or contact your primary medical clinic to discuss your concerns.     You must understand that you have received an Urgent Care treatment only and that you may be released before all of your medical problems are known or treated. You, the patient, will arrange for follow up care as instructed.     You received a steroid shot today - this can elevate your blood pressure, elevate your blood sugar, water weight gain, nervous energy, redness to the face and dimpling of the skin where the shot goes in.     You have tested negative for COVID-19 today.  If you did not have any close exposure as defined below, then effective today, you can return to your normal daily activities including social distancing, wearing masks, and frequent handwashing.    A close exposure is defined as anyone who had a masked or an unmasked exposure to a known COVID -19 positive person, at less than 6 ft for more than 15 minutes.  If your exposure meets this definition, then you are required to quarantine for 10 days per the CDC.    The 10 day quarantine begins from the day you were exposed, not the day of your test.  For example, if your exposure was on a Monday, and you waited until Friday of the same week to get tested and it was negative, your 10 day quarantine begins from that Monday, not the Friday you tested negative.    If you developed symptoms since the exposure, and your test was negative today, you still have to quarantine for 10 days from the date of the exposure.    So if you meet the definition of a close exposure, A NEGATIVE TEST DOES NOT GET YOU OUT OF 10 DAYS OF QUARANTINE!      PLEASE READ YOUR DISCHARGE INSTRUCTIONS ENTIRELY AS IT CONTAINS IMPORTANT INFORMATION.    Please drink  plenty of fluids.    Please get plenty of rest.    Please return here or go to the Emergency Department for any concerns or worsening of condition.    Please take an over the counter antihistamine medication (Claritin/Zyrtec/Allegra) of your choice as directed.    Try an over the counter decongestant like Sudafed. You buy this behind the pharmacy counter.  You can also buy combination OTC antihistamine plus decongestant medications such at Zyrtec-D or Claritin-D.  Do not take decongestant if you suffer from high blood pressure.    If you do have high blood pressure or palpitations, it is safe to take Coricidin HBP for relief of sinus symptoms.    If not allergic, please take over the counter Tylenol (Acetaminophen) and/or Motrin (Ibuprofen) as directed for control of pain and/or fever.  Please follow up with your primary care doctor or specialist as needed.    Sore throat recommendations: Warm fluids, warm salt water gargles, throat lozenges, tea, honey, soup, rest, hydration.    Use over the counter flonase: one spray each nostril twice daily OR two sprays each nostril once daily. You must use this medication for at least 7 consecutive days to experience the full benefits of the intranasal steroid.    Sinus rinses DO NOT USE TAP WATER, if you must, water must be a rolling boil for 1 minute, let it cool, then use.  May use distilled water, or over the counter nasal saline rinses.  Vics vapor rub in shower to help open nasal passages.  May use nasal gel to keep passages moisturized.  May use Nasal saline sprays during the day for added relief of congestion.   For those who go to the gym, please do not use the sauna or steam room now to clear sinuses.    If you  smoke, please stop smoking.      Please return or see your primary care doctor if you develop new or worsening symptoms.     Please arrange follow up with your primary medical clinic as soon as possible. You must understand that you've received an Urgent Care  treatment only and that you may be released before all of your medical problems are known or treated. You, the patient, will arrange for follow up as instructed. If your symptoms worsen or fail to improve you should go to the Emergency Room.      Viral Upper Respiratory Illness (Adult)  You have a viral upper respiratory illness (URI), which is another term for the common cold. This illness is contagious during the first few days. It is spread through the air by coughing and sneezing. It may also be spread by direct contact (touching the sick person and then touching your own eyes, nose, or mouth). Frequent handwashing will decrease risk of spread. Most viral illnesses go away within 7 to 10 days with rest and simple home remedies. Sometimes the illness may last for several weeks. Antibiotics will not kill a virus, and they are generally not prescribed for this condition.    Home care  · If symptoms are severe, rest at home for the first 2 to 3 days. When you resume activity, don't let yourself get too tired.  · Avoid being exposed to cigarette smoke (yours or others).  · You may use acetaminophen or ibuprofen to control pain and fever, unless another medicine was prescribed. (Note: If you have chronic liver or kidney disease, have ever had a stomach ulcer or gastrointestinal bleeding, or are taking blood-thinning medicines, talk with your healthcare provider before using these medicines.) Aspirin should never be given to anyone under 18 years of age who is ill with a viral infection or fever. It may cause severe liver or brain damage.  · Your appetite may be poor, so a light diet is fine. Avoid dehydration by drinking 6 to 8 glasses of fluids per day (water, soft drinks, juices, tea, or soup). Extra fluids will help loosen secretions in the nose and lungs.  · Over-the-counter cold medicines will not shorten the length of time youre sick, but they may be helpful for the following symptoms: cough, sore throat, and  nasal and sinus congestion. (Note: Do not use decongestants if you have high blood pressure.)  Follow-up care  Follow up with your healthcare provider, or as advised.  When to seek medical advice  Call your healthcare provider right away if any of these occur:  · Cough with lots of colored sputum (mucus)  · Severe headache; face, neck, or ear pain  · Difficulty swallowing due to throat pain  · Fever of 100.4°F (38°C)  Call 911, or get immediate medical care  Call emergency services right away if any of these occur:  · Chest pain, shortness of breath, wheezing, or difficulty breathing  · Coughing up blood  · Inability to swallow due to throat pain  Date Last Reviewed: 9/13/2015  © 7535-0481 [employer]. [address] [place] [address], [place] [address]. All rights reserved. This information is not intended as a substitute for professional medical care. Always follow your healthcare professional's instructions.

## 2020-12-17 ENCOUNTER — APPOINTMENT (OUTPATIENT)
Dept: RADIOLOGY | Facility: CLINIC | Age: 60
End: 2020-12-17
Attending: INTERNAL MEDICINE
Payer: MEDICARE

## 2020-12-17 DIAGNOSIS — R76.8 RHEUMATOID FACTOR POSITIVE: ICD-10-CM

## 2020-12-17 DIAGNOSIS — M25.549 ARTHRALGIA OF HAND, UNSPECIFIED LATERALITY: ICD-10-CM

## 2020-12-17 DIAGNOSIS — M85.852 OTHER SPECIFIED DISORDERS OF BONE DENSITY AND STRUCTURE, LEFT THIGH: ICD-10-CM

## 2020-12-17 DIAGNOSIS — M85.80 OSTEOPENIA, UNSPECIFIED LOCATION: ICD-10-CM

## 2020-12-17 DIAGNOSIS — R53.83 FATIGUE, UNSPECIFIED TYPE: ICD-10-CM

## 2020-12-17 PROCEDURE — 77080 DEXA BONE DENSITY SPINE HIP: ICD-10-PCS | Mod: 26,HCNC,, | Performed by: INTERNAL MEDICINE

## 2020-12-17 PROCEDURE — 77080 DXA BONE DENSITY AXIAL: CPT | Mod: TC,HCNC,PO

## 2020-12-17 PROCEDURE — 77080 DXA BONE DENSITY AXIAL: CPT | Mod: 26,HCNC,, | Performed by: INTERNAL MEDICINE

## 2020-12-30 ENCOUNTER — PATIENT MESSAGE (OUTPATIENT)
Dept: RHEUMATOLOGY | Facility: CLINIC | Age: 60
End: 2020-12-30

## 2021-01-05 ENCOUNTER — HOSPITAL ENCOUNTER (OUTPATIENT)
Dept: RADIOLOGY | Facility: HOSPITAL | Age: 61
Discharge: HOME | End: 2021-01-05
Attending: INTERNAL MEDICINE
Payer: MEDICARE

## 2021-01-05 ENCOUNTER — OFFICE VISIT (OUTPATIENT)
Dept: INTERNAL MEDICINE | Facility: CLINIC | Age: 61
End: 2021-01-05
Payer: MEDICARE

## 2021-01-05 VITALS
OXYGEN SATURATION: 98 % | SYSTOLIC BLOOD PRESSURE: 120 MMHG | DIASTOLIC BLOOD PRESSURE: 82 MMHG | WEIGHT: 125.44 LBS | HEART RATE: 83 BPM | HEIGHT: 61 IN | BODY MASS INDEX: 23.68 KG/M2

## 2021-01-05 DIAGNOSIS — M25.551 RIGHT HIP PAIN: ICD-10-CM
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
## 2021-01-05 PROCEDURE — 3079F PR MOST RECENT DIASTOLIC BLOOD PRESSURE 80-89 MM HG: ICD-10-PCS | Mod: HCNC,CPTII,S$GLB, | Performed by: INTERNAL MEDICINE

## 2021-01-05 PROCEDURE — 3074F SYST BP LT 130 MM HG: CPT | Mod: HCNC,CPTII,S$GLB, | Performed by: INTERNAL MEDICINE

## 2021-01-05 PROCEDURE — 99999 PR PBB SHADOW E&M-EST. PATIENT-LVL IV: ICD-10-PCS | Mod: PBBFAC,HCNC,, | Performed by: INTERNAL MEDICINE

## 2021-01-05 PROCEDURE — 3079F DIAST BP 80-89 MM HG: CPT | Mod: HCNC,CPTII,S$GLB, | Performed by: INTERNAL MEDICINE

## 2021-01-05 PROCEDURE — 99213 OFFICE O/P EST LOW 20 MIN: CPT | Mod: HCNC,S$GLB,, | Performed by: INTERNAL MEDICINE

## 2021-01-05 PROCEDURE — 73502 X-RAY EXAM HIP UNI 2-3 VIEWS: CPT | Mod: TC,HCNC,RT

## 2021-01-05 PROCEDURE — 73502 X-RAY EXAM HIP UNI 2-3 VIEWS: CPT | Mod: 26,HCNC,RT, | Performed by: RADIOLOGY

## 2021-01-05 PROCEDURE — 3008F BODY MASS INDEX DOCD: CPT | Mod: HCNC,CPTII,S$GLB, | Performed by: INTERNAL MEDICINE

## 2021-01-05 PROCEDURE — 99213 PR OFFICE/OUTPT VISIT, EST, LEVL III, 20-29 MIN: ICD-10-PCS | Mod: HCNC,S$GLB,, | Performed by: INTERNAL MEDICINE

## 2021-01-05 PROCEDURE — 3074F PR MOST RECENT SYSTOLIC BLOOD PRESSURE < 130 MM HG: ICD-10-PCS | Mod: HCNC,CPTII,S$GLB, | Performed by: INTERNAL MEDICINE

## 2021-01-05 PROCEDURE — 99999 PR PBB SHADOW E&M-EST. PATIENT-LVL IV: CPT | Mod: PBBFAC,HCNC,, | Performed by: INTERNAL MEDICINE

## 2021-01-05 PROCEDURE — 1125F AMNT PAIN NOTED PAIN PRSNT: CPT | Mod: HCNC,S$GLB,, | Performed by: INTERNAL MEDICINE

## 2021-01-05 PROCEDURE — 1125F PR PAIN SEVERITY QUANTIFIED, PAIN PRESENT: ICD-10-PCS | Mod: HCNC,S$GLB,, | Performed by: INTERNAL MEDICINE

## 2021-01-05 PROCEDURE — 73502 XR HIP 2 VIEW RIGHT: ICD-10-PCS | Mod: 26,HCNC,RT, | Performed by: RADIOLOGY

## 2021-01-05 PROCEDURE — 3008F PR BODY MASS INDEX (BMI) DOCUMENTED: ICD-10-PCS | Mod: HCNC,CPTII,S$GLB, | Performed by: INTERNAL MEDICINE

## 2021-01-14 ENCOUNTER — CLINICAL SUPPORT (OUTPATIENT)
Dept: REHABILITATION | Facility: HOSPITAL | Age: 61
End: 2021-01-14
Attending: INTERNAL MEDICINE
Payer: MEDICARE

## 2021-01-14 DIAGNOSIS — M25.551 RIGHT HIP PAIN: ICD-10-CM
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
## 2021-01-14 PROCEDURE — 97161 PT EVAL LOW COMPLEX 20 MIN: CPT | Mod: HCNC

## 2021-01-14 PROCEDURE — 97110 THERAPEUTIC EXERCISES: CPT | Mod: HCNC

## 2021-01-19 PROBLEM — M25.551 RIGHT HIP PAIN: Status: ACTIVE | Noted: 2021-01-19

## 2021-01-20 ENCOUNTER — PES CALL (OUTPATIENT)
Dept: ADMINISTRATIVE | Facility: CLINIC | Age: 61
End: 2021-01-20

## 2021-01-25 ENCOUNTER — CLINICAL SUPPORT (OUTPATIENT)
Dept: REHABILITATION | Facility: HOSPITAL | Age: 61
End: 2021-01-25
Attending: INTERNAL MEDICINE
Payer: MEDICARE

## 2021-01-25 DIAGNOSIS — M25.551 RIGHT HIP PAIN: ICD-10-CM

## 2021-01-25 PROCEDURE — 97110 THERAPEUTIC EXERCISES: CPT | Mod: HCNC

## 2021-01-27 ENCOUNTER — TELEPHONE (OUTPATIENT)
Dept: INTERNAL MEDICINE | Facility: CLINIC | Age: 61
End: 2021-01-27

## 2021-01-27 DIAGNOSIS — I10 HYPERTENSION, ESSENTIAL: ICD-10-CM

## 2021-01-27 DIAGNOSIS — E78.5 HYPERLIPIDEMIA, UNSPECIFIED HYPERLIPIDEMIA TYPE: ICD-10-CM

## 2021-01-27 DIAGNOSIS — N95.2 VAGINAL ATROPHY: ICD-10-CM

## 2021-01-27 DIAGNOSIS — Z00.00 WELL ADULT EXAM: Primary | ICD-10-CM

## 2021-01-27 DIAGNOSIS — M85.9 DISORDER OF BONE DENSITY AND STRUCTURE, UNSPECIFIED: ICD-10-CM

## 2021-01-27 RX ORDER — CONJUGATED ESTROGENS 0.62 MG/G
CREAM VAGINAL
Qty: 30 G | Refills: 6 | Status: SHIPPED | OUTPATIENT
Start: 2021-01-27 | End: 2021-05-17 | Stop reason: SDUPTHER

## 2021-01-29 ENCOUNTER — CLINICAL SUPPORT (OUTPATIENT)
Dept: REHABILITATION | Facility: HOSPITAL | Age: 61
End: 2021-01-29
Attending: INTERNAL MEDICINE
Payer: MEDICARE

## 2021-01-29 DIAGNOSIS — M25.551 RIGHT HIP PAIN: ICD-10-CM

## 2021-01-29 PROCEDURE — 97110 THERAPEUTIC EXERCISES: CPT

## 2021-02-04 ENCOUNTER — CLINICAL SUPPORT (OUTPATIENT)
Dept: REHABILITATION | Facility: HOSPITAL | Age: 61
End: 2021-02-04
Attending: INTERNAL MEDICINE
Payer: MEDICARE

## 2021-02-04 DIAGNOSIS — M25.551 RIGHT HIP PAIN: ICD-10-CM

## 2021-02-04 PROCEDURE — 97110 THERAPEUTIC EXERCISES: CPT | Mod: CQ

## 2021-02-23 ENCOUNTER — PATIENT MESSAGE (OUTPATIENT)
Dept: RHEUMATOLOGY | Facility: CLINIC | Age: 61
End: 2021-02-23

## 2021-02-23 ENCOUNTER — CLINICAL SUPPORT (OUTPATIENT)
Dept: REHABILITATION | Facility: HOSPITAL | Age: 61
End: 2021-02-23
Attending: INTERNAL MEDICINE
Payer: MEDICARE

## 2021-02-23 DIAGNOSIS — M25.551 RIGHT HIP PAIN: ICD-10-CM

## 2021-02-23 PROCEDURE — 97110 THERAPEUTIC EXERCISES: CPT | Mod: CQ

## 2021-03-01 ENCOUNTER — CLINICAL SUPPORT (OUTPATIENT)
Dept: REHABILITATION | Facility: HOSPITAL | Age: 61
End: 2021-03-01
Attending: INTERNAL MEDICINE
Payer: MEDICARE

## 2021-03-01 DIAGNOSIS — M25.551 RIGHT HIP PAIN: ICD-10-CM

## 2021-03-01 PROCEDURE — 97110 THERAPEUTIC EXERCISES: CPT

## 2021-03-05 ENCOUNTER — PES CALL (OUTPATIENT)
Dept: ADMINISTRATIVE | Facility: CLINIC | Age: 61
End: 2021-03-05

## 2021-03-18 ENCOUNTER — OFFICE VISIT (OUTPATIENT)
Dept: RHEUMATOLOGY | Facility: CLINIC | Age: 61
End: 2021-03-18
Payer: MEDICARE

## 2021-03-18 VITALS
SYSTOLIC BLOOD PRESSURE: 112 MMHG | WEIGHT: 127.44 LBS | HEART RATE: 83 BPM | HEIGHT: 61 IN | DIASTOLIC BLOOD PRESSURE: 78 MMHG | BODY MASS INDEX: 24.06 KG/M2

## 2021-03-18 DIAGNOSIS — M70.71 ISCHIAL BURSITIS OF RIGHT SIDE: Primary | ICD-10-CM

## 2021-03-18 PROCEDURE — 99214 PR OFFICE/OUTPT VISIT, EST, LEVL IV, 30-39 MIN: ICD-10-PCS | Mod: S$GLB,,, | Performed by: INTERNAL MEDICINE

## 2021-03-18 PROCEDURE — 99499 RISK ADDL DX/OHS AUDIT: ICD-10-PCS | Mod: S$GLB,,, | Performed by: INTERNAL MEDICINE

## 2021-03-18 PROCEDURE — 99999 PR PBB SHADOW E&M-EST. PATIENT-LVL IV: ICD-10-PCS | Mod: PBBFAC,,, | Performed by: INTERNAL MEDICINE

## 2021-03-18 PROCEDURE — 99214 OFFICE O/P EST MOD 30 MIN: CPT | Mod: S$GLB,,, | Performed by: INTERNAL MEDICINE

## 2021-03-18 PROCEDURE — 3008F PR BODY MASS INDEX (BMI) DOCUMENTED: ICD-10-PCS | Mod: CPTII,S$GLB,, | Performed by: INTERNAL MEDICINE

## 2021-03-18 PROCEDURE — 3074F PR MOST RECENT SYSTOLIC BLOOD PRESSURE < 130 MM HG: ICD-10-PCS | Mod: CPTII,S$GLB,, | Performed by: INTERNAL MEDICINE

## 2021-03-18 PROCEDURE — 1125F PR PAIN SEVERITY QUANTIFIED, PAIN PRESENT: ICD-10-PCS | Mod: S$GLB,,, | Performed by: INTERNAL MEDICINE

## 2021-03-18 PROCEDURE — 1125F AMNT PAIN NOTED PAIN PRSNT: CPT | Mod: S$GLB,,, | Performed by: INTERNAL MEDICINE

## 2021-03-18 PROCEDURE — 3078F PR MOST RECENT DIASTOLIC BLOOD PRESSURE < 80 MM HG: ICD-10-PCS | Mod: CPTII,S$GLB,, | Performed by: INTERNAL MEDICINE

## 2021-03-18 PROCEDURE — 99999 PR PBB SHADOW E&M-EST. PATIENT-LVL IV: CPT | Mod: PBBFAC,,, | Performed by: INTERNAL MEDICINE

## 2021-03-18 PROCEDURE — 3008F BODY MASS INDEX DOCD: CPT | Mod: CPTII,S$GLB,, | Performed by: INTERNAL MEDICINE

## 2021-03-18 PROCEDURE — 3074F SYST BP LT 130 MM HG: CPT | Mod: CPTII,S$GLB,, | Performed by: INTERNAL MEDICINE

## 2021-03-18 PROCEDURE — 99499 UNLISTED E&M SERVICE: CPT | Mod: S$GLB,,, | Performed by: INTERNAL MEDICINE

## 2021-03-18 PROCEDURE — 3078F DIAST BP <80 MM HG: CPT | Mod: CPTII,S$GLB,, | Performed by: INTERNAL MEDICINE

## 2021-03-18 RX ORDER — MECLIZINE HCL 12.5 MG 12.5 MG/1
TABLET ORAL
COMMUNITY
Start: 2021-01-14 | End: 2022-07-28 | Stop reason: SDUPTHER

## 2021-03-24 ENCOUNTER — PATIENT MESSAGE (OUTPATIENT)
Dept: DERMATOLOGY | Facility: CLINIC | Age: 61
End: 2021-03-24

## 2021-03-25 ENCOUNTER — PROCEDURE VISIT (OUTPATIENT)
Dept: DERMATOLOGY | Facility: CLINIC | Age: 61
End: 2021-03-25
Payer: MEDICARE

## 2021-03-25 DIAGNOSIS — Z41.1 ELECTIVE PROCEDURE FOR UNACCEPTABLE COSMETIC APPEARANCE: Primary | ICD-10-CM

## 2021-03-25 PROCEDURE — 99499 UNLISTED E&M SERVICE: CPT | Mod: CSM,S$GLB,, | Performed by: DERMATOLOGY

## 2021-03-25 PROCEDURE — 99499 NO LOS: ICD-10-PCS | Mod: CSM,S$GLB,, | Performed by: DERMATOLOGY

## 2021-04-13 ENCOUNTER — TELEPHONE (OUTPATIENT)
Dept: RHEUMATOLOGY | Facility: CLINIC | Age: 61
End: 2021-04-13

## 2021-04-13 ENCOUNTER — TELEPHONE (OUTPATIENT)
Dept: PAIN MEDICINE | Facility: CLINIC | Age: 61
End: 2021-04-13

## 2021-04-13 ENCOUNTER — PATIENT MESSAGE (OUTPATIENT)
Dept: PAIN MEDICINE | Facility: CLINIC | Age: 61
End: 2021-04-13

## 2021-04-14 ENCOUNTER — PATIENT MESSAGE (OUTPATIENT)
Dept: PAIN MEDICINE | Facility: CLINIC | Age: 61
End: 2021-04-14

## 2021-04-14 ENCOUNTER — TELEPHONE (OUTPATIENT)
Dept: PAIN MEDICINE | Facility: CLINIC | Age: 61
End: 2021-04-14

## 2021-04-15 ENCOUNTER — OFFICE VISIT (OUTPATIENT)
Dept: PAIN MEDICINE | Facility: CLINIC | Age: 61
End: 2021-04-15
Payer: MEDICARE

## 2021-04-15 ENCOUNTER — PATIENT MESSAGE (OUTPATIENT)
Dept: RHEUMATOLOGY | Facility: CLINIC | Age: 61
End: 2021-04-15

## 2021-04-15 VITALS
HEART RATE: 72 BPM | SYSTOLIC BLOOD PRESSURE: 118 MMHG | DIASTOLIC BLOOD PRESSURE: 67 MMHG | BODY MASS INDEX: 22.89 KG/M2 | WEIGHT: 121.25 LBS | RESPIRATION RATE: 18 BRPM | HEIGHT: 61 IN | TEMPERATURE: 98 F

## 2021-04-15 DIAGNOSIS — M70.61 TROCHANTERIC BURSITIS OF RIGHT HIP: Primary | ICD-10-CM

## 2021-04-15 DIAGNOSIS — M46.1 SACROILIITIS, NOT ELSEWHERE CLASSIFIED: ICD-10-CM

## 2021-04-15 DIAGNOSIS — M70.71 ISCHIAL BURSITIS OF RIGHT SIDE: ICD-10-CM

## 2021-04-15 DIAGNOSIS — M47.816 SPONDYLOSIS OF LUMBAR REGION WITHOUT MYELOPATHY OR RADICULOPATHY: ICD-10-CM

## 2021-04-15 PROCEDURE — 99213 OFFICE O/P EST LOW 20 MIN: CPT | Mod: S$GLB,,, | Performed by: NURSE PRACTITIONER

## 2021-04-15 PROCEDURE — 3008F PR BODY MASS INDEX (BMI) DOCUMENTED: ICD-10-PCS | Mod: CPTII,S$GLB,, | Performed by: NURSE PRACTITIONER

## 2021-04-15 PROCEDURE — 99999 PR PBB SHADOW E&M-EST. PATIENT-LVL V: ICD-10-PCS | Mod: PBBFAC,,, | Performed by: NURSE PRACTITIONER

## 2021-04-15 PROCEDURE — 1125F PR PAIN SEVERITY QUANTIFIED, PAIN PRESENT: ICD-10-PCS | Mod: S$GLB,,, | Performed by: NURSE PRACTITIONER

## 2021-04-15 PROCEDURE — 3008F BODY MASS INDEX DOCD: CPT | Mod: CPTII,S$GLB,, | Performed by: NURSE PRACTITIONER

## 2021-04-15 PROCEDURE — 99213 PR OFFICE/OUTPT VISIT, EST, LEVL III, 20-29 MIN: ICD-10-PCS | Mod: S$GLB,,, | Performed by: NURSE PRACTITIONER

## 2021-04-15 PROCEDURE — 99999 PR PBB SHADOW E&M-EST. PATIENT-LVL V: CPT | Mod: PBBFAC,,, | Performed by: NURSE PRACTITIONER

## 2021-04-15 PROCEDURE — 1125F AMNT PAIN NOTED PAIN PRSNT: CPT | Mod: S$GLB,,, | Performed by: NURSE PRACTITIONER

## 2021-04-29 ENCOUNTER — PROCEDURE VISIT (OUTPATIENT)
Dept: PAIN MEDICINE | Facility: CLINIC | Age: 61
End: 2021-04-29
Payer: MEDICARE

## 2021-04-29 VITALS
DIASTOLIC BLOOD PRESSURE: 74 MMHG | HEART RATE: 82 BPM | TEMPERATURE: 98 F | WEIGHT: 121.25 LBS | BODY MASS INDEX: 23.81 KG/M2 | HEIGHT: 60 IN | SYSTOLIC BLOOD PRESSURE: 108 MMHG | RESPIRATION RATE: 18 BRPM

## 2021-04-29 DIAGNOSIS — M70.60 GREATER TROCHANTERIC BURSITIS, UNSPECIFIED LATERALITY: Primary | ICD-10-CM

## 2021-04-29 DIAGNOSIS — M70.70 ISCHIAL BURSITIS, UNSPECIFIED LATERALITY: ICD-10-CM

## 2021-04-29 PROCEDURE — 20611 PR DRAIN/ASP/INJECT MAJOR JOINT/BURSA W/US GUIDANCE: ICD-10-PCS | Mod: RT,S$GLB,, | Performed by: ANESTHESIOLOGY

## 2021-04-29 PROCEDURE — 20611 DRAIN/INJ JOINT/BURSA W/US: CPT | Mod: RT,S$GLB,, | Performed by: ANESTHESIOLOGY

## 2021-04-29 PROCEDURE — 99499 UNLISTED E&M SERVICE: CPT | Mod: GC,S$GLB,, | Performed by: ANESTHESIOLOGY

## 2021-04-29 PROCEDURE — 99499 NO LOS: ICD-10-PCS | Mod: GC,S$GLB,, | Performed by: ANESTHESIOLOGY

## 2021-04-29 RX ORDER — TRIAMCINOLONE ACETONIDE 40 MG/ML
80 INJECTION, SUSPENSION INTRA-ARTICULAR; INTRAMUSCULAR
Status: COMPLETED | OUTPATIENT
Start: 2021-04-29 | End: 2021-04-29

## 2021-04-29 RX ADMIN — TRIAMCINOLONE ACETONIDE 80 MG: 40 INJECTION, SUSPENSION INTRA-ARTICULAR; INTRAMUSCULAR at 01:04

## 2021-05-12 ENCOUNTER — TELEPHONE (OUTPATIENT)
Dept: PAIN MEDICINE | Facility: CLINIC | Age: 61
End: 2021-05-12

## 2021-05-14 ENCOUNTER — OFFICE VISIT (OUTPATIENT)
Dept: PAIN MEDICINE | Facility: CLINIC | Age: 61
End: 2021-05-14
Payer: MEDICARE

## 2021-05-14 DIAGNOSIS — M70.61 TROCHANTERIC BURSITIS OF RIGHT HIP: ICD-10-CM

## 2021-05-14 DIAGNOSIS — M70.71 ISCHIAL BURSITIS OF RIGHT SIDE: ICD-10-CM

## 2021-05-14 DIAGNOSIS — M70.60 GREATER TROCHANTERIC BURSITIS, UNSPECIFIED LATERALITY: Primary | ICD-10-CM

## 2021-05-14 DIAGNOSIS — M47.817 DJD (DEGENERATIVE JOINT DISEASE), LUMBOSACRAL: ICD-10-CM

## 2021-05-14 PROCEDURE — 1125F PR PAIN SEVERITY QUANTIFIED, PAIN PRESENT: ICD-10-PCS | Mod: 95,,, | Performed by: NURSE PRACTITIONER

## 2021-05-14 PROCEDURE — 99213 OFFICE O/P EST LOW 20 MIN: CPT | Mod: 95,,, | Performed by: NURSE PRACTITIONER

## 2021-05-14 PROCEDURE — 1125F AMNT PAIN NOTED PAIN PRSNT: CPT | Mod: 95,,, | Performed by: NURSE PRACTITIONER

## 2021-05-14 PROCEDURE — 99213 PR OFFICE/OUTPT VISIT, EST, LEVL III, 20-29 MIN: ICD-10-PCS | Mod: 95,,, | Performed by: NURSE PRACTITIONER

## 2021-05-17 ENCOUNTER — PATIENT MESSAGE (OUTPATIENT)
Dept: OBSTETRICS AND GYNECOLOGY | Facility: CLINIC | Age: 61
End: 2021-05-17

## 2021-05-17 ENCOUNTER — TELEPHONE (OUTPATIENT)
Dept: OBSTETRICS AND GYNECOLOGY | Facility: CLINIC | Age: 61
End: 2021-05-17

## 2021-05-17 ENCOUNTER — OFFICE VISIT (OUTPATIENT)
Dept: OBSTETRICS AND GYNECOLOGY | Facility: CLINIC | Age: 61
End: 2021-05-17
Attending: OBSTETRICS & GYNECOLOGY
Payer: MEDICARE

## 2021-05-17 VITALS
DIASTOLIC BLOOD PRESSURE: 74 MMHG | SYSTOLIC BLOOD PRESSURE: 118 MMHG | WEIGHT: 124.56 LBS | HEIGHT: 61 IN | BODY MASS INDEX: 23.52 KG/M2

## 2021-05-17 DIAGNOSIS — Z12.31 ENCOUNTER FOR SCREENING MAMMOGRAM FOR MALIGNANT NEOPLASM OF BREAST: Primary | ICD-10-CM

## 2021-05-17 DIAGNOSIS — N95.2 VAGINAL ATROPHY: ICD-10-CM

## 2021-05-17 DIAGNOSIS — Z01.419 ENCOUNTER FOR GYNECOLOGICAL EXAMINATION WITHOUT ABNORMAL FINDING: Primary | ICD-10-CM

## 2021-05-17 PROCEDURE — G0101 PR CA SCREEN;PELVIC/BREAST EXAM: ICD-10-PCS | Mod: S$GLB,,, | Performed by: OBSTETRICS & GYNECOLOGY

## 2021-05-17 PROCEDURE — 1126F AMNT PAIN NOTED NONE PRSNT: CPT | Mod: S$GLB,,, | Performed by: OBSTETRICS & GYNECOLOGY

## 2021-05-17 PROCEDURE — 3008F PR BODY MASS INDEX (BMI) DOCUMENTED: ICD-10-PCS | Mod: CPTII,S$GLB,, | Performed by: OBSTETRICS & GYNECOLOGY

## 2021-05-17 PROCEDURE — G0101 CA SCREEN;PELVIC/BREAST EXAM: HCPCS | Mod: S$GLB,,, | Performed by: OBSTETRICS & GYNECOLOGY

## 2021-05-17 PROCEDURE — 1126F PR PAIN SEVERITY QUANTIFIED, NO PAIN PRESENT: ICD-10-PCS | Mod: S$GLB,,, | Performed by: OBSTETRICS & GYNECOLOGY

## 2021-05-17 PROCEDURE — 3008F BODY MASS INDEX DOCD: CPT | Mod: CPTII,S$GLB,, | Performed by: OBSTETRICS & GYNECOLOGY

## 2021-05-17 RX ORDER — CONJUGATED ESTROGENS 0.62 MG/G
CREAM VAGINAL
Qty: 30 G | Refills: 6 | Status: SHIPPED | OUTPATIENT
Start: 2021-05-17 | End: 2022-01-13 | Stop reason: SDUPTHER

## 2021-05-17 RX ORDER — ESTRADIOL 1 MG/1
1 TABLET ORAL DAILY
Qty: 90 TABLET | Refills: 3 | Status: SHIPPED | OUTPATIENT
Start: 2021-05-17 | End: 2022-06-13

## 2021-05-26 ENCOUNTER — HOSPITAL ENCOUNTER (OUTPATIENT)
Dept: RADIOLOGY | Facility: HOSPITAL | Age: 61
Discharge: HOME | End: 2021-05-26
Attending: OBSTETRICS & GYNECOLOGY
Payer: MEDICARE

## 2021-05-26 VITALS — BODY MASS INDEX: 22.66 KG/M2 | HEIGHT: 61 IN | WEIGHT: 120 LBS

## 2021-05-26 DIAGNOSIS — Z12.31 ENCOUNTER FOR SCREENING MAMMOGRAM FOR MALIGNANT NEOPLASM OF BREAST: ICD-10-CM

## 2021-05-26 PROCEDURE — 77067 MAMMO DIGITAL SCREENING BILAT WITH TOMO: ICD-10-PCS | Mod: 26,,, | Performed by: RADIOLOGY

## 2021-05-26 PROCEDURE — 77063 MAMMO DIGITAL SCREENING BILAT WITH TOMO: ICD-10-PCS | Mod: 26,,, | Performed by: RADIOLOGY

## 2021-05-26 PROCEDURE — 77063 BREAST TOMOSYNTHESIS BI: CPT | Mod: 26,,, | Performed by: RADIOLOGY

## 2021-05-26 PROCEDURE — 77067 SCR MAMMO BI INCL CAD: CPT | Mod: 26,,, | Performed by: RADIOLOGY

## 2021-05-26 PROCEDURE — 77067 SCR MAMMO BI INCL CAD: CPT | Mod: TC

## 2021-06-01 ENCOUNTER — LAB VISIT (OUTPATIENT)
Dept: LAB | Facility: HOSPITAL | Age: 61
End: 2021-06-01
Attending: INTERNAL MEDICINE
Payer: MEDICARE

## 2021-06-01 DIAGNOSIS — E78.5 HYPERLIPIDEMIA, UNSPECIFIED HYPERLIPIDEMIA TYPE: ICD-10-CM

## 2021-06-01 DIAGNOSIS — I10 HYPERTENSION, ESSENTIAL: ICD-10-CM

## 2021-06-01 DIAGNOSIS — M85.9 DISORDER OF BONE DENSITY AND STRUCTURE, UNSPECIFIED: ICD-10-CM

## 2021-06-01 DIAGNOSIS — Z00.00 WELL ADULT EXAM: ICD-10-CM

## 2021-06-01 LAB
25(OH)D3+25(OH)D2 SERPL-MCNC: 57 NG/ML (ref 30–96)
ALBUMIN SERPL BCP-MCNC: 3.8 G/DL (ref 3.5–5.2)
ALP SERPL-CCNC: 74 U/L (ref 55–135)
ALT SERPL W/O P-5'-P-CCNC: 17 U/L (ref 10–44)
ANION GAP SERPL CALC-SCNC: 11 MMOL/L (ref 8–16)
AST SERPL-CCNC: 16 U/L (ref 10–40)
BASOPHILS # BLD AUTO: 0.04 K/UL (ref 0–0.2)
BASOPHILS NFR BLD: 0.5 % (ref 0–1.9)
BILIRUB SERPL-MCNC: 1.4 MG/DL (ref 0.1–1)
BUN SERPL-MCNC: 14 MG/DL (ref 8–23)
CALCIUM SERPL-MCNC: 8.9 MG/DL (ref 8.7–10.5)
CHLORIDE SERPL-SCNC: 108 MMOL/L (ref 95–110)
CHOLEST SERPL-MCNC: 181 MG/DL (ref 120–199)
CHOLEST/HDLC SERPL: 2.2 {RATIO} (ref 2–5)
CO2 SERPL-SCNC: 20 MMOL/L (ref 23–29)
CREAT SERPL-MCNC: 0.8 MG/DL (ref 0.5–1.4)
DIFFERENTIAL METHOD: ABNORMAL
EOSINOPHIL # BLD AUTO: 0.2 K/UL (ref 0–0.5)
EOSINOPHIL NFR BLD: 2.4 % (ref 0–8)
ERYTHROCYTE [DISTWIDTH] IN BLOOD BY AUTOMATED COUNT: 13.2 % (ref 11.5–14.5)
EST. GFR  (AFRICAN AMERICAN): >60 ML/MIN/1.73 M^2
EST. GFR  (NON AFRICAN AMERICAN): >60 ML/MIN/1.73 M^2
GLUCOSE SERPL-MCNC: 83 MG/DL (ref 70–110)
HCT VFR BLD AUTO: 39.7 % (ref 37–48.5)
HDLC SERPL-MCNC: 83 MG/DL (ref 40–75)
HDLC SERPL: 45.9 % (ref 20–50)
HGB BLD-MCNC: 12.9 G/DL (ref 12–16)
IMM GRANULOCYTES # BLD AUTO: 0.02 K/UL (ref 0–0.04)
IMM GRANULOCYTES NFR BLD AUTO: 0.3 % (ref 0–0.5)
LDLC SERPL CALC-MCNC: 85.2 MG/DL (ref 63–159)
LYMPHOCYTES # BLD AUTO: 2.2 K/UL (ref 1–4.8)
LYMPHOCYTES NFR BLD: 27.6 % (ref 18–48)
MCH RBC QN AUTO: 32.9 PG (ref 27–31)
MCHC RBC AUTO-ENTMCNC: 32.5 G/DL (ref 32–36)
MCV RBC AUTO: 101 FL (ref 82–98)
MONOCYTES # BLD AUTO: 0.7 K/UL (ref 0.3–1)
MONOCYTES NFR BLD: 9.2 % (ref 4–15)
NEUTROPHILS # BLD AUTO: 4.7 K/UL (ref 1.8–7.7)
NEUTROPHILS NFR BLD: 60 % (ref 38–73)
NONHDLC SERPL-MCNC: 98 MG/DL
NRBC BLD-RTO: 0 /100 WBC
PLATELET # BLD AUTO: 266 K/UL (ref 150–450)
PMV BLD AUTO: 11 FL (ref 9.2–12.9)
POTASSIUM SERPL-SCNC: 4.3 MMOL/L (ref 3.5–5.1)
PROT SERPL-MCNC: 6.9 G/DL (ref 6–8.4)
RBC # BLD AUTO: 3.92 M/UL (ref 4–5.4)
SODIUM SERPL-SCNC: 139 MMOL/L (ref 136–145)
TRIGL SERPL-MCNC: 64 MG/DL (ref 30–150)
TSH SERPL DL<=0.005 MIU/L-ACNC: 2.73 UIU/ML (ref 0.4–4)
WBC # BLD AUTO: 7.83 K/UL (ref 3.9–12.7)

## 2021-06-01 PROCEDURE — 82306 VITAMIN D 25 HYDROXY: CPT | Performed by: INTERNAL MEDICINE

## 2021-06-01 PROCEDURE — 85025 COMPLETE CBC W/AUTO DIFF WBC: CPT | Performed by: INTERNAL MEDICINE

## 2021-06-01 PROCEDURE — 36415 COLL VENOUS BLD VENIPUNCTURE: CPT | Mod: PO | Performed by: INTERNAL MEDICINE

## 2021-06-01 PROCEDURE — 80061 LIPID PANEL: CPT | Performed by: INTERNAL MEDICINE

## 2021-06-01 PROCEDURE — 84443 ASSAY THYROID STIM HORMONE: CPT | Performed by: INTERNAL MEDICINE

## 2021-06-01 PROCEDURE — 80053 COMPREHEN METABOLIC PANEL: CPT | Performed by: INTERNAL MEDICINE

## 2021-06-02 ENCOUNTER — PATIENT MESSAGE (OUTPATIENT)
Dept: DERMATOLOGY | Facility: CLINIC | Age: 61
End: 2021-06-02

## 2021-06-04 ENCOUNTER — PROCEDURE VISIT (OUTPATIENT)
Dept: DERMATOLOGY | Facility: CLINIC | Age: 61
End: 2021-06-04
Payer: MEDICARE

## 2021-06-04 DIAGNOSIS — Z41.1 ELECTIVE PROCEDURE FOR UNACCEPTABLE COSMETIC APPEARANCE: Primary | ICD-10-CM

## 2021-06-04 PROCEDURE — 99499 UNLISTED E&M SERVICE: CPT | Mod: CSM,S$GLB,, | Performed by: DERMATOLOGY

## 2021-06-04 PROCEDURE — 99499 NO LOS: ICD-10-PCS | Mod: CSM,S$GLB,, | Performed by: DERMATOLOGY

## 2021-07-02 ENCOUNTER — OFFICE VISIT (OUTPATIENT)
Dept: INTERNAL MEDICINE | Facility: CLINIC | Age: 61
End: 2021-07-02
Payer: MEDICARE

## 2021-07-02 ENCOUNTER — PATIENT MESSAGE (OUTPATIENT)
Dept: DERMATOLOGY | Facility: CLINIC | Age: 61
End: 2021-07-02

## 2021-07-02 ENCOUNTER — PATIENT MESSAGE (OUTPATIENT)
Dept: INTERNAL MEDICINE | Facility: CLINIC | Age: 61
End: 2021-07-02

## 2021-07-02 VITALS
WEIGHT: 124.13 LBS | OXYGEN SATURATION: 97 % | HEART RATE: 85 BPM | DIASTOLIC BLOOD PRESSURE: 78 MMHG | HEIGHT: 61 IN | SYSTOLIC BLOOD PRESSURE: 124 MMHG | TEMPERATURE: 98 F | BODY MASS INDEX: 23.43 KG/M2

## 2021-07-02 DIAGNOSIS — M25.549 ARTHRALGIA OF HAND, UNSPECIFIED LATERALITY: ICD-10-CM

## 2021-07-02 DIAGNOSIS — H81.93 DISORDER OF VESTIBULAR FUNCTION OF BOTH EARS: ICD-10-CM

## 2021-07-02 DIAGNOSIS — R13.10 DYSPHAGIA, UNSPECIFIED TYPE: ICD-10-CM

## 2021-07-02 DIAGNOSIS — J30.9 CHRONIC ALLERGIC RHINITIS: ICD-10-CM

## 2021-07-02 DIAGNOSIS — Z00.00 WELL ADULT EXAM: Primary | ICD-10-CM

## 2021-07-02 PROCEDURE — 3008F PR BODY MASS INDEX (BMI) DOCUMENTED: ICD-10-PCS | Mod: CPTII,S$GLB,, | Performed by: INTERNAL MEDICINE

## 2021-07-02 PROCEDURE — 1126F AMNT PAIN NOTED NONE PRSNT: CPT | Mod: S$GLB,,, | Performed by: INTERNAL MEDICINE

## 2021-07-02 PROCEDURE — 3008F BODY MASS INDEX DOCD: CPT | Mod: CPTII,S$GLB,, | Performed by: INTERNAL MEDICINE

## 2021-07-02 PROCEDURE — 1126F PR PAIN SEVERITY QUANTIFIED, NO PAIN PRESENT: ICD-10-PCS | Mod: S$GLB,,, | Performed by: INTERNAL MEDICINE

## 2021-07-02 PROCEDURE — 99999 PR PBB SHADOW E&M-EST. PATIENT-LVL IV: ICD-10-PCS | Mod: PBBFAC,,, | Performed by: INTERNAL MEDICINE

## 2021-07-02 PROCEDURE — 99396 PREV VISIT EST AGE 40-64: CPT | Mod: S$GLB,,, | Performed by: INTERNAL MEDICINE

## 2021-07-02 PROCEDURE — 99999 PR PBB SHADOW E&M-EST. PATIENT-LVL IV: CPT | Mod: PBBFAC,,, | Performed by: INTERNAL MEDICINE

## 2021-07-02 PROCEDURE — 99396 PR PREVENTIVE VISIT,EST,40-64: ICD-10-PCS | Mod: S$GLB,,, | Performed by: INTERNAL MEDICINE

## 2021-07-02 RX ORDER — ALPRAZOLAM 0.5 MG/1
0.5 TABLET ORAL 2 TIMES DAILY PRN
Qty: 40 TABLET | Refills: 1 | Status: SHIPPED | OUTPATIENT
Start: 2021-07-02 | End: 2021-11-11 | Stop reason: ALTCHOICE

## 2021-07-02 RX ORDER — MECLIZINE HCL 12.5 MG 12.5 MG/1
12.5 TABLET ORAL 3 TIMES DAILY PRN
Qty: 40 TABLET | Refills: 2 | Status: SHIPPED | OUTPATIENT
Start: 2021-07-02 | End: 2021-11-11

## 2021-07-06 ENCOUNTER — TELEPHONE (OUTPATIENT)
Dept: ENDOSCOPY | Facility: HOSPITAL | Age: 61
End: 2021-07-06

## 2021-07-14 ENCOUNTER — PATIENT MESSAGE (OUTPATIENT)
Dept: OBSTETRICS AND GYNECOLOGY | Facility: CLINIC | Age: 61
End: 2021-07-14

## 2021-07-15 ENCOUNTER — TELEPHONE (OUTPATIENT)
Dept: OBSTETRICS AND GYNECOLOGY | Facility: CLINIC | Age: 61
End: 2021-07-15

## 2021-07-15 DIAGNOSIS — N39.41 URGE INCONTINENCE: Primary | ICD-10-CM

## 2021-07-16 ENCOUNTER — TELEPHONE (OUTPATIENT)
Dept: ADMINISTRATIVE | Facility: OTHER | Age: 61
End: 2021-07-16

## 2021-07-22 ENCOUNTER — OFFICE VISIT (OUTPATIENT)
Dept: UROGYNECOLOGY | Facility: CLINIC | Age: 61
End: 2021-07-22
Payer: MEDICARE

## 2021-07-22 VITALS — WEIGHT: 124.31 LBS | HEIGHT: 61 IN | BODY MASS INDEX: 23.47 KG/M2

## 2021-07-22 DIAGNOSIS — N81.84 PELVIC MUSCLE WASTING: ICD-10-CM

## 2021-07-22 DIAGNOSIS — N39.41 URGE INCONTINENCE: ICD-10-CM

## 2021-07-22 DIAGNOSIS — N39.46 MIXED INCONTINENCE URGE AND STRESS (MALE)(FEMALE): Primary | ICD-10-CM

## 2021-07-22 PROBLEM — M79.10 MYALGIA: Status: ACTIVE | Noted: 2021-07-22

## 2021-07-22 PROBLEM — R27.9 LACK OF COORDINATION: Status: ACTIVE | Noted: 2021-07-22

## 2021-07-22 PROCEDURE — 3008F BODY MASS INDEX DOCD: CPT | Mod: CPTII,S$GLB,, | Performed by: OBSTETRICS & GYNECOLOGY

## 2021-07-22 PROCEDURE — 99214 PR OFFICE/OUTPT VISIT, EST, LEVL IV, 30-39 MIN: ICD-10-PCS | Mod: S$GLB,,, | Performed by: OBSTETRICS & GYNECOLOGY

## 2021-07-22 PROCEDURE — 1126F PR PAIN SEVERITY QUANTIFIED, NO PAIN PRESENT: ICD-10-PCS | Mod: CPTII,S$GLB,, | Performed by: OBSTETRICS & GYNECOLOGY

## 2021-07-22 PROCEDURE — 99999 PR PBB SHADOW E&M-EST. PATIENT-LVL III: ICD-10-PCS | Mod: PBBFAC,,, | Performed by: OBSTETRICS & GYNECOLOGY

## 2021-07-22 PROCEDURE — 1126F AMNT PAIN NOTED NONE PRSNT: CPT | Mod: CPTII,S$GLB,, | Performed by: OBSTETRICS & GYNECOLOGY

## 2021-07-22 PROCEDURE — 99999 PR PBB SHADOW E&M-EST. PATIENT-LVL III: CPT | Mod: PBBFAC,,, | Performed by: OBSTETRICS & GYNECOLOGY

## 2021-07-22 PROCEDURE — 99214 OFFICE O/P EST MOD 30 MIN: CPT | Mod: S$GLB,,, | Performed by: OBSTETRICS & GYNECOLOGY

## 2021-07-22 PROCEDURE — 3008F PR BODY MASS INDEX (BMI) DOCUMENTED: ICD-10-PCS | Mod: CPTII,S$GLB,, | Performed by: OBSTETRICS & GYNECOLOGY

## 2021-07-22 RX ORDER — TRIAMCINOLONE ACETONIDE 40 MG/ML
INJECTION, SUSPENSION INTRA-ARTICULAR; INTRAMUSCULAR
COMMUNITY
Start: 2021-04-29 | End: 2021-11-11

## 2021-07-22 RX ORDER — OXYBUTYNIN CHLORIDE 5 MG/1
TABLET, EXTENDED RELEASE ORAL
Qty: 63 TABLET | Refills: 0 | Status: SHIPPED | OUTPATIENT
Start: 2021-07-22 | End: 2021-08-11

## 2021-07-23 ENCOUNTER — CLINICAL SUPPORT (OUTPATIENT)
Dept: REHABILITATION | Facility: OTHER | Age: 61
End: 2021-07-23
Attending: OBSTETRICS & GYNECOLOGY
Payer: MEDICARE

## 2021-07-23 DIAGNOSIS — M79.10 MYALGIA: ICD-10-CM

## 2021-07-23 DIAGNOSIS — N39.41 URGE INCONTINENCE: ICD-10-CM

## 2021-07-23 DIAGNOSIS — R27.9 LACK OF COORDINATION: ICD-10-CM

## 2021-07-23 PROCEDURE — 97112 NEUROMUSCULAR REEDUCATION: CPT | Mod: PN

## 2021-07-23 PROCEDURE — 97161 PT EVAL LOW COMPLEX 20 MIN: CPT | Mod: PN

## 2021-07-23 PROCEDURE — 97530 THERAPEUTIC ACTIVITIES: CPT | Mod: PN

## 2021-07-29 ENCOUNTER — PATIENT MESSAGE (OUTPATIENT)
Dept: PAIN MEDICINE | Facility: CLINIC | Age: 61
End: 2021-07-29

## 2021-07-29 ENCOUNTER — CLINICAL SUPPORT (OUTPATIENT)
Dept: REHABILITATION | Facility: OTHER | Age: 61
End: 2021-07-29
Payer: MEDICARE

## 2021-07-29 ENCOUNTER — PATIENT MESSAGE (OUTPATIENT)
Dept: UROGYNECOLOGY | Facility: CLINIC | Age: 61
End: 2021-07-29

## 2021-07-29 DIAGNOSIS — M79.10 MYALGIA: ICD-10-CM

## 2021-07-29 DIAGNOSIS — R27.9 LACK OF COORDINATION: ICD-10-CM

## 2021-07-29 PROCEDURE — 97112 NEUROMUSCULAR REEDUCATION: CPT | Mod: PN

## 2021-07-29 PROCEDURE — 97140 MANUAL THERAPY 1/> REGIONS: CPT | Mod: PN

## 2021-07-29 PROCEDURE — 97110 THERAPEUTIC EXERCISES: CPT | Mod: PN

## 2021-07-29 RX ORDER — MIRABEGRON 25 MG/1
25 TABLET, FILM COATED, EXTENDED RELEASE ORAL DAILY
Qty: 30 TABLET | Refills: 11 | Status: SHIPPED | OUTPATIENT
Start: 2021-07-29 | End: 2022-07-06

## 2021-07-30 DIAGNOSIS — Z01.818 PRE-OP TESTING: Primary | ICD-10-CM

## 2021-08-01 ENCOUNTER — LAB VISIT (OUTPATIENT)
Dept: PRIMARY CARE CLINIC | Facility: CLINIC | Age: 61
End: 2021-08-01
Payer: MEDICARE

## 2021-08-01 DIAGNOSIS — Z01.818 PRE-OP TESTING: ICD-10-CM

## 2021-08-01 PROCEDURE — U0003 INFECTIOUS AGENT DETECTION BY NUCLEIC ACID (DNA OR RNA); SEVERE ACUTE RESPIRATORY SYNDROME CORONAVIRUS 2 (SARS-COV-2) (CORONAVIRUS DISEASE [COVID-19]), AMPLIFIED PROBE TECHNIQUE, MAKING USE OF HIGH THROUGHPUT TECHNOLOGIES AS DESCRIBED BY CMS-2020-01-R: HCPCS | Performed by: FAMILY MEDICINE

## 2021-08-01 PROCEDURE — U0005 INFEC AGEN DETEC AMPLI PROBE: HCPCS | Performed by: FAMILY MEDICINE

## 2021-08-02 ENCOUNTER — CLINICAL SUPPORT (OUTPATIENT)
Dept: REHABILITATION | Facility: OTHER | Age: 61
End: 2021-08-02
Payer: MEDICARE

## 2021-08-02 ENCOUNTER — TELEPHONE (OUTPATIENT)
Dept: ENDOSCOPY | Facility: HOSPITAL | Age: 61
End: 2021-08-02

## 2021-08-02 ENCOUNTER — PATIENT MESSAGE (OUTPATIENT)
Dept: ENDOSCOPY | Facility: HOSPITAL | Age: 61
End: 2021-08-02

## 2021-08-02 DIAGNOSIS — M79.10 MYALGIA: ICD-10-CM

## 2021-08-02 DIAGNOSIS — R27.9 LACK OF COORDINATION: ICD-10-CM

## 2021-08-02 LAB
SARS-COV-2 RNA RESP QL NAA+PROBE: NOT DETECTED
SARS-COV-2- CYCLE NUMBER: -1

## 2021-08-02 PROCEDURE — 97110 THERAPEUTIC EXERCISES: CPT | Mod: PN

## 2021-08-02 PROCEDURE — 97140 MANUAL THERAPY 1/> REGIONS: CPT | Mod: PN

## 2021-08-03 ENCOUNTER — PATIENT MESSAGE (OUTPATIENT)
Dept: PAIN MEDICINE | Facility: CLINIC | Age: 61
End: 2021-08-03

## 2021-08-03 RX ORDER — IBUPROFEN 800 MG/1
TABLET ORAL
Qty: 60 TABLET | Refills: 0 | Status: ON HOLD | OUTPATIENT
Start: 2021-08-03 | End: 2021-08-04 | Stop reason: HOSPADM

## 2021-08-04 ENCOUNTER — ANESTHESIA (OUTPATIENT)
Dept: ENDOSCOPY | Facility: HOSPITAL | Age: 61
End: 2021-08-04
Payer: MEDICARE

## 2021-08-04 ENCOUNTER — ANESTHESIA EVENT (OUTPATIENT)
Dept: ENDOSCOPY | Facility: HOSPITAL | Age: 61
End: 2021-08-04
Payer: MEDICARE

## 2021-08-04 ENCOUNTER — HOSPITAL ENCOUNTER (OUTPATIENT)
Facility: HOSPITAL | Age: 61
Discharge: HOME | End: 2021-08-04
Attending: INTERNAL MEDICINE | Admitting: INTERNAL MEDICINE
Payer: MEDICARE

## 2021-08-04 ENCOUNTER — PATIENT MESSAGE (OUTPATIENT)
Dept: PAIN MEDICINE | Facility: CLINIC | Age: 61
End: 2021-08-04

## 2021-08-04 VITALS
HEART RATE: 76 BPM | HEIGHT: 61 IN | RESPIRATION RATE: 22 BRPM | DIASTOLIC BLOOD PRESSURE: 61 MMHG | WEIGHT: 123 LBS | OXYGEN SATURATION: 100 % | SYSTOLIC BLOOD PRESSURE: 126 MMHG | TEMPERATURE: 98 F | BODY MASS INDEX: 23.22 KG/M2

## 2021-08-04 DIAGNOSIS — R13.10 DYSPHAGIA: ICD-10-CM

## 2021-08-04 PROCEDURE — D9220A PRA ANESTHESIA: Mod: CRNA,,, | Performed by: NURSE ANESTHETIST, CERTIFIED REGISTERED

## 2021-08-04 PROCEDURE — 43239 EGD BIOPSY SINGLE/MULTIPLE: CPT | Mod: 59,,, | Performed by: INTERNAL MEDICINE

## 2021-08-04 PROCEDURE — 37000009 HC ANESTHESIA EA ADD 15 MINS: Performed by: INTERNAL MEDICINE

## 2021-08-04 PROCEDURE — 43249 ESOPH EGD DILATION <30 MM: CPT | Performed by: INTERNAL MEDICINE

## 2021-08-04 PROCEDURE — 27201012 HC FORCEPS, HOT/COLD, DISP: Performed by: INTERNAL MEDICINE

## 2021-08-04 PROCEDURE — D9220A PRA ANESTHESIA: Mod: ANES,,, | Performed by: ANESTHESIOLOGY

## 2021-08-04 PROCEDURE — 43249 PR EGD, FLEX, W/BALL DILATION, < 30MM: ICD-10-PCS | Mod: ,,, | Performed by: INTERNAL MEDICINE

## 2021-08-04 PROCEDURE — 88305 TISSUE EXAM BY PATHOLOGIST: ICD-10-PCS | Mod: 26,,, | Performed by: PATHOLOGY

## 2021-08-04 PROCEDURE — 43249 ESOPH EGD DILATION <30 MM: CPT | Mod: ,,, | Performed by: INTERNAL MEDICINE

## 2021-08-04 PROCEDURE — 88305 TISSUE EXAM BY PATHOLOGIST: CPT | Performed by: PATHOLOGY

## 2021-08-04 PROCEDURE — 43239 PR EGD, FLEX, W/BIOPSY, SGL/MULTI: ICD-10-PCS | Mod: 59,,, | Performed by: INTERNAL MEDICINE

## 2021-08-04 PROCEDURE — 25000003 PHARM REV CODE 250: Performed by: NURSE ANESTHETIST, CERTIFIED REGISTERED

## 2021-08-04 PROCEDURE — 37000008 HC ANESTHESIA 1ST 15 MINUTES: Performed by: INTERNAL MEDICINE

## 2021-08-04 PROCEDURE — 88305 TISSUE EXAM BY PATHOLOGIST: CPT | Mod: 26,,, | Performed by: PATHOLOGY

## 2021-08-04 PROCEDURE — C1726 CATH, BAL DIL, NON-VASCULAR: HCPCS | Performed by: INTERNAL MEDICINE

## 2021-08-04 PROCEDURE — D9220A PRA ANESTHESIA: ICD-10-PCS | Mod: ANES,,, | Performed by: ANESTHESIOLOGY

## 2021-08-04 PROCEDURE — D9220A PRA ANESTHESIA: ICD-10-PCS | Mod: CRNA,,, | Performed by: NURSE ANESTHETIST, CERTIFIED REGISTERED

## 2021-08-04 PROCEDURE — 63600175 PHARM REV CODE 636 W HCPCS: Performed by: NURSE ANESTHETIST, CERTIFIED REGISTERED

## 2021-08-04 PROCEDURE — 25000003 PHARM REV CODE 250: Performed by: INTERNAL MEDICINE

## 2021-08-04 PROCEDURE — 43239 EGD BIOPSY SINGLE/MULTIPLE: CPT | Performed by: INTERNAL MEDICINE

## 2021-08-04 RX ORDER — MEPERIDINE HYDROCHLORIDE 50 MG/ML
12.5 INJECTION INTRAMUSCULAR; INTRAVENOUS; SUBCUTANEOUS ONCE AS NEEDED
Status: DISCONTINUED | OUTPATIENT
Start: 2021-08-04 | End: 2021-08-04 | Stop reason: HOSPADM

## 2021-08-04 RX ORDER — SODIUM CHLORIDE 9 MG/ML
INJECTION, SOLUTION INTRAVENOUS CONTINUOUS
Status: DISCONTINUED | OUTPATIENT
Start: 2021-08-04 | End: 2021-08-04 | Stop reason: HOSPADM

## 2021-08-04 RX ORDER — PANTOPRAZOLE SODIUM 40 MG/1
40 TABLET, DELAYED RELEASE ORAL DAILY
Qty: 30 TABLET | Refills: 11 | Status: SHIPPED | OUTPATIENT
Start: 2021-08-04 | End: 2022-07-06

## 2021-08-04 RX ORDER — PROPOFOL 10 MG/ML
VIAL (ML) INTRAVENOUS CONTINUOUS PRN
Status: DISCONTINUED | OUTPATIENT
Start: 2021-08-04 | End: 2021-08-04

## 2021-08-04 RX ORDER — LORAZEPAM 2 MG/ML
0.25 INJECTION INTRAMUSCULAR ONCE AS NEEDED
Status: DISCONTINUED | OUTPATIENT
Start: 2021-08-04 | End: 2021-08-04 | Stop reason: HOSPADM

## 2021-08-04 RX ORDER — LIDOCAINE HYDROCHLORIDE 20 MG/ML
INJECTION INTRAVENOUS
Status: DISCONTINUED | OUTPATIENT
Start: 2021-08-04 | End: 2021-08-04

## 2021-08-04 RX ORDER — HYDROMORPHONE HYDROCHLORIDE 1 MG/ML
0.2 INJECTION, SOLUTION INTRAMUSCULAR; INTRAVENOUS; SUBCUTANEOUS EVERY 5 MIN PRN
Status: DISCONTINUED | OUTPATIENT
Start: 2021-08-04 | End: 2021-08-04 | Stop reason: HOSPADM

## 2021-08-04 RX ORDER — PROPOFOL 10 MG/ML
VIAL (ML) INTRAVENOUS
Status: DISCONTINUED | OUTPATIENT
Start: 2021-08-04 | End: 2021-08-04

## 2021-08-04 RX ADMIN — GLYCOPYRROLATE 0.1 MG: 0.2 INJECTION, SOLUTION INTRAMUSCULAR; INTRAVITREAL at 11:08

## 2021-08-04 RX ADMIN — SODIUM CHLORIDE: 0.9 INJECTION, SOLUTION INTRAVENOUS at 11:08

## 2021-08-04 RX ADMIN — Medication 100 MG: at 11:08

## 2021-08-04 RX ADMIN — PROPOFOL 200 MCG/KG/MIN: 10 INJECTION, EMULSION INTRAVENOUS at 11:08

## 2021-08-04 RX ADMIN — LIDOCAINE HYDROCHLORIDE 100 MG: 20 INJECTION, SOLUTION INTRAVENOUS at 11:08

## 2021-08-09 ENCOUNTER — PATIENT MESSAGE (OUTPATIENT)
Dept: GASTROENTEROLOGY | Facility: CLINIC | Age: 61
End: 2021-08-09

## 2021-08-09 ENCOUNTER — CLINICAL SUPPORT (OUTPATIENT)
Dept: REHABILITATION | Facility: OTHER | Age: 61
End: 2021-08-09
Payer: MEDICARE

## 2021-08-09 DIAGNOSIS — M79.10 MYALGIA: ICD-10-CM

## 2021-08-09 DIAGNOSIS — R27.9 LACK OF COORDINATION: ICD-10-CM

## 2021-08-09 LAB
FINAL PATHOLOGIC DIAGNOSIS: NORMAL
GROSS: NORMAL
Lab: NORMAL

## 2021-08-09 PROCEDURE — 97110 THERAPEUTIC EXERCISES: CPT | Mod: PN

## 2021-08-09 PROCEDURE — 97140 MANUAL THERAPY 1/> REGIONS: CPT | Mod: PN

## 2021-08-11 ENCOUNTER — PATIENT MESSAGE (OUTPATIENT)
Dept: UROGYNECOLOGY | Facility: CLINIC | Age: 61
End: 2021-08-11

## 2021-08-12 RX ORDER — MIRABEGRON 25 MG/1
25 TABLET, FILM COATED, EXTENDED RELEASE ORAL DAILY
Qty: 30 TABLET | Refills: 11 | Status: SHIPPED | OUTPATIENT
Start: 2021-08-12 | End: 2021-11-11

## 2021-08-18 ENCOUNTER — CLINICAL SUPPORT (OUTPATIENT)
Dept: REHABILITATION | Facility: OTHER | Age: 61
End: 2021-08-18
Payer: MEDICARE

## 2021-08-18 DIAGNOSIS — M79.10 MYALGIA: ICD-10-CM

## 2021-08-18 DIAGNOSIS — R27.9 LACK OF COORDINATION: ICD-10-CM

## 2021-08-18 PROCEDURE — 97110 THERAPEUTIC EXERCISES: CPT | Mod: PN

## 2021-08-18 PROCEDURE — 97140 MANUAL THERAPY 1/> REGIONS: CPT | Mod: PN

## 2021-09-16 ENCOUNTER — PATIENT MESSAGE (OUTPATIENT)
Dept: OBSTETRICS AND GYNECOLOGY | Facility: CLINIC | Age: 61
End: 2021-09-16

## 2021-09-22 ENCOUNTER — PATIENT MESSAGE (OUTPATIENT)
Dept: OBSTETRICS AND GYNECOLOGY | Facility: CLINIC | Age: 61
End: 2021-09-22

## 2021-09-22 ENCOUNTER — OFFICE VISIT (OUTPATIENT)
Dept: OBSTETRICS AND GYNECOLOGY | Facility: CLINIC | Age: 61
End: 2021-09-22
Attending: OBSTETRICS & GYNECOLOGY
Payer: MEDICARE

## 2021-09-22 ENCOUNTER — PATIENT MESSAGE (OUTPATIENT)
Dept: DERMATOLOGY | Facility: CLINIC | Age: 61
End: 2021-09-22

## 2021-09-22 DIAGNOSIS — N95.1 MENOPAUSAL SYMPTOM: Primary | ICD-10-CM

## 2021-09-22 DIAGNOSIS — R68.82 LOW LIBIDO: ICD-10-CM

## 2021-09-22 PROCEDURE — 1159F MED LIST DOCD IN RCRD: CPT | Mod: CPTII,95,, | Performed by: OBSTETRICS & GYNECOLOGY

## 2021-09-22 PROCEDURE — 99213 PR OFFICE/OUTPT VISIT, EST, LEVL III, 20-29 MIN: ICD-10-PCS | Mod: 95,,, | Performed by: OBSTETRICS & GYNECOLOGY

## 2021-09-22 PROCEDURE — 1160F RVW MEDS BY RX/DR IN RCRD: CPT | Mod: CPTII,95,, | Performed by: OBSTETRICS & GYNECOLOGY

## 2021-09-22 PROCEDURE — 1160F PR REVIEW ALL MEDS BY PRESCRIBER/CLIN PHARMACIST DOCUMENTED: ICD-10-PCS | Mod: CPTII,95,, | Performed by: OBSTETRICS & GYNECOLOGY

## 2021-09-22 PROCEDURE — 1159F PR MEDICATION LIST DOCUMENTED IN MEDICAL RECORD: ICD-10-PCS | Mod: CPTII,95,, | Performed by: OBSTETRICS & GYNECOLOGY

## 2021-09-22 PROCEDURE — 99213 OFFICE O/P EST LOW 20 MIN: CPT | Mod: 95,,, | Performed by: OBSTETRICS & GYNECOLOGY

## 2021-09-23 ENCOUNTER — PATIENT OUTREACH (OUTPATIENT)
Dept: ADMINISTRATIVE | Facility: OTHER | Age: 61
End: 2021-09-23

## 2021-09-24 ENCOUNTER — PATIENT MESSAGE (OUTPATIENT)
Dept: OBSTETRICS AND GYNECOLOGY | Facility: CLINIC | Age: 61
End: 2021-09-24

## 2021-09-24 ENCOUNTER — PROCEDURE VISIT (OUTPATIENT)
Dept: DERMATOLOGY | Facility: CLINIC | Age: 61
End: 2021-09-24
Payer: MEDICARE

## 2021-09-24 ENCOUNTER — LAB VISIT (OUTPATIENT)
Dept: LAB | Facility: OTHER | Age: 61
End: 2021-09-24
Attending: OBSTETRICS & GYNECOLOGY
Payer: MEDICARE

## 2021-09-24 ENCOUNTER — PATIENT MESSAGE (OUTPATIENT)
Dept: DERMATOLOGY | Facility: CLINIC | Age: 61
End: 2021-09-24

## 2021-09-24 DIAGNOSIS — Z41.1 ELECTIVE PROCEDURE FOR UNACCEPTABLE COSMETIC APPEARANCE: Primary | ICD-10-CM

## 2021-09-24 DIAGNOSIS — N95.1 MENOPAUSAL SYMPTOM: ICD-10-CM

## 2021-09-24 LAB — ESTRADIOL SERPL-MCNC: 43 PG/ML

## 2021-09-24 PROCEDURE — 84402 ASSAY OF FREE TESTOSTERONE: CPT | Mod: HCNC | Performed by: OBSTETRICS & GYNECOLOGY

## 2021-09-24 PROCEDURE — 99499 NO LOS: ICD-10-PCS | Mod: CSM,S$GLB,, | Performed by: DERMATOLOGY

## 2021-09-24 PROCEDURE — 82670 ASSAY OF TOTAL ESTRADIOL: CPT | Mod: HCNC | Performed by: OBSTETRICS & GYNECOLOGY

## 2021-09-24 PROCEDURE — 99499 UNLISTED E&M SERVICE: CPT | Mod: CSM,S$GLB,, | Performed by: DERMATOLOGY

## 2021-09-27 ENCOUNTER — TELEPHONE (OUTPATIENT)
Dept: PAIN MEDICINE | Facility: CLINIC | Age: 61
End: 2021-09-27

## 2021-09-27 ENCOUNTER — LAB VISIT (OUTPATIENT)
Dept: LAB | Facility: HOSPITAL | Age: 61
End: 2021-09-27
Attending: INTERNAL MEDICINE
Payer: MEDICARE

## 2021-09-27 ENCOUNTER — OFFICE VISIT (OUTPATIENT)
Dept: RHEUMATOLOGY | Facility: CLINIC | Age: 61
End: 2021-09-27
Payer: MEDICARE

## 2021-09-27 VITALS
BODY MASS INDEX: 24.22 KG/M2 | WEIGHT: 128.31 LBS | SYSTOLIC BLOOD PRESSURE: 105 MMHG | DIASTOLIC BLOOD PRESSURE: 72 MMHG | HEIGHT: 61 IN | HEART RATE: 73 BPM

## 2021-09-27 DIAGNOSIS — R76.8 RHEUMATOID FACTOR POSITIVE: ICD-10-CM

## 2021-09-27 DIAGNOSIS — R53.83 FATIGUE, UNSPECIFIED TYPE: ICD-10-CM

## 2021-09-27 DIAGNOSIS — M25.549 ARTHRALGIA OF HAND, UNSPECIFIED LATERALITY: ICD-10-CM

## 2021-09-27 DIAGNOSIS — M25.549 ARTHRALGIA OF HAND, UNSPECIFIED LATERALITY: Primary | ICD-10-CM

## 2021-09-27 LAB
ALBUMIN SERPL BCP-MCNC: 4.3 G/DL (ref 3.5–5.2)
ALP SERPL-CCNC: 84 U/L (ref 55–135)
ALT SERPL W/O P-5'-P-CCNC: 23 U/L (ref 10–44)
ANION GAP SERPL CALC-SCNC: 9 MMOL/L (ref 8–16)
AST SERPL-CCNC: 18 U/L (ref 10–40)
BASOPHILS # BLD AUTO: 0.06 K/UL (ref 0–0.2)
BASOPHILS NFR BLD: 0.7 % (ref 0–1.9)
BILIRUB SERPL-MCNC: 1.1 MG/DL (ref 0.1–1)
BUN SERPL-MCNC: 17 MG/DL (ref 8–23)
CALCIUM SERPL-MCNC: 10.3 MG/DL (ref 8.7–10.5)
CCP AB SER IA-ACNC: <0.5 U/ML
CHLORIDE SERPL-SCNC: 106 MMOL/L (ref 95–110)
CO2 SERPL-SCNC: 23 MMOL/L (ref 23–29)
CREAT SERPL-MCNC: 0.8 MG/DL (ref 0.5–1.4)
CRP SERPL-MCNC: 3.9 MG/L (ref 0–8.2)
DIFFERENTIAL METHOD: ABNORMAL
EOSINOPHIL # BLD AUTO: 0.3 K/UL (ref 0–0.5)
EOSINOPHIL NFR BLD: 3.1 % (ref 0–8)
ERYTHROCYTE [DISTWIDTH] IN BLOOD BY AUTOMATED COUNT: 12.1 % (ref 11.5–14.5)
ERYTHROCYTE [SEDIMENTATION RATE] IN BLOOD BY WESTERGREN METHOD: 7 MM/HR (ref 0–36)
EST. GFR  (AFRICAN AMERICAN): >60 ML/MIN/1.73 M^2
EST. GFR  (NON AFRICAN AMERICAN): >60 ML/MIN/1.73 M^2
GLUCOSE SERPL-MCNC: 97 MG/DL (ref 70–110)
HCT VFR BLD AUTO: 43 % (ref 37–48.5)
HGB BLD-MCNC: 14.2 G/DL (ref 12–16)
IMM GRANULOCYTES # BLD AUTO: 0.02 K/UL (ref 0–0.04)
IMM GRANULOCYTES NFR BLD AUTO: 0.2 % (ref 0–0.5)
LYMPHOCYTES # BLD AUTO: 2.4 K/UL (ref 1–4.8)
LYMPHOCYTES NFR BLD: 29.1 % (ref 18–48)
MCH RBC QN AUTO: 32.3 PG (ref 27–31)
MCHC RBC AUTO-ENTMCNC: 33 G/DL (ref 32–36)
MCV RBC AUTO: 98 FL (ref 82–98)
MONOCYTES # BLD AUTO: 0.6 K/UL (ref 0.3–1)
MONOCYTES NFR BLD: 7.6 % (ref 4–15)
NEUTROPHILS # BLD AUTO: 4.8 K/UL (ref 1.8–7.7)
NEUTROPHILS NFR BLD: 59.3 % (ref 38–73)
NRBC BLD-RTO: 0 /100 WBC
PLATELET # BLD AUTO: 300 K/UL (ref 150–450)
PMV BLD AUTO: 10.3 FL (ref 9.2–12.9)
POTASSIUM SERPL-SCNC: 4.5 MMOL/L (ref 3.5–5.1)
PROT SERPL-MCNC: 7.6 G/DL (ref 6–8.4)
RBC # BLD AUTO: 4.39 M/UL (ref 4–5.4)
RHEUMATOID FACT SERPL-ACNC: <13 IU/ML (ref 0–15)
SODIUM SERPL-SCNC: 138 MMOL/L (ref 136–145)
WBC # BLD AUTO: 8.07 K/UL (ref 3.9–12.7)

## 2021-09-27 PROCEDURE — 1160F PR REVIEW ALL MEDS BY PRESCRIBER/CLIN PHARMACIST DOCUMENTED: ICD-10-PCS | Mod: HCNC,CPTII,S$GLB, | Performed by: INTERNAL MEDICINE

## 2021-09-27 PROCEDURE — 1160F RVW MEDS BY RX/DR IN RCRD: CPT | Mod: HCNC,CPTII,S$GLB, | Performed by: INTERNAL MEDICINE

## 2021-09-27 PROCEDURE — 99999 PR PBB SHADOW E&M-EST. PATIENT-LVL IV: CPT | Mod: PBBFAC,HCNC,, | Performed by: INTERNAL MEDICINE

## 2021-09-27 PROCEDURE — 36415 COLL VENOUS BLD VENIPUNCTURE: CPT | Mod: HCNC | Performed by: INTERNAL MEDICINE

## 2021-09-27 PROCEDURE — 80053 COMPREHEN METABOLIC PANEL: CPT | Mod: HCNC | Performed by: INTERNAL MEDICINE

## 2021-09-27 PROCEDURE — 1159F MED LIST DOCD IN RCRD: CPT | Mod: HCNC,CPTII,S$GLB, | Performed by: INTERNAL MEDICINE

## 2021-09-27 PROCEDURE — 86140 C-REACTIVE PROTEIN: CPT | Mod: HCNC | Performed by: INTERNAL MEDICINE

## 2021-09-27 PROCEDURE — 1159F PR MEDICATION LIST DOCUMENTED IN MEDICAL RECORD: ICD-10-PCS | Mod: HCNC,CPTII,S$GLB, | Performed by: INTERNAL MEDICINE

## 2021-09-27 PROCEDURE — 3078F PR MOST RECENT DIASTOLIC BLOOD PRESSURE < 80 MM HG: ICD-10-PCS | Mod: HCNC,CPTII,S$GLB, | Performed by: INTERNAL MEDICINE

## 2021-09-27 PROCEDURE — 86431 RHEUMATOID FACTOR QUANT: CPT | Mod: HCNC | Performed by: INTERNAL MEDICINE

## 2021-09-27 PROCEDURE — 99214 PR OFFICE/OUTPT VISIT, EST, LEVL IV, 30-39 MIN: ICD-10-PCS | Mod: HCNC,S$GLB,, | Performed by: INTERNAL MEDICINE

## 2021-09-27 PROCEDURE — 85652 RBC SED RATE AUTOMATED: CPT | Mod: HCNC | Performed by: INTERNAL MEDICINE

## 2021-09-27 PROCEDURE — 3078F DIAST BP <80 MM HG: CPT | Mod: HCNC,CPTII,S$GLB, | Performed by: INTERNAL MEDICINE

## 2021-09-27 PROCEDURE — 86200 CCP ANTIBODY: CPT | Mod: HCNC | Performed by: INTERNAL MEDICINE

## 2021-09-27 PROCEDURE — 99999 PR PBB SHADOW E&M-EST. PATIENT-LVL IV: ICD-10-PCS | Mod: PBBFAC,HCNC,, | Performed by: INTERNAL MEDICINE

## 2021-09-27 PROCEDURE — 3074F PR MOST RECENT SYSTOLIC BLOOD PRESSURE < 130 MM HG: ICD-10-PCS | Mod: HCNC,CPTII,S$GLB, | Performed by: INTERNAL MEDICINE

## 2021-09-27 PROCEDURE — 3008F BODY MASS INDEX DOCD: CPT | Mod: HCNC,CPTII,S$GLB, | Performed by: INTERNAL MEDICINE

## 2021-09-27 PROCEDURE — 99214 OFFICE O/P EST MOD 30 MIN: CPT | Mod: HCNC,S$GLB,, | Performed by: INTERNAL MEDICINE

## 2021-09-27 PROCEDURE — 3008F PR BODY MASS INDEX (BMI) DOCUMENTED: ICD-10-PCS | Mod: HCNC,CPTII,S$GLB, | Performed by: INTERNAL MEDICINE

## 2021-09-27 PROCEDURE — 85025 COMPLETE CBC W/AUTO DIFF WBC: CPT | Mod: HCNC | Performed by: INTERNAL MEDICINE

## 2021-09-27 PROCEDURE — 3074F SYST BP LT 130 MM HG: CPT | Mod: HCNC,CPTII,S$GLB, | Performed by: INTERNAL MEDICINE

## 2021-09-28 ENCOUNTER — PATIENT MESSAGE (OUTPATIENT)
Dept: RHEUMATOLOGY | Facility: CLINIC | Age: 61
End: 2021-09-28

## 2021-09-28 LAB — TESTOST FREE SERPL-MCNC: 0.8 PG/ML

## 2021-10-09 ENCOUNTER — OFFICE VISIT (OUTPATIENT)
Dept: URGENT CARE | Facility: CLINIC | Age: 61
End: 2021-10-09
Payer: MEDICARE

## 2021-10-09 VITALS
WEIGHT: 128 LBS | OXYGEN SATURATION: 97 % | BODY MASS INDEX: 24.17 KG/M2 | TEMPERATURE: 98 F | SYSTOLIC BLOOD PRESSURE: 115 MMHG | RESPIRATION RATE: 19 BRPM | HEIGHT: 61 IN | HEART RATE: 94 BPM | DIASTOLIC BLOOD PRESSURE: 78 MMHG

## 2021-10-09 DIAGNOSIS — T63.441A BEE STING, ACCIDENTAL OR UNINTENTIONAL, INITIAL ENCOUNTER: ICD-10-CM

## 2021-10-09 DIAGNOSIS — L03.90 CELLULITIS, UNSPECIFIED CELLULITIS SITE: Primary | ICD-10-CM

## 2021-10-09 PROCEDURE — 3008F PR BODY MASS INDEX (BMI) DOCUMENTED: ICD-10-PCS | Mod: CPTII,S$GLB,, | Performed by: NURSE PRACTITIONER

## 2021-10-09 PROCEDURE — 3078F DIAST BP <80 MM HG: CPT | Mod: CPTII,S$GLB,, | Performed by: NURSE PRACTITIONER

## 2021-10-09 PROCEDURE — 99203 OFFICE O/P NEW LOW 30 MIN: CPT | Mod: 25,S$GLB,, | Performed by: NURSE PRACTITIONER

## 2021-10-09 PROCEDURE — 3074F PR MOST RECENT SYSTOLIC BLOOD PRESSURE < 130 MM HG: ICD-10-PCS | Mod: CPTII,S$GLB,, | Performed by: NURSE PRACTITIONER

## 2021-10-09 PROCEDURE — 1159F MED LIST DOCD IN RCRD: CPT | Mod: CPTII,S$GLB,, | Performed by: NURSE PRACTITIONER

## 2021-10-09 PROCEDURE — 1159F PR MEDICATION LIST DOCUMENTED IN MEDICAL RECORD: ICD-10-PCS | Mod: CPTII,S$GLB,, | Performed by: NURSE PRACTITIONER

## 2021-10-09 PROCEDURE — 3074F SYST BP LT 130 MM HG: CPT | Mod: CPTII,S$GLB,, | Performed by: NURSE PRACTITIONER

## 2021-10-09 PROCEDURE — 3008F BODY MASS INDEX DOCD: CPT | Mod: CPTII,S$GLB,, | Performed by: NURSE PRACTITIONER

## 2021-10-09 PROCEDURE — 96372 PR INJECTION,THERAP/PROPH/DIAG2ST, IM OR SUBCUT: ICD-10-PCS | Mod: S$GLB,,, | Performed by: NURSE PRACTITIONER

## 2021-10-09 PROCEDURE — 3078F PR MOST RECENT DIASTOLIC BLOOD PRESSURE < 80 MM HG: ICD-10-PCS | Mod: CPTII,S$GLB,, | Performed by: NURSE PRACTITIONER

## 2021-10-09 PROCEDURE — 96372 THER/PROPH/DIAG INJ SC/IM: CPT | Mod: S$GLB,,, | Performed by: NURSE PRACTITIONER

## 2021-10-09 PROCEDURE — 99203 PR OFFICE/OUTPT VISIT, NEW, LEVL III, 30-44 MIN: ICD-10-PCS | Mod: 25,S$GLB,, | Performed by: NURSE PRACTITIONER

## 2021-10-09 RX ORDER — AMOXICILLIN 500 MG/1
500 CAPSULE ORAL 3 TIMES DAILY
Qty: 21 CAPSULE | Refills: 0 | Status: SHIPPED | OUTPATIENT
Start: 2021-10-09 | End: 2021-10-16

## 2021-10-28 ENCOUNTER — PATIENT MESSAGE (OUTPATIENT)
Dept: INTERNAL MEDICINE | Facility: CLINIC | Age: 61
End: 2021-10-28
Payer: MEDICARE

## 2021-11-11 ENCOUNTER — OFFICE VISIT (OUTPATIENT)
Dept: INTERNAL MEDICINE | Facility: CLINIC | Age: 61
End: 2021-11-11
Payer: MEDICARE

## 2021-11-11 ENCOUNTER — PATIENT MESSAGE (OUTPATIENT)
Dept: DERMATOLOGY | Facility: CLINIC | Age: 61
End: 2021-11-11
Payer: MEDICARE

## 2021-11-11 VITALS
HEIGHT: 61 IN | BODY MASS INDEX: 23.3 KG/M2 | OXYGEN SATURATION: 97 % | TEMPERATURE: 97 F | DIASTOLIC BLOOD PRESSURE: 74 MMHG | SYSTOLIC BLOOD PRESSURE: 102 MMHG | HEART RATE: 74 BPM | WEIGHT: 123.44 LBS | RESPIRATION RATE: 19 BRPM

## 2021-11-11 DIAGNOSIS — R10.13 DYSPEPSIA: Primary | ICD-10-CM

## 2021-11-11 DIAGNOSIS — K29.70 GASTRITIS, PRESENCE OF BLEEDING UNSPECIFIED, UNSPECIFIED CHRONICITY, UNSPECIFIED GASTRITIS TYPE: ICD-10-CM

## 2021-11-11 PROCEDURE — 1160F RVW MEDS BY RX/DR IN RCRD: CPT | Mod: CPTII,S$GLB,, | Performed by: INTERNAL MEDICINE

## 2021-11-11 PROCEDURE — 3008F BODY MASS INDEX DOCD: CPT | Mod: CPTII,S$GLB,, | Performed by: INTERNAL MEDICINE

## 2021-11-11 PROCEDURE — 1160F PR REVIEW ALL MEDS BY PRESCRIBER/CLIN PHARMACIST DOCUMENTED: ICD-10-PCS | Mod: CPTII,S$GLB,, | Performed by: INTERNAL MEDICINE

## 2021-11-11 PROCEDURE — 99999 PR PBB SHADOW E&M-EST. PATIENT-LVL IV: ICD-10-PCS | Mod: PBBFAC,,, | Performed by: INTERNAL MEDICINE

## 2021-11-11 PROCEDURE — 1159F MED LIST DOCD IN RCRD: CPT | Mod: CPTII,S$GLB,, | Performed by: INTERNAL MEDICINE

## 2021-11-11 PROCEDURE — 99214 PR OFFICE/OUTPT VISIT, EST, LEVL IV, 30-39 MIN: ICD-10-PCS | Mod: S$GLB,,, | Performed by: INTERNAL MEDICINE

## 2021-11-11 PROCEDURE — 3074F PR MOST RECENT SYSTOLIC BLOOD PRESSURE < 130 MM HG: ICD-10-PCS | Mod: CPTII,S$GLB,, | Performed by: INTERNAL MEDICINE

## 2021-11-11 PROCEDURE — 3078F PR MOST RECENT DIASTOLIC BLOOD PRESSURE < 80 MM HG: ICD-10-PCS | Mod: CPTII,S$GLB,, | Performed by: INTERNAL MEDICINE

## 2021-11-11 PROCEDURE — 3008F PR BODY MASS INDEX (BMI) DOCUMENTED: ICD-10-PCS | Mod: CPTII,S$GLB,, | Performed by: INTERNAL MEDICINE

## 2021-11-11 PROCEDURE — 3074F SYST BP LT 130 MM HG: CPT | Mod: CPTII,S$GLB,, | Performed by: INTERNAL MEDICINE

## 2021-11-11 PROCEDURE — 99999 PR PBB SHADOW E&M-EST. PATIENT-LVL IV: CPT | Mod: PBBFAC,,, | Performed by: INTERNAL MEDICINE

## 2021-11-11 PROCEDURE — 99214 OFFICE O/P EST MOD 30 MIN: CPT | Mod: S$GLB,,, | Performed by: INTERNAL MEDICINE

## 2021-11-11 PROCEDURE — 1159F PR MEDICATION LIST DOCUMENTED IN MEDICAL RECORD: ICD-10-PCS | Mod: CPTII,S$GLB,, | Performed by: INTERNAL MEDICINE

## 2021-11-11 PROCEDURE — 3078F DIAST BP <80 MM HG: CPT | Mod: CPTII,S$GLB,, | Performed by: INTERNAL MEDICINE

## 2021-11-11 RX ORDER — TRETINOIN 0.25 MG/G
CREAM TOPICAL
Qty: 30 G | Refills: 5 | Status: SHIPPED | OUTPATIENT
Start: 2021-11-11 | End: 2023-04-11 | Stop reason: SDUPTHER

## 2022-01-12 RX ORDER — PANTOPRAZOLE SODIUM 40 MG/1
TABLET, DELAYED RELEASE ORAL
Qty: 90 TABLET | Refills: 0 | OUTPATIENT
Start: 2022-01-12

## 2022-01-12 NOTE — TELEPHONE ENCOUNTER
No new care gaps identified.  Powered by [employer]. Reference number: [id number].   1/12/2022 9:37:31 AM CST

## 2022-01-13 DIAGNOSIS — N95.2 VAGINAL ATROPHY: ICD-10-CM

## 2022-01-13 RX ORDER — CONJUGATED ESTROGENS 0.62 MG/G
CREAM VAGINAL
Qty: 30 G | Refills: 6 | Status: SHIPPED | OUTPATIENT
Start: 2022-01-13 | End: 2022-07-28

## 2022-02-03 NOTE — PROGRESS NOTES
Subjective:       Patient ID: Raquel Kaye is a 61 y.o. female.    Chief Complaint: Abdominal Pain    HPI   The patient is status post EGD with esophageal dilatation.  She was noted to have gastritis.  Testing for H pylori was negative.  NSAIDs were stopped.  She has noted some mild recent upper abdominal pain symptoms.  She is currently on daily pantoprazole therapy.  Bowel movements are normal.  No melena gross rectal bleeding.    She does have arthritis symptoms affecting hand joints.  Symptoms are variable.  Symptoms are exacerbated by exercise.  At this time she is not experiencing any other significant joint symptoms.  We discussed use of Tylenol Arthritis preparation for joint pain.     Review of Systems   Constitutional: Negative for activity change and appetite change.   HENT: Negative for trouble swallowing.    Cardiovascular: Negative for chest pain, leg swelling and claudication.   Gastrointestinal: Positive for abdominal pain. Negative for abdominal distention, blood in stool, diarrhea, nausea, vomiting and reflux.   Musculoskeletal: Positive for arthralgias. Negative for back pain and joint swelling.            Physical Exam  Vitals and nursing note reviewed.   Constitutional:       General: She is not in acute distress.     Appearance: She is well-developed and well-nourished.   HENT:      Head: Normocephalic and atraumatic.   Eyes:      General: No scleral icterus.     Extraocular Movements: EOM normal.      Conjunctiva/sclera: Conjunctivae normal.   Neck:      Thyroid: No thyromegaly.      Vascular: No JVD.   Cardiovascular:      Rate and Rhythm: Normal rate and regular rhythm.      Pulses: Intact distal pulses.      Heart sounds: Normal heart sounds. No murmur heard.  No friction rub. No gallop.    Pulmonary:      Effort: Pulmonary effort is normal. No respiratory distress.      Breath sounds: Normal breath sounds. No wheezing or rales.   Abdominal:      General: Abdomen is flat. Bowel sounds  are normal. There is no distension.      Palpations: Abdomen is soft. There is no mass.      Tenderness: There is no abdominal tenderness. There is no right CVA tenderness or left CVA tenderness.   Musculoskeletal:         General: No swelling or tenderness. Normal range of motion.      Cervical back: Normal range of motion and neck supple.   Lymphadenopathy:      Cervical: No cervical adenopathy.   Skin:     General: Skin is warm and dry.      Findings: No rash.   Neurological:      Mental Status: She is alert and oriented to person, place, and time.           Lab Visit on 09/27/2021   Component Date Value Ref Range Status    Sed Rate 09/27/2021 7  0 - 36 mm/Hr Final    CRP 09/27/2021 3.9  0.0 - 8.2 mg/L Final    CCP Antibodies 09/27/2021 <0.5  <5.0 U/mL Final    Rheumatoid Factor 09/27/2021 <13.0  0.0 - 15.0 IU/mL Final    WBC 09/27/2021 8.07  3.90 - 12.70 K/uL Final    RBC 09/27/2021 4.39  4.00 - 5.40 M/uL Final    Hemoglobin 09/27/2021 14.2  12.0 - 16.0 g/dL Final    Hematocrit 09/27/2021 43.0  37.0 - 48.5 % Final    MCV 09/27/2021 98  82 - 98 fL Final    MCH 09/27/2021 32.3* 27.0 - 31.0 pg Final    MCHC 09/27/2021 33.0  32.0 - 36.0 g/dL Final    RDW 09/27/2021 12.1  11.5 - 14.5 % Final    Platelets 09/27/2021 300  150 - 450 K/uL Final    MPV 09/27/2021 10.3  9.2 - 12.9 fL Final    Immature Granulocytes 09/27/2021 0.2  0.0 - 0.5 % Final    Gran # (ANC) 09/27/2021 4.8  1.8 - 7.7 K/uL Final    Immature Grans (Abs) 09/27/2021 0.02  0.00 - 0.04 K/uL Final    Comment: Mild elevation in immature granulocytes is non specific and   can be seen in a variety of conditions including stress response,   acute inflammation, trauma and pregnancy. Correlation with other   laboratory and clinical findings is essential.      Lymph # 09/27/2021 2.4  1.0 - 4.8 K/uL Final    Mono # 09/27/2021 0.6  0.3 - 1.0 K/uL Final    Eos # 09/27/2021 0.3  0.0 - 0.5 K/uL Final    Baso # 09/27/2021 0.06  0.00 - 0.20 K/uL Final     nRBC 09/27/2021 0  0 /100 WBC Final    Gran % 09/27/2021 59.3  38.0 - 73.0 % Final    Lymph % 09/27/2021 29.1  18.0 - 48.0 % Final    Mono % 09/27/2021 7.6  4.0 - 15.0 % Final    Eosinophil % 09/27/2021 3.1  0.0 - 8.0 % Final    Basophil % 09/27/2021 0.7  0.0 - 1.9 % Final    Differential Method 09/27/2021 Automated   Final    Sodium 09/27/2021 138  136 - 145 mmol/L Final    Potassium 09/27/2021 4.5  3.5 - 5.1 mmol/L Final    Chloride 09/27/2021 106  95 - 110 mmol/L Final    CO2 09/27/2021 23  23 - 29 mmol/L Final    Glucose 09/27/2021 97  70 - 110 mg/dL Final    BUN 09/27/2021 17  8 - 23 mg/dL Final    Creatinine 09/27/2021 0.8  0.5 - 1.4 mg/dL Final    Calcium 09/27/2021 10.3  8.7 - 10.5 mg/dL Final    Total Protein 09/27/2021 7.6  6.0 - 8.4 g/dL Final    Albumin 09/27/2021 4.3  3.5 - 5.2 g/dL Final    Total Bilirubin 09/27/2021 1.1* 0.1 - 1.0 mg/dL Final    Comment: For infants and newborns, interpretation of results should be based  on gestational age, weight and in agreement with clinical  observations.    Premature Infant recommended reference ranges:  Up to 24 hours.............<8.0 mg/dL  Up to 48 hours............<12.0 mg/dL  3-5 days..................<15.0 mg/dL  6-29 days.................<15.0 mg/dL      Alkaline Phosphatase 09/27/2021 84  55 - 135 U/L Final    AST 09/27/2021 18  10 - 40 U/L Final    ALT 09/27/2021 23  10 - 44 U/L Final    Anion Gap 09/27/2021 9  8 - 16 mmol/L Final    eGFR if African American 09/27/2021 >60.0  >60 mL/min/1.73 m^2 Final    eGFR if non African American 09/27/2021 >60.0  >60 mL/min/1.73 m^2 Final    Comment: Calculation used to obtain the estimated glomerular filtration  rate (eGFR) is the CKD-EPI equation.      Lab Visit on 09/24/2021   Component Date Value Ref Range Status    Testosterone, Free 09/24/2021 0.8  pg/mL Final    Comment: Females-Reproductive Age:       <2.7 pg/mL  Post Menopausal:        <2.1 pg/mL    Test performed at [facility],  [address] , [place]  [address]     [phone]  Naldo Rosa MD  - Medical Director      Estradiol 09/24/2021 43  See Text pg/mL Final    Comment: Estradiol Reference Ranges (Female):  Follicular phase:  pg/mL  Midcycle:          pg/mL  Luteal phase:      pg/mL  Post-menopausal(Not on HRT): <10-28 pg/mL  Post-menopausal(On HRT): < pg/mL  Males: 11-44 pg/mL    The drug Fulvestrant (Faslodex) may interfere with the   assay leading to falsely elevated Estradiol results.    Patients treated with Mifepristone should not be tested with  the  or Alinity I Estradiol assay for up to two   weeks due to the interference of the drug in this assay.         Assessment & Plan:      Raquel was seen today for abdominal pain.  Tylenol arthritis will be ordered for joint pain.  The patient should discontinue aspirin use for now.  Protonix should be continued daily.  Follow-up consultation with Gastroenterology will be obtained if abdominal pain symptoms do not completely resolve.    Diagnoses and all orders for this visit:    Dyspepsia    Gastritis, presence of bleeding unspecified, unspecified chronicity, unspecified gastritis type         Follow up if symptoms worsen or fail to improve.     Salazar Bird MD

## 2022-02-08 ENCOUNTER — OFFICE VISIT (OUTPATIENT)
Dept: INTERNAL MEDICINE | Facility: CLINIC | Age: 62
End: 2022-02-08
Payer: MEDICARE

## 2022-02-08 DIAGNOSIS — J32.9 SINUSITIS, UNSPECIFIED CHRONICITY, UNSPECIFIED LOCATION: Primary | ICD-10-CM

## 2022-02-08 PROCEDURE — 99213 PR OFFICE/OUTPT VISIT, EST, LEVL III, 20-29 MIN: ICD-10-PCS | Mod: HCNC,95,, | Performed by: INTERNAL MEDICINE

## 2022-02-08 PROCEDURE — 1160F PR REVIEW ALL MEDS BY PRESCRIBER/CLIN PHARMACIST DOCUMENTED: ICD-10-PCS | Mod: HCNC,CPTII,95, | Performed by: INTERNAL MEDICINE

## 2022-02-08 PROCEDURE — 1159F MED LIST DOCD IN RCRD: CPT | Mod: HCNC,CPTII,95, | Performed by: INTERNAL MEDICINE

## 2022-02-08 PROCEDURE — 99213 OFFICE O/P EST LOW 20 MIN: CPT | Mod: HCNC,95,, | Performed by: INTERNAL MEDICINE

## 2022-02-08 PROCEDURE — 1160F RVW MEDS BY RX/DR IN RCRD: CPT | Mod: HCNC,CPTII,95, | Performed by: INTERNAL MEDICINE

## 2022-02-08 PROCEDURE — 1159F PR MEDICATION LIST DOCUMENTED IN MEDICAL RECORD: ICD-10-PCS | Mod: HCNC,CPTII,95, | Performed by: INTERNAL MEDICINE

## 2022-02-08 RX ORDER — LEVOCETIRIZINE DIHYDROCHLORIDE 5 MG/1
5 TABLET, FILM COATED ORAL NIGHTLY
Qty: 30 TABLET | Refills: 0 | Status: SHIPPED | OUTPATIENT
Start: 2022-02-08 | End: 2022-03-03

## 2022-02-08 NOTE — PROGRESS NOTES
Subjective:       Patient ID: Raquel Kaye is a 61 y.o. female.    Chief Complaint: Sinusitis    HPI     The patient location is:  Home   The chief complaint leading to consultation is:  Sinus infection  Total time spent with patient: 7 minutes  Visit type: Virtual visit with synchronous audio and video  Each patient to whom he or she provides medical services by telemedicine is: (1) informed of the relationship between the physician and patient and the respective role of any other health care provider with respect to management of the patient; and (2) notified that he or she may decline to receive medical services by telemedicine and may withdraw from such care at any time.    61-year-old female here for evaluation of a sinus infection.  She reports that the last couple of days, she has had her nose draining down her throat.  It is dripping in her throat and makes her cough a lot.  It was drying up a little bit.  Now it is green.  This has been going on since yesterday.  She did a COVID test yesterday that was negative.  No sick contacts.  She has no sinus pressure or congestion. It is draining in her throat.  She does not have sinus pressure.  It feels like it is going into her chest.  No fevers or chills.  No SOB.  She has tried some cough syrup that was left over from a prior rx and sudafed.      Review of Systems   Respiratory: Positive for cough.    Allergic/Immunologic: Negative for environmental allergies.         Objective:      Physical Exam  Constitutional:       General: She is not in acute distress.     Appearance: She is well-developed and well-nourished. She is not diaphoretic.   HENT:      Head: Normocephalic and atraumatic.      Right Ear: External ear normal.      Left Ear: External ear normal.   Musculoskeletal:      Cervical back: Normal range of motion.         Assessment:       1. Sinusitis, unspecified chronicity, unspecified location      Plan:       1.  Likely viral.  Patient counseled to  call back on Monday if worse or Wednesday of next week if no better for an antibiotic.  Xyzal prescribed.  Flonase over-the-counter recommended.

## 2022-02-14 ENCOUNTER — PATIENT MESSAGE (OUTPATIENT)
Dept: RESEARCH | Facility: HOSPITAL | Age: 62
End: 2022-02-14
Payer: MEDICARE

## 2022-02-21 ENCOUNTER — TELEPHONE (OUTPATIENT)
Dept: INTERNAL MEDICINE | Facility: CLINIC | Age: 62
End: 2022-02-21
Payer: MEDICARE

## 2022-02-21 DIAGNOSIS — Z00.00 WELL ADULT EXAM: Primary | ICD-10-CM

## 2022-02-21 DIAGNOSIS — E78.5 HYPERLIPIDEMIA, UNSPECIFIED HYPERLIPIDEMIA TYPE: ICD-10-CM

## 2022-02-21 DIAGNOSIS — M35.01 KERATOCONJUNCTIVITIS SICCA OF BOTH EYES: Primary | ICD-10-CM

## 2022-02-21 DIAGNOSIS — H04.123 DRY EYE SYNDROME OF BOTH EYES: ICD-10-CM

## 2022-02-21 DIAGNOSIS — I10 HYPERTENSION, ESSENTIAL: ICD-10-CM

## 2022-02-21 NOTE — TELEPHONE ENCOUNTER
----- Message from Kerrie Jacobo MA sent at 2/21/2022  9:26 AM CST -----  Contact: [phone] Patient    ----- Message -----  From: Carlee Ernst  Sent: 2/21/2022   8:23 AM CST  To: Pelon Lincoln A Staff    Doctor appointment and lab have been scheduled.  Please link lab orders to the lab appointment.  Date of doctor appointment:  07/07/22  Date of lab appointment:  06/30/22  Physical or F/U: physical  Comments:

## 2022-02-21 NOTE — TELEPHONE ENCOUNTER
----- Message from Carlee Ernst sent at 2/21/2022  8:23 AM CST -----  Contact: [phone] Patient  Patient would like to get a referral.  Referral to what specialty:  ophthalmology  Does the patient want the referral with a specific physician:  no  Is the [facility]: [facility]   Reason (be specific):  routine eye exam  Does the patient already have the specialty clinic appointment scheduled:  no  If yes, what date is the appointment scheduled:     Is the insurance listed in Epic correct? (this is important for a referral):  yes  Advised patient that once provider approves this either a nurse or  will return their call?:   Would the patient like a call back, or a response through their [facility] portal?:   call back  Comments:

## 2022-03-04 ENCOUNTER — TELEPHONE (OUTPATIENT)
Dept: OBSTETRICS AND GYNECOLOGY | Facility: CLINIC | Age: 62
End: 2022-03-04
Payer: MEDICARE

## 2022-03-04 DIAGNOSIS — Z12.31 ENCOUNTER FOR SCREENING MAMMOGRAM FOR MALIGNANT NEOPLASM OF BREAST: Primary | ICD-10-CM

## 2022-03-04 NOTE — TELEPHONE ENCOUNTER
----- Message from Umm Gamez sent at 3/4/2022 10:03 AM CST -----   Name of Who is Calling:     What is the request in detail:  patient request call back in reference to annual appointment and mammogram order Please contact to further discuss and advise      Can the clinic reply by [facility]:     What Number to Call Back if not in [facility]:  [phone]

## 2022-03-04 NOTE — TELEPHONE ENCOUNTER
----- Message from Anne Zavala sent at 3/4/2022 11:49 AM CST -----  Regarding: Returing phone call  Contact: RHONDA EISENBERG [[id number]]  Type: Patient Returning Call        Who Called:  RHONDA EISENBERG [[id number]]      Who Left Message for Patient: n/a        Does the patient know what this is regarding? Yes         Best Call Back Number:[phone]        Additional Information:

## 2022-03-17 RX ORDER — LEVOCETIRIZINE DIHYDROCHLORIDE 5 MG/1
TABLET, FILM COATED ORAL
Qty: 90 TABLET | Refills: 1 | Status: SHIPPED | OUTPATIENT
Start: 2022-03-17 | End: 2022-03-28

## 2022-03-28 ENCOUNTER — OFFICE VISIT (OUTPATIENT)
Dept: RHEUMATOLOGY | Facility: CLINIC | Age: 62
End: 2022-03-28
Payer: MEDICARE

## 2022-03-28 VITALS
HEART RATE: 77 BPM | SYSTOLIC BLOOD PRESSURE: 113 MMHG | BODY MASS INDEX: 23.65 KG/M2 | HEIGHT: 61 IN | WEIGHT: 125.25 LBS | DIASTOLIC BLOOD PRESSURE: 75 MMHG

## 2022-03-28 DIAGNOSIS — M25.549 ARTHRALGIA OF HAND, UNSPECIFIED LATERALITY: Primary | ICD-10-CM

## 2022-03-28 DIAGNOSIS — M79.642 BILATERAL HAND PAIN: ICD-10-CM

## 2022-03-28 DIAGNOSIS — M79.641 BILATERAL HAND PAIN: ICD-10-CM

## 2022-03-28 PROCEDURE — 3078F DIAST BP <80 MM HG: CPT | Mod: CPTII,S$GLB,, | Performed by: INTERNAL MEDICINE

## 2022-03-28 PROCEDURE — 3074F PR MOST RECENT SYSTOLIC BLOOD PRESSURE < 130 MM HG: ICD-10-PCS | Mod: CPTII,S$GLB,, | Performed by: INTERNAL MEDICINE

## 2022-03-28 PROCEDURE — 3074F SYST BP LT 130 MM HG: CPT | Mod: CPTII,S$GLB,, | Performed by: INTERNAL MEDICINE

## 2022-03-28 PROCEDURE — 99214 OFFICE O/P EST MOD 30 MIN: CPT | Mod: S$GLB,,, | Performed by: INTERNAL MEDICINE

## 2022-03-28 PROCEDURE — 99214 PR OFFICE/OUTPT VISIT, EST, LEVL IV, 30-39 MIN: ICD-10-PCS | Mod: S$GLB,,, | Performed by: INTERNAL MEDICINE

## 2022-03-28 PROCEDURE — 1160F RVW MEDS BY RX/DR IN RCRD: CPT | Mod: CPTII,S$GLB,, | Performed by: INTERNAL MEDICINE

## 2022-03-28 PROCEDURE — 3078F PR MOST RECENT DIASTOLIC BLOOD PRESSURE < 80 MM HG: ICD-10-PCS | Mod: CPTII,S$GLB,, | Performed by: INTERNAL MEDICINE

## 2022-03-28 PROCEDURE — 99999 PR PBB SHADOW E&M-EST. PATIENT-LVL IV: ICD-10-PCS | Mod: PBBFAC,,, | Performed by: INTERNAL MEDICINE

## 2022-03-28 PROCEDURE — 3008F BODY MASS INDEX DOCD: CPT | Mod: CPTII,S$GLB,, | Performed by: INTERNAL MEDICINE

## 2022-03-28 PROCEDURE — 99999 PR PBB SHADOW E&M-EST. PATIENT-LVL IV: CPT | Mod: PBBFAC,,, | Performed by: INTERNAL MEDICINE

## 2022-03-28 PROCEDURE — 1159F MED LIST DOCD IN RCRD: CPT | Mod: CPTII,S$GLB,, | Performed by: INTERNAL MEDICINE

## 2022-03-28 PROCEDURE — 1160F PR REVIEW ALL MEDS BY PRESCRIBER/CLIN PHARMACIST DOCUMENTED: ICD-10-PCS | Mod: CPTII,S$GLB,, | Performed by: INTERNAL MEDICINE

## 2022-03-28 PROCEDURE — 1159F PR MEDICATION LIST DOCUMENTED IN MEDICAL RECORD: ICD-10-PCS | Mod: CPTII,S$GLB,, | Performed by: INTERNAL MEDICINE

## 2022-03-28 PROCEDURE — 3008F PR BODY MASS INDEX (BMI) DOCUMENTED: ICD-10-PCS | Mod: CPTII,S$GLB,, | Performed by: INTERNAL MEDICINE

## 2022-03-28 NOTE — PROGRESS NOTES
Rapid3 Question Responses and Scores 3/25/2022   MDHAQ Score 0.5   Psychologic Score 1.1   Pain Score 8   When you awakened in the morning OVER THE LAST WEEK, did you feel stiff? Yes   If Yes, please indicate the number of hours until you are as limber as you will be for the day 2   Fatigue Score 3   Global Health Score 3   RAPID3 Score 4.22     Answers for HPI/ROS submitted by the patient on 3/25/2022  fever: No  eye redness: No  mouth sores: No  headaches: No  shortness of breath: No  chest pain: No  trouble swallowing: No  diarrhea: No  constipation: No  unexpected weight change: No  genital sore: No  dysuria: No  During the last 3 days, have you had a skin rash?: No  Bruises or bleeds easily: No  cough: No

## 2022-04-20 ENCOUNTER — PES CALL (OUTPATIENT)
Dept: ADMINISTRATIVE | Facility: CLINIC | Age: 62
End: 2022-04-20
Payer: MEDICARE

## 2022-04-21 ENCOUNTER — CLINICAL SUPPORT (OUTPATIENT)
Dept: REHABILITATION | Facility: HOSPITAL | Age: 62
End: 2022-04-21
Payer: MEDICARE

## 2022-04-21 DIAGNOSIS — M25.549 ARTHRALGIA OF HAND, UNSPECIFIED LATERALITY: ICD-10-CM

## 2022-04-21 DIAGNOSIS — M79.641 BILATERAL HAND PAIN: ICD-10-CM

## 2022-04-21 DIAGNOSIS — M25.542 ARTHRALGIA OF BOTH HANDS: ICD-10-CM

## 2022-04-21 DIAGNOSIS — M79.642 BILATERAL HAND PAIN: ICD-10-CM

## 2022-04-21 DIAGNOSIS — M25.541 ARTHRALGIA OF BOTH HANDS: ICD-10-CM

## 2022-04-21 NOTE — PLAN OF CARE
[facility]  Initial Evaluation     Name: Raquel Galan  Clinic Number: [id number]    Therapy Diagnosis:   Encounter Diagnoses   Name Primary?    Arthralgia of hand, unspecified laterality     Bilateral hand pain     Arthralgia of both hands      Physician: Nayana Sears*    Physician Orders: eval and treat   Medical Diagnosis: M25.549 (ICD-10-CM) - Arthralgia of hand, unspecified laterality M79.641,M79.642 (ICD-10-CM) - Bilateral hand pain     Surgical Procedure/ Date : none   Evaluation Date: 4/21/2022  Insurance:[employer]   Insurance Authorization period Expiration: 3/28/22   Plan of Care Certification Period: 5/30/22     Visit # / Visits Authortized: 1 / 20   Time In:8:00 am   Time Out: 9:00 am    Total Billable Time: 60  minutes    Precautions: Standard    Subjective     Involved Side:  bilateral  Dominant Side: Right  Date of Onset:  A couple of months ago   Mechanism of Injury: unknown   History of Current Condition: pt reports that she has pain mainly in right index and thumb only      Imaging: xrays   Previous Therapy: none     Patient's Goals for Therapy: pt reports that she has flares mainly in index and thumb only     Pain:  Functional Pain Scale Rating 0-10:   2/10 on average  2/10 at best  2/10 at worst  Locationbilateral: hands   Description: Dull  Aggravating Factors: Bending  Easing Factors: massage    Occupation:  Retired   Working presently: unemployed  Duties: very active at home and in garden     Functional Limitations/Social History:    Previous functional status includes: Independent with all ADLs.     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    Raquel Galan  has a past surgical history that includes Tonsillectomy; Appendectomy; Cyst Removal (Right); Adenoidectomy; Breast surgery; Mandible fracture surgery (Right); Dilation and curettage of uterus; Colonoscopy (N/A, 9/27/2018); Injection of joint (Left, 10/16/2018); Robot-assisted laparoscopic salpingo-oophorectomy using da Kika Xi (N/A, 6/12/2020); Robot-assisted laparoscopic abdominal hysterectomy using da Kika Xi (N/A, 6/12/2020); Hysterectomy; Breast biopsy (Left); and Esophagogastroduodenoscopy (N/A, 8/4/2021).    Raquel has a current medication list which includes the following prescription(s): acetaminophen, b complex vitamins, calcium 600 with vitamin d3, premarin, cyclosporine, epipen 2-norm, estradiol, fexofenadine, glucosamine-chondroitin, meclizine, myrbetriq, fish oil-omega-3 fatty acids, pantoprazole, and tretinoin.    Review of patient's allergies indicates:  No Known Allergies       Objective     Observation/Appearance:  Skin intact and Skin dry       Hand ROM. Measured in degrees.   4/21/2022 4/21/2022       Left Right              Index: MP  0/55 0/70                 PIP     0/95 0/90                 DIP 0/55 0/55                 [place]                Long:  MP 0/60 0/70                 PIP 0/90 0/95                 DIP 0/50 0/55                 [place]                Ring:   MP 0/60 0/80                 PIP 0/90 0/100                 DIP 0/50 0/40                 [place]                Small:  MP 0/70 0/70                  PIP 0/90 0/100                  DIP 0/50 0/40                 [place]                 Thumb: MP 0/40 0/40                   IP 0/60 0/60          Rad ADD/ABD 30 30          Pal ADD/ABD 30 030          Opposition  Full  Full                WRIST          flexion wnl  wnl       Ext  wnl wnl      RD wn wnl      UD wnl wnl      Pro wnl wnl      Sup  wnl wnl                             Sensation  Median Nerve Distribution 4/21/2022 4/21/2022    Left Right   [place]do     Normal 1.65-2.83     Diminished Light Touch 3.22-3.61     Diminished Protective 3.84-4.31     Loss of Protective 4.56-6.65     Untestable >6.65     2 Point Discrimination     Static     Dynamic          Strength (Dyanmometer) and Pinch Strength (Pinch Gauge)  Measured in pounds and psi. Average of three trials.   4/21/2022 4/21/2022        Left Right        Rung II 25 30       Key Pinch 10 11       3pt Pinch 10 10       2pt Pinch 6 6           CMS Impairment/Limitation/Restriction for FOTO initial  Survey    Therapist reviewed FOTO scores for Raquel Galan on 4/21/2022.   FOTO documents entered into [employer] - see Media section.                    4/21/2022   Limitation Score: 24%  Category: Self care              Treatment     Treatment Time In: 8:00 am   Treatment Time Out: 9:00 am   Total Treatment time separate from Evaluation time:50    Raquel received the following supervised modalities after being cleared for contradictions for 10 minutes:   -paraffin X 10 minutes       Raquel received therapeutic exercises for 15 minutes including:  -, finger spreads, OK sign and 10 reps each      Raquel participated in dynamic functional therapeutic activities to improve functional performance for 8  minutes, including:  -use of home accessories with large handles and dycem for opening jars     Home Exercise Program/Education:  Issued HEP: AROM and use of paraffin at home        and educated on modality use for pain management . Exercises were reviewed and Raquel was able to demonstrate them prior to the end of the session.   Pt  received a written copy of exercises to perform at home. Raquel demonstrated good  understanding of the education provided.  Pt was advised to perform these exercises free of pain, and to stop performing them if pain occurs.    Patient/Family Education: role of OT, goals for OT, scheduling/cancellations - pt verbalized understanding. Discussed insurance limitations with patient.    Additional Education provided:  none     Assessment     Raquel Galan is a 61 y.o. female referred to outpatient occupational/hand therapy and presents with a medical diagnosis of bilateal  OA hands , resulting in pain with gripping and pinching and demonstrates limitations as described in the chart below. Following  medical record review it is determined that pt will benefit from occupational therapy services in order to maximize pain free and/or functional use of bilateral  Hands .     The patient's rehab potential is Good.     Anticipated barriers to occupational therapy:  None   Pt has no cultural, educational or language barriers to learning provided.    Profile and History Assessment of Occupational Performance Level of Clinical Decision Making Complexity Score   Occupational Profile:   Raquel Galan is a 61 y.o. female who lives with their family and is currently employed . Raquel Galan has difficulty with  Dressing, fine motor to manage clothing   phone/computer use  affecting his/her daily functional abilities. His/her main goal for therapy is  Pain free function .     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    Medical and Therapy History Review:   Brief               Performance Deficits    Physical:  Joint  Mobility    Cognitive:  No Deficits    Psychosocial:    No Deficits     Clinical Decision Making:  low    Assessment Process:  Problem-Focused Assessments    Modification/Need for Assistance:  Not Necessary    Intervention Selection:  Limited Treatment Options       low  Based on PMHX, co morbidities , data from assessments and functional level of assistance required with task and clinical presentation directly impacting function.       The following goals were discussed with the patient and patient is in agreement with them as to be addressed in the treatment plan.          GOALS: 4  weeks. Pt agrees with goals set.  Goals:     Short Term (4 weeks on  5/21/22 ):  1)   Patient to be IND with HEP and modalities for pain management, progressing   2)   Increase ROM 5  degrees  For  Right thumb Mp and ip flexion  motion to increase functional hand use for gripping , progressing       Long Term (by discharge):  1)   Pt will report 0 out of 10 pain with right hand ., progressing   2)   Patient to score of 10 on FOTO to demonstrate improved perception of functionalbilateral hand/ arm  Use. Progressing   3)   Pt will return to prior level of function for ADLs and household management, progressing              Plan   Certification Period/Plan of care expiration: 4/21/2022 to  5/30/ 22 .    Outpatient Occupational Therapy 1 times weekly for 5 weeks may include the following interventions: Paraffin, Fluidotherapy and Therapeutic exercises/activities..      Kerrie Ambriz, OT

## 2022-05-19 ENCOUNTER — CLINICAL SUPPORT (OUTPATIENT)
Dept: REHABILITATION | Facility: HOSPITAL | Age: 62
End: 2022-05-19
Payer: MEDICARE

## 2022-05-19 DIAGNOSIS — M25.549 ARTHRALGIA OF HAND, UNSPECIFIED LATERALITY: Primary | ICD-10-CM

## 2022-05-19 PROCEDURE — 97018 PARAFFIN BATH THERAPY: CPT

## 2022-05-19 PROCEDURE — 97110 THERAPEUTIC EXERCISES: CPT

## 2022-05-19 NOTE — PROGRESS NOTES
Occupational Therapy Daily Treatment Note   Date 5/19/2022    Name: Raquel Galan  Clinic Number: [id number]        Involved Side:  bilateral  Dominant Side: Right  Date of Onset:  A couple of months ago   Mechanism of Injury: unknown   History of Current Condition: pt reports that she has pain mainly in right index and thumb only       Imaging: xrays   Previous Therapy: none      Patient's Goals for Therapy: pt reports that she has flares mainly in index and thumb only      Pain:  Functional Pain Scale Rating 0-10:   2/10 on average  2/10 at best  2/10 at worst  Locationbilateral: hands   Description: Dull  Aggravating Factors: Bending  Easing Factors: massage        Precautions: Standard    Subjective     Pt reports: she was compliant with home exercise program given last session.   Response to previous treatment: none   Functional change: reports she has ordered paraffin and is using it without  problems     Pain: 2/10  Location: right/ left  fingers      Objective   Observation/Appearance:  Skin intact and Skin dry         Hand ROM. Measured in degrees.    4/21/2022 4/21/2022           Left Right                       Index: MP  0/55 0/70                    PIP     0/95 0/90                    DIP 0/55 0/55                    [place]                           Long:  MP 0/60 0/70                    PIP 0/90 0/95                    DIP 0/50 0/55                    [place]                           Ring:   MP 0/60 0/80                    PIP 0/90 0/100                    DIP 0/50 0/40                    [place]                           Small:  MP 0/70 0/70                     PIP 0/90 0/100                     DIP 0/50 0/40                    [place]                           Thumb: MP 0/40 0/40                      IP 0/60 0/60             Rad ADD/ABD 30 30             Pal ADD/ABD 30 030             Opposition  Full  Full                         WRIST               flexion wnl  wnl          Ext  wnl  wnl         RD wn wnl         UD wnl wnl         Pro wnl wnl         Sup  wnl wnl                                    Strength (Dyanmometer) and Pinch Strength (Pinch Gauge)  Measured in pounds and psi. Average of three trials.    4/21/2022 4/21/2022             Left Right            Rung II 25 30           Meek Pinch 10 11           3pt Pinch 10 10           2pt Pinch 6 6                Raquel received the following supervised modalities after being cleared for contradictions for 10  minutes:   -paraffin bilateral hands       Raquel received therapeutic exercises for 10 minutes including:  -AROM wrist ,  And digits  10 reps each      Pt has reported that she has purchased paraffin unit is able to use at home without issues . She is independent with HEP . She does not feel that she has to come back to therapy again.           Home Exercises and Education Provided     Education provided:     - Progress towards goals     Written Home Exercises Provided: Patient instructed to cont prior HEP.  Exercises were reviewed and Raquel was able to demonstrate them prior to the end of the session.  Raquel demonstrated fair  understanding of the education provided.   .   See EMR under Patient Instructions for exercises provided prior visit.       Assessment     Pt would continue to benefit from skilled OT.  Pt does not require any further therapy at this time.      Raquel is progressing well towards her goals and there are no updates to goals at this time. Pt prognosis is Excellent.     Pt does not require  Any further OT . Pt is independent with HEP.        Anticipated barriers to occupational therapy:  none    Pt's spiritual, cultural and educational needs considered and pt agreeable to plan of care and goals.      GOALS: 4  weeks. Pt agrees with goals set.       Short Term (4 weeks on  5/21/22 ):  1)   Patient to be IND with HEP and modalities for pain management,  Met   2)   Increase ROM 5  degrees  For  Right thumb Mp and  ip flexion  motion to increase functional hand use for gripping ,  Met         Long Term (by discharge):  1)   Pt will report 0 out of 10 pain with right hand .,  Met   2)   Patient to score of 10 on FOTO to demonstrate improved perception of functionalbilateral hand/ arm  Use.  Met    3)   Pt will return to prior level of function for ADLs and household management,  Met          Plan     Pt to be D/c from [facility], OT

## 2022-06-13 ENCOUNTER — OFFICE VISIT (OUTPATIENT)
Dept: OBSTETRICS AND GYNECOLOGY | Facility: CLINIC | Age: 62
End: 2022-06-13
Attending: OBSTETRICS & GYNECOLOGY
Payer: MEDICARE

## 2022-06-13 VITALS
HEIGHT: 61 IN | DIASTOLIC BLOOD PRESSURE: 58 MMHG | WEIGHT: 122.63 LBS | SYSTOLIC BLOOD PRESSURE: 127 MMHG | BODY MASS INDEX: 23.15 KG/M2 | HEART RATE: 77 BPM

## 2022-06-13 DIAGNOSIS — Z01.419 ENCOUNTER FOR GYNECOLOGICAL EXAMINATION WITHOUT ABNORMAL FINDING: Primary | ICD-10-CM

## 2022-06-13 PROCEDURE — 3078F PR MOST RECENT DIASTOLIC BLOOD PRESSURE < 80 MM HG: ICD-10-PCS | Mod: CPTII,S$GLB,, | Performed by: OBSTETRICS & GYNECOLOGY

## 2022-06-13 PROCEDURE — 99999 PR PBB SHADOW E&M-EST. PATIENT-LVL III: CPT | Mod: PBBFAC,,, | Performed by: OBSTETRICS & GYNECOLOGY

## 2022-06-13 PROCEDURE — 99396 PREV VISIT EST AGE 40-64: CPT | Mod: S$GLB,,, | Performed by: OBSTETRICS & GYNECOLOGY

## 2022-06-13 PROCEDURE — 3008F BODY MASS INDEX DOCD: CPT | Mod: CPTII,S$GLB,, | Performed by: OBSTETRICS & GYNECOLOGY

## 2022-06-13 PROCEDURE — 1159F PR MEDICATION LIST DOCUMENTED IN MEDICAL RECORD: ICD-10-PCS | Mod: CPTII,S$GLB,, | Performed by: OBSTETRICS & GYNECOLOGY

## 2022-06-13 PROCEDURE — 1159F MED LIST DOCD IN RCRD: CPT | Mod: CPTII,S$GLB,, | Performed by: OBSTETRICS & GYNECOLOGY

## 2022-06-13 PROCEDURE — 99396 PR PREVENTIVE VISIT,EST,40-64: ICD-10-PCS | Mod: S$GLB,,, | Performed by: OBSTETRICS & GYNECOLOGY

## 2022-06-13 PROCEDURE — 3074F SYST BP LT 130 MM HG: CPT | Mod: CPTII,S$GLB,, | Performed by: OBSTETRICS & GYNECOLOGY

## 2022-06-13 PROCEDURE — 3074F PR MOST RECENT SYSTOLIC BLOOD PRESSURE < 130 MM HG: ICD-10-PCS | Mod: CPTII,S$GLB,, | Performed by: OBSTETRICS & GYNECOLOGY

## 2022-06-13 PROCEDURE — 99999 PR PBB SHADOW E&M-EST. PATIENT-LVL III: ICD-10-PCS | Mod: PBBFAC,,, | Performed by: OBSTETRICS & GYNECOLOGY

## 2022-06-13 PROCEDURE — 3078F DIAST BP <80 MM HG: CPT | Mod: CPTII,S$GLB,, | Performed by: OBSTETRICS & GYNECOLOGY

## 2022-06-13 PROCEDURE — 3008F PR BODY MASS INDEX (BMI) DOCUMENTED: ICD-10-PCS | Mod: CPTII,S$GLB,, | Performed by: OBSTETRICS & GYNECOLOGY

## 2022-06-13 RX ORDER — ESTRADIOL 10 UG/1
1 INSERT VAGINAL
Qty: 8 TABLET | Refills: 11 | Status: SHIPPED | OUTPATIENT
Start: 2022-06-13 | End: 2022-07-28

## 2022-06-13 RX ORDER — ESTRADIOL 0.5 MG/1
0.5 TABLET ORAL DAILY
Qty: 90 TABLET | Refills: 3 | Status: SHIPPED | OUTPATIENT
Start: 2022-06-13 | End: 2022-10-17

## 2022-06-13 NOTE — PROGRESS NOTES
SUBJECTIVE:   62 y.o. female   for annual routine  checkup. Patient's last menstrual period was 04/15/2009..  She reports vaginal dryness- she stopped using Premarin vaginal cream- too expensive. She would like to try something else. .    She would like to remain on ERT    Past Medical History:   Diagnosis Date    Allergy     Back pain     Disorder of vestibular function of both ears     Fibrocystic breast disease in female     HEARING LOSS     right side - no hearing aid    Hyperlipidemia     Meniere's disease     Osteopenia     PONV (postoperative nausea and vomiting)     PONV (postoperative nausea and vomiting)     Special screening for malignant neoplasms, colon 2013     Past Surgical History:   Procedure Laterality Date    ADENOIDECTOMY      APPENDECTOMY      BREAST BIOPSY Left     exc bx, benign per pt    BREAST SURGERY      left breast biopsy    COLONOSCOPY N/A 2018    Procedure: COLONOSCOPY;  Surgeon: Uche Smiley MD;  Location: [facility] (4TH FLR);  Service: Endoscopy;  Laterality: N/A;  pt/instructed on prep day before and day of importance    CYST REMOVAL Right     neck    DILATION AND CURETTAGE OF UTERUS      ESOPHAGOGASTRODUODENOSCOPY N/A 2021    Procedure: ESOPHAGOGASTRODUODENOSCOPY (EGD);  Surgeon: Ameya Medeiros MD;  Location: [facility] (2ND FLR);  Service: Endoscopy;  Laterality: N/A;  Food gets hung up. Dilatation likely needed.     COVID test in [place] on -GT  -new instructions via portal-tb    HYSTERECTOMY      INJECTION OF JOINT Left 10/16/2018    Procedure: INJECTION, JOINT, LEFT SI JOINT INJECTION UNDER FLUORO;  Surgeon: Alirio Lei MD;  Location[facility];  Service: Pain Management;  Laterality: Left;  NEEDS CONSENT    MANDIBLE FRACTURE SURGERY Right     scope    ROBOT-ASSISTED LAPAROSCOPIC ABDOMINAL HYSTERECTOMY USING DA ERICA XI N/A 2020    Procedure: XI ROBOTIC HYSTERECTOMY;  Surgeon: Alice Valenzuela MD;   Location: [facility] OR;  Service: OB/GYN;  Laterality: N/A;    ROBOT-ASSISTED LAPAROSCOPIC SALPINGO-OOPHORECTOMY USING DA ERICA XI N/A 6/12/2020    Procedure: XI ROBOTIC SALPINGO-OOPHORECTOMY;  Surgeon: Alice Vaelnzuela MD;  Location: [facility];  Service: OB/GYN;  Laterality: N/A;    TONSILLECTOMY       Social History     Socioeconomic History    Marital status:    Tobacco Use    Smoking status: Never Smoker    Smokeless tobacco: Never Used   Substance and Sexual Activity    Alcohol use: Yes     Alcohol/week: 2.0 standard drinks     Types: 2 Glasses of wine per week     Comment: social    Drug use: Never    Sexual activity: Yes     Partners: Male     Birth control/protection: Partner-Vasectomy, Post-menopausal, None   Other Topics Concern    Are you pregnant or think you may be? No    Breast-feeding No     Social Determinants of Health     Financial Resource Strain: Unknown    Difficulty of Paying Living Expenses: Patient refused   Food Insecurity: Unknown    Worried About Running Out of Food in the Last Year: Patient refused    Ran Out of Food in the Last Year: Patient refused   Transportation Needs: Unknown    Lack of Transportation (Medical): Patient refused    Lack of Transportation (Non-Medical): Patient refused   Physical Activity: Unknown    Days of Exercise per Week: Patient refused    Minutes of Exercise per Session: 30 min   Stress: No Stress Concern Present    Feeling of Stress : Only a little   Social Connections: Unknown    Frequency of Communication with Friends and Family: More than three times a week    Frequency of Social Gatherings with Friends and Family: More than three times a week    Active Member of Clubs or Organizations: Yes    Attends Club or Organization Meetings: More than 4 times per year    Marital Status:    Housing Stability: Unknown    Unable to Pay for Housing in the Last Year: Patient refused    Unstable Housing in the Last Year: Patient refused      Family History   Problem Relation Age of Onset    Heart disease Mother     Kidney disease Mother     Diabetes Mother     Hypertension Mother     Hyperlipidemia Mother     COPD Mother     Arthritis Mother     Miscarriages / Stillbirths Mother     Stroke Mother     Heart disease Father     Cancer Father 75        lymphoma, bladder tumors    Arthritis Father     Breast cancer Neg Hx     Ovarian cancer Neg Hx     Colon cancer Neg Hx     Lupus Neg Hx     Psoriasis Neg Hx     Rheum arthritis Neg Hx     Inflammatory bowel disease Neg Hx      OB History    Para Term  AB Living   0   0         SAB IAB Ectopic Multiple Live Births                       Current Outpatient Medications   Medication Sig Dispense Refill    b complex vitamins tablet Take 1 tablet by mouth once daily.      CALCIUM 600 WITH VITAMIN D3 600 mg(1,500mg) -200 unit Tab once daily.       cycloSPORINE (RESTASIS) 0.05 % ophthalmic emulsion 1 drop 2 (two) times daily.      EPIPEN 2-PEDRO 0.3 mg/0.3 mL (1:1,000) AtIn   0    estradioL (ESTRACE) 1 MG tablet Take 1 tablet (1 mg total) by mouth once daily. 90 tablet 3    glucosamine-chondroitin 500-400 mg tablet Take 1 tablet by mouth 3 (three) times daily.      meclizine (ANTIVERT) 12.5 mg tablet TAKE 1 TABLET BY MOUTH 3 TIMES A DAY AS NEEDED FOR NAUSEA AND VOMITING      omega-3 fatty acids/fish oil (FISH OIL-OMEGA-3 FATTY ACIDS) 300-1,000 mg capsule Take by mouth once daily.      tretinoin (RETIN-A) 0.025 % cream Compound tretinoin 0.025% / niacinamide 2% cream. Apply a pea-sized amount to entire face qhs. 30 g 5    acetaminophen (TYLENOL) 650 MG TbSR Take 650 mg by mouth as needed.      conjugated estrogens (PREMARIN) vaginal cream PLACE 1 GRAM VAGINALLY TWICE A WEEK. (Patient not taking: Reported on 2022) 30 g 6    estradioL (ESTRACE) 0.5 MG tablet Take 1 tablet (0.5 mg total) by mouth once daily. 90 tablet 3    estradioL (VAGIFEM) 10 mcg Tab Place 1 tablet  (10 mcg total) vaginally twice a week. 8 tablet 11    fexofenadine (ALLEGRA) 180 MG tablet Take 1 tablet (180 mg total) by mouth once daily. 30 tablet 11    mirabegron (MYRBETRIQ) 25 mg Tb24 ER tablet Take 1 tablet (25 mg total) by mouth once daily. 30 tablet 11    pantoprazole (PROTONIX) 40 MG tablet Take 1 tablet (40 mg total) by mouth once daily. 30 tablet 11     No current facility-administered medications for this visit.     Allergies: Patient has no known allergies.     The 10-year ASCVD risk score (Danielabenjamín GUTIERREZ Jr., et al., 2013) is: 3.8%    Values used to calculate the score:      Age: 62 years      Sex: Female      Is Non- : No      Diabetic: No      Tobacco smoker: No      Systolic Blood Pressure: 127 mmHg      Is BP treated: No      HDL Cholesterol: 83 mg/dL      Total Cholesterol: 181 mg/dL      ROS:  Constitutional: no weight loss, weight gain, fever, fatigue  Eyes:  No vision changes, glasses/contacts  ENT/Mouth: No ulcers, sinus problems, ears ringing, headache  Cardiovascular: No inability to lie flat, chest pain, exercise intolerance, swelling, heart palpitations  Respiratory: No wheezing, coughing blood, shortness of breath, or cough  Gastrointestinal: No diarrhea, bloody stool, nausea/vomiting, constipation, gas, hemorrhoids  Genitourinary: No blood in urine, painful urination, urgency of urination, frequency of urination, incomplete emptying, incontinence, abnormal bleeding, painful periods, heavy periods, vaginal discharge, vaginal odor, painful intercourse, sexual problems, bleeding after intercourse, +vaginal dryness  Musculoskeletal: No muscle weakness  Skin/Breast: No painful breasts, nipple discharge, masses, rash, ulcers  Neurological: No passing out, seizures, numbness, headache  Endocrine: No diabetes, hypothyroid, hyperthyroid, hot flashes, hair loss, abnormal hair growth, acne  Psychiatric: No depression, crying  Hematologic: No bruises, bleeding, swollen lymph  nodes, anemia.      Physical Exam:   Constitutional: She is oriented to person, place, and time. She appears well-developed and well-nourished.      Neck: No tracheal deviation present. No thyromegaly present.    Cardiovascular: Exam reveals no edema.     Pulmonary/Chest: Effort normal. She exhibits no mass, no tenderness, no deformity and no retraction. Right breast exhibits no inverted nipple, no mass, no nipple discharge, no skin change, no tenderness, presence, no bleeding and no swelling. Left breast exhibits no inverted nipple, no mass, no nipple discharge, no skin change, no tenderness, presence, no bleeding and no swelling. Breasts are symmetrical.        Abdominal: Soft. She exhibits no distension and no mass. There is no abdominal tenderness. There is no rebound and no guarding. No hernia. Hernia confirmed negative in the left inguinal area.     Genitourinary: Rectum:      No external hemorrhoid.   There is no rash, tenderness or lesion on the right labia. There is no rash, tenderness or lesion on the left labia. No no adexnal prolapse. Right adnexum displays no mass, no tenderness and no fullness. Left adnexum displays no mass, no tenderness and no fullness. Vaginal cuff normal.  No  no vaginal discharge, tenderness, bleeding, rectocele, cystocele or unspecified prolapse of vaginal walls in the vagina. Cervix is absent.Uterus is absent.           Musculoskeletal: Normal range of motion and moves all extremeties. No edema.       Neurological: She is alert and oriented to person, place, and time.    Skin: No rash noted. No erythema. No pallor.    Psychiatric: She has a normal mood and affect. Her behavior is normal. Judgment and thought content normal.         ASSESSMENT:   well woman  no contraindication to continue hormonal therapy    PLAN:   Mammogram  Will decrease Estradiol to 0.5 mg daily  Yuvafem sent in- counseled her that I am not sure if it is covered.  Compression socks and ambulation discussed-  she is flying to [place]  return annually or prn

## 2022-06-30 ENCOUNTER — LAB VISIT (OUTPATIENT)
Dept: LAB | Facility: HOSPITAL | Age: 62
End: 2022-06-30
Attending: INTERNAL MEDICINE
Payer: MEDICARE

## 2022-06-30 DIAGNOSIS — Z00.00 WELL ADULT EXAM: ICD-10-CM

## 2022-06-30 DIAGNOSIS — E78.5 HYPERLIPIDEMIA, UNSPECIFIED HYPERLIPIDEMIA TYPE: ICD-10-CM

## 2022-06-30 DIAGNOSIS — I10 HYPERTENSION, ESSENTIAL: ICD-10-CM

## 2022-06-30 LAB
ALBUMIN SERPL BCP-MCNC: 4.1 G/DL (ref 3.5–5.2)
ALP SERPL-CCNC: 79 U/L (ref 55–135)
ALT SERPL W/O P-5'-P-CCNC: 20 U/L (ref 10–44)
ANION GAP SERPL CALC-SCNC: 8 MMOL/L (ref 8–16)
AST SERPL-CCNC: 18 U/L (ref 10–40)
BASOPHILS # BLD AUTO: 0.06 K/UL (ref 0–0.2)
BASOPHILS NFR BLD: 1 % (ref 0–1.9)
BILIRUB SERPL-MCNC: 1.3 MG/DL (ref 0.1–1)
BUN SERPL-MCNC: 14 MG/DL (ref 8–23)
CALCIUM SERPL-MCNC: 9.3 MG/DL (ref 8.7–10.5)
CHLORIDE SERPL-SCNC: 109 MMOL/L (ref 95–110)
CHOLEST SERPL-MCNC: 196 MG/DL (ref 120–199)
CHOLEST/HDLC SERPL: 2.8 {RATIO} (ref 2–5)
CO2 SERPL-SCNC: 25 MMOL/L (ref 23–29)
CREAT SERPL-MCNC: 0.8 MG/DL (ref 0.5–1.4)
DIFFERENTIAL METHOD: ABNORMAL
EOSINOPHIL # BLD AUTO: 0.3 K/UL (ref 0–0.5)
EOSINOPHIL NFR BLD: 4.2 % (ref 0–8)
ERYTHROCYTE [DISTWIDTH] IN BLOOD BY AUTOMATED COUNT: 12.8 % (ref 11.5–14.5)
EST. GFR  (AFRICAN AMERICAN): >60 ML/MIN/1.73 M^2
EST. GFR  (NON AFRICAN AMERICAN): >60 ML/MIN/1.73 M^2
ESTIMATED AVG GLUCOSE: 103 MG/DL (ref 68–131)
GLUCOSE SERPL-MCNC: 99 MG/DL (ref 70–110)
HBA1C MFR BLD: 5.2 % (ref 4–5.6)
HCT VFR BLD AUTO: 43.6 % (ref 37–48.5)
HDLC SERPL-MCNC: 69 MG/DL (ref 40–75)
HDLC SERPL: 35.2 % (ref 20–50)
HGB BLD-MCNC: 14.1 G/DL (ref 12–16)
IMM GRANULOCYTES # BLD AUTO: 0.01 K/UL (ref 0–0.04)
IMM GRANULOCYTES NFR BLD AUTO: 0.2 % (ref 0–0.5)
LDLC SERPL CALC-MCNC: 116 MG/DL (ref 63–159)
LYMPHOCYTES # BLD AUTO: 2.2 K/UL (ref 1–4.8)
LYMPHOCYTES NFR BLD: 37.4 % (ref 18–48)
MCH RBC QN AUTO: 32.9 PG (ref 27–31)
MCHC RBC AUTO-ENTMCNC: 32.3 G/DL (ref 32–36)
MCV RBC AUTO: 102 FL (ref 82–98)
MONOCYTES # BLD AUTO: 0.5 K/UL (ref 0.3–1)
MONOCYTES NFR BLD: 8.5 % (ref 4–15)
NEUTROPHILS # BLD AUTO: 2.9 K/UL (ref 1.8–7.7)
NEUTROPHILS NFR BLD: 48.7 % (ref 38–73)
NONHDLC SERPL-MCNC: 127 MG/DL
NRBC BLD-RTO: 0 /100 WBC
PLATELET # BLD AUTO: 309 K/UL (ref 150–450)
PMV BLD AUTO: 10.9 FL (ref 9.2–12.9)
POTASSIUM SERPL-SCNC: 4.1 MMOL/L (ref 3.5–5.1)
PROT SERPL-MCNC: 7.2 G/DL (ref 6–8.4)
RBC # BLD AUTO: 4.29 M/UL (ref 4–5.4)
SODIUM SERPL-SCNC: 142 MMOL/L (ref 136–145)
T4 FREE SERPL-MCNC: 0.95 NG/DL (ref 0.71–1.51)
TRIGL SERPL-MCNC: 55 MG/DL (ref 30–150)
TSH SERPL DL<=0.005 MIU/L-ACNC: 2.31 UIU/ML (ref 0.4–4)
WBC # BLD AUTO: 5.97 K/UL (ref 3.9–12.7)

## 2022-06-30 PROCEDURE — 84443 ASSAY THYROID STIM HORMONE: CPT | Performed by: INTERNAL MEDICINE

## 2022-06-30 PROCEDURE — 80053 COMPREHEN METABOLIC PANEL: CPT | Performed by: INTERNAL MEDICINE

## 2022-06-30 PROCEDURE — 84439 ASSAY OF FREE THYROXINE: CPT | Performed by: INTERNAL MEDICINE

## 2022-06-30 PROCEDURE — 85025 COMPLETE CBC W/AUTO DIFF WBC: CPT | Performed by: INTERNAL MEDICINE

## 2022-06-30 PROCEDURE — 36415 COLL VENOUS BLD VENIPUNCTURE: CPT | Mod: PO | Performed by: INTERNAL MEDICINE

## 2022-06-30 PROCEDURE — 80061 LIPID PANEL: CPT | Performed by: INTERNAL MEDICINE

## 2022-06-30 PROCEDURE — 83036 HEMOGLOBIN GLYCOSYLATED A1C: CPT | Performed by: INTERNAL MEDICINE

## 2022-07-06 ENCOUNTER — HOSPITAL ENCOUNTER (OUTPATIENT)
Dept: RADIOLOGY | Facility: HOSPITAL | Age: 62
Discharge: HOME | End: 2022-07-06
Attending: OBSTETRICS & GYNECOLOGY
Payer: MEDICARE

## 2022-07-06 ENCOUNTER — OFFICE VISIT (OUTPATIENT)
Dept: OPTOMETRY | Facility: CLINIC | Age: 62
End: 2022-07-06
Payer: MEDICARE

## 2022-07-06 VITALS — HEIGHT: 61 IN | WEIGHT: 120 LBS | BODY MASS INDEX: 22.66 KG/M2

## 2022-07-06 DIAGNOSIS — Z13.5 GLAUCOMA SCREENING: ICD-10-CM

## 2022-07-06 DIAGNOSIS — H04.123 DRY EYE SYNDROME OF BOTH EYES: ICD-10-CM

## 2022-07-06 DIAGNOSIS — Z12.31 ENCOUNTER FOR SCREENING MAMMOGRAM FOR MALIGNANT NEOPLASM OF BREAST: ICD-10-CM

## 2022-07-06 DIAGNOSIS — H25.13 NUCLEAR SCLEROSIS, BILATERAL: Primary | ICD-10-CM

## 2022-07-06 DIAGNOSIS — M35.01 KERATOCONJUNCTIVITIS SICCA OF BOTH EYES: ICD-10-CM

## 2022-07-06 DIAGNOSIS — H52.4 PRESBYOPIA: ICD-10-CM

## 2022-07-06 PROCEDURE — 77063 MAMMO DIGITAL SCREENING BILAT WITH TOMO: ICD-10-PCS | Mod: 26,,, | Performed by: RADIOLOGY

## 2022-07-06 PROCEDURE — 92004 COMPRE OPH EXAM NEW PT 1/>: CPT | Mod: S$GLB,,, | Performed by: OPTOMETRIST

## 2022-07-06 PROCEDURE — 1160F PR REVIEW ALL MEDS BY PRESCRIBER/CLIN PHARMACIST DOCUMENTED: ICD-10-PCS | Mod: CPTII,S$GLB,, | Performed by: OPTOMETRIST

## 2022-07-06 PROCEDURE — 1160F RVW MEDS BY RX/DR IN RCRD: CPT | Mod: CPTII,S$GLB,, | Performed by: OPTOMETRIST

## 2022-07-06 PROCEDURE — 92004 PR EYE EXAM, NEW PATIENT,COMPREHESV: ICD-10-PCS | Mod: S$GLB,,, | Performed by: OPTOMETRIST

## 2022-07-06 PROCEDURE — 3044F PR MOST RECENT HEMOGLOBIN A1C LEVEL <7.0%: ICD-10-PCS | Mod: CPTII,S$GLB,, | Performed by: OPTOMETRIST

## 2022-07-06 PROCEDURE — 77067 SCR MAMMO BI INCL CAD: CPT | Mod: 26,,, | Performed by: RADIOLOGY

## 2022-07-06 PROCEDURE — 77063 BREAST TOMOSYNTHESIS BI: CPT | Mod: TC

## 2022-07-06 PROCEDURE — 1159F MED LIST DOCD IN RCRD: CPT | Mod: CPTII,S$GLB,, | Performed by: OPTOMETRIST

## 2022-07-06 PROCEDURE — 92015 DETERMINE REFRACTIVE STATE: CPT | Mod: S$GLB,,, | Performed by: OPTOMETRIST

## 2022-07-06 PROCEDURE — 77063 BREAST TOMOSYNTHESIS BI: CPT | Mod: 26,,, | Performed by: RADIOLOGY

## 2022-07-06 PROCEDURE — 3044F HG A1C LEVEL LT 7.0%: CPT | Mod: CPTII,S$GLB,, | Performed by: OPTOMETRIST

## 2022-07-06 PROCEDURE — 77067 MAMMO DIGITAL SCREENING BILAT WITH TOMO: ICD-10-PCS | Mod: 26,,, | Performed by: RADIOLOGY

## 2022-07-06 PROCEDURE — 99999 PR PBB SHADOW E&M-EST. PATIENT-LVL III: ICD-10-PCS | Mod: PBBFAC,,, | Performed by: OPTOMETRIST

## 2022-07-06 PROCEDURE — 92015 PR REFRACTION: ICD-10-PCS | Mod: S$GLB,,, | Performed by: OPTOMETRIST

## 2022-07-06 PROCEDURE — 1159F PR MEDICATION LIST DOCUMENTED IN MEDICAL RECORD: ICD-10-PCS | Mod: CPTII,S$GLB,, | Performed by: OPTOMETRIST

## 2022-07-06 PROCEDURE — 99999 PR PBB SHADOW E&M-EST. PATIENT-LVL III: CPT | Mod: PBBFAC,,, | Performed by: OPTOMETRIST

## 2022-07-06 NOTE — PROGRESS NOTES
HPI     Presents today to establish ocular health care.  No vision complaints--wear OTC readers +2.50 for near.  Floaters, no flashes  Restasis--for dry eyes    Last edited by Marti Bland MA on 7/6/2022  9:38 AM. (History)        ROS     Negative for: Constitutional, Gastrointestinal, Neurological, Skin,   Genitourinary, Musculoskeletal, HENT, Endocrine, Cardiovascular, Eyes,   Respiratory, Psychiatric, Allergic/Imm, Heme/Lymph    Last edited by Pierre Ruiz, OD on 7/6/2022  9:54 AM. (History)        Assessment /Plan     For exam results, see Encounter Report.    Nuclear sclerosis, bilateral    Keratoconjunctivitis sicca of both eyes  -     Ambulatory referral/consult to Ophthalmology    Dry eye syndrome of both eyes  -     Ambulatory referral/consult to Ophthalmology    Glaucoma screening    Presbyopia      1. Small cats OU--pt happy w otc readers  2. YELENA--was on RESTASIS, but ran out and now using otc drops and feels OK.    PLAN:    1. Advised SOOTHE XP or SYS BAL ATs QID--if comfortable OK to not go back to RESTASIS.  Pt will call tho if otc doesn't work and will refill RESTASIS  2. rtc 1 yr

## 2022-07-20 ENCOUNTER — IMMUNIZATION (OUTPATIENT)
Dept: PRIMARY CARE CLINIC | Facility: CLINIC | Age: 62
End: 2022-07-20
Payer: MEDICARE

## 2022-07-20 DIAGNOSIS — Z23 NEED FOR VACCINATION: Primary | ICD-10-CM

## 2022-07-20 PROCEDURE — 0064A COVID-19, MRNA, LNP-S, PF, 100 MCG/0.25 ML DOSE VACCINE (MODERNA BOOSTER): CPT | Mod: CV19,PBBFAC | Performed by: INTERNAL MEDICINE

## 2022-07-28 ENCOUNTER — OFFICE VISIT (OUTPATIENT)
Dept: INTERNAL MEDICINE | Facility: CLINIC | Age: 62
End: 2022-07-28
Payer: MEDICARE

## 2022-07-28 VITALS
DIASTOLIC BLOOD PRESSURE: 70 MMHG | TEMPERATURE: 98 F | OXYGEN SATURATION: 98 % | HEART RATE: 74 BPM | WEIGHT: 121.69 LBS | BODY MASS INDEX: 22.98 KG/M2 | HEIGHT: 61 IN | SYSTOLIC BLOOD PRESSURE: 110 MMHG | RESPIRATION RATE: 16 BRPM

## 2022-07-28 DIAGNOSIS — M25.542 ARTHRALGIA OF BOTH HANDS: ICD-10-CM

## 2022-07-28 DIAGNOSIS — M25.541 ARTHRALGIA OF BOTH HANDS: ICD-10-CM

## 2022-07-28 DIAGNOSIS — R00.2 PALPITATIONS: ICD-10-CM

## 2022-07-28 DIAGNOSIS — R06.02 SOB (SHORTNESS OF BREATH) ON EXERTION: ICD-10-CM

## 2022-07-28 DIAGNOSIS — J30.9 CHRONIC ALLERGIC RHINITIS: ICD-10-CM

## 2022-07-28 DIAGNOSIS — Z00.00 WELL ADULT EXAM: Primary | ICD-10-CM

## 2022-07-28 DIAGNOSIS — M85.9 DISORDER OF BONE DENSITY AND STRUCTURE, UNSPECIFIED: ICD-10-CM

## 2022-07-28 PROCEDURE — 1159F PR MEDICATION LIST DOCUMENTED IN MEDICAL RECORD: ICD-10-PCS | Mod: CPTII,S$GLB,, | Performed by: INTERNAL MEDICINE

## 2022-07-28 PROCEDURE — 3044F PR MOST RECENT HEMOGLOBIN A1C LEVEL <7.0%: ICD-10-PCS | Mod: CPTII,S$GLB,, | Performed by: INTERNAL MEDICINE

## 2022-07-28 PROCEDURE — 1159F MED LIST DOCD IN RCRD: CPT | Mod: CPTII,S$GLB,, | Performed by: INTERNAL MEDICINE

## 2022-07-28 PROCEDURE — 1160F PR REVIEW ALL MEDS BY PRESCRIBER/CLIN PHARMACIST DOCUMENTED: ICD-10-PCS | Mod: CPTII,S$GLB,, | Performed by: INTERNAL MEDICINE

## 2022-07-28 PROCEDURE — 3008F BODY MASS INDEX DOCD: CPT | Mod: CPTII,S$GLB,, | Performed by: INTERNAL MEDICINE

## 2022-07-28 PROCEDURE — 99999 PR PBB SHADOW E&M-EST. PATIENT-LVL V: ICD-10-PCS | Mod: PBBFAC,,, | Performed by: INTERNAL MEDICINE

## 2022-07-28 PROCEDURE — 1160F RVW MEDS BY RX/DR IN RCRD: CPT | Mod: CPTII,S$GLB,, | Performed by: INTERNAL MEDICINE

## 2022-07-28 PROCEDURE — 99396 PREV VISIT EST AGE 40-64: CPT | Mod: S$GLB,,, | Performed by: INTERNAL MEDICINE

## 2022-07-28 PROCEDURE — 3074F PR MOST RECENT SYSTOLIC BLOOD PRESSURE < 130 MM HG: ICD-10-PCS | Mod: CPTII,S$GLB,, | Performed by: INTERNAL MEDICINE

## 2022-07-28 PROCEDURE — 99999 PR PBB SHADOW E&M-EST. PATIENT-LVL V: CPT | Mod: PBBFAC,,, | Performed by: INTERNAL MEDICINE

## 2022-07-28 PROCEDURE — 99396 PR PREVENTIVE VISIT,EST,40-64: ICD-10-PCS | Mod: S$GLB,,, | Performed by: INTERNAL MEDICINE

## 2022-07-28 PROCEDURE — 3074F SYST BP LT 130 MM HG: CPT | Mod: CPTII,S$GLB,, | Performed by: INTERNAL MEDICINE

## 2022-07-28 PROCEDURE — 3078F PR MOST RECENT DIASTOLIC BLOOD PRESSURE < 80 MM HG: ICD-10-PCS | Mod: CPTII,S$GLB,, | Performed by: INTERNAL MEDICINE

## 2022-07-28 PROCEDURE — 3078F DIAST BP <80 MM HG: CPT | Mod: CPTII,S$GLB,, | Performed by: INTERNAL MEDICINE

## 2022-07-28 PROCEDURE — 3044F HG A1C LEVEL LT 7.0%: CPT | Mod: CPTII,S$GLB,, | Performed by: INTERNAL MEDICINE

## 2022-07-28 PROCEDURE — 3008F PR BODY MASS INDEX (BMI) DOCUMENTED: ICD-10-PCS | Mod: CPTII,S$GLB,, | Performed by: INTERNAL MEDICINE

## 2022-07-28 RX ORDER — PANTOPRAZOLE SODIUM 40 MG/1
40 TABLET, DELAYED RELEASE ORAL DAILY
Qty: 30 TABLET | Refills: 5 | Status: SHIPPED | OUTPATIENT
Start: 2022-07-28 | End: 2022-12-05

## 2022-07-28 RX ORDER — MECLIZINE HCL 12.5 MG 12.5 MG/1
12.5 TABLET ORAL 3 TIMES DAILY PRN
Qty: 40 TABLET | Refills: 2 | Status: SHIPPED | OUTPATIENT
Start: 2022-07-28 | End: 2022-12-05

## 2022-07-28 RX ORDER — ALPRAZOLAM 0.5 MG/1
0.5 TABLET ORAL 2 TIMES DAILY PRN
Qty: 40 TABLET | Refills: 1 | Status: SHIPPED | OUTPATIENT
Start: 2022-07-28 | End: 2022-12-05

## 2022-07-28 NOTE — PROGRESS NOTES
Subjective:       Patient ID: Raquel Galan is a 62 y.o. female.    Chief Complaint: Annual Exam and Palpitations    HPI     The patient presents for annual physical examination and follow-up. The patient states she has been feeling well except for intermittent palpitations.  She describes a sensation of her heart racing in her chest. She also has noted shortness of breath with physical exertion such as climbing stairs.  She denies having any chest pain.  She has not experienced cough, wheezing, nausea or vomiting.  She states she has not been as physically active over the past 6 months.  No PND, orthopnea, or edema is described.    Active medical conditions include osteopenia, osteoarthritis in the hands, intermittent anxiety, menopausal syndrome.  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  There is no history of hypertension, diabetes mellitus, coronary artery disease, or asthma.    Immunization record was reviewed.      Screening tests were reviewed.  The patient is up-to-date on her screening tests.     No interval change in past medical history, family history, or social history since prior evaluations.    Review of Systems   Constitutional: Negative for fatigue, fever and unexpected weight change.   HENT: Negative for nasal congestion, postnasal drip, rhinorrhea and sore throat.    Eyes: Negative for visual disturbance.   Respiratory: Positive for shortness of breath. Negative for cough, chest tightness and wheezing.    Cardiovascular: Positive for palpitations. Negative for chest pain and leg swelling.   Gastrointestinal: Negative for abdominal pain and blood in stool.   Genitourinary: Negative for dysuria, frequency and hematuria.   Musculoskeletal: Negative for arthralgias, back pain, joint swelling and myalgias.   Integumentary:  Negative for rash.   Neurological: Positive for vertigo (  Occasional spells of vertigo are reported.). Negative for syncope, weakness, numbness and  headaches.   Psychiatric/Behavioral: Negative for sleep disturbance. The patient is not nervous/anxious.             Physical Exam  Vitals and nursing note reviewed.   Constitutional:       General: She is not in acute distress.     Appearance: She is well-developed.   HENT:      Head: Normocephalic and atraumatic.      Right Ear: External ear normal.      Left Ear: External ear normal.      Nose: Nose normal.      Mouth/Throat:      Pharynx: No oropharyngeal exudate.   Eyes:      General: No scleral icterus.     Conjunctiva/sclera: Conjunctivae normal.      Pupils: Pupils are equal, round, and reactive to light.   Neck:      Thyroid: No thyromegaly.      Vascular: No carotid bruit or JVD.   Cardiovascular:      Rate and Rhythm: Normal rate and regular rhythm.      Pulses: Normal pulses.      Heart sounds: Normal heart sounds. No murmur heard.    No friction rub. No gallop.   Pulmonary:      Effort: Pulmonary effort is normal. No respiratory distress.      Breath sounds: Normal breath sounds. No wheezing or rales.   Chest:   Breasts:      Right: No supraclavicular adenopathy.      Left: No supraclavicular adenopathy.       Abdominal:      General: Bowel sounds are normal. There is no abdominal bruit.      Palpations: Abdomen is soft. There is no hepatomegaly, splenomegaly or mass.      Tenderness: There is no abdominal tenderness.      Hernia: No hernia is present.   Musculoskeletal:         General: No tenderness. Normal range of motion.      Right shoulder: No deformity or effusion.      Cervical back: Normal range of motion and neck supple.      Comments: Right hand: Hypertrophic joint changes of the MCP joint of the index finger is noted.  No local tenderness is noted.  Range of motion is intact.   Lymphadenopathy:      Cervical: No cervical adenopathy.      Upper Body:      Right upper body: No supraclavicular adenopathy.      Left upper body: No supraclavicular adenopathy.   Skin:     General: Skin is warm and  dry.      Findings: No rash.   Neurological:      Mental Status: She is alert and oriented to person, place, and time.      Cranial Nerves: No cranial nerve deficit.   Psychiatric:         Mood and Affect: Mood normal.         Speech: Speech normal.         Behavior: Behavior normal.           Lab Visit on 06/30/2022   Component Date Value Ref Range Status    Specimen UA 06/30/2022 Urine, Clean Catch   Final    Color, UA 06/30/2022 Yellow  Yellow, Straw, Genie Final    Appearance, UA 06/30/2022 Hazy (A) Clear Final    pH, UA 06/30/2022 5.0  5.0 - 8.0 Final    Specific Gravity, UA 06/30/2022 1.025  1.005 - 1.030 Final    Protein, UA 06/30/2022 Negative  Negative Final    Comment: Recommend a 24 hour urine protein or a urine   protein/creatinine ratio if globulin induced proteinuria is  clinically suspected.      Glucose, UA 06/30/2022 Negative  Negative Final    Ketones, UA 06/30/2022 Negative  Negative Final    Bilirubin (UA) 06/30/2022 Negative  Negative Final    Occult Blood UA 06/30/2022 Negative  Negative Final    Nitrite, UA 06/30/2022 Negative  Negative Final    Leukocytes, UA 06/30/2022 Negative  Negative Final    RBC, UA 06/30/2022 0  0 - 4 /hpf Final    WBC, UA 06/30/2022 0  0 - 5 /hpf Final    Bacteria 06/30/2022 Occasional  None-Occ /hpf Final    Squam Epithel, UA 06/30/2022 4  /hpf Final    Microscopic Comment 06/30/2022 SEE COMMENT   Final    Comment: Other formed elements not mentioned in the report are not   present in the microscopic examination.      Lab Visit on 06/30/2022   Component Date Value Ref Range Status    Sodium 06/30/2022 142  136 - 145 mmol/L Final    Potassium 06/30/2022 4.1  3.5 - 5.1 mmol/L Final    Chloride 06/30/2022 109  95 - 110 mmol/L Final    CO2 06/30/2022 25  23 - 29 mmol/L Final    Glucose 06/30/2022 99  70 - 110 mg/dL Final    BUN 06/30/2022 14  8 - 23 mg/dL Final    Creatinine 06/30/2022 0.8  0.5 - 1.4 mg/dL Final    Calcium 06/30/2022 9.3  8.7 -  10.5 mg/dL Final    Total Protein 06/30/2022 7.2  6.0 - 8.4 g/dL Final    Albumin 06/30/2022 4.1  3.5 - 5.2 g/dL Final    Total Bilirubin 06/30/2022 1.3 (A) 0.1 - 1.0 mg/dL Final    Comment: For infants and newborns, interpretation of results should be based  on gestational age, weight and in agreement with clinical  observations.    Premature Infant recommended reference ranges:  Up to 24 hours.............<8.0 mg/dL  Up to 48 hours............<12.0 mg/dL  3-5 days..................<15.0 mg/dL  6-29 days.................<15.0 mg/dL      Alkaline Phosphatase 06/30/2022 79  55 - 135 U/L Final    AST 06/30/2022 18  10 - 40 U/L Final    ALT 06/30/2022 20  10 - 44 U/L Final    Anion Gap 06/30/2022 8  8 - 16 mmol/L Final    eGFR if African American 06/30/2022 >60.0  >60 mL/min/1.73 m^2 Final    eGFR if non African American 06/30/2022 >60.0  >60 mL/min/1.73 m^2 Final    Comment: Calculation used to obtain the estimated glomerular filtration  rate (eGFR) is the CKD-EPI equation.       Cholesterol 06/30/2022 196  120 - 199 mg/dL Final    Comment: The National Cholesterol Education Program (NCEP) has set the  following guidelines (reference ranges) for Cholesterol:  Optimal.....................<200 mg/dL  Borderline High.............200-239 mg/dL  High........................> or = 240 mg/dL      Triglycerides 06/30/2022 55  30 - 150 mg/dL Final    Comment: The National Cholesterol Education Program (NCEP) has set the  following guidelines (reference values) for triglycerides:  Normal......................<150 mg/dL  Borderline High.............150-199 mg/dL  High........................200-499 mg/dL      HDL 06/30/2022 69  40 - 75 mg/dL Final    Comment: The National Cholesterol Education Program (NCEP) has set the  following guidelines (reference values) for HDL Cholesterol:  Low...............<40 mg/dL  Optimal...........>60 mg/dL      LDL Cholesterol 06/30/2022 116.0  63.0 - 159.0 mg/dL Final    Comment: The  National Cholesterol Education Program (NCEP) has set the  following guidelines (reference values) for LDL Cholesterol:  Optimal.......................<130 mg/dL  Borderline High...............130-159 mg/dL  High..........................160-189 mg/dL  Very High.....................>190 mg/dL      HDL/Cholesterol Ratio 06/30/2022 35.2  20.0 - 50.0 % Final    Total Cholesterol/HDL Ratio 06/30/2022 2.8  2.0 - 5.0 Final    Non-HDL Cholesterol 06/30/2022 127  mg/dL Final    Comment: Risk category and Non-HDL cholesterol goals:  Coronary heart disease (CHD)or equivalent (10-year risk of CHD >20%):  Non-HDL cholesterol goal     <130 mg/dL  Two or more CHD risk factors and 10-year risk of CHD <= 20%:  Non-HDL cholesterol goal     <160 mg/dL  0 to 1 CHD risk factor:  Non-HDL cholesterol goal     <190 mg/dL      WBC 06/30/2022 5.97  3.90 - 12.70 K/uL Final    RBC 06/30/2022 4.29  4.00 - 5.40 M/uL Final    Hemoglobin 06/30/2022 14.1  12.0 - 16.0 g/dL Final    Hematocrit 06/30/2022 43.6  37.0 - 48.5 % Final    MCV 06/30/2022 102 (A) 82 - 98 fL Final    MCH 06/30/2022 32.9 (A) 27.0 - 31.0 pg Final    MCHC 06/30/2022 32.3  32.0 - 36.0 g/dL Final    RDW 06/30/2022 12.8  11.5 - 14.5 % Final    Platelets 06/30/2022 309  150 - 450 K/uL Final    MPV 06/30/2022 10.9  9.2 - 12.9 fL Final    Immature Granulocytes 06/30/2022 0.2  0.0 - 0.5 % Final    Gran # (ANC) 06/30/2022 2.9  1.8 - 7.7 K/uL Final    Immature Grans (Abs) 06/30/2022 0.01  0.00 - 0.04 K/uL Final    Comment: Mild elevation in immature granulocytes is non specific and   can be seen in a variety of conditions including stress response,   acute inflammation, trauma and pregnancy. Correlation with other   laboratory and clinical findings is essential.      Lymph # 06/30/2022 2.2  1.0 - 4.8 K/uL Final    Mono # 06/30/2022 0.5  0.3 - 1.0 K/uL Final    Eos # 06/30/2022 0.3  0.0 - 0.5 K/uL Final    Baso # 06/30/2022 0.06  0.00 - 0.20 K/uL Final    nRBC  06/30/2022 0  0 /100 WBC Final    Gran % 06/30/2022 48.7  38.0 - 73.0 % Final    Lymph % 06/30/2022 37.4  18.0 - 48.0 % Final    Mono % 06/30/2022 8.5  4.0 - 15.0 % Final    Eosinophil % 06/30/2022 4.2  0.0 - 8.0 % Final    Basophil % 06/30/2022 1.0  0.0 - 1.9 % Final    Differential Method 06/30/2022 Automated   Final    TSH 06/30/2022 2.309  0.400 - 4.000 uIU/mL Final    Free T4 06/30/2022 0.95  0.71 - 1.51 ng/dL Final    Hemoglobin A1C 06/30/2022 5.2  4.0 - 5.6 % Final    Comment: ADA Screening Guidelines:  5.7-6.4%  Consistent with prediabetes  >or=6.5%  Consistent with diabetes    High levels of fetal hemoglobin interfere with the HbA1C  assay. Heterozygous hemoglobin variants (HbS, HgC, etc)do  not significantly interfere with this assay.   However, presence of multiple variants may affect accuracy.      Estimated Avg Glucose 06/30/2022 103  68 - 131 mg/dL Final       Assessment & Plan:      Raquel was seen today for annual exam and palpitations. Cardiology consultation will be obtained for further evaluation of the patient has palpitations and intermittent shortness of breath.  Current therapy will be continued.    Diagnoses and all orders for this visit:    Well adult exam    Chronic allergic rhinitis    Disorder of bone density and structure, unspecified    Arthralgia of both hands    SOB (shortness of breath) on exertion  -     Ambulatory referral/consult to Cardiology; Future    Palpitations  -     Ambulatory referral/consult to Cardiology; Future    Other orders  -     pantoprazole (PROTONIX) 40 MG tablet; Take 1 tablet (40 mg total) by mouth once daily.  -     ALPRAZolam (XANAX) 0.5 MG tablet; Take 1 tablet (0.5 mg total) by mouth 2 (two) times daily as needed for Anxiety.  -     meclizine (ANTIVERT) 12.5 mg tablet; Take 1 tablet (12.5 mg total) by mouth 3 (three) times daily as needed (vertigo).         Follow up in about 6 months (around 1/28/2023).     Salazar Bird MD

## 2022-08-01 ENCOUNTER — OFFICE VISIT (OUTPATIENT)
Dept: CARDIOLOGY | Facility: CLINIC | Age: 62
End: 2022-08-01
Payer: MEDICARE

## 2022-08-01 VITALS
SYSTOLIC BLOOD PRESSURE: 114 MMHG | HEART RATE: 67 BPM | BODY MASS INDEX: 23.43 KG/M2 | WEIGHT: 124.13 LBS | HEIGHT: 61 IN | DIASTOLIC BLOOD PRESSURE: 72 MMHG

## 2022-08-01 DIAGNOSIS — Z82.49 FAMILY HISTORY OF CORONARY ARTERY DISEASE OCCURRING PRIOR TO 55 YEARS OF AGE: ICD-10-CM

## 2022-08-01 DIAGNOSIS — R00.2 PALPITATIONS: ICD-10-CM

## 2022-08-01 DIAGNOSIS — R06.02 SOB (SHORTNESS OF BREATH) ON EXERTION: ICD-10-CM

## 2022-08-01 DIAGNOSIS — I20.89 ANGINAL EQUIVALENT: Primary | ICD-10-CM

## 2022-08-01 PROCEDURE — 1160F PR REVIEW ALL MEDS BY PRESCRIBER/CLIN PHARMACIST DOCUMENTED: ICD-10-PCS | Mod: CPTII,S$GLB,, | Performed by: INTERNAL MEDICINE

## 2022-08-01 PROCEDURE — 99204 OFFICE O/P NEW MOD 45 MIN: CPT | Mod: 25,S$GLB,, | Performed by: INTERNAL MEDICINE

## 2022-08-01 PROCEDURE — 3074F SYST BP LT 130 MM HG: CPT | Mod: CPTII,S$GLB,, | Performed by: INTERNAL MEDICINE

## 2022-08-01 PROCEDURE — 3074F PR MOST RECENT SYSTOLIC BLOOD PRESSURE < 130 MM HG: ICD-10-PCS | Mod: CPTII,S$GLB,, | Performed by: INTERNAL MEDICINE

## 2022-08-01 PROCEDURE — 1160F RVW MEDS BY RX/DR IN RCRD: CPT | Mod: CPTII,S$GLB,, | Performed by: INTERNAL MEDICINE

## 2022-08-01 PROCEDURE — 3044F HG A1C LEVEL LT 7.0%: CPT | Mod: CPTII,S$GLB,, | Performed by: INTERNAL MEDICINE

## 2022-08-01 PROCEDURE — 3078F PR MOST RECENT DIASTOLIC BLOOD PRESSURE < 80 MM HG: ICD-10-PCS | Mod: CPTII,S$GLB,, | Performed by: INTERNAL MEDICINE

## 2022-08-01 PROCEDURE — 3008F PR BODY MASS INDEX (BMI) DOCUMENTED: ICD-10-PCS | Mod: CPTII,S$GLB,, | Performed by: INTERNAL MEDICINE

## 2022-08-01 PROCEDURE — 99999 PR PBB SHADOW E&M-EST. PATIENT-LVL IV: CPT | Mod: PBBFAC,,, | Performed by: INTERNAL MEDICINE

## 2022-08-01 PROCEDURE — 3044F PR MOST RECENT HEMOGLOBIN A1C LEVEL <7.0%: ICD-10-PCS | Mod: CPTII,S$GLB,, | Performed by: INTERNAL MEDICINE

## 2022-08-01 PROCEDURE — 99999 PR PBB SHADOW E&M-EST. PATIENT-LVL IV: ICD-10-PCS | Mod: PBBFAC,,, | Performed by: INTERNAL MEDICINE

## 2022-08-01 PROCEDURE — 3008F BODY MASS INDEX DOCD: CPT | Mod: CPTII,S$GLB,, | Performed by: INTERNAL MEDICINE

## 2022-08-01 PROCEDURE — 1159F PR MEDICATION LIST DOCUMENTED IN MEDICAL RECORD: ICD-10-PCS | Mod: CPTII,S$GLB,, | Performed by: INTERNAL MEDICINE

## 2022-08-01 PROCEDURE — 99204 PR OFFICE/OUTPT VISIT, NEW, LEVL IV, 45-59 MIN: ICD-10-PCS | Mod: 25,S$GLB,, | Performed by: INTERNAL MEDICINE

## 2022-08-01 PROCEDURE — 3078F DIAST BP <80 MM HG: CPT | Mod: CPTII,S$GLB,, | Performed by: INTERNAL MEDICINE

## 2022-08-01 PROCEDURE — 93000 EKG 12-LEAD: ICD-10-PCS | Mod: S$GLB,,, | Performed by: INTERNAL MEDICINE

## 2022-08-01 PROCEDURE — 93000 ELECTROCARDIOGRAM COMPLETE: CPT | Mod: S$GLB,,, | Performed by: INTERNAL MEDICINE

## 2022-08-01 PROCEDURE — 1159F MED LIST DOCD IN RCRD: CPT | Mod: CPTII,S$GLB,, | Performed by: INTERNAL MEDICINE

## 2022-08-01 NOTE — PROGRESS NOTES
Subjective:   08/01/2022     Patient ID:  Raquel Galan is a 62 y.o. female who presents for evaulation of Shortness of Breath      She comes in today for evaluation of increasing dyspnea with activity, primarily worse going up stairs and on an incline.  She generally very active physically.  She exercises regularly not having exertional chest pains or tightness.  She has been under great deal of stress recently.  Episodic palpitations, primarily her heart racing, mostly at night if she has awoken by the neighboring dog    Her family history is strongly positive for coronary artery disease at an early age, primarily her mother who had bypass surgery twice.    She has never had hypertension, diabetes.  Lipid profile has generally been a high HDL.          The 10-year ASCVD risk score (Danielabenjamín GUTIERREZ Jr., et al., 2013) is: 3.6%    Values used to calculate the score:      Age: 62 years      Sex: Female      Is Non- : No      Diabetic: No      Tobacco smoker: No      Systolic Blood Pressure: 114 mmHg      Is BP treated: No      HDL Cholesterol: 69 mg/dL      Total Cholesterol: 196 mg/dL        Past Medical History:   Diagnosis Date    Allergy     Back pain     Disorder of vestibular function of both ears     Fibrocystic breast disease in female     HEARING LOSS     right side - no hearing aid    Hyperlipidemia     Osteopenia     PONV (postoperative nausea and vomiting)     PONV (postoperative nausea and vomiting)     Special screening for malignant neoplasms, colon 7/22/2013       Review of patient's allergies indicates:  No Known Allergies      Current Outpatient Medications:     ALPRAZolam (XANAX) 0.5 MG tablet, Take 1 tablet (0.5 mg total) by mouth 2 (two) times daily as needed for Anxiety., Disp: 40 tablet, Rfl: 1    b complex vitamins tablet, Take 1 tablet by mouth once daily., Disp: , Rfl:     CALCIUM 600 WITH VITAMIN D3 600 mg(1,500mg) -200 unit Tab, Take 1 tablet by mouth once  daily., Disp: , Rfl:     EPIPEN 2-PEDRO 0.3 mg/0.3 mL (1:1,000) AtIn, , Disp: , Rfl: 0    estradioL (ESTRACE) 0.5 MG tablet, Take 1 tablet (0.5 mg total) by mouth once daily., Disp: 90 tablet, Rfl: 3    fexofenadine (ALLEGRA) 180 MG tablet, Take 1 tablet (180 mg total) by mouth once daily., Disp: 30 tablet, Rfl: 11    glucosamine-chondroitin 500-400 mg tablet, Take 1 tablet by mouth Daily., Disp: , Rfl:     meclizine (ANTIVERT) 12.5 mg tablet, Take 1 tablet (12.5 mg total) by mouth 3 (three) times daily as needed (vertigo)., Disp: 40 tablet, Rfl: 2    omega-3 fatty acids/fish oil (FISH OIL-OMEGA-3 FATTY ACIDS) 300-1,000 mg capsule, Take 1 capsule by mouth once daily., Disp: , Rfl:     pantoprazole (PROTONIX) 40 MG tablet, Take 1 tablet (40 mg total) by mouth once daily., Disp: 30 tablet, Rfl: 5    tretinoin (RETIN-A) 0.025 % cream, Compound tretinoin 0.025% / niacinamide 2% cream. Apply a pea-sized amount to entire face qhs., Disp: 30 g, Rfl: 5     Objective:   Review of Systems   Cardiovascular: Positive for dyspnea on exertion, irregular heartbeat and palpitations. Negative for chest pain, claudication, cyanosis, leg swelling, near-syncope, orthopnea, paroxysmal nocturnal dyspnea and syncope.         Vitals:    08/01/22 1306   BP: 114/72   Pulse: 67     Wt Readings from Last 3 Encounters:   08/01/22 56.3 kg (124 lb 1.9 oz)   07/28/22 55.2 kg (121 lb 11.1 oz)   07/06/22 54.4 kg (120 lb)     Temp Readings from Last 3 Encounters:   07/28/22 98 °F (36.7 °C) (Oral)   11/11/21 97 °F (36.1 °C) (Temporal)   10/09/21 98.2 °F (36.8 °C) (Oral)     BP Readings from Last 3 Encounters:   08/01/22 114/72   07/28/22 110/70   06/13/22 (!) 127/58     Pulse Readings from Last 3 Encounters:   08/01/22 67   07/28/22 74   06/13/22 77             Physical Exam  Vitals reviewed.   Constitutional:       General: She is not in acute distress.     Appearance: She is well-developed.   HENT:      Head: Normocephalic and atraumatic.       Nose: Nose normal.   Eyes:      Conjunctiva/sclera: Conjunctivae normal.      Pupils: Pupils are equal, round, and reactive to light.   Neck:      Vascular: No JVD.   Cardiovascular:      Rate and Rhythm: Normal rate and regular rhythm.      Pulses: Intact distal pulses.      Heart sounds: Normal heart sounds. No murmur heard.    No friction rub. No gallop.   Pulmonary:      Effort: Pulmonary effort is normal. No respiratory distress.      Breath sounds: Normal breath sounds. No wheezing or rales.   Chest:      Chest wall: No tenderness.   Abdominal:      General: Bowel sounds are normal. There is no distension.      Palpations: Abdomen is soft.      Tenderness: There is no abdominal tenderness.   Musculoskeletal:         General: No tenderness or deformity. Normal range of motion.      Cervical back: Normal range of motion and neck supple.   Skin:     General: Skin is warm and dry.      Findings: No erythema or rash.   Neurological:      Mental Status: She is alert and oriented to person, place, and time.      Cranial Nerves: No cranial nerve deficit.      Motor: No abnormal muscle tone.      Coordination: Coordination normal.   Psychiatric:         Behavior: Behavior normal.         Thought Content: Thought content normal.         Judgment: Judgment normal.       Component      Latest Ref Rng & Units 6/30/2022   WBC      3.90 - 12.70 K/uL 5.97   RBC      4.00 - 5.40 M/uL 4.29   Hemoglobin      12.0 - 16.0 g/dL 14.1   Hematocrit      37.0 - 48.5 % 43.6   MCV      82 - 98 fL 102 (H)   MCH      27.0 - 31.0 pg 32.9 (H)   MCHC      32.0 - 36.0 g/dL 32.3   RDW      11.5 - 14.5 % 12.8   Platelets      150 - 450 K/uL 309   MPV      9.2 - 12.9 fL 10.9   Immature Granulocytes      0.0 - 0.5 % 0.2   Gran # (ANC)      1.8 - 7.7 K/uL 2.9   Immature Grans (Abs)      0.00 - 0.04 K/uL 0.01   Lymph #      1.0 - 4.8 K/uL 2.2   Mono #      0.3 - 1.0 K/uL 0.5   Eos #      0.0 - 0.5 K/uL 0.3   Baso #      0.00 - 0.20 K/uL 0.06    nRBC      0 /100 WBC 0   Gran %      38.0 - 73.0 % 48.7   Lymph %      18.0 - 48.0 % 37.4   Mono %      4.0 - 15.0 % 8.5   Eosinophil %      0.0 - 8.0 % 4.2   Basophil %      0.0 - 1.9 % 1.0   Differential Method       Automated   Sodium      136 - 145 mmol/L 142   Potassium      3.5 - 5.1 mmol/L 4.1   Chloride      95 - 110 mmol/L 109   CO2      23 - 29 mmol/L 25   Glucose      70 - 110 mg/dL 99   BUN      8 - 23 mg/dL 14   Creatinine      0.5 - 1.4 mg/dL 0.8   Calcium      8.7 - 10.5 mg/dL 9.3   PROTEIN TOTAL      6.0 - 8.4 g/dL 7.2   Albumin      3.5 - 5.2 g/dL 4.1   BILIRUBIN TOTAL      0.1 - 1.0 mg/dL 1.3 (H)   Alkaline Phosphatase      55 - 135 U/L 79   AST      10 - 40 U/L 18   ALT      10 - 44 U/L 20   Anion Gap      8 - 16 mmol/L 8   eGFR if African American      >60 mL/min/1.73 m:2 >60.0   eGFR if non African American      >60 mL/min/1.73 m:2 >60.0   Cholesterol      120 - 199 mg/dL 196   Triglycerides      30 - 150 mg/dL 55   HDL      40 - 75 mg/dL 69   LDL Cholesterol External      63.0 - 159.0 mg/dL 116.0   HDL/Cholesterol Ratio      20.0 - 50.0 % 35.2   Total Cholesterol/HDL Ratio      2.0 - 5.0 2.8   Non-HDL Cholesterol      mg/dL 127   Hemoglobin A1C External      4.0 - 5.6 % 5.2   Estimated Avg Glucose      68 - 131 mg/dL 103   TSH      0.400 - 4.000 uIU/mL 2.309   Free T4      0.71 - 1.51 ng/dL 0.95       Lab Results   Component Value Date    CHOL 196 06/30/2022    CHOL 181 06/01/2021    CHOL 268 (H) 05/19/2020     Lab Results   Component Value Date    HDL 69 06/30/2022    HDL 83 (H) 06/01/2021    HDL 97 (H) 05/19/2020     Lab Results   Component Value Date    LDLCALC 116.0 06/30/2022    LDLCALC 85.2 06/01/2021    LDLCALC 152.4 05/19/2020     Lab Results   Component Value Date    ALT 20 06/30/2022    AST 18 06/30/2022    AST 18 09/27/2021    AST 16 06/01/2021     Lab Results   Component Value Date    CREATININE 0.8 06/30/2022    BUN 14 06/30/2022     06/30/2022    K 4.1 06/30/2022    CO2 25  06/30/2022    CO2 23 09/27/2021    CO2 20 (L) 06/01/2021     Lab Results   Component Value Date    HGB 14.1 06/30/2022    HCT 43.6 06/30/2022    HCT 43.0 09/27/2021    HCT 39.7 06/01/2021                         Assessment and Plan:     Anginal equivalent  -     Stress Echo Which stress agent will be used? Treadmill Exercise; Color Flow Doppler? No; Future; Expected date: 08/02/2022    SOB (shortness of breath) on exertion  -     Ambulatory referral/consult to Cardiology  -     SCHEDULED EKG 12-LEAD (to [place]); Future    Palpitations  -     Ambulatory referral/consult to Cardiology  -     SCHEDULED EKG 12-LEAD (to [place]); Future    Family history of coronary artery disease occurring prior to 55 years of age    Cardiac risk to be assessed with a CT coronary calcium score and a stress echo cardiac     No follow-ups on file.          Future Appointments   Date Time Provider Department Center   8/17/2022  8:00 AM [facility] [place]   1/30/2023  8:00 AM Salazar Bird MD [facility] [place]       Coronary calcium score is 0

## 2022-08-01 NOTE — PATIENT INSTRUCTIONS
Will evaluate with a CT coronary calcium score and a stress test with echocardiography.    Check your heart rhythm at home with an Alive Cor monitor

## 2022-08-16 ENCOUNTER — OFFICE VISIT (OUTPATIENT)
Dept: URGENT CARE | Facility: CLINIC | Age: 62
End: 2022-08-16
Payer: MEDICARE

## 2022-08-16 VITALS
WEIGHT: 124 LBS | DIASTOLIC BLOOD PRESSURE: 74 MMHG | OXYGEN SATURATION: 97 % | RESPIRATION RATE: 19 BRPM | BODY MASS INDEX: 23.41 KG/M2 | TEMPERATURE: 99 F | HEIGHT: 61 IN | SYSTOLIC BLOOD PRESSURE: 120 MMHG | HEART RATE: 77 BPM

## 2022-08-16 DIAGNOSIS — L23.7 CONTACT DERMATITIS DUE TO POISON IVY: Primary | ICD-10-CM

## 2022-08-16 DIAGNOSIS — T14.8XXA ABRASION: ICD-10-CM

## 2022-08-16 DIAGNOSIS — L29.9 PRURITUS: ICD-10-CM

## 2022-08-16 PROCEDURE — 3044F PR MOST RECENT HEMOGLOBIN A1C LEVEL <7.0%: ICD-10-PCS | Mod: CPTII,S$GLB,, | Performed by: PHYSICIAN ASSISTANT

## 2022-08-16 PROCEDURE — 1159F PR MEDICATION LIST DOCUMENTED IN MEDICAL RECORD: ICD-10-PCS | Mod: CPTII,S$GLB,, | Performed by: PHYSICIAN ASSISTANT

## 2022-08-16 PROCEDURE — 1159F MED LIST DOCD IN RCRD: CPT | Mod: CPTII,S$GLB,, | Performed by: PHYSICIAN ASSISTANT

## 2022-08-16 PROCEDURE — 3074F SYST BP LT 130 MM HG: CPT | Mod: CPTII,S$GLB,, | Performed by: PHYSICIAN ASSISTANT

## 2022-08-16 PROCEDURE — 3008F PR BODY MASS INDEX (BMI) DOCUMENTED: ICD-10-PCS | Mod: CPTII,S$GLB,, | Performed by: PHYSICIAN ASSISTANT

## 2022-08-16 PROCEDURE — 3078F PR MOST RECENT DIASTOLIC BLOOD PRESSURE < 80 MM HG: ICD-10-PCS | Mod: CPTII,S$GLB,, | Performed by: PHYSICIAN ASSISTANT

## 2022-08-16 PROCEDURE — 3008F BODY MASS INDEX DOCD: CPT | Mod: CPTII,S$GLB,, | Performed by: PHYSICIAN ASSISTANT

## 2022-08-16 PROCEDURE — 3074F PR MOST RECENT SYSTOLIC BLOOD PRESSURE < 130 MM HG: ICD-10-PCS | Mod: CPTII,S$GLB,, | Performed by: PHYSICIAN ASSISTANT

## 2022-08-16 PROCEDURE — 3078F DIAST BP <80 MM HG: CPT | Mod: CPTII,S$GLB,, | Performed by: PHYSICIAN ASSISTANT

## 2022-08-16 PROCEDURE — 99214 PR OFFICE/OUTPT VISIT, EST, LEVL IV, 30-39 MIN: ICD-10-PCS | Mod: S$GLB,,, | Performed by: PHYSICIAN ASSISTANT

## 2022-08-16 PROCEDURE — 3044F HG A1C LEVEL LT 7.0%: CPT | Mod: CPTII,S$GLB,, | Performed by: PHYSICIAN ASSISTANT

## 2022-08-16 PROCEDURE — 99214 OFFICE O/P EST MOD 30 MIN: CPT | Mod: S$GLB,,, | Performed by: PHYSICIAN ASSISTANT

## 2022-08-16 RX ORDER — MUPIROCIN 20 MG/G
OINTMENT TOPICAL 2 TIMES DAILY
Qty: 22 G | Refills: 0 | Status: SHIPPED | OUTPATIENT
Start: 2022-08-16 | End: 2022-08-23

## 2022-08-16 RX ORDER — BETAMETHASONE DIPROPIONATE 0.5 MG/G
CREAM TOPICAL 2 TIMES DAILY
Qty: 45 G | Refills: 0 | Status: SHIPPED | OUTPATIENT
Start: 2022-08-16 | End: 2022-10-17

## 2022-08-16 NOTE — PATIENT INSTRUCTIONS
PLEASE READ YOUR DISCHARGE INSTRUCTIONS ENTIRELY AS IT CONTAINS IMPORTANT INFORMATION.     Please return here or go to the Emergency Department for any concerns or worsening of condition (vision loss, worsening rash, lethargy, difficulty swallowing/breathing, shortness of breath, passing out).        -You should use Benadryl every 8-12 hours.  Be careful with this medication, as it may cause drowsiness.    -Please also take over the counter Pepcid/zantac every 12 hours as directed for the next 24-72 hours to help with your allergic reaction.    -Please add an over the counter antihistamine medication (Claritin/Zyrtec/allegra) every 12 hours  of your choice as directed daily to help with your allergic reaction. May taper meds to off when symptoms improve.      USE CAUTION WITH ANTIHISTAMINE COMBINATION AS IT MAY CAUSE DROWSINESS AS WELL AS DEHYDRATION OR CONSTIPATION. May add stool softener or miralax if you develop constipation.     If you have a localized reaction, it is ok to apply over the counter anti-itch topical creams as directed to the affected area.  Do not apply steroid cream on your face.  Do not apply steroid cream to any open scab wounds.     Apply Bactroban/antibiotic ointment to scab or open wound.    -Please call [facility] scheduling center set up appointment for PCP/specialty clinic for continued workup and management.       Please arrange follow up with your primary medical clinic as soon as possible.         You must understand that you've received an Urgent Care treatment only and that you may be released before all of your medical problems are known or treated. You, the patient, will arrange for follow up as instructed. If your symptoms worsen or fail to improve you should go to the Emergency Room.      WE CANNOT RULE OUT ALL POSSIBLE CAUSES OF YOUR SYMPTOMS IN THE URGENT CARE SETTING PLEASE GO TO THE ER IF YOU FEELS YOUR CONDITION IS WORSENING OR YOU WOULD LIKE EMERGENT  EVALUATION.      Follow-up care  Follow up with your healthcare provider as advised. If you have stitches or staples, be sure to return as directed to have them removed.    When to seek medical advice  Call your healthcare provider right away if any of these occur:  Wound bleeding not controlled by direct pressure  Signs of infection, including increasing pain in the wound, increasing wound redness or swelling, or pus or bad odor coming from the wound  Fever of 100.4°F (38.ºC) or as directed by your health care provider  Stitches or staples come apart or fall out or surgical tape falls off before 7 days  Wound edges re-open  Wound changes colors  Numbness or weakness in the affected hand   Decreased movement of the hand  Date Last Reviewed: 6/10/2015  © 0628-3313 [employer]. [address] [place] [address], [place] [address]. All rights reserved. This information is not intended as a substitute for professional medical care. Always follow your healthcare professional's instructions.

## 2022-08-16 NOTE — PROGRESS NOTES
"Subjective:       Patient ID: Raquel Galan is a 62 y.o. female.    Vitals:  height is 5' 1" (1.549 m) and weight is 56.2 kg (124 lb). Her oral temperature is 98.5 °F (36.9 °C). Her blood pressure is 120/74 and her pulse is 77. Her respiration is 19 and oxygen saturation is 97%.     Chief Complaint: Rash    This is a 62 y.o. female with a history of anxiety, palpitations, and osteopenia who presents today with a chief complaint of possible poison ivy rash. Rash is mostly on lower legs. Rash appeared two weekends ago after she visited her country house in [place], [place].  She tends to get these poison ivy rash when she is at this property.  She has associated redness, itching and scabbed wounds from scratching incessantly.  Denies any fever, chills, open wound, or weakness.  Denies any chest pain, shortness of breath, or focal weakness.  She has been using calamine lotion, ALLEGRA, and Nelly dish detergent with some improvements.  Not taking anything else for symptoms.      Rash  This is a new problem. The current episode started in the past 7 days. The problem is unchanged. The affected locations include the left lower leg and right lower leg. The rash is characterized by blistering and itchiness. She was exposed to plant contact. Pertinent negatives include no congestion, cough, diarrhea, eye pain, fatigue, fever, shortness of breath, sore throat or vomiting. Past treatments include antihistamine (allegra). The treatment provided no relief.       Constitution: Negative for activity change, appetite change, chills, sweating, fatigue, fever and generalized weakness.   HENT: Negative for ear pain, hearing loss, facial swelling, congestion, postnasal drip, sinus pain, sinus pressure, sore throat, trouble swallowing and voice change.    Neck: Negative for neck pain, neck stiffness and painful lymph nodes.   Cardiovascular: Negative for chest pain, leg swelling, palpitations, sob on exertion and passing out. "   Eyes: Negative for eye discharge, eye pain, photophobia, vision loss, double vision and blurred vision.   Respiratory: Negative for chest tightness, cough, sputum production, bloody sputum, COPD, shortness of breath, stridor, wheezing and asthma.    Gastrointestinal: Negative for abdominal pain, nausea, vomiting, constipation, diarrhea, bright red blood in stool, rectal bleeding, heartburn and bowel incontinence.   Genitourinary: Negative for dysuria, frequency, urgency, urine decreased, flank pain, bladder incontinence and hematuria.   Musculoskeletal: Negative for trauma, joint pain, joint swelling, abnormal ROM of joint, muscle cramps and muscle ache.   Skin: Positive for rash and erythema. Negative for color change, pale and wound.   Allergic/Immunologic: Positive for environmental allergies, seasonal allergies and itching. Negative for asthma and immunocompromised state.   Neurological: Negative for dizziness, history of vertigo, light-headedness, passing out, facial drooping, speech difficulty, coordination disturbances, loss of balance, headaches, disorientation, altered mental status, loss of consciousness, numbness, tingling and seizures.   Hematologic/Lymphatic: Negative for swollen lymph nodes, easy bruising/bleeding and trouble clotting. Does not bruise/bleed easily.   Psychiatric/Behavioral: Negative for altered mental status and disorientation.       Past Medical History:   Diagnosis Date    Allergy     Back pain     Disorder of vestibular function of both ears     Fibrocystic breast disease in female     HEARING LOSS     right side - no hearing aid    Hyperlipidemia     Osteopenia     PONV (postoperative nausea and vomiting)     PONV (postoperative nausea and vomiting)     Special screening for malignant neoplasms, colon 7/22/2013       Objective:      Physical Exam   Constitutional: She appears well-developed. She is cooperative.  Non-toxic appearance. She does not appear ill. No  distress.   HENT:   Head: Normocephalic and atraumatic.   Ears:   Right Ear: Hearing, external ear and ear canal normal.   Left Ear: Hearing, external ear and ear canal normal.   Nose: Nose normal.   Mouth/Throat: Uvula is midline, oropharynx is clear and moist and mucous membranes are normal. Mucous membranes are moist. No posterior oropharyngeal edema. No tonsillar exudate. Oropharynx is clear.   Eyes: Conjunctivae, EOM and lids are normal. Pupils are equal, round, and reactive to light. Right eye exhibits no discharge. Left eye exhibits no discharge. Extraocular movement intact   Neck: Neck supple. No neck rigidity present.   Cardiovascular: Normal rate, regular rhythm and normal pulses.   Pulmonary/Chest: Effort normal. No accessory muscle usage. No respiratory distress. She has no wheezes. She exhibits no tenderness.   Abdominal: Normal appearance. She exhibits no distension. Soft. There is no abdominal tenderness.   Musculoskeletal: Normal range of motion.         General: Normal range of motion.      Right lower leg: No edema.      Left lower leg: No edema.      Comments: Moves all extremities with normal tone, strength, and ROM.   Neurological: no focal deficit. She is alert and at baseline. She has normal motor skills and normal sensation. She displays no weakness, facial symmetry and normal reflexes. No cranial nerve deficit or sensory deficit. She exhibits normal muscle tone. Gait and coordination normal. Coordination normal.   Skin: Skin is warm, dry, not diaphoretic and rash. Capillary refill takes less than 2 seconds. erythema         Comments: Thick plaque like erythematous rash to bilateral lower ankle.  There is some dry superficial scab in excoriation wounds.  There are small papule extending upper thigh.  No induration, warmth, tenderness, fluctuance, or vesicles present.   Psychiatric: Her behavior is normal. Mood and thought content normal.   Nursing note and vitals reviewed.                 Assessment:       1. Contact dermatitis due to poison ivy    2. Pruritus    3. Abrasion        On exam, patient is nontoxic appearing and vitals are stable.  Patient is essentially neurovascularly intact on exam.   no concern for anaphylaxis or cellulitis.  There is contact dermatitis and small scab superficial left present.  Patient was prescribed medications and recommended OTC treatments for their symptoms.  If symptoms do not improve/worsens, patient was referred back to PCP for continued outpatient workup and management.     Patient is 11-12 days out from her initial poison ivy contact.  Patient feels like symptoms are gradually improving.  We did discuss the use of short course oral steroid taper in poison ivy infection but we both felt that risk is greater than benefit for her given her osteopenia history, anxiety, palpitation, and has cardiac stress test to be done tomorrow.  She preferred to not start any steroid treatment.  She has had bad reactions to steroid treatment in the past.     Patient was instructed to return for re-evaluation for any worsening or change in current symptoms. Strict ED versus clinic precautions given in depth. Discharge and follow-up instructions given verbally/printed with the patient who expressed understanding and willingness to comply with my recommendations.  Patient verbalized understanding and agreed with the entirety of plan of care.     Note dictated with voice recognition software, please excuse any grammatical errors.    Plan:         Contact dermatitis due to poison ivy  -     betamethasone dipropionate 0.05 % cream; Apply topically 2 (two) times daily. Apply to poison ivy rash. Avoid scab wounds.  Dispense: 45 g; Refill: 0    Pruritus    Abrasion  -     mupirocin (BACTROBAN) 2 % ointment; Apply topically 2 (two) times daily. Apply to scab wounds for 7 days  Dispense: 22 g; Refill: 0              Additional MDM:     Heart Failure Score:   COPD =  No      Patient Instructions     PLEASE READ YOUR DISCHARGE INSTRUCTIONS ENTIRELY AS IT CONTAINS IMPORTANT INFORMATION.     Please return here or go to the Emergency Department for any concerns or worsening of condition (vision loss, worsening rash, lethargy, difficulty swallowing/breathing, shortness of breath, passing out).        -You should use Benadryl every 8-12 hours.  Be careful with this medication, as it may cause drowsiness.    -Please also take over the counter Pepcid/zantac every 12 hours as directed for the next 24-72 hours to help with your allergic reaction.    -Please add an over the counter antihistamine medication (Claritin/Zyrtec/allegra) every 12 hours  of your choice as directed daily to help with your allergic reaction. May taper meds to off when symptoms improve.      USE CAUTION WITH ANTIHISTAMINE COMBINATION AS IT MAY CAUSE DROWSINESS AS WELL AS DEHYDRATION OR CONSTIPATION. May add stool softener or miralax if you develop constipation.     If you have a localized reaction, it is ok to apply over the counter anti-itch topical creams as directed to the affected area.  Do not apply steroid cream on your face.  Do not apply steroid cream to any open scab wounds.     Apply Bactroban/antibiotic ointment to scab or open wound.    -Please call [facility] scheduling center set up appointment for PCP/specialty clinic for continued workup and management.       Please arrange follow up with your primary medical clinic as soon as possible.         You must understand that you've received an Urgent Care treatment only and that you may be released before all of your medical problems are known or treated. You, the patient, will arrange for follow up as instructed. If your symptoms worsen or fail to improve you should go to the Emergency Room.      WE CANNOT RULE OUT ALL POSSIBLE CAUSES OF YOUR SYMPTOMS IN THE URGENT CARE SETTING PLEASE GO TO THE ER IF YOU FEELS YOUR CONDITION IS WORSENING OR YOU WOULD LIKE  EMERGENT EVALUATION.      Follow-up care  Follow up with your healthcare provider as advised. If you have stitches or staples, be sure to return as directed to have them removed.    When to seek medical advice  Call your healthcare provider right away if any of these occur:  · Wound bleeding not controlled by direct pressure  · Signs of infection, including increasing pain in the wound, increasing wound redness or swelling, or pus or bad odor coming from the wound  · Fever of 100.4°F (38.ºC) or as directed by your health care provider  · Stitches or staples come apart or fall out or surgical tape falls off before 7 days  · Wound edges re-open  · Wound changes colors  · Numbness or weakness in the affected hand   · Decreased movement of the hand  Date Last Reviewed: 6/10/2015  © 7305-0090 [employer]. [address] [place] [address], [place] [address]. All rights reserved. This information is not intended as a substitute for professional medical care. Always follow your healthcare professional's instructions.

## 2022-08-17 ENCOUNTER — HOSPITAL ENCOUNTER (OUTPATIENT)
Dept: CARDIOLOGY | Facility: HOSPITAL | Age: 62
Discharge: HOME | End: 2022-08-17
Attending: INTERNAL MEDICINE
Payer: MEDICARE

## 2022-08-17 ENCOUNTER — TELEPHONE (OUTPATIENT)
Dept: CARDIOLOGY | Facility: CLINIC | Age: 62
End: 2022-08-17

## 2022-08-17 VITALS — BODY MASS INDEX: 23.41 KG/M2 | WEIGHT: 124 LBS | HEIGHT: 61 IN

## 2022-08-17 DIAGNOSIS — I20.89 ANGINAL EQUIVALENT: ICD-10-CM

## 2022-08-17 LAB
ASCENDING AORTA: 2.61 CM
BSA FOR ECHO PROCEDURE: 1.56 M2
CV ECHO LV RWT: 0.34 CM
CV STRESS BASE HR: 80 BPM
DIASTOLIC BLOOD PRESSURE: 74 MMHG
DOP CALC LVOT AREA: 2.7 CM2
DOP CALC LVOT DIAMETER: 1.84 CM
DOP CALC LVOT PEAK VEL: 0.95 M/S
DOP CALC LVOT STROKE VOLUME: 55.23 CM3
DOP CALCLVOT PEAK VEL VTI: 20.78 CM
E WAVE DECELERATION TIME: 155.02 MSEC
E/A RATIO: 0.84
E/E' RATIO: 8.67 M/S
ECHO LV POSTERIOR WALL: 0.66 CM (ref 0.6–1.1)
EJECTION FRACTION: 65 %
FRACTIONAL SHORTENING: 35 % (ref 28–44)
INTERVENTRICULAR SEPTUM: 0.67 CM (ref 0.6–1.1)
IVRT: 99.9 MSEC
LA MAJOR: 4.05 CM
LA MINOR: 3.98 CM
LA WIDTH: 3.4 CM
LEFT ATRIUM SIZE: 2.55 CM
LEFT ATRIUM VOLUME INDEX MOD: 19.3 ML/M2
LEFT ATRIUM VOLUME INDEX: 19.2 ML/M2
LEFT ATRIUM VOLUME MOD: 29.68 CM3
LEFT ATRIUM VOLUME: 29.59 CM3
LEFT INTERNAL DIMENSION IN SYSTOLE: 2.5 CM (ref 2.1–4)
LEFT VENTRICLE DIASTOLIC VOLUME INDEX: 41.34 ML/M2
LEFT VENTRICLE DIASTOLIC VOLUME: 63.66 ML
LEFT VENTRICLE MASS INDEX: 44 G/M2
LEFT VENTRICLE SYSTOLIC VOLUME INDEX: 14.4 ML/M2
LEFT VENTRICLE SYSTOLIC VOLUME: 22.24 ML
LEFT VENTRICULAR INTERNAL DIMENSION IN DIASTOLE: 3.84 CM (ref 3.5–6)
LEFT VENTRICULAR MASS: 68.46 G
LV LATERAL E/E' RATIO: 9.29 M/S
LV SEPTAL E/E' RATIO: 8.13 M/S
MV PEAK A VEL: 0.77 M/S
MV PEAK E VEL: 0.65 M/S
MV STENOSIS PRESSURE HALF TIME: 44.96 MS
MV VALVE AREA P 1/2 METHOD: 4.89 CM2
OHS CV CPX 1 MINUTE RECOVERY HEART RATE: 102 BPM
OHS CV CPX 85 PERCENT MAX PREDICTED HEART RATE MALE: 127
OHS CV CPX ESTIMATED METS: 9
OHS CV CPX MAX PREDICTED HEART RATE: 150
OHS CV CPX PATIENT IS FEMALE: 1
OHS CV CPX PATIENT IS MALE: 0
OHS CV CPX PEAK DIASTOLIC BLOOD PRESSURE: 92 MMHG
OHS CV CPX PEAK HEAR RATE: 141 BPM
OHS CV CPX PEAK RATE PRESSURE PRODUCT: NORMAL
OHS CV CPX PEAK SYSTOLIC BLOOD PRESSURE: 158 MMHG
OHS CV CPX PERCENT MAX PREDICTED HEART RATE ACHIEVED: 94
OHS CV CPX RATE PRESSURE PRODUCT PRESENTING: NORMAL
PULM VEIN S/D RATIO: 1.3
PV PEAK D VEL: 0.33 M/S
PV PEAK S VEL: 0.43 M/S
RA MAJOR: 3.85 CM
RA PRESSURE: 3 MMHG
RA WIDTH: 2.89 CM
RIGHT VENTRICULAR END-DIASTOLIC DIMENSION: 2.36 CM
SINUS: 2.87 CM
STJ: 2.42 CM
STRESS ECHO POST EXERCISE DUR MIN: 5 MINUTES
STRESS ECHO POST EXERCISE DUR SEC: 59 SECONDS
SYSTOLIC BLOOD PRESSURE: 128 MMHG
TDI LATERAL: 0.07 M/S
TDI SEPTAL: 0.08 M/S
TDI: 0.08 M/S
TRICUSPID ANNULAR PLANE SYSTOLIC EXCURSION: 1.72 CM

## 2022-08-17 PROCEDURE — 93351 STRESS ECHO (CUPID ONLY): ICD-10-PCS | Mod: 26,,, | Performed by: INTERNAL MEDICINE

## 2022-08-17 PROCEDURE — 93351 STRESS TTE COMPLETE: CPT

## 2022-08-17 PROCEDURE — 93351 STRESS TTE COMPLETE: CPT | Mod: 26,,, | Performed by: INTERNAL MEDICINE

## 2022-08-17 NOTE — TELEPHONE ENCOUNTER
Discussed results with patient:    Stress echocardiography was normal, no evidence for ischemia    CT coronary calcium score is 0.    Patient reassured.  No evidence for significant coronary artery disease or ischemia. No Indication for statin therapy

## 2022-08-18 ENCOUNTER — PATIENT MESSAGE (OUTPATIENT)
Dept: CARDIOLOGY | Facility: CLINIC | Age: 62
End: 2022-08-18
Payer: MEDICARE

## 2022-08-18 ENCOUNTER — PATIENT MESSAGE (OUTPATIENT)
Dept: OBSTETRICS AND GYNECOLOGY | Facility: CLINIC | Age: 62
End: 2022-08-18
Payer: MEDICARE

## 2022-08-24 RX ORDER — ESTRADIOL 1 MG/1
1 TABLET ORAL DAILY
Qty: 30 TABLET | Refills: 9 | Status: SHIPPED | OUTPATIENT
Start: 2022-08-24 | End: 2022-09-14

## 2022-10-03 ENCOUNTER — PATIENT MESSAGE (OUTPATIENT)
Dept: INTERNAL MEDICINE | Facility: CLINIC | Age: 62
End: 2022-10-03
Payer: MEDICARE

## 2022-10-17 ENCOUNTER — OFFICE VISIT (OUTPATIENT)
Dept: PAIN MEDICINE | Facility: CLINIC | Age: 62
End: 2022-10-17
Payer: MEDICARE

## 2022-10-17 VITALS
HEART RATE: 70 BPM | DIASTOLIC BLOOD PRESSURE: 60 MMHG | BODY MASS INDEX: 22.48 KG/M2 | TEMPERATURE: 96 F | HEIGHT: 61 IN | WEIGHT: 119.06 LBS | SYSTOLIC BLOOD PRESSURE: 104 MMHG

## 2022-10-17 DIAGNOSIS — M70.71 ISCHIAL BURSITIS OF RIGHT SIDE: ICD-10-CM

## 2022-10-17 DIAGNOSIS — M70.60 GREATER TROCHANTERIC BURSITIS, UNSPECIFIED LATERALITY: Primary | ICD-10-CM

## 2022-10-17 DIAGNOSIS — M47.817 DJD (DEGENERATIVE JOINT DISEASE), LUMBOSACRAL: ICD-10-CM

## 2022-10-17 PROCEDURE — 3078F DIAST BP <80 MM HG: CPT | Mod: CPTII,S$GLB,, | Performed by: NURSE PRACTITIONER

## 2022-10-17 PROCEDURE — 3044F PR MOST RECENT HEMOGLOBIN A1C LEVEL <7.0%: ICD-10-PCS | Mod: CPTII,S$GLB,, | Performed by: NURSE PRACTITIONER

## 2022-10-17 PROCEDURE — 3074F PR MOST RECENT SYSTOLIC BLOOD PRESSURE < 130 MM HG: ICD-10-PCS | Mod: CPTII,S$GLB,, | Performed by: NURSE PRACTITIONER

## 2022-10-17 PROCEDURE — 99999 PR PBB SHADOW E&M-EST. PATIENT-LVL III: ICD-10-PCS | Mod: PBBFAC,,, | Performed by: NURSE PRACTITIONER

## 2022-10-17 PROCEDURE — 99999 PR PBB SHADOW E&M-EST. PATIENT-LVL III: CPT | Mod: PBBFAC,,, | Performed by: NURSE PRACTITIONER

## 2022-10-17 PROCEDURE — 3078F PR MOST RECENT DIASTOLIC BLOOD PRESSURE < 80 MM HG: ICD-10-PCS | Mod: CPTII,S$GLB,, | Performed by: NURSE PRACTITIONER

## 2022-10-17 PROCEDURE — 1159F PR MEDICATION LIST DOCUMENTED IN MEDICAL RECORD: ICD-10-PCS | Mod: CPTII,S$GLB,, | Performed by: NURSE PRACTITIONER

## 2022-10-17 PROCEDURE — 99213 OFFICE O/P EST LOW 20 MIN: CPT | Mod: S$GLB,,, | Performed by: NURSE PRACTITIONER

## 2022-10-17 PROCEDURE — 3044F HG A1C LEVEL LT 7.0%: CPT | Mod: CPTII,S$GLB,, | Performed by: NURSE PRACTITIONER

## 2022-10-17 PROCEDURE — 99213 PR OFFICE/OUTPT VISIT, EST, LEVL III, 20-29 MIN: ICD-10-PCS | Mod: S$GLB,,, | Performed by: NURSE PRACTITIONER

## 2022-10-17 PROCEDURE — 3074F SYST BP LT 130 MM HG: CPT | Mod: CPTII,S$GLB,, | Performed by: NURSE PRACTITIONER

## 2022-10-17 PROCEDURE — 1159F MED LIST DOCD IN RCRD: CPT | Mod: CPTII,S$GLB,, | Performed by: NURSE PRACTITIONER

## 2022-10-17 NOTE — PROGRESS NOTES
Chronic Pain-Established Note (Follow up visit)          Referring Physician: No ref. provider found    Chief Complaint:   No chief complaint on file.       SUBJECTIVE: Disclaimer: This note has been generated using voice-recognition software. There may be typographical errors that have been missed during proof-reading    Interval History 10/17/2022:  The patient presents today for re-evaluation of back and hip pain. I last saw her in May 2021 and she had hip injections with benefit. She says that her pain was manageable until recently. She is now having more right lateral hip and buttock pain. She had benefit with right GTB and ischial bursa in April 2021. She is no longer taking NSAIDs due to stomach issues. She takes Tylenol PRN. Her pain today is 8/10.    Interval History 5/14/2021:  The patient has a virtual video appointment for follow up of right hip and buttock pain. She is s/p right ischial bursa and GTB injection with 90% pain relief.  She now only has some mild soreness after she exercises from the right side.  She says her pain has significantly improved.  She mild pain on the left which also worsens after exercise.  She is working on walking and increasing activity for weight loss.  No new complaints noted.  Her pain today is 1/10.    Interval History 4/15/2021:  The patient reports today for follow up of of right-sided buttock and hip pain.  We have not seen the patient in was 2 years.  She reports that she has been doing fairly well but had a recent injury that caused increased pain.  Her buttock pain is located to the bottom of her buttock and posterior thigh.  It worsens with sitting and applying pressure.  She also has right-sided lateral hip pain.  She would like to discuss injections today.  Her pain today is 8/10.    Interval History 8/28/2019:  The patient is here for follow up of left sided buttock pain.  She has intermittent Toradol injections as well as SI joint injections as needed.  She  had a Toradol shot in April which controlled her symptoms well until recently.  She is requesting a repeat today.  She takes Ibuprofen sparingly as well.  Her pain worsens with sitting and standing.  She is not having any shooting into the legs.  Her pain today is 3/10.    Interval History 4/5/2019:  The patient returns to discuss pain.  After her last visit, I gave her a Toradol shot which provided her with significant benefit for some time.  She has been taking 800 mg Ibuprofen as needed with benefit, usually about once per week.  Her pain worsens with sitting and driving.  She does not have very much pain with walking.  Her pain today is 9/10.     Interval History 11/26/2018:  The patient returns for follow up of left sided lower back pain.  She is s/p left SI joint injection on 10/16/18 with minimal benefit.  Previous injection helped significantly.  She continues to report pain over the left buttock.  She does have pain over the lumbar spine, but this is mild.  Her pain is most severe when she sits for prolonged periods or changes from sitting to standing.  She is active and has been exercising and gardening.  Her previous XRAYs from last year show right sided curvature and retrolisthesis without instability.  She has not had imaging of hips or pelvis recently.  Her pain today is 6/10.  She takes OTC Ibuprofen with some benefit.    Interval History 2/1/2018:  The patient presents today for follow up.  She is s/p left SI joint injection on 1/16/18 with about 80% pain relief overall.  She has intermittent pain to left buttock which is mild.  This is mainly with bending and lifting.  Since her last visit, she has also changed her body mechanics which she thinks is helping.  She takes OTC NSAIDs sparingly as needed.  She has also increased her activity and has been walking.  Her pain today is 3/10.    Interval History 12/7/2017:  The patient presents today for follow up of left sided lower back and buttock pain.   She is s/p left L3,4,5 MBB on 11/7/17.  She reports that she felt numbness to her lower back for about one hour after the procedure.  Immediately after the procedure, she went to walk her dog in the park and started to have her usual pain.  Her pain is mainly to the left buttock at this time.  It is worsened with prolonged sitting, changing from sitting to standing, and lifting her 20 lb nephew.  She also notices that she carried him on her left hip which worsens her pain.  She does have some benefit with OTC NSAIDs.  Voltaren gel provided limited benefit.      Initial encounter:    Raquel Galan presents to the clinic for the evaluation of left sided low back pain. The pain started 10 years ago and symptoms have been worsening.    Brief history:  Patient had a history of radicular symptoms but they are not occurring now.    Pain Description:    The pain is located in the left side of lower back area and does not radiate.      At BEST  2/10     At WORST  9/10 on the WORST day.      On average pain is rated as 5/10.     Today the pain is rated as 4/10    The pain is described as aching and sharp      Symptoms interfere with daily activity.     Exacerbating factors: Sitting, Standing, Laying, Bending, Coughing/Sneezing, Walking, Morning, Lifting and Getting out of bed/chair.      Mitigating factors heat, ice, massage and medications.     Patient denies night fever/night sweats, urinary incontinence, bowel incontinence, significant weight loss, significant motor weakness and loss of sensations.  Patient denies any suicidal or homicidal ideations    Pain Medications:  Current:  Voltaren gel  Tylenol PRN    Tried in Past:  NSAIDs - Ibuprofen and Voltaren gel  TCA -Never  SNRI -Never  Anti-convulsants -asprin 81mg  Muscle Relaxants -Never  Opioids-Never    Physical Therapy/Home Exercise: yes  -works with a physical therapist and psychiatrist     report:  Reviewed and consistent with medication use as  prescribed.    Pain Procedures:   11/7/17 Left L3,4,5 MBB- limited relief after 1 hr with activity  1/16/18 Left SI joint injection- 80% relief  10/16/18 Left SI joint injection  4/29/21 Right GTB and ischial bursa injection- 90% relief    Chiropractor -never  Acupuncture - never  TENS unit -never  Spinal decompression -never  Joint replacement -never    CMP  Sodium   Date Value Ref Range Status   06/30/2022 142 136 - 145 mmol/L Final     Potassium   Date Value Ref Range Status   06/30/2022 4.1 3.5 - 5.1 mmol/L Final     Chloride   Date Value Ref Range Status   06/30/2022 109 95 - 110 mmol/L Final     CO2   Date Value Ref Range Status   06/30/2022 25 23 - 29 mmol/L Final     Glucose   Date Value Ref Range Status   06/30/2022 99 70 - 110 mg/dL Final     BUN   Date Value Ref Range Status   06/30/2022 14 8 - 23 mg/dL Final     Creatinine   Date Value Ref Range Status   06/30/2022 0.8 0.5 - 1.4 mg/dL Final     Calcium   Date Value Ref Range Status   06/30/2022 9.3 8.7 - 10.5 mg/dL Final     Total Protein   Date Value Ref Range Status   06/30/2022 7.2 6.0 - 8.4 g/dL Final     Albumin   Date Value Ref Range Status   06/30/2022 4.1 3.5 - 5.2 g/dL Final     Total Bilirubin   Date Value Ref Range Status   06/30/2022 1.3 (H) 0.1 - 1.0 mg/dL Final     Comment:     For infants and newborns, interpretation of results should be based  on gestational age, weight and in agreement with clinical  observations.    Premature Infant recommended reference ranges:  Up to 24 hours.............<8.0 mg/dL  Up to 48 hours............<12.0 mg/dL  3-5 days..................<15.0 mg/dL  6-29 days.................<15.0 mg/dL       Alkaline Phosphatase   Date Value Ref Range Status   06/30/2022 79 55 - 135 U/L Final     AST   Date Value Ref Range Status   06/30/2022 18 10 - 40 U/L Final     ALT   Date Value Ref Range Status   06/30/2022 20 10 - 44 U/L Final     Anion Gap   Date Value Ref Range Status   06/30/2022 8 8 - 16 mmol/L Final     eGFR if     Date Value Ref Range Status   06/30/2022 >60.0 >60 mL/min/1.73 m^2 Final     eGFR if non    Date Value Ref Range Status   06/30/2022 >60.0 >60 mL/min/1.73 m^2 Final     Comment:     Calculation used to obtain the estimated glomerular filtration  rate (eGFR) is the CKD-EPI equation.        Lab Results   Component Value Date    WBC 5.97 06/30/2022    HGB 14.1 06/30/2022    HCT 43.6 06/30/2022     (H) 06/30/2022     06/30/2022       Imaging:  Narrative     EXAMINATION:  XR HIPS BILATERAL 2 VIEW INCL AP PELVIS    CLINICAL HISTORY:  Sacroiliitis, not elsewhere classified    TECHNIQUE:  AP view of the pelvis and frogleg lateral views of both hips were performed.    COMPARISON:  None.    FINDINGS:  There is no evidence of acute fracture, dislocation, or bone destruction.  Joint spaces are well preserved.  There is mild degenerative change of the sacroiliac joints.  No erosions are seen      Impression       See above.     Narrative     EXAMINATION:  XR LUMBAR SPINE 5 VIEW WITH FLEX AND EXT    CLINICAL HISTORY:  Low back pain, >6wks conservative tx, persistent-progressive sx, surgical candidate;  Sacroiliitis, not elsewhere classified    TECHNIQUE:  Five views of the lumbar spine plus flexion extension views were performed.    COMPARISON:  Prior dated 10/26/2017    FINDINGS:  There is dextroscoliosis of the lumbar spine.  There is retrolisthesis of L2-3 and L3 on L4 (grade 1) measuring approximately 4 mm.  This is similar to previous exam.  There is no significant change on flexion/extension views to suggest translational instability.  There are endplate degenerative changes with subchondral sclerosis and marginal osteophyte formation and disc height narrowing at L3-4.  Mild facet arthropathy is present at the lower lumbar levels.      Impression       Degenerative change of the lumbar spine with associated dextroscoliosis and L2-3/L3-4 retrolisthesis, similar to previous  exam.  No translational instability.       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     Fibrocystic breast disease in female     HEARING LOSS     right side - no hearing aid    Hyperlipidemia     Osteopenia     PONV (postoperative nausea and vomiting)     PONV (postoperative nausea and vomiting)     Special screening for malignant neoplasms, colon 7/22/2013     Past Surgical History:   Procedure Laterality Date    ADENOIDECTOMY      APPENDECTOMY      BREAST BIOPSY Left     exc bx, benign per pt    BREAST SURGERY      left breast biopsy    COLONOSCOPY N/A 9/27/2018    Procedure: COLONOSCOPY;  Surgeon: Uche Smiley MD;  Location: [facility] ENDO (4TH FLR);  Service: Endoscopy;  Laterality: N/A;  pt/instructed on prep day before and day of importance    CYST REMOVAL Right     neck    DILATION AND CURETTAGE OF UTERUS      ESOPHAGOGASTRODUODENOSCOPY N/A 8/4/2021    Procedure: ESOPHAGOGASTRODUODENOSCOPY (EGD);  Surgeon: Ameya Medeiros MD;  Location: [facility] ENDO (2ND FLR);  Service: Endoscopy;  Laterality: N/A;  Food gets hung up. Dilatation likely needed.     COVID test in [place] on 8/1-GT  8/2-new instructions via portal-tb    HYSTERECTOMY      INJECTION OF JOINT Left 10/16/2018    Procedure: INJECTION, JOINT, LEFT SI JOINT INJECTION UNDER FLUORO;  Surgeon: Alirio Lei MD;  Location: [facility] PAIN MGT;  Service: Pain Management;  Laterality: Left;  NEEDS CONSENT    MANDIBLE FRACTURE SURGERY Right     scope    ROBOT-ASSISTED LAPAROSCOPIC ABDOMINAL HYSTERECTOMY USING DA ERICA XI N/A 6/12/2020    Procedure: XI ROBOTIC HYSTERECTOMY;  Surgeon: Alice Valenzuela MD;  Location: [facility] OR;  Service: OB/GYN;  Laterality: N/A;    ROBOT-ASSISTED LAPAROSCOPIC SALPINGO-OOPHORECTOMY USING DA ERICA XI N/A 6/12/2020    Procedure: XI ROBOTIC SALPINGO-OOPHORECTOMY;  Surgeon: Alice Valenzuela MD;  Location: [facility] OR;  Service: OB/GYN;   Laterality: N/A;    TONSILLECTOMY       Social History     Socioeconomic History    Marital status:    Tobacco Use    Smoking status: Never    Smokeless tobacco: Never   Substance and Sexual Activity    Alcohol use: Yes     Alcohol/week: 2.0 standard drinks     Types: 2 Glasses of wine per week     Comment: social    Drug use: Never    Sexual activity: Yes     Partners: Male     Birth control/protection: Partner-Vasectomy, Post-menopausal, None   Other Topics Concern    Are you pregnant or think you may be? No    Breast-feeding No     Social Determinants of Health     Financial Resource Strain: Low Risk     Difficulty of Paying Living Expenses: Not hard at all   Food Insecurity: No Food Insecurity    Worried About Running Out of Food in the Last Year: Never true    Ran Out of Food in the Last Year: Never true   Transportation Needs: No Transportation Needs    Lack of Transportation (Medical): No    Lack of Transportation (Non-Medical): No   Physical Activity: Insufficiently Active    Days of Exercise per Week: 3 days    Minutes of Exercise per Session: 30 min   Stress: Stress Concern Present    Feeling of Stress : To some extent   Social Connections: Unknown    Frequency of Communication with Friends and Family: More than three times a week    Frequency of Social Gatherings with Friends and Family: More than three times a week    Active Member of Clubs or Organizations: Yes    Attends Club or Organization Meetings: More than 4 times per year    Marital Status:    Housing Stability: Low Risk     Unable to Pay for Housing in the Last Year: No    Number of Places Lived in the Last Year: 2    Unstable Housing in the Last Year: No     Family History   Problem Relation Age of Onset    Heart disease Mother     Kidney disease Mother     Diabetes Mother     Hypertension Mother     Hyperlipidemia Mother     COPD Mother     Arthritis Mother     Miscarriages / Stillbirths Mother     Stroke Mother     Heart disease  Father     Cancer Father 75        lymphoma, bladder tumors    Arthritis Father     Breast cancer Neg Hx     Ovarian cancer Neg Hx     Colon cancer Neg Hx     Lupus Neg Hx     Psoriasis Neg Hx     Rheum arthritis Neg Hx     Inflammatory bowel disease Neg Hx        Review of patient's allergies indicates:   Allergen Reactions    Dexamethasone      Flushing and headache with medrol dosepack & tachycardia       Current Outpatient Medications   Medication Sig    ALPRAZolam (XANAX) 0.5 MG tablet Take 1 tablet (0.5 mg total) by mouth 2 (two) times daily as needed for Anxiety.    b complex vitamins tablet Take 1 tablet by mouth once daily.    CALCIUM 600 WITH VITAMIN D3 600 mg(1,500mg) -200 unit Tab Take 1 tablet by mouth once daily.    EPIPEN 2-PEDRO 0.3 mg/0.3 mL (1:1,000) AtIn     estradioL (ESTRACE) 0.5 MG tablet Take 1 tablet (0.5 mg total) by mouth once daily.    estradioL (ESTRACE) 1 MG tablet TAKE 1 TABLET ONE TIME DAILY.    fexofenadine (ALLEGRA) 180 MG tablet Take 1 tablet (180 mg total) by mouth once daily.    glucosamine-chondroitin 500-400 mg tablet Take 1 tablet by mouth Daily.    meclizine (ANTIVERT) 12.5 mg tablet Take 1 tablet (12.5 mg total) by mouth 3 (three) times daily as needed (vertigo).    omega-3 fatty acids/fish oil (FISH OIL-OMEGA-3 FATTY ACIDS) 300-1,000 mg capsule Take 1 capsule by mouth once daily.    pantoprazole (PROTONIX) 40 MG tablet Take 1 tablet (40 mg total) by mouth once daily.    tretinoin (RETIN-A) 0.025 % cream Compound tretinoin 0.025% / niacinamide 2% cream. Apply a pea-sized amount to entire face qhs.     No current facility-administered medications for this visit.       REVIEW OF SYSTEMS:    GENERAL:  No weight loss, malaise or fevers.  HEENT:   No recent changes in vision or hearing  NECK:  Negative for lumps, no difficulty with swallowing.  RESPIRATORY:  Negative for cough, wheezing or shortness of breath, patient denies any recent URI.  CARDIOVASCULAR:  Negative for chest pain,  "leg swelling or palpitations.  GI:  Negative for abdominal discomfort, blood in stools or black stools or change in bowel habits.  MUSCULOSKELETAL:  See HPI.  SKIN:  Negative for lesions, rash, and itching.  PSYCH:  No mood disorder or recent psychosocial stressors.  Patient's sleep is disturbed secondary to pain.  HEMATOLOGY/LYMPHOLOGY:  Negative for prolonged bleeding, bruising easily or swollen nodes.  Patient is not currently taking any anti-coagulants  ENDO: No history of diabetes or thyroid dysfunction  NEURO:   No history of headaches, syncope, paralysis, seizures or tremors.  All other reviewed and negative other than HPI.    OBJECTIVE:    /60 (BP Location: Left arm, Patient Position: Sitting, BP Method: Medium (Automatic))   Pulse 70   Temp 96 °F (35.6 °C)   Ht 5' 1" (1.549 m)   Wt 54 kg (119 lb 0.8 oz)   LMP 04/15/2009   BMI 22.49 kg/m²     PHYSICAL EXAMINATION:    GENERAL: Well appearing, in no acute distress, alert and oriented x3.  PSYCH:  Mood and affect appropriate.  SKIN: Skin color, texture, turgor normal, no rashes or lesions.  HEAD/FACE:  Normocephalic, atraumatic. Cranial nerves grossly intact.  BACK: Straight leg raising in the sitting and supine positions is negative to radicular pain. There is no pain with palpation over the facet joints of the lumbar spine. There is decreased range of motion with extension to 15 degrees, and facet loading maneuvers cause reproducible pain bilaterally.  EXTREMITIES: Peripheral joint ROM is full and pain free without obvious instability or laxity in all four extremities. No deformities, edema, or skin discoloration. Good capillary refill.  MUSCULOSKELETAL: TTP over right ischial bursa and greater trochanteric bursa. FABERs test is positive bilaterally.   Bilateral lower extremity strength is normal and symmetric.  No atrophy or tone abnormalities are noted.  NEURO: Bilateral lower extremity coordination and muscle stretch reflexes are physiologic " and symmetric.  Plantar response are downgoing.   GAIT: Normal.      ASSESSMENT: 62 y.o. year old female with left sided back/buttock pain, consistent with the following:    Encounter Diagnoses   Name Primary?    Greater trochanteric bursitis, unspecified laterality Yes    DJD (degenerative joint disease), lumbosacral     Ischial bursitis of right side          PLAN:     - Previous imaging was reviewed and discussed with the patient today.    - She had significant benefit for over one year with right GTB and right ischial bursa injection in office under ultrasound with Dr. Lei. She is aware that he is no longer with [facility]. She says Dr. Tillman was recommended by her rheumatologist so I will schedule her for repeat with her.    - The patient will continue a home exercise routine to help with pain and strengthening.      - RTC after procedure.      The above plan and management options were discussed at length with patient. Patient is in agreement with the above and verbalized understanding.    Jennifer Pickens  10/17/2022

## 2022-10-18 ENCOUNTER — TELEPHONE (OUTPATIENT)
Dept: PAIN MEDICINE | Facility: CLINIC | Age: 62
End: 2022-10-18
Payer: MEDICARE

## 2022-10-18 DIAGNOSIS — M70.60 GREATER TROCHANTERIC BURSITIS, UNSPECIFIED LATERALITY: Primary | ICD-10-CM

## 2022-10-18 NOTE — TELEPHONE ENCOUNTER
Patient was contact and informed that her procedure wasn't approved for tomorrow.       However a message is being sent to the pre service department.    Once a response is made. We will update the patient.    Verbalized understanding

## 2022-10-19 ENCOUNTER — PROCEDURE VISIT (OUTPATIENT)
Dept: PAIN MEDICINE | Facility: CLINIC | Age: 62
End: 2022-10-19
Attending: ANESTHESIOLOGY
Payer: MEDICARE

## 2022-10-19 VITALS
HEART RATE: 70 BPM | HEIGHT: 61 IN | RESPIRATION RATE: 18 BRPM | SYSTOLIC BLOOD PRESSURE: 111 MMHG | DIASTOLIC BLOOD PRESSURE: 69 MMHG | BODY MASS INDEX: 22.84 KG/M2 | WEIGHT: 121 LBS

## 2022-10-19 DIAGNOSIS — M70.70 ISCHIAL BURSITIS, UNSPECIFIED LATERALITY: ICD-10-CM

## 2022-10-19 DIAGNOSIS — M70.60 GREATER TROCHANTERIC BURSITIS, UNSPECIFIED LATERALITY: Primary | ICD-10-CM

## 2022-10-19 PROCEDURE — 20611 PR DRAIN/ASP/INJECT MAJOR JOINT/BURSA W/US GUIDANCE: ICD-10-PCS | Mod: RT,S$GLB,, | Performed by: ANESTHESIOLOGY

## 2022-10-19 PROCEDURE — 99499 UNLISTED E&M SERVICE: CPT | Mod: S$GLB,,, | Performed by: ANESTHESIOLOGY

## 2022-10-19 PROCEDURE — 20611 DRAIN/INJ JOINT/BURSA W/US: CPT | Mod: RT,S$GLB,, | Performed by: ANESTHESIOLOGY

## 2022-10-19 PROCEDURE — 99499 NO LOS: ICD-10-PCS | Mod: S$GLB,,, | Performed by: ANESTHESIOLOGY

## 2022-10-19 RX ORDER — TRIAMCINOLONE ACETONIDE 40 MG/ML
40 INJECTION, SUSPENSION INTRA-ARTICULAR; INTRAMUSCULAR
Status: COMPLETED | OUTPATIENT
Start: 2022-10-19 | End: 2022-10-19

## 2022-10-19 RX ADMIN — TRIAMCINOLONE ACETONIDE 40 MG: 40 INJECTION, SUSPENSION INTRA-ARTICULAR; INTRAMUSCULAR at 12:10

## 2022-10-19 NOTE — PROCEDURES
Procedures  Greater Trochanteric Bursa Injection under Ultrasound Guidance    The procedure, risks, benefits, and options were discussed with the patient. There are no contraindications to the procedure. The patent expressed understanding and agreed to the procedure. Informed written consent was obtained prior to the start of the procedure and can be found in the patient's chart.    PATIENT NAME: Raquel Galan   MRN: [id number]     DATE OF PROCEDURE: 10/19/2022    PROCEDURE: Right Greater Trochanteric Bursa Injection and Right Ischial Bursa Injection under Ultrasound Guidance    PRE-OP DIAGNOSIS: Right ischial bursitis and right greater trochanter bursitis.     POST-OP DIAGNOSIS: Same    PHYSICIAN: Danielle Tillman MD    ASSISTANTS: Alvin Chavez DO    MEDICATIONS INJECTED: 10cc of 0.25% Bupivacaine and 0.5 mL 40 mg/ml triamcinolone      LOCAL ANESTHETIC INJECTED: Xylocaine 2%     SEDATION: None    ESTIMATED BLOOD LOSS: None    COMPLICATIONS: None    TECHNIQUE: Time-out was performed to identify the patient and procedure to be performed. With the patient laying in a prone position, the surgical area was prepped and draped in the usual sterile fashion using ChloraPrep. The area overlying the ischial bursa was determined under ultrasound guidance. The right ischial bursa was then approached with a 25 gauge, 3.5 inch spinal quinke needle that was introduced under ultrasound guidance. Once the needle tip was in the area of the bursa, and there was no blood aspiration,  5 mL of the medication mixture listed above was injected slowly. The needles were removed and bleeding was nil. Bandaid was applied to the injection site. The same procedure was then performed to the right greater trochanter bursa. No specimens collected. The patient tolerated the procedure well.    The patient was monitored after the procedure in the recovery area. They were given post-procedure and discharge instructions to follow at home. The patient was  discharged in a stable condition.    Alvin Chavez DO [employer] pain fellow    I reviewed and edited the fellow's note. I conducted my own interview and physical examination. I agree with the findings. I was present and supervising all critical portions of the procedure.    I have personally taken the history and examined this patient and agree with the fellow's note as stated above.

## 2022-10-20 ENCOUNTER — PATIENT MESSAGE (OUTPATIENT)
Dept: PAIN MEDICINE | Facility: CLINIC | Age: 62
End: 2022-10-20
Payer: MEDICARE

## 2022-12-05 ENCOUNTER — PATIENT MESSAGE (OUTPATIENT)
Dept: SPINE | Facility: CLINIC | Age: 62
End: 2022-12-05

## 2022-12-05 ENCOUNTER — OFFICE VISIT (OUTPATIENT)
Dept: SPINE | Facility: CLINIC | Age: 62
End: 2022-12-05
Payer: MEDICARE

## 2022-12-05 VITALS
DIASTOLIC BLOOD PRESSURE: 83 MMHG | HEART RATE: 72 BPM | HEIGHT: 61 IN | SYSTOLIC BLOOD PRESSURE: 166 MMHG | TEMPERATURE: 99 F | WEIGHT: 121.25 LBS | BODY MASS INDEX: 22.89 KG/M2

## 2022-12-05 DIAGNOSIS — M70.71 ISCHIAL BURSITIS OF RIGHT SIDE: Primary | ICD-10-CM

## 2022-12-05 DIAGNOSIS — M70.61 TROCHANTERIC BURSITIS OF RIGHT HIP: ICD-10-CM

## 2022-12-05 DIAGNOSIS — G89.4 CHRONIC PAIN DISORDER: ICD-10-CM

## 2022-12-05 PROCEDURE — 3008F BODY MASS INDEX DOCD: CPT | Mod: CPTII,S$GLB,, | Performed by: NURSE PRACTITIONER

## 2022-12-05 PROCEDURE — 3008F PR BODY MASS INDEX (BMI) DOCUMENTED: ICD-10-PCS | Mod: CPTII,S$GLB,, | Performed by: NURSE PRACTITIONER

## 2022-12-05 PROCEDURE — 3077F PR MOST RECENT SYSTOLIC BLOOD PRESSURE >= 140 MM HG: ICD-10-PCS | Mod: CPTII,S$GLB,, | Performed by: NURSE PRACTITIONER

## 2022-12-05 PROCEDURE — 3079F PR MOST RECENT DIASTOLIC BLOOD PRESSURE 80-89 MM HG: ICD-10-PCS | Mod: CPTII,S$GLB,, | Performed by: NURSE PRACTITIONER

## 2022-12-05 PROCEDURE — 1160F PR REVIEW ALL MEDS BY PRESCRIBER/CLIN PHARMACIST DOCUMENTED: ICD-10-PCS | Mod: CPTII,S$GLB,, | Performed by: NURSE PRACTITIONER

## 2022-12-05 PROCEDURE — 3044F HG A1C LEVEL LT 7.0%: CPT | Mod: CPTII,S$GLB,, | Performed by: NURSE PRACTITIONER

## 2022-12-05 PROCEDURE — 3077F SYST BP >= 140 MM HG: CPT | Mod: CPTII,S$GLB,, | Performed by: NURSE PRACTITIONER

## 2022-12-05 PROCEDURE — 99999 PR PBB SHADOW E&M-EST. PATIENT-LVL III: ICD-10-PCS | Mod: PBBFAC,,, | Performed by: NURSE PRACTITIONER

## 2022-12-05 PROCEDURE — 99999 PR PBB SHADOW E&M-EST. PATIENT-LVL III: CPT | Mod: PBBFAC,,, | Performed by: NURSE PRACTITIONER

## 2022-12-05 PROCEDURE — 3044F PR MOST RECENT HEMOGLOBIN A1C LEVEL <7.0%: ICD-10-PCS | Mod: CPTII,S$GLB,, | Performed by: NURSE PRACTITIONER

## 2022-12-05 PROCEDURE — 1159F PR MEDICATION LIST DOCUMENTED IN MEDICAL RECORD: ICD-10-PCS | Mod: CPTII,S$GLB,, | Performed by: NURSE PRACTITIONER

## 2022-12-05 PROCEDURE — 99213 OFFICE O/P EST LOW 20 MIN: CPT | Mod: S$GLB,,, | Performed by: NURSE PRACTITIONER

## 2022-12-05 PROCEDURE — 1159F MED LIST DOCD IN RCRD: CPT | Mod: CPTII,S$GLB,, | Performed by: NURSE PRACTITIONER

## 2022-12-05 PROCEDURE — 99213 PR OFFICE/OUTPT VISIT, EST, LEVL III, 20-29 MIN: ICD-10-PCS | Mod: S$GLB,,, | Performed by: NURSE PRACTITIONER

## 2022-12-05 PROCEDURE — 3079F DIAST BP 80-89 MM HG: CPT | Mod: CPTII,S$GLB,, | Performed by: NURSE PRACTITIONER

## 2022-12-05 PROCEDURE — 1160F RVW MEDS BY RX/DR IN RCRD: CPT | Mod: CPTII,S$GLB,, | Performed by: NURSE PRACTITIONER

## 2022-12-05 NOTE — PROGRESS NOTES
Chronic Pain-Established Note (Follow up visit)          Referring Physician: No ref. provider found    Chief Complaint:   No chief complaint on file.       SUBJECTIVE: Disclaimer: This note has been generated using voice-recognition software. There may be typographical errors that have been missed during proof-reading    Interval History 12/5/2022:  The patient is here for follow up of right sided hip and buttock pain. She is s/p right GTB and ischial bursa injections in office on 10/19/22 with Dr. Tillman. She reports 90% relief of her pain. She was able to take a trip and walk a lot after the procedure without significant pain. She has very mild, intermittent pain since the procedure. Her pain today is 0/10.    Interval History 10/17/2022:  The patient presents today for re-evaluation of back and hip pain. I last saw her in May 2021 and she had hip injections with benefit. She says that her pain was manageable until recently. She is now having more right lateral hip and buttock pain. She had benefit with right GTB and ischial bursa in April 2021. She is no longer taking NSAIDs due to stomach issues. She takes Tylenol PRN. Her pain today is 8/10.    Interval History 5/14/2021:  The patient has a virtual video appointment for follow up of right hip and buttock pain. She is s/p right ischial bursa and GTB injection with 90% pain relief.  She now only has some mild soreness after she exercises from the right side.  She says her pain has significantly improved.  She mild pain on the left which also worsens after exercise.  She is working on walking and increasing activity for weight loss.  No new complaints noted.  Her pain today is 1/10.    Interval History 4/15/2021:  The patient reports today for follow up of of right-sided buttock and hip pain.  We have not seen the patient in was 2 years.  She reports that she has been doing fairly well but had a recent injury that caused increased pain.  Her buttock pain is  located to the bottom of her buttock and posterior thigh.  It worsens with sitting and applying pressure.  She also has right-sided lateral hip pain.  She would like to discuss injections today.  Her pain today is 8/10.    Interval History 8/28/2019:  The patient is here for follow up of left sided buttock pain.  She has intermittent Toradol injections as well as SI joint injections as needed.  She had a Toradol shot in April which controlled her symptoms well until recently.  She is requesting a repeat today.  She takes Ibuprofen sparingly as well.  Her pain worsens with sitting and standing.  She is not having any shooting into the legs.  Her pain today is 3/10.    Interval History 4/5/2019:  The patient returns to discuss pain.  After her last visit, I gave her a Toradol shot which provided her with significant benefit for some time.  She has been taking 800 mg Ibuprofen as needed with benefit, usually about once per week.  Her pain worsens with sitting and driving.  She does not have very much pain with walking.  Her pain today is 9/10.     Interval History 11/26/2018:  The patient returns for follow up of left sided lower back pain.  She is s/p left SI joint injection on 10/16/18 with minimal benefit.  Previous injection helped significantly.  She continues to report pain over the left buttock.  She does have pain over the lumbar spine, but this is mild.  Her pain is most severe when she sits for prolonged periods or changes from sitting to standing.  She is active and has been exercising and gardening.  Her previous XRAYs from last year show right sided curvature and retrolisthesis without instability.  She has not had imaging of hips or pelvis recently.  Her pain today is 6/10.  She takes OTC Ibuprofen with some benefit.    Interval History 2/1/2018:  The patient presents today for follow up.  She is s/p left SI joint injection on 1/16/18 with about 80% pain relief overall.  She has intermittent pain to left  buttock which is mild.  This is mainly with bending and lifting.  Since her last visit, she has also changed her body mechanics which she thinks is helping.  She takes OTC NSAIDs sparingly as needed.  She has also increased her activity and has been walking.  Her pain today is 3/10.    Interval History 12/7/2017:  The patient presents today for follow up of left sided lower back and buttock pain.  She is s/p left L3,4,5 MBB on 11/7/17.  She reports that she felt numbness to her lower back for about one hour after the procedure.  Immediately after the procedure, she went to walk her dog in the park and started to have her usual pain.  Her pain is mainly to the left buttock at this time.  It is worsened with prolonged sitting, changing from sitting to standing, and lifting her 20 lb nephew.  She also notices that she carried him on her left hip which worsens her pain.  She does have some benefit with OTC NSAIDs.  Voltaren gel provided limited benefit.      Initial encounter:    Raquel Galan presents to the clinic for the evaluation of left sided low back pain. The pain started 10 years ago and symptoms have been worsening.    Brief history:  Patient had a history of radicular symptoms but they are not occurring now.    Pain Description:    The pain is located in the left side of lower back area and does not radiate.      At BEST  2/10     At WORST  9/10 on the WORST day.      On average pain is rated as 5/10.     Today the pain is rated as 4/10    The pain is described as aching and sharp      Symptoms interfere with daily activity.     Exacerbating factors: Sitting, Standing, Laying, Bending, Coughing/Sneezing, Walking, Morning, Lifting and Getting out of bed/chair.      Mitigating factors heat, ice, massage and medications.     Patient denies night fever/night sweats, urinary incontinence, bowel incontinence, significant weight loss, significant motor weakness and loss of sensations.  Patient denies any suicidal  or homicidal ideations    Pain Medications:  Current:  Voltaren gel  Tylenol PRN    Tried in Past:  NSAIDs - Ibuprofen and Voltaren gel  TCA -Never  SNRI -Never  Anti-convulsants -asprin 81mg  Muscle Relaxants -Never  Opioids-Never    Physical Therapy/Home Exercise: yes  -works with a physical therapist and psychiatrist     report:  Reviewed and consistent with medication use as prescribed.    Pain Procedures:   11/7/17 Left L3,4,5 MBB- limited relief after 1 hr with activity  1/16/18 Left SI joint injection- 80% relief  10/16/18 Left SI joint injection  4/29/21 Right GTB and ischial bursa injection- 90% relief  10/19/22 Right GTB and ischial bursa injection- 90% relief    Chiropractor -never  Acupuncture - never  TENS unit -never  Spinal decompression -never  Joint replacement -never    CMP  Sodium   Date Value Ref Range Status   06/30/2022 142 136 - 145 mmol/L Final     Potassium   Date Value Ref Range Status   06/30/2022 4.1 3.5 - 5.1 mmol/L Final     Chloride   Date Value Ref Range Status   06/30/2022 109 95 - 110 mmol/L Final     CO2   Date Value Ref Range Status   06/30/2022 25 23 - 29 mmol/L Final     Glucose   Date Value Ref Range Status   06/30/2022 99 70 - 110 mg/dL Final     BUN   Date Value Ref Range Status   06/30/2022 14 8 - 23 mg/dL Final     Creatinine   Date Value Ref Range Status   06/30/2022 0.8 0.5 - 1.4 mg/dL Final     Calcium   Date Value Ref Range Status   06/30/2022 9.3 8.7 - 10.5 mg/dL Final     Total Protein   Date Value Ref Range Status   06/30/2022 7.2 6.0 - 8.4 g/dL Final     Albumin   Date Value Ref Range Status   06/30/2022 4.1 3.5 - 5.2 g/dL Final     Total Bilirubin   Date Value Ref Range Status   06/30/2022 1.3 (H) 0.1 - 1.0 mg/dL Final     Comment:     For infants and newborns, interpretation of results should be based  on gestational age, weight and in agreement with clinical  observations.    Premature Infant recommended reference ranges:  Up to 24 hours.............<8.0  mg/dL  Up to 48 hours............<12.0 mg/dL  3-5 days..................<15.0 mg/dL  6-29 days.................<15.0 mg/dL       Alkaline Phosphatase   Date Value Ref Range Status   06/30/2022 79 55 - 135 U/L Final     AST   Date Value Ref Range Status   06/30/2022 18 10 - 40 U/L Final     ALT   Date Value Ref Range Status   06/30/2022 20 10 - 44 U/L Final     Anion Gap   Date Value Ref Range Status   06/30/2022 8 8 - 16 mmol/L Final     eGFR if    Date Value Ref Range Status   06/30/2022 >60.0 >60 mL/min/1.73 m^2 Final     eGFR if non    Date Value Ref Range Status   06/30/2022 >60.0 >60 mL/min/1.73 m^2 Final     Comment:     Calculation used to obtain the estimated glomerular filtration  rate (eGFR) is the CKD-EPI equation.        Lab Results   Component Value Date    WBC 5.97 06/30/2022    HGB 14.1 06/30/2022    HCT 43.6 06/30/2022     (H) 06/30/2022     06/30/2022       Imaging:  Narrative     EXAMINATION:  XR HIPS BILATERAL 2 VIEW INCL AP PELVIS    CLINICAL HISTORY:  Sacroiliitis, not elsewhere classified    TECHNIQUE:  AP view of the pelvis and frogleg lateral views of both hips were performed.    COMPARISON:  None.    FINDINGS:  There is no evidence of acute fracture, dislocation, or bone destruction.  Joint spaces are well preserved.  There is mild degenerative change of the sacroiliac joints.  No erosions are seen      Impression       See above.     Narrative     EXAMINATION:  XR LUMBAR SPINE 5 VIEW WITH FLEX AND EXT    CLINICAL HISTORY:  Low back pain, >6wks conservative tx, persistent-progressive sx, surgical candidate;  Sacroiliitis, not elsewhere classified    TECHNIQUE:  Five views of the lumbar spine plus flexion extension views were performed.    COMPARISON:  Prior dated 10/26/2017    FINDINGS:  There is dextroscoliosis of the lumbar spine.  There is retrolisthesis of L2-3 and L3 on L4 (grade 1) measuring approximately 4 mm.  This is similar to previous  exam.  There is no significant change on flexion/extension views to suggest translational instability.  There are endplate degenerative changes with subchondral sclerosis and marginal osteophyte formation and disc height narrowing at L3-4.  Mild facet arthropathy is present at the lower lumbar levels.      Impression       Degenerative change of the lumbar spine with associated dextroscoliosis and L2-3/L3-4 retrolisthesis, similar to previous exam.  No translational instability.       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     Fibrocystic breast disease in female     HEARING LOSS     right side - no hearing aid    Hyperlipidemia     Osteopenia     PONV (postoperative nausea and vomiting)     PONV (postoperative nausea and vomiting)     Special screening for malignant neoplasms, colon 7/22/2013     Past Surgical History:   Procedure Laterality Date    ADENOIDECTOMY      APPENDECTOMY      BREAST BIOPSY Left     exc bx, benign per pt    BREAST SURGERY      left breast biopsy    COLONOSCOPY N/A 9/27/2018    Procedure: COLONOSCOPY;  Surgeon: Uche Smiley MD;  Location: [facility] (4TH FLR);  Service: Endoscopy;  Laterality: N/A;  pt/instructed on prep day before and day of importance    CYST REMOVAL Right     neck    DILATION AND CURETTAGE OF UTERUS      ESOPHAGOGASTRODUODENOSCOPY N/A 8/4/2021    Procedure: ESOPHAGOGASTRODUODENOSCOPY (EGD);  Surgeon: Ameya Medeiros MD;  Location: [facility] (2ND FLR);  Service: Endoscopy;  Laterality: N/A;  Food gets hung up. Dilatation likely needed.     COVID test in [place] on 8/1-GT  8/2-new instructions via portal-tb    HYSTERECTOMY      INJECTION OF JOINT Left 10/16/2018    Procedure: INJECTION, JOINT, LEFT SI JOINT INJECTION UNDER FLUORO;  Surgeon: Alirio Lei MD;  Location: [facility] PAIN MGT;  Service: Pain Management;  Laterality: Left;  NEEDS CONSENT    MANDIBLE  FRACTURE SURGERY Right     scope    ROBOT-ASSISTED LAPAROSCOPIC ABDOMINAL HYSTERECTOMY USING DA ERICA XI N/A 6/12/2020    Procedure: XI ROBOTIC HYSTERECTOMY;  Surgeon: Alice Valenzuela MD;  Location: [facility];  Service: OB/GYN;  Laterality: N/A;    ROBOT-ASSISTED LAPAROSCOPIC SALPINGO-OOPHORECTOMY USING DA ERICA XI N/A 6/12/2020    Procedure: XI ROBOTIC SALPINGO-OOPHORECTOMY;  Surgeon: Alice Valenzuela MD;  Location: [facility] OR;  Service: OB/GYN;  Laterality: N/A;    TONSILLECTOMY       Social History     Socioeconomic History    Marital status:    Tobacco Use    Smoking status: Never    Smokeless tobacco: Never   Substance and Sexual Activity    Alcohol use: Yes     Alcohol/week: 2.0 standard drinks     Types: 2 Glasses of wine per week     Comment: social    Drug use: Never    Sexual activity: Yes     Partners: Male     Birth control/protection: Partner-Vasectomy, Post-menopausal, None   Other Topics Concern    Are you pregnant or think you may be? No    Breast-feeding No     Social Determinants of Health     Financial Resource Strain: Low Risk     Difficulty of Paying Living Expenses: Not hard at all   Food Insecurity: No Food Insecurity    Worried About Running Out of Food in the Last Year: Never true    Ran Out of Food in the Last Year: Never true   Transportation Needs: No Transportation Needs    Lack of Transportation (Medical): No    Lack of Transportation (Non-Medical): No   Physical Activity: Insufficiently Active    Days of Exercise per Week: 3 days    Minutes of Exercise per Session: 30 min   Stress: Stress Concern Present    Feeling of Stress : To some extent   Social Connections: Unknown    Frequency of Communication with Friends and Family: More than three times a week    Frequency of Social Gatherings with Friends and Family: More than three times a week    Active Member of Clubs or Organizations: Yes    Attends Club or Organization Meetings: More than 4 times per year    Marital Status:     Housing Stability: Low Risk     Unable to Pay for Housing in the Last Year: No    Number of Places Lived in the Last Year: 2    Unstable Housing in the Last Year: No     Family History   Problem Relation Age of Onset    Heart disease Mother     Kidney disease Mother     Diabetes Mother     Hypertension Mother     Hyperlipidemia Mother     COPD Mother     Arthritis Mother     Miscarriages / Stillbirths Mother     Stroke Mother     Heart disease Father     Cancer Father 75        lymphoma, bladder tumors    Arthritis Father     Breast cancer Neg Hx     Ovarian cancer Neg Hx     Colon cancer Neg Hx     Lupus Neg Hx     Psoriasis Neg Hx     Rheum arthritis Neg Hx     Inflammatory bowel disease Neg Hx        Review of patient's allergies indicates:   Allergen Reactions    Dexamethasone      Flushing and headache with medrol dosepack & tachycardia       Current Outpatient Medications   Medication Sig    b complex vitamins tablet Take 1 tablet by mouth once daily.    CALCIUM 600 WITH VITAMIN D3 600 mg(1,500mg) -200 unit Tab Take 1 tablet by mouth once daily.    EPIPEN 2-PEDRO 0.3 mg/0.3 mL (1:1,000) AtIn     estradioL (ESTRACE) 1 MG tablet TAKE 1 TABLET ONE TIME DAILY.    glucosamine-chondroitin 500-400 mg tablet Take 1 tablet by mouth Daily.    omega-3 fatty acids/fish oil (FISH OIL-OMEGA-3 FATTY ACIDS) 300-1,000 mg capsule Take 1 capsule by mouth once daily.    tretinoin (RETIN-A) 0.025 % cream Compound tretinoin 0.025% / niacinamide 2% cream. Apply a pea-sized amount to entire face qhs.    fexofenadine (ALLEGRA) 180 MG tablet Take 1 tablet (180 mg total) by mouth once daily.    meclizine (ANTIVERT) 12.5 mg tablet Take 1 tablet (12.5 mg total) by mouth 3 (three) times daily as needed (vertigo).    pantoprazole (PROTONIX) 40 MG tablet Take 1 tablet (40 mg total) by mouth once daily.     No current facility-administered medications for this visit.       REVIEW OF SYSTEMS:    GENERAL:  No weight loss, malaise or  "fevers.  HEENT:   No recent changes in vision or hearing  NECK:  Negative for lumps, no difficulty with swallowing.  RESPIRATORY:  Negative for cough, wheezing or shortness of breath, patient denies any recent URI.  CARDIOVASCULAR:  Negative for chest pain, leg swelling or palpitations.  GI:  Negative for abdominal discomfort, blood in stools or black stools or change in bowel habits.  MUSCULOSKELETAL:  See HPI.  SKIN:  Negative for lesions, rash, and itching.  PSYCH:  No mood disorder or recent psychosocial stressors.  Patient's sleep is disturbed secondary to pain.  HEMATOLOGY/LYMPHOLOGY:  Negative for prolonged bleeding, bruising easily or swollen nodes.  Patient is not currently taking any anti-coagulants  ENDO: No history of diabetes or thyroid dysfunction  NEURO:   No history of headaches, syncope, paralysis, seizures or tremors.  All other reviewed and negative other than HPI.    OBJECTIVE:    BP (!) 166/83   Pulse 72   Temp 98.6 °F (37 °C)   Ht 5' 1" (1.549 m)   Wt 55 kg (121 lb 4.1 oz)   LMP 04/15/2009   BMI 22.91 kg/m²     PHYSICAL EXAMINATION:    GENERAL: Well appearing, in no acute distress, alert and oriented x3.  PSYCH:  Mood and affect appropriate.  SKIN: Skin color, texture, turgor normal, no rashes or lesions.  HEAD/FACE:  Normocephalic, atraumatic. Cranial nerves grossly intact.  BACK: Straight leg raising in the sitting and supine positions is negative to radicular pain. There is no pain with palpation over the facet joints of the lumbar spine. There is full range of motion with extension to 15 degrees, and facet loading maneuvers cause reproducible pain bilaterally.  EXTREMITIES: Peripheral joint ROM is full and pain free without obvious instability or laxity in all four extremities. No deformities, edema, or skin discoloration. Good capillary refill.  MUSCULOSKELETAL: No TTP over right ischial bursa and greater trochanteric bursa. FABERs test is negative. Bilateral lower extremity strength " is normal and symmetric.  No atrophy or tone abnormalities are noted.  NEURO: No loss of sensation noted.   GAIT: Normal.      ASSESSMENT: 62 y.o. year old female with left sided back/buttock pain, consistent with the following:    Encounter Diagnoses   Name Primary?    Ischial bursitis of right side Yes    Trochanteric bursitis of right hip     Chronic pain disorder            PLAN:     - Previous imaging was reviewed and discussed with the patient today.    - She is s/p right GTB and right ischial bursa injection in office under ultrasound with Dr. Tillman with significant benefit.    - The patient will continue a home exercise routine to help with pain and strengthening. We discussed getting back into her daily routine of walking and stretching.    - RTC as needed.      The above plan and management options were discussed at length with patient. Patient is in agreement with the above and verbalized understanding.    Jennifer Pickens  12/05/2022

## 2023-01-12 ENCOUNTER — PES CALL (OUTPATIENT)
Dept: ADMINISTRATIVE | Facility: OTHER | Age: 63
End: 2023-01-12
Payer: MEDICARE

## 2023-01-17 ENCOUNTER — PATIENT MESSAGE (OUTPATIENT)
Dept: SPINE | Facility: CLINIC | Age: 63
End: 2023-01-17
Payer: MEDICARE

## 2023-01-30 ENCOUNTER — OFFICE VISIT (OUTPATIENT)
Dept: INTERNAL MEDICINE | Facility: CLINIC | Age: 63
End: 2023-01-30
Payer: MEDICARE

## 2023-01-30 VITALS
WEIGHT: 124.75 LBS | BODY MASS INDEX: 23.55 KG/M2 | HEART RATE: 75 BPM | DIASTOLIC BLOOD PRESSURE: 70 MMHG | TEMPERATURE: 97 F | SYSTOLIC BLOOD PRESSURE: 90 MMHG | RESPIRATION RATE: 20 BRPM | HEIGHT: 61 IN | OXYGEN SATURATION: 97 %

## 2023-01-30 DIAGNOSIS — Z78.0 ENCOUNTER FOR OSTEOPOROSIS SCREENING IN ASYMPTOMATIC POSTMENOPAUSAL PATIENT: ICD-10-CM

## 2023-01-30 DIAGNOSIS — R13.10 DYSPHAGIA, UNSPECIFIED TYPE: Primary | ICD-10-CM

## 2023-01-30 DIAGNOSIS — Z98.890 HISTORY OF ESOPHAGEAL DILATATION: ICD-10-CM

## 2023-01-30 DIAGNOSIS — Z13.820 ENCOUNTER FOR OSTEOPOROSIS SCREENING IN ASYMPTOMATIC POSTMENOPAUSAL PATIENT: ICD-10-CM

## 2023-01-30 DIAGNOSIS — Z82.49 FAMILY HISTORY OF CORONARY ARTERY DISEASE OCCURRING PRIOR TO 55 YEARS OF AGE: ICD-10-CM

## 2023-01-30 PROCEDURE — 99213 PR OFFICE/OUTPT VISIT, EST, LEVL III, 20-29 MIN: ICD-10-PCS | Mod: HCNC,S$GLB,, | Performed by: NURSE PRACTITIONER

## 2023-01-30 PROCEDURE — 3008F PR BODY MASS INDEX (BMI) DOCUMENTED: ICD-10-PCS | Mod: HCNC,CPTII,S$GLB, | Performed by: NURSE PRACTITIONER

## 2023-01-30 PROCEDURE — 3074F SYST BP LT 130 MM HG: CPT | Mod: HCNC,CPTII,S$GLB, | Performed by: NURSE PRACTITIONER

## 2023-01-30 PROCEDURE — 3008F BODY MASS INDEX DOCD: CPT | Mod: HCNC,CPTII,S$GLB, | Performed by: NURSE PRACTITIONER

## 2023-01-30 PROCEDURE — 99213 OFFICE O/P EST LOW 20 MIN: CPT | Mod: HCNC,S$GLB,, | Performed by: NURSE PRACTITIONER

## 2023-01-30 PROCEDURE — 1160F RVW MEDS BY RX/DR IN RCRD: CPT | Mod: HCNC,CPTII,S$GLB, | Performed by: NURSE PRACTITIONER

## 2023-01-30 PROCEDURE — 1159F PR MEDICATION LIST DOCUMENTED IN MEDICAL RECORD: ICD-10-PCS | Mod: HCNC,CPTII,S$GLB, | Performed by: NURSE PRACTITIONER

## 2023-01-30 PROCEDURE — 99999 PR PBB SHADOW E&M-EST. PATIENT-LVL V: CPT | Mod: PBBFAC,HCNC,, | Performed by: NURSE PRACTITIONER

## 2023-01-30 PROCEDURE — 1160F PR REVIEW ALL MEDS BY PRESCRIBER/CLIN PHARMACIST DOCUMENTED: ICD-10-PCS | Mod: HCNC,CPTII,S$GLB, | Performed by: NURSE PRACTITIONER

## 2023-01-30 PROCEDURE — 3074F PR MOST RECENT SYSTOLIC BLOOD PRESSURE < 130 MM HG: ICD-10-PCS | Mod: HCNC,CPTII,S$GLB, | Performed by: NURSE PRACTITIONER

## 2023-01-30 PROCEDURE — 99999 PR PBB SHADOW E&M-EST. PATIENT-LVL V: ICD-10-PCS | Mod: PBBFAC,HCNC,, | Performed by: NURSE PRACTITIONER

## 2023-01-30 PROCEDURE — 1159F MED LIST DOCD IN RCRD: CPT | Mod: HCNC,CPTII,S$GLB, | Performed by: NURSE PRACTITIONER

## 2023-01-30 PROCEDURE — 3078F PR MOST RECENT DIASTOLIC BLOOD PRESSURE < 80 MM HG: ICD-10-PCS | Mod: HCNC,CPTII,S$GLB, | Performed by: NURSE PRACTITIONER

## 2023-01-30 PROCEDURE — 3078F DIAST BP <80 MM HG: CPT | Mod: HCNC,CPTII,S$GLB, | Performed by: NURSE PRACTITIONER

## 2023-01-30 RX ORDER — ESTRADIOL 10 UG/1
1 TABLET VAGINAL
COMMUNITY
Start: 2023-01-18 | End: 2023-06-30 | Stop reason: SDUPTHER

## 2023-01-30 RX ORDER — CLINDAMYCIN HYDROCHLORIDE 300 MG/1
300 CAPSULE ORAL 2 TIMES DAILY
COMMUNITY
Start: 2023-01-24 | End: 2023-05-09

## 2023-01-30 NOTE — PROGRESS NOTES
"Subjective:       Patient ID: Raquel Galan is a 62 y.o. female.    Chief Complaint: Follow-up (Discuss results)      Patient is a 62 y.o. female who traditionally follows with Salazar Bird MD presenting today for follow up.     Review of patient's allergies indicates:  No Known Allergies    Medication List with Changes/Refills   Current Medications    B COMPLEX VITAMINS TABLET    Take 1 tablet by mouth once daily.    CALCIUM 600 WITH VITAMIN D3 600 MG(1,500MG) -200 UNIT TAB    Take 1 tablet by mouth once daily.    CLINDAMYCIN (CLEOCIN) 300 MG CAPSULE    Take 300 mg by mouth 2 (two) times daily.    EPIPEN 2-PEDRO 0.3 MG/0.3 ML (1:1,000) ATIN        ESTRADIOL (ESTRACE) 1 MG TABLET    TAKE 1 TABLET ONE TIME DAILY.    FEXOFENADINE (ALLEGRA) 180 MG TABLET    Take 1 tablet (180 mg total) by mouth once daily.    GLUCOSAMINE-CHONDROITIN 500-400 MG TABLET    Take 1 tablet by mouth Daily.    OMEGA-3 FATTY ACIDS/FISH OIL (FISH OIL-OMEGA-3 FATTY ACIDS) 300-1,000 MG CAPSULE    Take 1 capsule by mouth once daily.    TRETINOIN (RETIN-A) 0.025 % CREAM    Compound tretinoin 0.025% / niacinamide 2% cream. Apply a pea-sized amount to entire face qhs.    YUVAFEM 10 MCG TAB    Place 1 tablet vaginally twice a week.     Medical, social and surgical history has been reviewed with the patient.      Review of Systems   Constitutional:  Negative for chills and fever.   Respiratory:  Negative for cough and shortness of breath.    Cardiovascular:  Negative for chest pain.   Gastrointestinal:         Difficulty swallowing   Neurological:  Negative for dizziness and headaches.         Objective:   BP 90/70 (BP Location: Right arm, Patient Position: Sitting, BP Method: Medium (Manual))   Pulse 75   Temp 97.1 °F (36.2 °C) (Temporal)   Resp 20   Ht 5' 1" (1.549 m)   Wt 56.6 kg (124 lb 12.5 oz)   LMP 04/15/2009   SpO2 97%   BMI 23.58 kg/m²       Physical Exam  Vitals reviewed.   Constitutional:       Appearance: Normal appearance. "   HENT:      Head: Normocephalic and atraumatic.   Cardiovascular:      Rate and Rhythm: Normal rate and regular rhythm.      Heart sounds: Normal heart sounds. No murmur heard.  Pulmonary:      Effort: Pulmonary effort is normal.      Breath sounds: Normal breath sounds. No wheezing.   Skin:     General: Skin is warm and dry.   Neurological:      Mental Status: She is alert and oriented to person, place, and time.       Last Labs:  Glucose   Date Value Ref Range Status   06/30/2022 99 70 - 110 mg/dL Final   09/27/2021 97 70 - 110 mg/dL Final     BUN   Date Value Ref Range Status   06/30/2022 14 8 - 23 mg/dL Final   09/27/2021 17 8 - 23 mg/dL Final     Creatinine   Date Value Ref Range Status   06/30/2022 0.8 0.5 - 1.4 mg/dL Final   09/27/2021 0.8 0.5 - 1.4 mg/dL Final     Cholesterol   Date Value Ref Range Status   06/30/2022 196 120 - 199 mg/dL Final     Comment:     The National Cholesterol Education Program (NCEP) has set the  following guidelines (reference ranges) for Cholesterol:  Optimal.....................<200 mg/dL  Borderline High.............200-239 mg/dL  High........................> or = 240 mg/dL     06/01/2021 181 120 - 199 mg/dL Final     Comment:     The National Cholesterol Education Program (NCEP) has set the  following guidelines (reference ranges) for Cholesterol:  Optimal.....................<200 mg/dL  Borderline High.............200-239 mg/dL  High........................> or = 240 mg/dL       Hemoglobin A1C   Date Value Ref Range Status   06/30/2022 5.2 4.0 - 5.6 % Final     Comment:     ADA Screening Guidelines:  5.7-6.4%  Consistent with prediabetes  >or=6.5%  Consistent with diabetes    High levels of fetal hemoglobin interfere with the HbA1C  assay. Heterozygous hemoglobin variants (HbS, HgC, etc)do  not significantly interfere with this assay.   However, presence of multiple variants may affect accuracy.     03/30/2012 5.4 4.0 - 6.2 % Final     Hemoglobin   Date Value Ref Range  Status   06/30/2022 14.1 12.0 - 16.0 g/dL Final   09/27/2021 14.2 12.0 - 16.0 g/dL Final     Hematocrit   Date Value Ref Range Status   06/30/2022 43.6 37.0 - 48.5 % Final   09/27/2021 43.0 37.0 - 48.5 % Final       I have reviewed the following:     Details / Date    [x]   Labs     []   Micro     []   Pathology     []   Imaging     []   Cardiology Procedures     []   Other        Assessment and Plan:     1. Dysphagia, unspecified type  2. History of esophageal dilatation    - Ambulatory referral/consult to Endo Procedure ; Future    3. Encounter for osteoporosis screening in asymptomatic postmenopausal patient    - DXA Bone Density Spine And Hip; Future    4. Family history of coronary artery disease occurring prior to 55 years of age    The 10-year ASCVD risk score (Garry CORREA, et al., 2019) is: 2.3%    Values used to calculate the score:      Age: 62 years      Sex: Female      Is Non- : No      Diabetic: No      Tobacco smoker: No      Systolic Blood Pressure: 90 mmHg      Is BP treated: No      HDL Cholesterol: 69 mg/dL      Total Cholesterol: 196 mg/dL

## 2023-02-07 DIAGNOSIS — Z00.00 ENCOUNTER FOR MEDICARE ANNUAL WELLNESS EXAM: ICD-10-CM

## 2023-02-08 ENCOUNTER — PATIENT MESSAGE (OUTPATIENT)
Dept: PAIN MEDICINE | Facility: CLINIC | Age: 63
End: 2023-02-08
Payer: MEDICARE

## 2023-02-08 ENCOUNTER — PATIENT MESSAGE (OUTPATIENT)
Dept: INTERNAL MEDICINE | Facility: CLINIC | Age: 63
End: 2023-02-08
Payer: MEDICARE

## 2023-02-08 DIAGNOSIS — R79.9 ABNORMAL FINDING OF BLOOD CHEMISTRY, UNSPECIFIED: ICD-10-CM

## 2023-02-08 DIAGNOSIS — Z00.00 WELL ADULT EXAM: Primary | ICD-10-CM

## 2023-02-08 DIAGNOSIS — I10 HYPERTENSION, ESSENTIAL: ICD-10-CM

## 2023-02-08 DIAGNOSIS — E78.5 HYPERLIPIDEMIA, UNSPECIFIED HYPERLIPIDEMIA TYPE: ICD-10-CM

## 2023-02-08 DIAGNOSIS — Z00.00 ENCOUNTER FOR PREVENTIVE HEALTH EXAMINATION: ICD-10-CM

## 2023-02-09 ENCOUNTER — OFFICE VISIT (OUTPATIENT)
Dept: PAIN MEDICINE | Facility: CLINIC | Age: 63
End: 2023-02-09
Payer: MEDICARE

## 2023-02-09 ENCOUNTER — PATIENT MESSAGE (OUTPATIENT)
Dept: OBSTETRICS AND GYNECOLOGY | Facility: CLINIC | Age: 63
End: 2023-02-09
Payer: MEDICARE

## 2023-02-09 VITALS
BODY MASS INDEX: 23.6 KG/M2 | HEIGHT: 61 IN | WEIGHT: 125 LBS | SYSTOLIC BLOOD PRESSURE: 108 MMHG | TEMPERATURE: 97 F | RESPIRATION RATE: 18 BRPM | HEART RATE: 65 BPM | DIASTOLIC BLOOD PRESSURE: 73 MMHG

## 2023-02-09 DIAGNOSIS — M47.817 DJD (DEGENERATIVE JOINT DISEASE), LUMBOSACRAL: ICD-10-CM

## 2023-02-09 DIAGNOSIS — Z00.00 ENCOUNTER FOR MEDICARE ANNUAL WELLNESS EXAM: ICD-10-CM

## 2023-02-09 DIAGNOSIS — M70.60 GREATER TROCHANTERIC BURSITIS, UNSPECIFIED LATERALITY: Primary | ICD-10-CM

## 2023-02-09 DIAGNOSIS — M70.61 TROCHANTERIC BURSITIS OF RIGHT HIP: ICD-10-CM

## 2023-02-09 DIAGNOSIS — M70.70 ISCHIAL BURSITIS, UNSPECIFIED LATERALITY: ICD-10-CM

## 2023-02-09 PROCEDURE — 3074F PR MOST RECENT SYSTOLIC BLOOD PRESSURE < 130 MM HG: ICD-10-PCS | Mod: HCNC,CPTII,S$GLB, | Performed by: NURSE PRACTITIONER

## 2023-02-09 PROCEDURE — 3078F PR MOST RECENT DIASTOLIC BLOOD PRESSURE < 80 MM HG: ICD-10-PCS | Mod: HCNC,CPTII,S$GLB, | Performed by: NURSE PRACTITIONER

## 2023-02-09 PROCEDURE — 3074F SYST BP LT 130 MM HG: CPT | Mod: HCNC,CPTII,S$GLB, | Performed by: NURSE PRACTITIONER

## 2023-02-09 PROCEDURE — 1160F RVW MEDS BY RX/DR IN RCRD: CPT | Mod: HCNC,CPTII,S$GLB, | Performed by: NURSE PRACTITIONER

## 2023-02-09 PROCEDURE — 99213 PR OFFICE/OUTPT VISIT, EST, LEVL III, 20-29 MIN: ICD-10-PCS | Mod: HCNC,S$GLB,, | Performed by: NURSE PRACTITIONER

## 2023-02-09 PROCEDURE — 1159F PR MEDICATION LIST DOCUMENTED IN MEDICAL RECORD: ICD-10-PCS | Mod: HCNC,CPTII,S$GLB, | Performed by: NURSE PRACTITIONER

## 2023-02-09 PROCEDURE — 1159F MED LIST DOCD IN RCRD: CPT | Mod: HCNC,CPTII,S$GLB, | Performed by: NURSE PRACTITIONER

## 2023-02-09 PROCEDURE — 3008F PR BODY MASS INDEX (BMI) DOCUMENTED: ICD-10-PCS | Mod: HCNC,CPTII,S$GLB, | Performed by: NURSE PRACTITIONER

## 2023-02-09 PROCEDURE — 3078F DIAST BP <80 MM HG: CPT | Mod: HCNC,CPTII,S$GLB, | Performed by: NURSE PRACTITIONER

## 2023-02-09 PROCEDURE — 1160F PR REVIEW ALL MEDS BY PRESCRIBER/CLIN PHARMACIST DOCUMENTED: ICD-10-PCS | Mod: HCNC,CPTII,S$GLB, | Performed by: NURSE PRACTITIONER

## 2023-02-09 PROCEDURE — 99213 OFFICE O/P EST LOW 20 MIN: CPT | Mod: HCNC,S$GLB,, | Performed by: NURSE PRACTITIONER

## 2023-02-09 PROCEDURE — 3008F BODY MASS INDEX DOCD: CPT | Mod: HCNC,CPTII,S$GLB, | Performed by: NURSE PRACTITIONER

## 2023-02-09 PROCEDURE — 99999 PR PBB SHADOW E&M-EST. PATIENT-LVL IV: ICD-10-PCS | Mod: PBBFAC,HCNC,, | Performed by: NURSE PRACTITIONER

## 2023-02-09 PROCEDURE — 99999 PR PBB SHADOW E&M-EST. PATIENT-LVL IV: CPT | Mod: PBBFAC,HCNC,, | Performed by: NURSE PRACTITIONER

## 2023-02-09 NOTE — PROGRESS NOTES
Chronic Pain-Established Note (Follow up visit)          Referring Physician: No ref. provider found    Chief Complaint:   Chief Complaint   Patient presents with    Hip Pain     right          SUBJECTIVE: Disclaimer: This note has been generated using voice-recognition software. There may be typographical errors that have been missed during proof-reading    Interval History 2/9/2023:  The patient presents for increased right sided hip and buttock pain. She has been having more pain over the past couple of weeks. The pain is located to the right buttock/lateral hip and radiates down the side of the leg. No numbness. She has mild symptoms on the left. The pain bothers her more when she is laying at night or putting pressure on the right side. Her pain today is 8/10.    Interval History 12/5/2022:  The patient is here for follow up of right sided hip and buttock pain. She is s/p right GTB and ischial bursa injections in office on 10/19/22 with Dr. Tillman. She reports 90% relief of her pain. She was able to take a trip and walk a lot after the procedure without significant pain. She has very mild, intermittent pain since the procedure. Her pain today is 0/10.    Interval History 10/17/2022:  The patient presents today for re-evaluation of back and hip pain. I last saw her in May 2021 and she had hip injections with benefit. She says that her pain was manageable until recently. She is now having more right lateral hip and buttock pain. She had benefit with right GTB and ischial bursa in April 2021. She is no longer taking NSAIDs due to stomach issues. She takes Tylenol PRN. Her pain today is 8/10.    Interval History 5/14/2021:  The patient has a virtual video appointment for follow up of right hip and buttock pain. She is s/p right ischial bursa and GTB injection with 90% pain relief.  She now only has some mild soreness after she exercises from the right side.  She says her pain has significantly improved.  She mild  pain on the left which also worsens after exercise.  She is working on walking and increasing activity for weight loss.  No new complaints noted.  Her pain today is 1/10.    Interval History 4/15/2021:  The patient reports today for follow up of of right-sided buttock and hip pain.  We have not seen the patient in was 2 years.  She reports that she has been doing fairly well but had a recent injury that caused increased pain.  Her buttock pain is located to the bottom of her buttock and posterior thigh.  It worsens with sitting and applying pressure.  She also has right-sided lateral hip pain.  She would like to discuss injections today.  Her pain today is 8/10.    Interval History 8/28/2019:  The patient is here for follow up of left sided buttock pain.  She has intermittent Toradol injections as well as SI joint injections as needed.  She had a Toradol shot in April which controlled her symptoms well until recently.  She is requesting a repeat today.  She takes Ibuprofen sparingly as well.  Her pain worsens with sitting and standing.  She is not having any shooting into the legs.  Her pain today is 3/10.    Interval History 4/5/2019:  The patient returns to discuss pain.  After her last visit, I gave her a Toradol shot which provided her with significant benefit for some time.  She has been taking 800 mg Ibuprofen as needed with benefit, usually about once per week.  Her pain worsens with sitting and driving.  She does not have very much pain with walking.  Her pain today is 9/10.     Interval History 11/26/2018:  The patient returns for follow up of left sided lower back pain.  She is s/p left SI joint injection on 10/16/18 with minimal benefit.  Previous injection helped significantly.  She continues to report pain over the left buttock.  She does have pain over the lumbar spine, but this is mild.  Her pain is most severe when she sits for prolonged periods or changes from sitting to standing.  She is active and  has been exercising and gardening.  Her previous XRAYs from last year show right sided curvature and retrolisthesis without instability.  She has not had imaging of hips or pelvis recently.  Her pain today is 6/10.  She takes OTC Ibuprofen with some benefit.    Interval History 2/1/2018:  The patient presents today for follow up.  She is s/p left SI joint injection on 1/16/18 with about 80% pain relief overall.  She has intermittent pain to left buttock which is mild.  This is mainly with bending and lifting.  Since her last visit, she has also changed her body mechanics which she thinks is helping.  She takes OTC NSAIDs sparingly as needed.  She has also increased her activity and has been walking.  Her pain today is 3/10.    Interval History 12/7/2017:  The patient presents today for follow up of left sided lower back and buttock pain.  She is s/p left L3,4,5 MBB on 11/7/17.  She reports that she felt numbness to her lower back for about one hour after the procedure.  Immediately after the procedure, she went to walk her dog in the park and started to have her usual pain.  Her pain is mainly to the left buttock at this time.  It is worsened with prolonged sitting, changing from sitting to standing, and lifting her 20 lb nephew.  She also notices that she carried him on her left hip which worsens her pain.  She does have some benefit with OTC NSAIDs.  Voltaren gel provided limited benefit.      Initial encounter:    Raquel Galan presents to the clinic for the evaluation of left sided low back pain. The pain started 10 years ago and symptoms have been worsening.    Brief history:  Patient had a history of radicular symptoms but they are not occurring now.    Pain Description:    The pain is located in the left side of lower back area and does not radiate.      At BEST  2/10     At WORST  9/10 on the WORST day.      On average pain is rated as 5/10.     Today the pain is rated as 4/10    The pain is described as  aching and sharp      Symptoms interfere with daily activity.     Exacerbating factors: Sitting, Standing, Laying, Bending, Coughing/Sneezing, Walking, Morning, Lifting and Getting out of bed/chair.      Mitigating factors heat, ice, massage and medications.     Patient denies night fever/night sweats, urinary incontinence, bowel incontinence, significant weight loss, significant motor weakness and loss of sensations.  Patient denies any suicidal or homicidal ideations    Pain Medications:  Current:  Voltaren gel  Tylenol PRN    Tried in Past:  NSAIDs - Ibuprofen and Voltaren gel  TCA -Never  SNRI -Never  Anti-convulsants -asprin 81mg  Muscle Relaxants -Never  Opioids-Never    Physical Therapy/Home Exercise: yes  -works with a physical therapist and psychiatrist     report:  Reviewed and consistent with medication use as prescribed.    Pain Procedures:   11/7/17 Left L3,4,5 MBB- limited relief after 1 hr with activity  1/16/18 Left SI joint injection- 80% relief  10/16/18 Left SI joint injection  4/29/21 Right GTB and ischial bursa injection- 90% relief  10/19/22 Right GTB and ischial bursa injection- 90% relief    Chiropractor -never  Acupuncture - never  TENS unit -never  Spinal decompression -never  Joint replacement -never    CMP  Sodium   Date Value Ref Range Status   06/30/2022 142 136 - 145 mmol/L Final     Potassium   Date Value Ref Range Status   06/30/2022 4.1 3.5 - 5.1 mmol/L Final     Chloride   Date Value Ref Range Status   06/30/2022 109 95 - 110 mmol/L Final     CO2   Date Value Ref Range Status   06/30/2022 25 23 - 29 mmol/L Final     Glucose   Date Value Ref Range Status   06/30/2022 99 70 - 110 mg/dL Final     BUN   Date Value Ref Range Status   06/30/2022 14 8 - 23 mg/dL Final     Creatinine   Date Value Ref Range Status   06/30/2022 0.8 0.5 - 1.4 mg/dL Final     Calcium   Date Value Ref Range Status   06/30/2022 9.3 8.7 - 10.5 mg/dL Final     Total Protein   Date Value Ref Range Status    06/30/2022 7.2 6.0 - 8.4 g/dL Final     Albumin   Date Value Ref Range Status   06/30/2022 4.1 3.5 - 5.2 g/dL Final     Total Bilirubin   Date Value Ref Range Status   06/30/2022 1.3 (H) 0.1 - 1.0 mg/dL Final     Comment:     For infants and newborns, interpretation of results should be based  on gestational age, weight and in agreement with clinical  observations.    Premature Infant recommended reference ranges:  Up to 24 hours.............<8.0 mg/dL  Up to 48 hours............<12.0 mg/dL  3-5 days..................<15.0 mg/dL  6-29 days.................<15.0 mg/dL       Alkaline Phosphatase   Date Value Ref Range Status   06/30/2022 79 55 - 135 U/L Final     AST   Date Value Ref Range Status   06/30/2022 18 10 - 40 U/L Final     ALT   Date Value Ref Range Status   06/30/2022 20 10 - 44 U/L Final     Anion Gap   Date Value Ref Range Status   06/30/2022 8 8 - 16 mmol/L Final     eGFR if    Date Value Ref Range Status   06/30/2022 >60.0 >60 mL/min/1.73 m^2 Final     eGFR if non    Date Value Ref Range Status   06/30/2022 >60.0 >60 mL/min/1.73 m^2 Final     Comment:     Calculation used to obtain the estimated glomerular filtration  rate (eGFR) is the CKD-EPI equation.        Lab Results   Component Value Date    WBC 5.97 06/30/2022    HGB 14.1 06/30/2022    HCT 43.6 06/30/2022     (H) 06/30/2022     06/30/2022       Imaging:  Narrative     EXAMINATION:  XR HIPS BILATERAL 2 VIEW INCL AP PELVIS    CLINICAL HISTORY:  Sacroiliitis, not elsewhere classified    TECHNIQUE:  AP view of the pelvis and frogleg lateral views of both hips were performed.    COMPARISON:  None.    FINDINGS:  There is no evidence of acute fracture, dislocation, or bone destruction.  Joint spaces are well preserved.  There is mild degenerative change of the sacroiliac joints.  No erosions are seen      Impression       See above.     Narrative     EXAMINATION:  XR LUMBAR SPINE 5 VIEW WITH FLEX AND  EXT    CLINICAL HISTORY:  Low back pain, >6wks conservative tx, persistent-progressive sx, surgical candidate;  Sacroiliitis, not elsewhere classified    TECHNIQUE:  Five views of the lumbar spine plus flexion extension views were performed.    COMPARISON:  Prior dated 10/26/2017    FINDINGS:  There is dextroscoliosis of the lumbar spine.  There is retrolisthesis of L2-3 and L3 on L4 (grade 1) measuring approximately 4 mm.  This is similar to previous exam.  There is no significant change on flexion/extension views to suggest translational instability.  There are endplate degenerative changes with subchondral sclerosis and marginal osteophyte formation and disc height narrowing at L3-4.  Mild facet arthropathy is present at the lower lumbar levels.      Impression       Degenerative change of the lumbar spine with associated dextroscoliosis and L2-3/L3-4 retrolisthesis, similar to previous exam.  No translational instability.       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     Fibrocystic breast disease in female     HEARING LOSS     right side - no hearing aid    Hyperlipidemia     Osteopenia     PONV (postoperative nausea and vomiting)     PONV (postoperative nausea and vomiting)     Special screening for malignant neoplasms, colon 7/22/2013     Past Surgical History:   Procedure Laterality Date    ADENOIDECTOMY      APPENDECTOMY      BREAST BIOPSY Left     exc bx, benign per pt    BREAST SURGERY      left breast biopsy    COLONOSCOPY N/A 9/27/2018    Procedure: COLONOSCOPY;  Surgeon: Uche Smiley MD;  Location: [address]);  Service: Endoscopy;  Laterality: N/A;  pt/instructed on prep day before and day of importance    CYST REMOVAL Right     neck    DILATION AND CURETTAGE OF UTERUS      ESOPHAGOGASTRODUODENOSCOPY N/A 8/4/2021    Procedure: ESOPHAGOGASTRODUODENOSCOPY (EGD);  Surgeon: Ameya NUNEZ  MD Waldemar;  Location: [facility] ENDO (2ND FLR);  Service: Endoscopy;  Laterality: N/A;  Food gets hung up. Dilatation likely needed.     COVID test in [place] on 8/1-GT  8/2-new instructions via portal-tb    HYSTERECTOMY      INJECTION OF JOINT Left 10/16/2018    Procedure: INJECTION, JOINT, LEFT SI JOINT INJECTION UNDER FLUORO;  Surgeon: Alirio Lei MD;  Location: [facility] PAIN MGT;  Service: Pain Management;  Laterality: Left;  NEEDS CONSENT    MANDIBLE FRACTURE SURGERY Right     scope    ROBOT-ASSISTED LAPAROSCOPIC ABDOMINAL HYSTERECTOMY USING DA ERICA XI N/A 6/12/2020    Procedure: XI ROBOTIC HYSTERECTOMY;  Surgeon: Alice Valenzuela MD;  Location: [facility] OR;  Service: OB/GYN;  Laterality: N/A;    ROBOT-ASSISTED LAPAROSCOPIC SALPINGO-OOPHORECTOMY USING DA ERICA XI N/A 6/12/2020    Procedure: XI ROBOTIC SALPINGO-OOPHORECTOMY;  Surgeon: Alice Valenzuela MD;  Location: [facility] OR;  Service: OB/GYN;  Laterality: N/A;    TONSILLECTOMY       Social History     Socioeconomic History    Marital status:    Tobacco Use    Smoking status: Never    Smokeless tobacco: Never   Substance and Sexual Activity    Alcohol use: Yes     Alcohol/week: 2.0 standard drinks     Types: 2 Glasses of wine per week     Comment: social    Drug use: Never    Sexual activity: Yes     Partners: Male     Birth control/protection: Partner-Vasectomy, Post-menopausal, None   Other Topics Concern    Are you pregnant or think you may be? No    Breast-feeding No     Social Determinants of Health     Financial Resource Strain: Medium Risk    Difficulty of Paying Living Expenses: Somewhat hard   Food Insecurity: No Food Insecurity    Worried About Running Out of Food in the Last Year: Never true    Ran Out of Food in the Last Year: Never true   Transportation Needs: No Transportation Needs    Lack of Transportation (Medical): No    Lack of Transportation (Non-Medical): No   Physical Activity: Insufficiently Active    Days of Exercise per Week: 3 days     Minutes of Exercise per Session: 30 min   Stress: Stress Concern Present    Feeling of Stress : To some extent   Social Connections: Unknown    Frequency of Communication with Friends and Family: More than three times a week    Frequency of Social Gatherings with Friends and Family: Three times a week    Active Member of Clubs or Organizations: Yes    Attends Club or Organization Meetings: More than 4 times per year    Marital Status:    Housing Stability: Low Risk     Unable to Pay for Housing in the Last Year: No    Number of Places Lived in the Last Year: 2    Unstable Housing in the Last Year: No     Family History   Problem Relation Age of Onset    Heart disease Mother     Kidney disease Mother     Diabetes Mother     Hypertension Mother     Hyperlipidemia Mother     COPD Mother     Arthritis Mother     Miscarriages / Stillbirths Mother     Stroke Mother     Heart disease Father     Cancer Father 75        lymphoma, bladder tumors    Arthritis Father     Breast cancer Neg Hx     Ovarian cancer Neg Hx     Colon cancer Neg Hx     Lupus Neg Hx     Psoriasis Neg Hx     Rheum arthritis Neg Hx     Inflammatory bowel disease Neg Hx        Review of patient's allergies indicates:   Allergen Reactions    Dexamethasone      Flushing and headache with medrol dosepack & tachycardia       Current Outpatient Medications   Medication Sig    b complex vitamins tablet Take 1 tablet by mouth once daily.    CALCIUM 600 WITH VITAMIN D3 600 mg(1,500mg) -200 unit Tab Take 1 tablet by mouth once daily.    EPIPEN 2-PEDRO 0.3 mg/0.3 mL (1:1,000) AtIn     estradioL (ESTRACE) 1 MG tablet TAKE 1 TABLET ONE TIME DAILY.    glucosamine-chondroitin 500-400 mg tablet Take 1 tablet by mouth Daily.    omega-3 fatty acids/fish oil (FISH OIL-OMEGA-3 FATTY ACIDS) 300-1,000 mg capsule Take 1 capsule by mouth once daily.    tretinoin (RETIN-A) 0.025 % cream Compound tretinoin 0.025% / niacinamide 2% cream. Apply a pea-sized amount to entire  "face qhs.    YUVAFEM 10 mcg Tab Place 1 tablet vaginally twice a week.    clindamycin (CLEOCIN) 300 MG capsule Take 300 mg by mouth 2 (two) times daily.    fexofenadine (ALLEGRA) 180 MG tablet Take 1 tablet (180 mg total) by mouth once daily.     No current facility-administered medications for this visit.       REVIEW OF SYSTEMS:    GENERAL:  No weight loss, malaise or fevers.  HEENT:   No recent changes in vision or hearing  NECK:  Negative for lumps, no difficulty with swallowing.  RESPIRATORY:  Negative for cough, wheezing or shortness of breath, patient denies any recent URI.  CARDIOVASCULAR:  Negative for chest pain, leg swelling or palpitations.  GI:  Negative for abdominal discomfort, blood in stools or black stools or change in bowel habits.  MUSCULOSKELETAL:  See HPI.  SKIN:  Negative for lesions, rash, and itching.  PSYCH:  No mood disorder or recent psychosocial stressors.  Patient's sleep is disturbed secondary to pain.  HEMATOLOGY/LYMPHOLOGY:  Negative for prolonged bleeding, bruising easily or swollen nodes.  Patient is not currently taking any anti-coagulants  ENDO: No history of diabetes or thyroid dysfunction  NEURO:   No history of headaches, syncope, paralysis, seizures or tremors.  All other reviewed and negative other than HPI.    OBJECTIVE:    /73   Pulse 65   Temp 97.3 °F (36.3 °C)   Resp 18   Ht 5' 1" (1.549 m)   Wt 56.7 kg (125 lb)   LMP 04/15/2009   BMI 23.62 kg/m²     PHYSICAL EXAMINATION:    GENERAL: Well appearing, in no acute distress, alert and oriented x3.  PSYCH:  Mood and affect appropriate.  SKIN: Skin color, texture, turgor normal, no rashes or lesions.  HEAD/FACE:  Normocephalic, atraumatic. Cranial nerves grossly intact.  BACK: Straight leg raising in the sitting and supine positions is negative to radicular pain. There is no pain with palpation over the facet joints of the lumbar spine. There is full range of motion with extension to 15 degrees, and facet loading " maneuvers cause reproducible pain on the right.  EXTREMITIES: Peripheral joint ROM is full and pain free without obvious instability or laxity in all four extremities. No deformities, edema, or skin discoloration. Good capillary refill.  MUSCULOSKELETAL: TTP over right ischial bursa and greater trochanteric bursa. Bilateral lower extremity strength is normal and symmetric.  No atrophy or tone abnormalities are noted.  NEURO: No loss of sensation noted.   GAIT: Normal.    ASSESSMENT: 62 y.o. year old female with left sided back/buttock pain, consistent with the following:    Encounter Diagnoses   Name Primary?    Greater trochanteric bursitis, unspecified laterality Yes    Ischial bursitis, unspecified laterality     DJD (degenerative joint disease), lumbosacral     Trochanteric bursitis of right hip        PLAN:     - Previous imaging was reviewed and discussed with the patient today.    - Schedule for repeat right GTB and right ischial bursa injection in office under ultrasound with Dr. Tillman. Previous provided significant benefit.    - The patient will continue a home exercise routine to help with pain and strengthening. We discussed getting back into her daily routine of walking and stretching.    - RTC 2 weeks after procedure.      The above plan and management options were discussed at length with patient. Patient is in agreement with the above and verbalized understanding.    Jennifer Pickens  02/09/2023

## 2023-02-14 ENCOUNTER — OFFICE VISIT (OUTPATIENT)
Dept: UROGYNECOLOGY | Facility: CLINIC | Age: 63
End: 2023-02-14
Payer: MEDICARE

## 2023-02-14 VITALS
HEIGHT: 61 IN | DIASTOLIC BLOOD PRESSURE: 60 MMHG | SYSTOLIC BLOOD PRESSURE: 117 MMHG | BODY MASS INDEX: 23.22 KG/M2 | WEIGHT: 123 LBS

## 2023-02-14 DIAGNOSIS — N39.41 URGE INCONTINENCE OF URINE: Primary | ICD-10-CM

## 2023-02-14 DIAGNOSIS — N39.3 STRESS INCONTINENCE: ICD-10-CM

## 2023-02-14 DIAGNOSIS — R35.0 URINARY FREQUENCY: ICD-10-CM

## 2023-02-14 DIAGNOSIS — R39.15 URINARY URGENCY: ICD-10-CM

## 2023-02-14 LAB
BILIRUB UR QL STRIP: NEGATIVE
GLUCOSE UR QL STRIP: NEGATIVE
KETONES UR QL STRIP: NEGATIVE
LEUKOCYTE ESTERASE UR QL STRIP: NEGATIVE
PH, POC UA: 5
POC BLOOD, URINE: NEGATIVE
POC NITRATES, URINE: NEGATIVE
POC RESIDUAL URINE VOLUME: 111 ML (ref 0–100)
PROT UR QL STRIP: NEGATIVE
SP GR UR STRIP: 1.01 (ref 1–1.03)
UROBILINOGEN UR STRIP-ACNC: 0.1 (ref 0.1–1.1)

## 2023-02-14 PROCEDURE — 3078F PR MOST RECENT DIASTOLIC BLOOD PRESSURE < 80 MM HG: ICD-10-PCS | Mod: HCNC,CPTII,S$GLB, | Performed by: OBSTETRICS & GYNECOLOGY

## 2023-02-14 PROCEDURE — 1159F MED LIST DOCD IN RCRD: CPT | Mod: HCNC,CPTII,S$GLB, | Performed by: OBSTETRICS & GYNECOLOGY

## 2023-02-14 PROCEDURE — 99215 OFFICE O/P EST HI 40 MIN: CPT | Mod: HCNC,S$GLB,, | Performed by: OBSTETRICS & GYNECOLOGY

## 2023-02-14 PROCEDURE — 1160F RVW MEDS BY RX/DR IN RCRD: CPT | Mod: HCNC,CPTII,S$GLB, | Performed by: OBSTETRICS & GYNECOLOGY

## 2023-02-14 PROCEDURE — 3078F DIAST BP <80 MM HG: CPT | Mod: HCNC,CPTII,S$GLB, | Performed by: OBSTETRICS & GYNECOLOGY

## 2023-02-14 PROCEDURE — 99215 PR OFFICE/OUTPT VISIT, EST, LEVL V, 40-54 MIN: ICD-10-PCS | Mod: HCNC,S$GLB,, | Performed by: OBSTETRICS & GYNECOLOGY

## 2023-02-14 PROCEDURE — 1159F PR MEDICATION LIST DOCUMENTED IN MEDICAL RECORD: ICD-10-PCS | Mod: HCNC,CPTII,S$GLB, | Performed by: OBSTETRICS & GYNECOLOGY

## 2023-02-14 PROCEDURE — 1160F PR REVIEW ALL MEDS BY PRESCRIBER/CLIN PHARMACIST DOCUMENTED: ICD-10-PCS | Mod: HCNC,CPTII,S$GLB, | Performed by: OBSTETRICS & GYNECOLOGY

## 2023-02-14 PROCEDURE — 3008F PR BODY MASS INDEX (BMI) DOCUMENTED: ICD-10-PCS | Mod: HCNC,CPTII,S$GLB, | Performed by: OBSTETRICS & GYNECOLOGY

## 2023-02-14 PROCEDURE — 3074F PR MOST RECENT SYSTOLIC BLOOD PRESSURE < 130 MM HG: ICD-10-PCS | Mod: HCNC,CPTII,S$GLB, | Performed by: OBSTETRICS & GYNECOLOGY

## 2023-02-14 PROCEDURE — 3074F SYST BP LT 130 MM HG: CPT | Mod: HCNC,CPTII,S$GLB, | Performed by: OBSTETRICS & GYNECOLOGY

## 2023-02-14 PROCEDURE — 99999 PR PBB SHADOW E&M-EST. PATIENT-LVL III: ICD-10-PCS | Mod: PBBFAC,HCNC,, | Performed by: OBSTETRICS & GYNECOLOGY

## 2023-02-14 PROCEDURE — 3008F BODY MASS INDEX DOCD: CPT | Mod: HCNC,CPTII,S$GLB, | Performed by: OBSTETRICS & GYNECOLOGY

## 2023-02-14 PROCEDURE — 99999 PR PBB SHADOW E&M-EST. PATIENT-LVL III: CPT | Mod: PBBFAC,HCNC,, | Performed by: OBSTETRICS & GYNECOLOGY

## 2023-02-28 ENCOUNTER — TELEPHONE (OUTPATIENT)
Dept: PAIN MEDICINE | Facility: CLINIC | Age: 63
End: 2023-02-28
Payer: MEDICARE

## 2023-02-28 ENCOUNTER — PATIENT MESSAGE (OUTPATIENT)
Dept: PAIN MEDICINE | Facility: CLINIC | Age: 63
End: 2023-02-28
Payer: MEDICARE

## 2023-02-28 DIAGNOSIS — M70.70 ISCHIAL BURSITIS, UNSPECIFIED LATERALITY: ICD-10-CM

## 2023-02-28 DIAGNOSIS — M70.60 TROCHANTERIC BURSITIS, UNSPECIFIED LATERALITY: Primary | ICD-10-CM

## 2023-03-01 ENCOUNTER — TELEPHONE (OUTPATIENT)
Dept: PAIN MEDICINE | Facility: CLINIC | Age: 63
End: 2023-03-01
Payer: MEDICARE

## 2023-03-01 ENCOUNTER — PATIENT MESSAGE (OUTPATIENT)
Dept: SPINE | Facility: CLINIC | Age: 63
End: 2023-03-01
Payer: MEDICARE

## 2023-03-01 NOTE — TELEPHONE ENCOUNTER
Contacted patient in regards to her message. There was no answer, left voicemail requesting a return call.

## 2023-03-01 NOTE — TELEPHONE ENCOUNTER
"----- Message from Enma Amaya sent at 3/1/2023  9:07 AM CST -----  Regarding: Returning Call          Name of Who is Calling:  Raquel Galan    Who Left The Message:  Raquel Galan      What is the request in detail:     Patient called stating, "she's returning the Office's call and would like you to please call again."   Thank you      Reply by [facility]:   Yes      Preferred Call Back :  [phone] (M)                                          "

## 2023-03-01 NOTE — TELEPHONE ENCOUNTER
----- Message from Enma Amaya sent at 3/1/2023  8:40 AM CST -----  Regarding: Patient Advice                  Name of Who is Calling:  Raquel Galan    Who Left The Message:  Raquel Galan    Message Is For:  Aura Stone MA      What is the request in detail:          Patient called requesting a call regarding the My Chart messages sent today Wednesday 03/01/2023.  Please give a call  back and further advise.  Thank you      Reply by [facility]: NO    Preferred Call Back :  [phone] (S)

## 2023-03-06 ENCOUNTER — PATIENT MESSAGE (OUTPATIENT)
Dept: PAIN MEDICINE | Facility: CLINIC | Age: 63
End: 2023-03-06
Payer: MEDICARE

## 2023-03-10 ENCOUNTER — APPOINTMENT (OUTPATIENT)
Dept: RADIOLOGY | Facility: CLINIC | Age: 63
End: 2023-03-10
Attending: NURSE PRACTITIONER
Payer: MEDICARE

## 2023-03-10 DIAGNOSIS — Z13.820 ENCOUNTER FOR OSTEOPOROSIS SCREENING IN ASYMPTOMATIC POSTMENOPAUSAL PATIENT: ICD-10-CM

## 2023-03-10 DIAGNOSIS — Z78.0 ENCOUNTER FOR OSTEOPOROSIS SCREENING IN ASYMPTOMATIC POSTMENOPAUSAL PATIENT: ICD-10-CM

## 2023-03-10 PROCEDURE — 77080 DXA BONE DENSITY AXIAL: CPT | Mod: 26,HCNC,, | Performed by: INTERNAL MEDICINE

## 2023-03-10 PROCEDURE — 77080 DEXA BONE DENSITY SPINE HIP: ICD-10-PCS | Mod: 26,HCNC,, | Performed by: INTERNAL MEDICINE

## 2023-03-10 PROCEDURE — 77080 DXA BONE DENSITY AXIAL: CPT | Mod: TC,HCNC,PO

## 2023-03-22 ENCOUNTER — TELEPHONE (OUTPATIENT)
Dept: PAIN MEDICINE | Facility: CLINIC | Age: 63
End: 2023-03-22
Payer: MEDICARE

## 2023-03-22 DIAGNOSIS — M70.60 TROCHANTERIC BURSITIS, UNSPECIFIED LATERALITY: Primary | ICD-10-CM

## 2023-03-22 DIAGNOSIS — M70.70 ISCHIAL BURSITIS, UNSPECIFIED LATERALITY: ICD-10-CM

## 2023-04-11 ENCOUNTER — OFFICE VISIT (OUTPATIENT)
Dept: DERMATOLOGY | Facility: CLINIC | Age: 63
End: 2023-04-11
Payer: MEDICARE

## 2023-04-11 DIAGNOSIS — L57.8 ACTINIC ELASTOSIS: Primary | ICD-10-CM

## 2023-04-11 DIAGNOSIS — L81.4 LENTIGINES: ICD-10-CM

## 2023-04-11 DIAGNOSIS — L82.1 SEBORRHEIC KERATOSIS: ICD-10-CM

## 2023-04-11 DIAGNOSIS — D22.9 MULTIPLE BENIGN MELANOCYTIC NEVI: ICD-10-CM

## 2023-04-11 PROCEDURE — 99214 OFFICE O/P EST MOD 30 MIN: CPT | Mod: S$GLB,,, | Performed by: DERMATOLOGY

## 2023-04-11 PROCEDURE — 99214 PR OFFICE/OUTPT VISIT, EST, LEVL IV, 30-39 MIN: ICD-10-PCS | Mod: S$GLB,,, | Performed by: DERMATOLOGY

## 2023-04-11 PROCEDURE — 1159F PR MEDICATION LIST DOCUMENTED IN MEDICAL RECORD: ICD-10-PCS | Mod: CPTII,S$GLB,, | Performed by: DERMATOLOGY

## 2023-04-11 PROCEDURE — 1160F PR REVIEW ALL MEDS BY PRESCRIBER/CLIN PHARMACIST DOCUMENTED: ICD-10-PCS | Mod: CPTII,S$GLB,, | Performed by: DERMATOLOGY

## 2023-04-11 PROCEDURE — 1160F RVW MEDS BY RX/DR IN RCRD: CPT | Mod: CPTII,S$GLB,, | Performed by: DERMATOLOGY

## 2023-04-11 PROCEDURE — 1159F MED LIST DOCD IN RCRD: CPT | Mod: CPTII,S$GLB,, | Performed by: DERMATOLOGY

## 2023-04-11 RX ORDER — TRETINOIN 0.25 MG/G
CREAM TOPICAL
Qty: 30 G | Refills: 5 | Status: SHIPPED | OUTPATIENT
Start: 2023-04-11

## 2023-04-11 NOTE — PROGRESS NOTES
Patient Information  Name: Raquel Galan  : 1960  MRN: [id number]     Referring Physician:  No ref. provider found   Primary Care Physician:  Salazar Bird MD   Date of Visit: 2023      Subjective:     History of Present lllness:    Raquel Galan is a 62 y.o. female who presents with a chief complaint of growths.  Location: chest and neck and back  Duration: years, recntly one on central chest got very red and now it is gone  Signs/Symptoms: rough growths  Relieving factors/Prior treatments: none    Patient was last seen: 2021.  Prior notes by myself reviewed.   Clinical documentation obtained by nursing staff reviewed.    Review of Systems    Objective:   Physical Exam   Constitutional: She appears well-developed and well-nourished. No distress.   Neurological: She is alert and oriented to person, place, and time. She is not disoriented.   Psychiatric: She has a normal mood and affect.   Skin:   Areas Examined (abnormalities noted in diagram):   Head / Face Inspection Performed  Neck Inspection Performed  Chest / Axilla Inspection Performed  Back Inspection Performed  RUE Inspected  LUE Inspection Performed               Diagram Legend     Erythematous scaling macule/papule c/w actinic keratosis       Vascular papule c/w angioma      Pigmented verrucoid papule/plaque c/w seborrheic keratosis      Yellow umbilicated papule c/w sebaceous hyperplasia      Irregularly shaped tan macule c/w lentigo     1-2 mm smooth white papules consistent with Milia      Movable subcutaneous cyst with punctum c/w epidermal inclusion cyst      Subcutaneous movable cyst c/w pilar cyst      Firm pink to brown papule c/w dermatofibroma      Pedunculated fleshy papule(s) c/w skin tag(s)      Evenly pigmented macule c/w junctional nevus     Mildly variegated pigmented, slightly irregular-bordered macule c/w mildly atypical nevus      Flesh colored to evenly pigmented papule c/w intradermal nevus       Pink pearly  papule/plaque c/w basal cell carcinoma      Erythematous hyperkeratotic cursted plaque c/w SCC      Surgical scar with no sign of skin cancer recurrence      Open and closed comedones      Inflammatory papules and pustules      Verrucoid papule consistent consistent with wart     Erythematous eczematous patches and plaques     Dystrophic onycholytic nail with subungual debris c/w onychomycosis     Umbilicated papule    Erythematous-base heme-crusted tan verrucoid plaque consistent with inflamed seborrheic keratosis     Erythematous Silvery Scaling Plaque c/w Psoriasis     See annotation    No images are attached to the encounter or orders placed in the encounter.      [] Data reviewed  [] Prior external notes reviewed  [] Independent review of test  [] Management discussed with another provider  [] Independent historian    Assessment / Plan:        Actinic elastosis  - chronic problem, not at treatment goal  -     tretinoin (RETIN-A) 0.025 % cream; Compound tretinoin 0.025% / niacinamide 2% cream. Apply a pea-sized amount to entire face qhs.  Dispense: 30 g; Refill: 5  Counseled pt that the retinoid may make the skin dry, red, and irritated. May moisturize daily with a non-comedogenic moisturizer. If still dry, would recommend using the retinoid every other night or less frequently as tolerated. Avoid using around corners of eyes, nose, and mouth.  Patient instructed in importance of daily broad-spectrum sunscreen use with SPF of at least 30. Sun avoidance and topical protection/protective clothing discussed.    Multiple benign melanocytic nevi  Multiple benign-appearing nevi present on exam today. Reassurance provided. Counseled patient to periodically examine moles and return to clinic if any changes in size, shape, or color are noted or if it becomes symptomatic (bleeding, itching, pain, etc).  Recommend using a broad-spectrum, water-resistant sunscreen with SPF of 30 or higher--reapply every 2 hours. Seek shade,  wear sun-protective clothing, and perform regular skin self-exams.    Lentigines  These are benign sun spots which should be monitored for changes. Daily sun protection will reduce the number of new lesions.   Recommend using a broad-spectrum, water-resistant sunscreen with SPF of 30 or higher--reapply every 2 hours. Seek shade, wear sun-protective clothing, and perform regular skin self-exams.    Seborrheic keratosis  These are benign, inherited growths without a malignant potential. Reassurance given to patient. No treatment is necessary.    Follow up in about 1 year (around 4/11/2024) for follow up, or sooner if any new problems or changing lesions.      Melissa Sims MD, FAAD  [facility]

## 2023-04-26 ENCOUNTER — HOSPITAL ENCOUNTER (EMERGENCY)
Facility: HOSPITAL | Age: 63
Discharge: HOME | End: 2023-04-26
Attending: EMERGENCY MEDICINE
Payer: MEDICARE

## 2023-04-26 VITALS
OXYGEN SATURATION: 99 % | TEMPERATURE: 98 F | DIASTOLIC BLOOD PRESSURE: 84 MMHG | SYSTOLIC BLOOD PRESSURE: 149 MMHG | HEART RATE: 71 BPM | RESPIRATION RATE: 16 BRPM

## 2023-04-26 DIAGNOSIS — R07.9 CHEST PAIN: ICD-10-CM

## 2023-04-26 LAB
ALBUMIN SERPL BCP-MCNC: 4.5 G/DL (ref 3.5–5.2)
ALP SERPL-CCNC: 76 U/L (ref 55–135)
ALT SERPL W/O P-5'-P-CCNC: 15 U/L (ref 10–44)
ANION GAP SERPL CALC-SCNC: 11 MMOL/L (ref 8–16)
AST SERPL-CCNC: 17 U/L (ref 10–40)
BASOPHILS # BLD AUTO: 0.05 K/UL (ref 0–0.2)
BASOPHILS NFR BLD: 0.6 % (ref 0–1.9)
BILIRUB SERPL-MCNC: 0.8 MG/DL (ref 0.1–1)
BNP SERPL-MCNC: 24 PG/ML (ref 0–99)
BUN SERPL-MCNC: 19 MG/DL (ref 8–23)
CALCIUM SERPL-MCNC: 9.7 MG/DL (ref 8.7–10.5)
CHLORIDE SERPL-SCNC: 106 MMOL/L (ref 95–110)
CO2 SERPL-SCNC: 22 MMOL/L (ref 23–29)
CREAT SERPL-MCNC: 1 MG/DL (ref 0.5–1.4)
DIFFERENTIAL METHOD: ABNORMAL
EOSINOPHIL # BLD AUTO: 0.1 K/UL (ref 0–0.5)
EOSINOPHIL NFR BLD: 1.6 % (ref 0–8)
ERYTHROCYTE [DISTWIDTH] IN BLOOD BY AUTOMATED COUNT: 11.9 % (ref 11.5–14.5)
EST. GFR  (NO RACE VARIABLE): >60 ML/MIN/1.73 M^2
GLUCOSE SERPL-MCNC: 91 MG/DL (ref 70–110)
HCT VFR BLD AUTO: 44.9 % (ref 37–48.5)
HCV AB SERPL QL IA: NORMAL
HGB BLD-MCNC: 14.9 G/DL (ref 12–16)
HIV 1+2 AB+HIV1 P24 AG SERPL QL IA: NORMAL
IMM GRANULOCYTES # BLD AUTO: 0.02 K/UL (ref 0–0.04)
IMM GRANULOCYTES NFR BLD AUTO: 0.2 % (ref 0–0.5)
LYMPHOCYTES # BLD AUTO: 2.2 K/UL (ref 1–4.8)
LYMPHOCYTES NFR BLD: 26.1 % (ref 18–48)
MCH RBC QN AUTO: 32.5 PG (ref 27–31)
MCHC RBC AUTO-ENTMCNC: 33.2 G/DL (ref 32–36)
MCV RBC AUTO: 98 FL (ref 82–98)
MONOCYTES # BLD AUTO: 0.6 K/UL (ref 0.3–1)
MONOCYTES NFR BLD: 6.9 % (ref 4–15)
NEUTROPHILS # BLD AUTO: 5.5 K/UL (ref 1.8–7.7)
NEUTROPHILS NFR BLD: 64.6 % (ref 38–73)
NRBC BLD-RTO: 0 /100 WBC
PLATELET # BLD AUTO: 288 K/UL (ref 150–450)
PMV BLD AUTO: 9.8 FL (ref 9.2–12.9)
POTASSIUM SERPL-SCNC: 4.1 MMOL/L (ref 3.5–5.1)
PROT SERPL-MCNC: 8 G/DL (ref 6–8.4)
RBC # BLD AUTO: 4.59 M/UL (ref 4–5.4)
SODIUM SERPL-SCNC: 139 MMOL/L (ref 136–145)
TROPONIN I SERPL DL<=0.01 NG/ML-MCNC: <0.006 NG/ML (ref 0–0.03)
WBC # BLD AUTO: 8.57 K/UL (ref 3.9–12.7)

## 2023-04-26 PROCEDURE — 84484 ASSAY OF TROPONIN QUANT: CPT | Mod: 91,HCNC | Performed by: EMERGENCY MEDICINE

## 2023-04-26 PROCEDURE — 99284 EMERGENCY DEPT VISIT MOD MDM: CPT | Mod: HCNC,,, | Performed by: EMERGENCY MEDICINE

## 2023-04-26 PROCEDURE — 99285 EMERGENCY DEPT VISIT HI MDM: CPT | Mod: 25,HCNC

## 2023-04-26 PROCEDURE — 85025 COMPLETE CBC W/AUTO DIFF WBC: CPT | Mod: HCNC | Performed by: EMERGENCY MEDICINE

## 2023-04-26 PROCEDURE — 87389 HIV-1 AG W/HIV-1&-2 AB AG IA: CPT | Mod: HCNC | Performed by: PHYSICIAN ASSISTANT

## 2023-04-26 PROCEDURE — 99284 PR EMERGENCY DEPT VISIT,LEVEL IV: ICD-10-PCS | Mod: HCNC,,, | Performed by: EMERGENCY MEDICINE

## 2023-04-26 PROCEDURE — 86803 HEPATITIS C AB TEST: CPT | Mod: HCNC | Performed by: PHYSICIAN ASSISTANT

## 2023-04-26 PROCEDURE — 83880 ASSAY OF NATRIURETIC PEPTIDE: CPT | Mod: HCNC | Performed by: EMERGENCY MEDICINE

## 2023-04-26 PROCEDURE — 93010 ELECTROCARDIOGRAM REPORT: CPT | Mod: HCNC,,, | Performed by: INTERNAL MEDICINE

## 2023-04-26 PROCEDURE — 93005 ELECTROCARDIOGRAM TRACING: CPT | Mod: HCNC

## 2023-04-26 PROCEDURE — 93010 EKG 12-LEAD: ICD-10-PCS | Mod: HCNC,,, | Performed by: INTERNAL MEDICINE

## 2023-04-26 PROCEDURE — 25000003 PHARM REV CODE 250: Mod: HCNC | Performed by: EMERGENCY MEDICINE

## 2023-04-26 PROCEDURE — 80053 COMPREHEN METABOLIC PANEL: CPT | Mod: HCNC | Performed by: EMERGENCY MEDICINE

## 2023-04-26 RX ORDER — ASPIRIN 325 MG
325 TABLET ORAL
Status: COMPLETED | OUTPATIENT
Start: 2023-04-26 | End: 2023-04-26

## 2023-04-26 RX ADMIN — ASPIRIN 325 MG ORAL TABLET 325 MG: 325 PILL ORAL at 04:04

## 2023-04-26 RX ADMIN — SODIUM CHLORIDE 500 ML: 9 INJECTION, SOLUTION INTRAVENOUS at 04:04

## 2023-04-26 NOTE — ED PROVIDER NOTES
Encounter Date: 4/26/2023    SCRIBE #1 NOTE: I, Theresa Ruiz, am scribing for, and in the presence of,  Pietro Peterson MD. I have scribed the entire note. the EKG reading.     History     Chief Complaint   Patient presents with    Chest Pain     Since Sunday with indigestion, arm pain, and neck and shoulder tightness. Pt states she feels like she is having a heart attack. Denies cardiac hx      Time patient was seen by the provider: 3:58 PM      The patient is a 62 y.o. female with past medical history of HLD and osteopenia who presents to the ED with a complaint of upper sub-sternal chest pain that radiates to left neck and arm occasionally with onset 3 days ago. She reports pain is constant for 3 days. Initially, patient thought that her symptoms were related to her prior esophogeal issues as she has req'd esophageal dilatation previously. However, she notes that her current symptoms are different as she describes it as heaviness and fullness that is non-exertional. She has an upcoming appointment scheduled for esophogeal dilation. Patient has virtual visit next week. Her last procedure was one year ago. Patient is a non-smoker. Patient denies SOB, leg pain/swelling, or any other complaints. Denies drug use. The patient drinks socially.      The history is provided by the patient and medical records. No  was used.   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     Fibrocystic breast disease in female     HEARING LOSS     right side - no hearing aid    Hyperlipidemia     Osteopenia     PONV (postoperative nausea and vomiting)     PONV (postoperative nausea and vomiting)     Special screening for malignant neoplasms, colon 7/22/2013     Past Surgical History:   Procedure Laterality Date    ADENOIDECTOMY      APPENDECTOMY      BREAST BIOPSY Left     exc bx, benign per pt     BREAST SURGERY      left breast biopsy    COLONOSCOPY N/A 9/27/2018    Procedure: COLONOSCOPY;  Surgeon: Uche Smiley MD;  Location: [facility] ENDO (4TH FLR);  Service: Endoscopy;  Laterality: N/A;  pt/instructed on prep day before and day of importance    CYST REMOVAL Right     neck    DILATION AND CURETTAGE OF UTERUS      ESOPHAGOGASTRODUODENOSCOPY N/A 8/4/2021    Procedure: ESOPHAGOGASTRODUODENOSCOPY (EGD);  Surgeon: Ameya Medeiros MD;  Location: [facility] ENDO (2ND FLR);  Service: Endoscopy;  Laterality: N/A;  Food gets hung up. Dilatation likely needed.     COVID test in [place] on 8/1-GT  8/2-new instructions via portal-tb    HYSTERECTOMY      INJECTION OF JOINT Left 10/16/2018    Procedure: INJECTION, JOINT, LEFT SI JOINT INJECTION UNDER FLUORO;  Surgeon: Alirio Lei MD;  Location: [facility] PAIN MGT;  Service: Pain Management;  Laterality: Left;  NEEDS CONSENT    MANDIBLE FRACTURE SURGERY Right     scope    ROBOT-ASSISTED LAPAROSCOPIC ABDOMINAL HYSTERECTOMY USING DA ERICA XI N/A 6/12/2020    Procedure: XI ROBOTIC HYSTERECTOMY;  Surgeon: Alice Valenzuela MD;  Location: [facility] OR;  Service: OB/GYN;  Laterality: N/A;    ROBOT-ASSISTED LAPAROSCOPIC SALPINGO-OOPHORECTOMY USING DA ERICA XI N/A 6/12/2020    Procedure: XI ROBOTIC SALPINGO-OOPHORECTOMY;  Surgeon: Alice Valenzuela MD;  Location: [facility] OR;  Service: OB/GYN;  Laterality: N/A;    TONSILLECTOMY       Family History   Problem Relation Age of Onset    Heart disease Mother     Kidney disease Mother     Diabetes Mother     Hypertension Mother     Hyperlipidemia Mother     COPD Mother     Arthritis Mother     Miscarriages / Stillbirths Mother     Stroke Mother     Heart disease Father     Cancer Father 75        lymphoma, bladder tumors    Arthritis Father     Breast cancer Neg Hx     Ovarian cancer Neg Hx     Colon cancer Neg Hx     Lupus Neg Hx     Psoriasis Neg Hx     Rheum arthritis Neg Hx     Inflammatory bowel disease Neg Hx      Social History      Tobacco Use    Smoking status: Never    Smokeless tobacco: Never   Substance Use Topics    Alcohol use: Yes     Alcohol/week: 2.0 standard drinks     Types: 2 Glasses of wine per week     Comment: social    Drug use: Never     Review of Systems   Constitutional:  Negative for activity change, appetite change, chills, diaphoresis, fatigue and fever.   HENT:  Negative for congestion, drooling, ear discharge, ear pain, facial swelling, sore throat and voice change.    Eyes:  Negative for pain, redness and visual disturbance.   Respiratory:  Negative for cough, choking, chest tightness and shortness of breath.    Cardiovascular:  Positive for chest pain. Negative for palpitations and leg swelling.   Gastrointestinal:  Negative for abdominal pain, diarrhea, nausea and vomiting.   Endocrine: Negative for polydipsia, polyphagia and polyuria.   Genitourinary:  Negative for difficulty urinating, dysuria, flank pain, frequency and hematuria.   Musculoskeletal:  Negative for arthralgias, back pain, gait problem, joint swelling, myalgias, neck pain and neck stiffness.   Skin:  Negative for rash and wound.   Neurological:  Negative for dizziness, seizures, syncope, facial asymmetry, speech difficulty, weakness, light-headedness, numbness and headaches.   Psychiatric/Behavioral:  Negative for agitation, behavioral problems, confusion and self-injury.      Physical Exam     Initial Vitals [04/26/23 1445]   BP Pulse Resp Temp SpO2   (!) 159/74 86 16 98.6 °F (37 °C) 98 %      MAP       --         Physical Exam    Nursing note and vitals reviewed.  Constitutional: She appears well-developed and well-nourished. She is not diaphoretic. She is active.  Non-toxic appearance. She does not have a sickly appearance. She does not appear ill. No distress.   HENT:   Head: Normocephalic and atraumatic.   Eyes: Conjunctivae and EOM are normal. Pupils are equal, round, and reactive to light. Right eye exhibits no discharge. Left eye exhibits  no discharge.   Neck: Trachea normal. Neck supple. No stridor present. No tracheal deviation present.   Normal range of motion.   Full passive range of motion without pain.     Cardiovascular:  Normal rate, regular rhythm, normal heart sounds and normal pulses.           Abdominal: Abdomen is soft. Bowel sounds are normal. She exhibits no distension. There is no abdominal tenderness. There is no rigidity and no guarding.   Musculoskeletal:         General: Normal range of motion.      Cervical back: Full passive range of motion without pain, normal range of motion and neck supple.     Neurological: She is alert and oriented to person, place, and time. She has normal strength. No cranial nerve deficit or sensory deficit. GCS eye subscore is 4. GCS verbal subscore is 5. GCS motor subscore is 6.   Skin: Skin is warm and intact. Capillary refill takes less than 2 seconds. No rash noted.   Psychiatric: She has a normal mood and affect. Her speech is normal and behavior is normal. Judgment and thought content normal. Cognition and memory are normal.       ED Course   Procedures  Labs Reviewed   CBC W/ AUTO DIFFERENTIAL - Abnormal; Notable for the following components:       Result Value    MCH 32.5 (*)     All other components within normal limits   COMPREHENSIVE METABOLIC PANEL - Abnormal; Notable for the following components:    CO2 22 (*)     All other components within normal limits   HIV 1 / 2 ANTIBODY    Narrative:     Release to patient->Immediate   HEPATITIS C ANTIBODY    Narrative:     Release to patient->Immediate   TROPONIN I   TROPONIN I   B-TYPE NATRIURETIC PEPTIDE   TROPONIN I     EKG Readings: (Independently Interpreted)   Initial Reading: No STEMI. Rhythm: Normal Sinus Rhythm. Heart Rate: 73.   No acute ischemic changes.    ECG Results              EKG 12-lead (Final result)  Result time 04/26/23 15:39:14      Final result by Interface, Lab In [facility] (04/26/23 15:39:14)                   Narrative:     Test Reason : R07.9,    Vent. Rate : 078 BPM     Atrial Rate : 078 BPM     P-R Int : 146 ms          QRS Dur : 080 ms      QT Int : 394 ms       P-R-T Axes : 073 058 081 degrees     QTc Int : 449 ms    Normal sinus rhythm  Normal ECG  When compared with ECG of 17-AUG-2022 08:18,  No change  Confirmed by Andrew CHAUDHARI MD ([id number]) on 4/26/2023 3:39:05 PM    Referred By: [email or web address]   SELF           Confirmed By:Andrew CHAUDHARI MD                                  Imaging Results              X-Ray Chest PA And Lateral (Final result)  Result time 04/26/23 17:07:27      Final result by Steve Ruano MD (04/26/23 17:07:27)                   Impression:      No acute process.      Electronically signed by: Steve Ruano MD  Date:    04/26/2023  Time:    17:07               Narrative:    EXAMINATION:  XR CHEST PA AND LATERAL    CLINICAL HISTORY:  Chest Pain;    TECHNIQUE:  PA and lateral views of the chest were performed.    COMPARISON:  12/03/2020.    FINDINGS:  Monitoring EKG leads are present.  The trachea is unremarkable.  The cardiomediastinal silhouette is within normal limits.  The hilar structures are unremarkable.  There is no evidence of free air beneath the hemidiaphragms.  There are no pleural effusions.  There is no evidence of a pneumothorax.  There is no evidence of pneumomediastinum.  No airspace opacity is present.  The osseous structures are unremarkable.                                       Medications   sodium chloride 0.9% bolus 500 mL 500 mL (0 mLs Intravenous Stopped 4/26/23 1733)   aspirin tablet 325 mg (325 mg Oral Given 4/26/23 1631)     Medical Decision Making:   History:   Old Medical Records: I decided to obtain old medical records.  Initial Assessment:   61 y/o female with PMHX of esophageal issues presents with upper, sub-sternal chest pain that radiates to her left arm and neck for 3 days.  She is well appearing and in nad.   Differential Diagnosis:   Differential includes but is not limited to: ACS,  angina, esophageal etiolog, msk pain, pna  Clinical Tests:   Lab Tests: Ordered and Reviewed  Radiological Study: Ordered and Reviewed  Medical Tests: Ordered and Reviewed  ED Management:  Plan: Will do cardiac workup and reassess.  Patient was signed out to Dr. Griffith to followup on labs/imaging and for final dispo at 5:00 pm.        Scribe Attestation:   Scribe #1: I performed the above scribed service and the documentation accurately describes the services I performed. I attest to the accuracy of the note.                   Clinical Impression:   Final diagnoses:  [R07.9] Chest pain        ED Disposition Condition    Discharge Stable          ED Prescriptions    None       Follow-up Information       Follow up With Specialties Details Why Contact Info    Salazar Bird MD Internal Medicine  Schedule a close ER follow-up visit with your primary care provider. [address] [facility] [address]  [phone]      ER   Return to the ER for worsened symptoms or for any other concerns.              Pietro Peterson MD  05/01/23 3009

## 2023-04-26 NOTE — ED NOTES
Patient states chest pressure, back pain onset Anthony night. Reports lightheaded, states she is scheduled for stretching esophagus next week. Aspirin at 1000

## 2023-04-26 NOTE — ED NOTES
Patient identifiers verified and correct for  Mr Galan  C/C:  mid chest discomfort SEE NN  APPEARANCE: awake and alert in NAD. PAIN  0/10  SKIN: warm, dry and intact. No breakdown or bruising.  MUSCULOSKELETAL: Patient moving all extremities spontaneously, no obvious swelling or deformities noted. Ambulates independently.  RESPIRATORY: Positive  shortness of breath.Respirations unlabored. Yogesh cough Yogesh fevers  CARDIAC:Positive CP, 2+ distal pulses; no peripheral edema  ABDOMEN: S/ND/NT, Denies nausea  : voids spontaneously, denies difficulty  Neurologic: AAO x 4; follows commands equal strength in all extremities; denies numbness/tingling. Denies dizziness  reports lightheaded, den ies new weakness

## 2023-04-27 ENCOUNTER — PATIENT MESSAGE (OUTPATIENT)
Dept: INTERNAL MEDICINE | Facility: CLINIC | Age: 63
End: 2023-04-27
Payer: MEDICARE

## 2023-04-27 NOTE — DISCHARGE INSTRUCTIONS
Start taking an 81 mg aspirin tablet daily.    You have been referred to cardiology for follow-up. You should receive a call about scheduling an appointment.

## 2023-04-27 NOTE — TELEPHONE ENCOUNTER
I called her and booked 4pm today.    Then after discussion about er she feels appt not needed    She will keep annual for July as booked.  I told her we couldn't move that up.  Ins only covers once a 365 day period

## 2023-05-04 ENCOUNTER — CLINICAL SUPPORT (OUTPATIENT)
Dept: ENDOSCOPY | Facility: HOSPITAL | Age: 63
End: 2023-05-04
Payer: MEDICARE

## 2023-05-04 DIAGNOSIS — Z98.890 HISTORY OF ESOPHAGEAL DILATATION: ICD-10-CM

## 2023-05-04 DIAGNOSIS — R13.10 DYSPHAGIA, UNSPECIFIED TYPE: ICD-10-CM

## 2023-05-05 ENCOUNTER — OFFICE VISIT (OUTPATIENT)
Dept: URGENT CARE | Facility: CLINIC | Age: 63
End: 2023-05-05
Payer: MEDICARE

## 2023-05-05 VITALS
BODY MASS INDEX: 23.22 KG/M2 | HEART RATE: 97 BPM | SYSTOLIC BLOOD PRESSURE: 95 MMHG | WEIGHT: 123 LBS | RESPIRATION RATE: 16 BRPM | DIASTOLIC BLOOD PRESSURE: 59 MMHG | HEIGHT: 61 IN | OXYGEN SATURATION: 97 % | TEMPERATURE: 98 F

## 2023-05-05 DIAGNOSIS — J34.89 STUFFY AND RUNNY NOSE: ICD-10-CM

## 2023-05-05 DIAGNOSIS — J34.89 SINUS PRESSURE: Primary | ICD-10-CM

## 2023-05-05 PROCEDURE — 96372 THER/PROPH/DIAG INJ SC/IM: CPT | Mod: S$GLB,,,

## 2023-05-05 PROCEDURE — 99213 OFFICE O/P EST LOW 20 MIN: CPT | Mod: 25,S$GLB,,

## 2023-05-05 PROCEDURE — 99213 PR OFFICE/OUTPT VISIT, EST, LEVL III, 20-29 MIN: ICD-10-PCS | Mod: 25,S$GLB,,

## 2023-05-05 PROCEDURE — 96372 PR INJECTION,THERAP/PROPH/DIAG2ST, IM OR SUBCUT: ICD-10-PCS | Mod: S$GLB,,,

## 2023-05-05 RX ORDER — FLUTICASONE PROPIONATE 50 MCG
2 SPRAY, SUSPENSION (ML) NASAL DAILY
Qty: 9.9 ML | Refills: 0 | Status: SHIPPED | OUTPATIENT
Start: 2023-05-05 | End: 2023-06-04

## 2023-05-05 RX ORDER — PANTOPRAZOLE SODIUM 20 MG/1
20 TABLET, DELAYED RELEASE ORAL DAILY
COMMUNITY
End: 2023-06-20

## 2023-05-05 NOTE — PROGRESS NOTES
"Subjective:      Patient ID: Raquel Galan is a 62 y.o. female.    Vitals:  height is 5' 1" (1.549 m) and weight is 55.8 kg (123 lb). Her temperature is 98.1 °F (36.7 °C). Her blood pressure is 95/59 (abnormal) and her pulse is 97. Her respiration is 16 and oxygen saturation is 97%.     Chief Complaint: Sinus Problem    This is a 62 y.o. female who presents today with a chief complaint of productive cough, nasal congestion, bilateral ear fullness, resolved post nasal drip, and sinus pressure x 7 days ago. Treated with sudafed, Guaiatussin, and promethazine. States she just finished her 3rd round of antibiotics last week for a dental issue which has been monitored by oral surgeon. Denies fever, sore throat, chest pain, shortness of breath, ear drainage. Patient has a hx of chronic rhinitis. Of note, patient took an at home COVID test yesterday and it was negative. NKDA.       Sinus Problem  This is a new problem. The current episode started in the past 7 days. The problem has been gradually worsening since onset. There has been no fever. Her pain is at a severity of 0/10. She is experiencing no pain. Associated symptoms include congestion, coughing, ear pain (congestion bilateral) and sinus pressure. Pertinent negatives include no chills, shortness of breath, sneezing or sore throat. (Ear fullness ) Treatments tried: sudafed, Guaiatussin, and promethazine. The treatment provided mild relief.     Constitution: Negative for chills and fever.   HENT:  Positive for ear pain (congestion bilateral), congestion and sinus pressure. Negative for ear discharge, postnasal drip (resolved), sore throat, trouble swallowing and voice change.    Cardiovascular:  Negative for chest pain.   Eyes:  Negative for eye discharge, eye itching and eye redness.   Respiratory:  Positive for cough. Negative for bloody sputum, shortness of breath and wheezing.    Allergic/Immunologic: Negative for sneezing.    Objective:     Vitals:    " 05/05/23 1157   BP: (!) 95/59   Pulse: 97   Resp: 16   Temp: 98.1 °F (36.7 °C)       Physical Exam   Constitutional: She is oriented to person, place, and time. She appears well-developed. She is cooperative.  Non-toxic appearance. She does not appear ill. No distress.   HENT:   Head: Normocephalic and atraumatic.   Ears:   Right Ear: Hearing, tympanic membrane, external ear and ear canal normal. Tympanic membrane is not erythematous and not bulging. No middle ear effusion. impacted cerumen  Left Ear: Hearing, tympanic membrane, external ear and ear canal normal. Tympanic membrane is not erythematous and not bulging.  No middle ear effusion. impacted cerumen  Nose: Nose normal. No mucosal edema, rhinorrhea or nasal deformity. No epistaxis. Right sinus exhibits no maxillary sinus tenderness and no frontal sinus tenderness. Left sinus exhibits no maxillary sinus tenderness and no frontal sinus tenderness.   Mouth/Throat: Uvula is midline, oropharynx is clear and moist and mucous membranes are normal. Mucous membranes are moist. No trismus in the jaw. Normal dentition. No uvula swelling. No oropharyngeal exudate, posterior oropharyngeal edema, posterior oropharyngeal erythema or cobblestoning. No tonsillar exudate.   Eyes: Conjunctivae and lids are normal. Pupils are equal, round, and reactive to light. Right eye exhibits no discharge. Left eye exhibits no discharge. No scleral icterus.   Neck: Trachea normal and phonation normal. Neck supple. No edema present. No erythema present. No neck rigidity present.   Cardiovascular: Normal rate, regular rhythm, normal heart sounds and normal pulses.   Pulmonary/Chest: Effort normal. No respiratory distress. She has no decreased breath sounds. She has no wheezes. She has no rhonchi. She has no rales.   Abdominal: Normal appearance.   Musculoskeletal: Normal range of motion.         General: No deformity. Normal range of motion.   Lymphadenopathy:     She has no cervical  adenopathy.   Neurological: She is alert and oriented to person, place, and time. She exhibits normal muscle tone. Coordination normal.   Skin: Skin is warm, dry, intact, not diaphoretic and not pale.   Psychiatric: Her speech is normal and behavior is normal. Judgment and thought content normal.   Nursing note and vitals reviewed.    Assessment:     1. Sinus pressure    2. Stuffy and runny nose        Plan:       Sinus pressure  -     methylPREDNISolone sod suc(PF) injection 40 mg    Stuffy and runny nose  -     fluticasone propionate (FLONASE) 50 mcg/actuation nasal spray; 2 sprays (100 mcg total) by Each Nostril route once daily.  Dispense: 9.9 mL; Refill: 0             Patient Instructions   Reviewed negative at home COVID-19 virus test with patient who verbalized understanding.  Advised patient that symptoms are indicative of an upper respiratory infection which is viral in nature and should be treated symptomatically.  We discussed over-the-counter medications as well as home remedies to help with current symptoms.  We also discussed a wait and see antibiotic plan which the patient verbalized understanding.  Patient educational handouts also included in discharge paperwork for patient who verbalized understanding agrees with plan of care.  They deny any further questions or concerns at this time.  Patient exits exam room in no acute distress.      PLEASE READ YOUR DISCHARGE INSTRUCTIONS ENTIRELY AS IT CONTAINS IMPORTANT INFORMATION.      - Please drink plenty of fluids.  - Please get plenty of rest.  - You can take plain Mucinex (guaifenesin) 1200 mg twice a day to help loosen mucous.   - Use over the counter Flonase as directed  Please return here or go to the Emergency Department for any concerns or worsening of condition.  - Please take an over the counter antihistamine medication (Allegra/Claritin/Zyrtec/Xyzal) of your choice as directed. These are antihistamines that can help with runny nose, nasal  congestion, sneezing, and helps to dry up post-nasal drip, which usually causes sore throat and cough.    -If you do NOT have high blood pressure, you may use a decongestant form (D)  of this medication (ie. Claritin- D, zyrtec-D, allegra-D, Mucinex-D) or if you do not take the D form, you can take sudafed (pseudoephedrine) over the counter, which is a decongestant. Do NOT take two decongestant (D) medications at the same time (such as mucinex-D and claritin-D or plain sudafed and claritin D). Dextromethorphan (DM) is a cough suppressant over the counter (ie. mucinex DM, robitussin, delsym; dayquil/nyquil has DM as well.)    If you do have Hypertension or palpitations, it is safe to take Coricidin HBP for relief of sinus symptoms.    - If not allergic, please take over the counter Tylenol (Acetaminophen) and/or Motrin (Ibuprofen) as directed for control of pain and/or fever.  Avoid tylenol if you have a history of liver disease. Do not take ibuprofen if you have a history of GI bleeding, kidney disease, or if you take blood thinners.  Please follow up with your primary care doctor or specialist as needed.    -IF YOU RECEIVED PRESCRIPTION COUGH SUPPRESSANTS: Take prescription cough meds (pills) as prescribed; take prescription cough syrup at night as needed for cough.  Do not take both the prescribed cough pills and syrup at the same time or within 6 hours of each other.  Do not take the cough syrup with any other sedative medication as it can can cause drowsiness. Do not operate any heavy machinery, drink or drive while taking the cough syrup.    Try taking half a dose first of the cough syrup to see how it affects you.     Sore throat recommendations: Warm fluids, warm salt water gargles, throat lozenges, tea, honey, soup, rest, hydration.    Use over the counter flonase: one spray each nostril twice daily OR two sprays each nostril once daily for sinus congestion and postnasal drip. This is a steroid nasal spray  that works locally over time to decrease the inflammation in your nose/sinuses and help with allergic symptoms. This is not an quick- relief spray like afrin, but it works well if used daily.  Discontinue if you develop nose bleed    Sinus rinses DO NOT USE TAP WATER, if you must, water must be a rolling boil for 1 minute, let it cool, then use.  May use distilled water, or over the counter nasal saline rinses.  Vics vapor rub in shower to help open nasal passages.  May use nasal gel to keep passages moisturized.  May use Nasal saline sprays during the day for added relief of congestion.   For those who go to the gym, please do not use the sauna or steam room now to clear sinuses.    If you  smoke, please stop smoking.      Please return or see your primary care doctor if you develop new or worsening symptoms.     Please arrange follow up with your primary medical clinic as soon as possible. You must understand that you've received an Urgent Care treatment only and that you may be released before all of your medical problems are known or treated. You, the patient, will arrange for follow up as instructed. If your symptoms worsen or fail to improve you should go to the Emergency Room.    You received a steroid prescription/shot today - Please be aware of potential side effects of steroids including elevating blood pressure, increased blood glucose levels, redness to the face, increased risk of opportunistic infections, peptic ulcer disease and GI bleeding, insomnia, tremors. If you received a shot you may also notice dimpling of the skin where the shot goes in.   Do not use steroids more than 3 times per year.   If you have diabetes, please check you blood sugar frequently.  If you have high blood pressure, please check your blood pressure frequently.       RISKS OF SYSTEMIC STEROIDS     Steroids are the most effective anti-inflammatory drugs available, and are derivatives of natural hormones which the body creates  to help the body cope with injury or stress.  However, prolonged use of oral or systemic steroids can result in suppression of normal steroid levels in the body.  Therefore, these medications should be taken exactly as prescribed, usually in a gradually decreasing dose, to avoid sudden withdrawal.  Withdrawal symptoms are uncommon in patients who have used steroids for less than two weeks at a time.  Continued or repeated use of steroids can reduce your ability to fight infection and can result in weight gain, fluid retention, acne, increased body hair, purple marks on the abdomen, collection of fatty deposits under the skin, and easy bruising.  High doses of steroids will frequently cause nervousness, sleeplessness, excitation, and sometimes depression or confusion.  Steroids can also cause elevation of blood sugar or blood pressure or change in salt balance.  Prolonged steroids can cause thinning of the bones, muscle weakness, glaucoma, and cataracts.  They can aggravate ulcers.  Patients who are pregnant, have a history of stomach ulcers, glaucoma, diabetes, high blood pressure, tuberculosis, osteoporosis, or recent vaccination, should not take steroids unless absolutely necessary.  A very rare complication of steroids is interruption of the blood supply to the hip bone which can result in a fracture that requires a hip replacement.   Fortunately, all of these complications are extremely rare in patients treated with short-term doses of steroids.  If your doctor has prescribed systemic steroids, he or she has likely judged that the risk of these complications is outweighed by the potential benefit for the treatment of your disease.  If you have any questions about this information or the instructions on how to take your steroids, please speak with your doctor before you begin the medication.   Alternatives to systemic steroids include topical applications to the nose, skin, lung or ear, so that the systemic dose  - that which distributes through the body - is greatly reduced. Topical steroids greatly reduce the risk of prolonged use of steroids.

## 2023-05-05 NOTE — PATIENT INSTRUCTIONS
Reviewed negative at home COVID-19 virus test with patient who verbalized understanding.  Advised patient that symptoms are indicative of an upper respiratory infection which is viral in nature and should be treated symptomatically.  We discussed over-the-counter medications as well as home remedies to help with current symptoms.  We also discussed a wait and see antibiotic plan which the patient verbalized understanding.  Patient educational handouts also included in discharge paperwork for patient who verbalized understanding agrees with plan of care.  They deny any further questions or concerns at this time.  Patient exits exam room in no acute distress.      PLEASE READ YOUR DISCHARGE INSTRUCTIONS ENTIRELY AS IT CONTAINS IMPORTANT INFORMATION.      - Please drink plenty of fluids.  - Please get plenty of rest.  - You can take plain Mucinex (guaifenesin) 1200 mg twice a day to help loosen mucous.   - Use over the counter Flonase as directed  Please return here or go to the Emergency Department for any concerns or worsening of condition.  - Please take an over the counter antihistamine medication (Allegra/Claritin/Zyrtec/Xyzal) of your choice as directed. These are antihistamines that can help with runny nose, nasal congestion, sneezing, and helps to dry up post-nasal drip, which usually causes sore throat and cough.    -If you do NOT have high blood pressure, you may use a decongestant form (D)  of this medication (ie. Claritin- D, zyrtec-D, allegra-D, Mucinex-D) or if you do not take the D form, you can take sudafed (pseudoephedrine) over the counter, which is a decongestant. Do NOT take two decongestant (D) medications at the same time (such as mucinex-D and claritin-D or plain sudafed and claritin D). Dextromethorphan (DM) is a cough suppressant over the counter (ie. mucinex DM, robitussin, delsym; dayquil/nyquil has DM as well.)    If you do have Hypertension or palpitations, it is safe to take Coricidin HBP  for relief of sinus symptoms.    - If not allergic, please take over the counter Tylenol (Acetaminophen) and/or Motrin (Ibuprofen) as directed for control of pain and/or fever.  Avoid tylenol if you have a history of liver disease. Do not take ibuprofen if you have a history of GI bleeding, kidney disease, or if you take blood thinners.  Please follow up with your primary care doctor or specialist as needed.    -IF YOU RECEIVED PRESCRIPTION COUGH SUPPRESSANTS: Take prescription cough meds (pills) as prescribed; take prescription cough syrup at night as needed for cough.  Do not take both the prescribed cough pills and syrup at the same time or within 6 hours of each other.  Do not take the cough syrup with any other sedative medication as it can can cause drowsiness. Do not operate any heavy machinery, drink or drive while taking the cough syrup.    Try taking half a dose first of the cough syrup to see how it affects you.     Sore throat recommendations: Warm fluids, warm salt water gargles, throat lozenges, tea, honey, soup, rest, hydration.    Use over the counter flonase: one spray each nostril twice daily OR two sprays each nostril once daily for sinus congestion and postnasal drip. This is a steroid nasal spray that works locally over time to decrease the inflammation in your nose/sinuses and help with allergic symptoms. This is not an quick- relief spray like afrin, but it works well if used daily.  Discontinue if you develop nose bleed    Sinus rinses DO NOT USE TAP WATER, if you must, water must be a rolling boil for 1 minute, let it cool, then use.  May use distilled water, or over the counter nasal saline rinses.  Vics vapor rub in shower to help open nasal passages.  May use nasal gel to keep passages moisturized.  May use Nasal saline sprays during the day for added relief of congestion.   For those who go to the gym, please do not use the sauna or steam room now to clear sinuses.    If you  smoke,  please stop smoking.      Please return or see your primary care doctor if you develop new or worsening symptoms.     Please arrange follow up with your primary medical clinic as soon as possible. You must understand that you've received an Urgent Care treatment only and that you may be released before all of your medical problems are known or treated. You, the patient, will arrange for follow up as instructed. If your symptoms worsen or fail to improve you should go to the Emergency Room.    You received a steroid prescription/shot today - Please be aware of potential side effects of steroids including elevating blood pressure, increased blood glucose levels, redness to the face, increased risk of opportunistic infections, peptic ulcer disease and GI bleeding, insomnia, tremors. If you received a shot you may also notice dimpling of the skin where the shot goes in.   Do not use steroids more than 3 times per year.   If you have diabetes, please check you blood sugar frequently.  If you have high blood pressure, please check your blood pressure frequently.       RISKS OF SYSTEMIC STEROIDS     Steroids are the most effective anti-inflammatory drugs available, and are derivatives of natural hormones which the body creates to help the body cope with injury or stress.  However, prolonged use of oral or systemic steroids can result in suppression of normal steroid levels in the body.  Therefore, these medications should be taken exactly as prescribed, usually in a gradually decreasing dose, to avoid sudden withdrawal.  Withdrawal symptoms are uncommon in patients who have used steroids for less than two weeks at a time.  Continued or repeated use of steroids can reduce your ability to fight infection and can result in weight gain, fluid retention, acne, increased body hair, purple marks on the abdomen, collection of fatty deposits under the skin, and easy bruising.  High doses of steroids will frequently cause  nervousness, sleeplessness, excitation, and sometimes depression or confusion.  Steroids can also cause elevation of blood sugar or blood pressure or change in salt balance.  Prolonged steroids can cause thinning of the bones, muscle weakness, glaucoma, and cataracts.  They can aggravate ulcers.  Patients who are pregnant, have a history of stomach ulcers, glaucoma, diabetes, high blood pressure, tuberculosis, osteoporosis, or recent vaccination, should not take steroids unless absolutely necessary.  A very rare complication of steroids is interruption of the blood supply to the hip bone which can result in a fracture that requires a hip replacement.   Fortunately, all of these complications are extremely rare in patients treated with short-term doses of steroids.  If your doctor has prescribed systemic steroids, he or she has likely judged that the risk of these complications is outweighed by the potential benefit for the treatment of your disease.  If you have any questions about this information or the instructions on how to take your steroids, please speak with your doctor before you begin the medication.   Alternatives to systemic steroids include topical applications to the nose, skin, lung or ear, so that the systemic dose - that which distributes through the body - is greatly reduced. Topical steroids greatly reduce the risk of prolonged use of steroids.

## 2023-05-09 ENCOUNTER — OFFICE VISIT (OUTPATIENT)
Dept: CARDIOLOGY | Facility: CLINIC | Age: 63
End: 2023-05-09
Payer: MEDICARE

## 2023-05-09 VITALS
BODY MASS INDEX: 23.65 KG/M2 | HEIGHT: 61 IN | SYSTOLIC BLOOD PRESSURE: 110 MMHG | WEIGHT: 125.25 LBS | HEART RATE: 81 BPM | DIASTOLIC BLOOD PRESSURE: 73 MMHG

## 2023-05-09 DIAGNOSIS — R07.2 PRECORDIAL PAIN: ICD-10-CM

## 2023-05-09 DIAGNOSIS — Z01.818 PREOPERATIVE CLEARANCE: Primary | ICD-10-CM

## 2023-05-09 PROBLEM — I20.89 ANGINAL EQUIVALENT: Status: RESOLVED | Noted: 2022-08-01 | Resolved: 2023-05-09

## 2023-05-09 PROCEDURE — 1160F PR REVIEW ALL MEDS BY PRESCRIBER/CLIN PHARMACIST DOCUMENTED: ICD-10-PCS | Mod: CPTII,S$GLB,, | Performed by: INTERNAL MEDICINE

## 2023-05-09 PROCEDURE — 99999 PR PBB SHADOW E&M-EST. PATIENT-LVL IV: CPT | Mod: PBBFAC,,, | Performed by: INTERNAL MEDICINE

## 2023-05-09 PROCEDURE — 3074F SYST BP LT 130 MM HG: CPT | Mod: CPTII,S$GLB,, | Performed by: INTERNAL MEDICINE

## 2023-05-09 PROCEDURE — 3008F BODY MASS INDEX DOCD: CPT | Mod: CPTII,S$GLB,, | Performed by: INTERNAL MEDICINE

## 2023-05-09 PROCEDURE — 3078F PR MOST RECENT DIASTOLIC BLOOD PRESSURE < 80 MM HG: ICD-10-PCS | Mod: CPTII,S$GLB,, | Performed by: INTERNAL MEDICINE

## 2023-05-09 PROCEDURE — 3074F PR MOST RECENT SYSTOLIC BLOOD PRESSURE < 130 MM HG: ICD-10-PCS | Mod: CPTII,S$GLB,, | Performed by: INTERNAL MEDICINE

## 2023-05-09 PROCEDURE — 1160F RVW MEDS BY RX/DR IN RCRD: CPT | Mod: CPTII,S$GLB,, | Performed by: INTERNAL MEDICINE

## 2023-05-09 PROCEDURE — 3078F DIAST BP <80 MM HG: CPT | Mod: CPTII,S$GLB,, | Performed by: INTERNAL MEDICINE

## 2023-05-09 PROCEDURE — 99999 PR PBB SHADOW E&M-EST. PATIENT-LVL IV: ICD-10-PCS | Mod: PBBFAC,,, | Performed by: INTERNAL MEDICINE

## 2023-05-09 PROCEDURE — 1159F MED LIST DOCD IN RCRD: CPT | Mod: CPTII,S$GLB,, | Performed by: INTERNAL MEDICINE

## 2023-05-09 PROCEDURE — 3008F PR BODY MASS INDEX (BMI) DOCUMENTED: ICD-10-PCS | Mod: CPTII,S$GLB,, | Performed by: INTERNAL MEDICINE

## 2023-05-09 PROCEDURE — 1159F PR MEDICATION LIST DOCUMENTED IN MEDICAL RECORD: ICD-10-PCS | Mod: CPTII,S$GLB,, | Performed by: INTERNAL MEDICINE

## 2023-05-09 PROCEDURE — 99213 OFFICE O/P EST LOW 20 MIN: CPT | Mod: S$GLB,,, | Performed by: INTERNAL MEDICINE

## 2023-05-09 PROCEDURE — 99213 PR OFFICE/OUTPT VISIT, EST, LEVL III, 20-29 MIN: ICD-10-PCS | Mod: S$GLB,,, | Performed by: INTERNAL MEDICINE

## 2023-05-09 NOTE — PROGRESS NOTES
Subjective:   05/09/2023     Patient ID:  Raquel Galan is a 62 y.o. female who presents for evaulation of Pre-op Exam      Patient here for preop clearance.  She had been seen in the emergency room with 3 days of chest pain, this was felt to be not of cardiac origin, troponins were normal.  Her EKG shows sinus rhythm, normal study.  I would seen her last ask pain and dyspnea.  A CT coronary calcium score was 0.  Stress echocardiography was normal.    Blood pressure well controlled.  Cardiac risk low.    She does have a history of esophageal strictures and, now needs esophageal dilatation    Prior note:   She comes in today for evaluation of increasing dyspnea with activity, primarily worse going up stairs and on an incline.  She generally very active physically.  She exercises regularly not having exertional chest pains or tightness.  She has been under great deal of stress recently.  Episodic palpitations, primarily her heart racing, mostly at night if she has awoken by the neighboring dog    Her family history is strongly positive for coronary artery disease at an early age, primarily her mother who had bypass surgery twice.    She has never had hypertension, diabetes.  Lipid profile has generally been a high HDL.          The 10-year ASCVD risk score (Garry CORREA, et al., 2019) is: 3.3%    Values used to calculate the score:      Age: 62 years      Sex: Female      Is Non- : No      Diabetic: No      Tobacco smoker: No      Systolic Blood Pressure: 110 mmHg      Is BP treated: No      HDL Cholesterol: 69 mg/dL      Total Cholesterol: 196 mg/dL          Past Medical History:   Diagnosis Date    Allergy     Back pain     Disorder of vestibular function of both ears     Fibrocystic breast disease in female     HEARING LOSS     right side - no hearing aid    Hyperlipidemia     Osteopenia     PONV (postoperative nausea and vomiting)     PONV (postoperative nausea and vomiting)     Special  screening for malignant neoplasms, colon 7/22/2013       Review of patient's allergies indicates:  No Known Allergies      Current Outpatient Medications:     b complex vitamins tablet, Take 1 tablet by mouth once daily., Disp: , Rfl:     CALCIUM 600 WITH VITAMIN D3 600 mg(1,500mg) -200 unit Tab, Take 1 tablet by mouth once daily., Disp: , Rfl:     estradioL (ESTRACE) 1 MG tablet, TAKE 1 TABLET ONE TIME DAILY., Disp: 90 tablet, Rfl: 3    fluticasone propionate (FLONASE) 50 mcg/actuation nasal spray, 2 sprays (100 mcg total) by Each Nostril route once daily., Disp: 9.9 mL, Rfl: 0    glucosamine-chondroitin 500-400 mg tablet, Take 1 tablet by mouth Daily., Disp: , Rfl:     omega-3 fatty acids/fish oil (FISH OIL-OMEGA-3 FATTY ACIDS) 300-1,000 mg capsule, Take 1 capsule by mouth once daily., Disp: , Rfl:     pantoprazole (PROTONIX) 20 MG tablet, Take 20 mg by mouth once daily., Disp: , Rfl:     YUVAFEM 10 mcg Tab, Place 1 tablet vaginally twice a week., Disp: , Rfl:     fexofenadine (ALLEGRA) 180 MG tablet, Take 1 tablet (180 mg total) by mouth once daily., Disp: 30 tablet, Rfl: 11    tretinoin (RETIN-A) 0.025 % cream, Compound tretinoin 0.025% / niacinamide 2% cream. Apply a pea-sized amount to entire face qhs. (Patient not taking: Reported on 5/5/2023), Disp: 30 g, Rfl: 5     Objective:   Review of Systems   Cardiovascular:  Positive for chest pain (Resolved). Negative for claudication, cyanosis, dyspnea on exertion, irregular heartbeat, leg swelling, near-syncope, orthopnea, palpitations, paroxysmal nocturnal dyspnea and syncope.   Gastrointestinal:  Positive for dysphagia.       Vitals:    05/09/23 0945   BP: 110/73   Pulse: 81     Wt Readings from Last 3 Encounters:   05/09/23 56.8 kg (125 lb 3.5 oz)   05/05/23 55.8 kg (123 lb)   02/14/23 55.8 kg (123 lb)     Temp Readings from Last 3 Encounters:   05/05/23 98.1 °F (36.7 °C)   04/26/23 97.8 °F (36.6 °C) (Oral)   02/09/23 97.3 °F (36.3 °C)     BP Readings from Last  3 Encounters:   05/09/23 110/73   05/05/23 (!) 95/59   04/26/23 (!) 149/84     Pulse Readings from Last 3 Encounters:   05/09/23 81   05/05/23 97   04/26/23 71             Physical Exam  Vitals reviewed.   Constitutional:       General: She is not in acute distress.     Appearance: She is well-developed.   HENT:      Head: Normocephalic and atraumatic.      Nose: Nose normal.   Eyes:      Conjunctiva/sclera: Conjunctivae normal.      Pupils: Pupils are equal, round, and reactive to light.   Neck:      Vascular: No JVD.   Cardiovascular:      Rate and Rhythm: Normal rate and regular rhythm.      Pulses: Intact distal pulses.      Heart sounds: Normal heart sounds. No murmur heard.    No friction rub. No gallop.   Pulmonary:      Effort: Pulmonary effort is normal. No respiratory distress.      Breath sounds: Normal breath sounds. No wheezing or rales.   Chest:      Chest wall: No tenderness.   Abdominal:      General: Bowel sounds are normal. There is no distension.      Palpations: Abdomen is soft.      Tenderness: There is no abdominal tenderness.   Musculoskeletal:         General: No tenderness or deformity. Normal range of motion.      Cervical back: Normal range of motion and neck supple.   Skin:     General: Skin is warm and dry.      Findings: No erythema or rash.   Neurological:      Mental Status: She is alert and oriented to person, place, and time.      Cranial Nerves: No cranial nerve deficit.      Motor: No abnormal muscle tone.      Coordination: Coordination normal.   Psychiatric:         Behavior: Behavior normal.         Thought Content: Thought content normal.         Judgment: Judgment normal.         Lab Results   Component Value Date    CHOL 196 06/30/2022    CHOL 181 06/01/2021    CHOL 268 (H) 05/19/2020     Lab Results   Component Value Date    HDL 69 06/30/2022    HDL 83 (H) 06/01/2021    HDL 97 (H) 05/19/2020     Lab Results   Component Value Date    LDLCALC 116.0 06/30/2022    LDLCALC 85.2  06/01/2021    LDLCALC 152.4 05/19/2020     Lab Results   Component Value Date    ALT 15 04/26/2023    AST 17 04/26/2023    AST 18 06/30/2022    AST 18 09/27/2021     Lab Results   Component Value Date    CREATININE 1.0 04/26/2023    BUN 19 04/26/2023     04/26/2023    K 4.1 04/26/2023    CO2 22 (L) 04/26/2023    CO2 25 06/30/2022    CO2 23 09/27/2021     Lab Results   Component Value Date    HGB 14.9 04/26/2023    HCT 44.9 04/26/2023    HCT 43.6 06/30/2022    HCT 43.0 09/27/2021                         Assessment and Plan:     Preoperative clearance  Comments:  Cleared for surgery    Precordial pain  Comments:  Chest pain not of cardiac origin  Cardiac risk low  Orders:  -     Ambulatory referral/consult to Cardiology         No follow-ups on file.          Future Appointments   Date Time Provider Department Center   6/14/2023 10:00 AM Thea Interiano PA-C [facility] WOM WEL [facility]   6/20/2023  9:00 AM Danielle Tillman MD [facility] PAINMGT [facility]   6/30/2023  9:30 AM Pierre Ruiz OD [facility] OPTOMTY [facility]   7/31/2023  7:15 AM LAB, [place] [facility] [place]   8/7/2023 10:30 AM Salazar Bird MD [facility] IM [place]

## 2023-05-10 ENCOUNTER — PATIENT MESSAGE (OUTPATIENT)
Dept: INTERNAL MEDICINE | Facility: CLINIC | Age: 63
End: 2023-05-10
Payer: MEDICARE

## 2023-05-11 ENCOUNTER — TELEPHONE (OUTPATIENT)
Dept: ENDOSCOPY | Facility: HOSPITAL | Age: 63
End: 2023-05-11

## 2023-05-11 NOTE — TELEPHONE ENCOUNTER
----- Message from Claudine Weldon sent at 5/11/2023 11:19 AM CDT -----  Regarding: appt req  Contact: Pt  Type: Appointment Request    Caller is requesting an appointment     Name of Caller: Pt   Reason for appointment: EGD  Would the patient rather a call back or a response via [facility]? Call back  Best Call Back Number: [phone]  Additional Information:  Orders are In Pt's Chart  , Please call to advise, thank You

## 2023-05-12 ENCOUNTER — TELEPHONE (OUTPATIENT)
Dept: INTERNAL MEDICINE | Facility: CLINIC | Age: 63
End: 2023-05-12
Payer: MEDICARE

## 2023-05-12 DIAGNOSIS — K22.2 ESOPHAGEAL STENOSIS: ICD-10-CM

## 2023-05-12 DIAGNOSIS — R13.10 DYSPHAGIA, UNSPECIFIED TYPE: Primary | ICD-10-CM

## 2023-05-12 NOTE — TELEPHONE ENCOUNTER
Colon dept says egd order is not active.    Please re enter order asap. So she can get scheduled.  She is upset that she is going around and around with colon dept and  Can't get scheduled.    I told her i'd send you a  msg this am to re enter the order.   Thanks trent    (Let me know once re entered so I can let her know)

## 2023-05-12 NOTE — TELEPHONE ENCOUNTER
----- Message from Leonora Winslow sent at 5/12/2023  8:04 AM CDT -----  Pt would like to be called back regarding  EDG orders    Pt can be reached at  [phone]

## 2023-05-12 NOTE — TELEPHONE ENCOUNTER
"Dr phillips says " Orders for EGD have been re-entered. "     Left msg at endoscopy phone to contact patient asap and why.  Sent patient msg.   "

## 2023-05-13 ENCOUNTER — PATIENT MESSAGE (OUTPATIENT)
Dept: INTERNAL MEDICINE | Facility: CLINIC | Age: 63
End: 2023-05-13
Payer: MEDICARE

## 2023-05-15 ENCOUNTER — PATIENT MESSAGE (OUTPATIENT)
Dept: ENDOSCOPY | Facility: HOSPITAL | Age: 63
End: 2023-05-15
Payer: MEDICARE

## 2023-05-15 DIAGNOSIS — R13.10 DYSPHAGIA, UNSPECIFIED TYPE: Primary | ICD-10-CM

## 2023-05-15 DIAGNOSIS — Z98.890 HISTORY OF ESOPHAGEAL DILATATION: ICD-10-CM

## 2023-05-24 ENCOUNTER — CLINICAL SUPPORT (OUTPATIENT)
Dept: ENDOSCOPY | Facility: HOSPITAL | Age: 63
End: 2023-05-24
Attending: INTERNAL MEDICINE
Payer: MEDICARE

## 2023-05-24 DIAGNOSIS — K22.2 ESOPHAGEAL STENOSIS: ICD-10-CM

## 2023-05-24 DIAGNOSIS — R13.10 DYSPHAGIA, UNSPECIFIED TYPE: ICD-10-CM

## 2023-05-30 ENCOUNTER — ANESTHESIA EVENT (OUTPATIENT)
Dept: ENDOSCOPY | Facility: HOSPITAL | Age: 63
End: 2023-05-30
Payer: MEDICARE

## 2023-05-30 NOTE — ANESTHESIA PREPROCEDURE EVALUATION
05/30/2023  Raquel Galan is a 63 y.o., female.  Past Medical History:   Diagnosis Date    Allergy     Back pain     Disorder of vestibular function of both ears     Fibrocystic breast disease in female     HEARING LOSS     right side - no hearing aid    Hyperlipidemia     Osteopenia     PONV (postoperative nausea and vomiting)     PONV (postoperative nausea and vomiting)     Special screening for malignant neoplasms, colon 7/22/2013     Past Surgical History:   Procedure Laterality Date    ADENOIDECTOMY      APPENDECTOMY      BREAST BIOPSY Left     exc bx, benign per pt    BREAST SURGERY      left breast biopsy    COLONOSCOPY N/A 9/27/2018    Procedure: COLONOSCOPY;  Surgeon: Uche Smiley MD;  Location: [facility] ENDO (4TH FLR);  Service: Endoscopy;  Laterality: N/A;  pt/instructed on prep day before and day of importance    CYST REMOVAL Right     neck    DILATION AND CURETTAGE OF UTERUS      ESOPHAGOGASTRODUODENOSCOPY N/A 8/4/2021    Procedure: ESOPHAGOGASTRODUODENOSCOPY (EGD);  Surgeon: Ameya Medeiros MD;  Location: [facility] ENDO (2ND FLR);  Service: Endoscopy;  Laterality: N/A;  Food gets hung up. Dilatation likely needed.     COVID test in [place] on 8/1-GT  8/2-new instructions via portal-tb    HYSTERECTOMY      INJECTION OF JOINT Left 10/16/2018    Procedure: INJECTION, JOINT, LEFT SI JOINT INJECTION UNDER FLUORO;  Surgeon: Alirio Lei MD;  Location: [facility] PAIN MGT;  Service: Pain Management;  Laterality: Left;  NEEDS CONSENT    MANDIBLE FRACTURE SURGERY Right     scope    ROBOT-ASSISTED LAPAROSCOPIC ABDOMINAL HYSTERECTOMY USING DA ERICA XI N/A 6/12/2020    Procedure: XI ROBOTIC HYSTERECTOMY;  Surgeon: Alice Valenzuela MD;  Location: [facility] OR;  Service: OB/GYN;  Laterality: N/A;    ROBOT-ASSISTED LAPAROSCOPIC SALPINGO-OOPHORECTOMY USING DA ERICA XI N/A 6/12/2020     Procedure: XI ROBOTIC SALPINGO-OOPHORECTOMY;  Surgeon: Alice Valenzuela MD;  Location: [facility];  Service: OB/GYN;  Laterality: N/A;    TONSILLECTOMY           Anesthesia Evaluation    I have reviewed the Patient Summary Reports.    I have reviewed the Nursing Notes. I have reviewed the NPO Status.   I have reviewed the Medications.     Review of Systems  Anesthesia Hx:  PONV Personal Hx of Anesthesia complications, Post-Operative Nausea/Vomiting   Social:  Non-Smoker, No Alcohol Use    Hematology/Oncology:  Hematology Normal   Oncology Normal     EENT/Dental:  EENT/Dental Normal Meniere's disease - right side hearing loss    Cardiovascular:   hyperlipidemia    Pulmonary:  Pulmonary Normal    Renal/:  Renal/ Normal     Hepatic/GI:  Hepatic/GI Normal    Musculoskeletal:   Arthritis     Neurological:  Neurology Normal    Endocrine:  Endocrine Normal    Dermatological:  Skin Normal    Psych:  Psychiatric Normal           Physical Exam  General:  Well nourished      Airway/Jaw/Neck:  Airway Findings: Mouth Opening: Normal   Tongue: Normal   General Airway Assessment: Adult Mallampati: I  TM Distance: Normal, at least 6 cm         Eyes/Ears/Nose:  EYES/EARS/NOSE FINDINGS: Normal   Dental:  Dental Findings: In tact     Chest/Lungs:  Chest/Lungs Clear    Heart/Vascular:  Heart Findings: Normal Heart murmur: negative Vascular Findings: Normal    Abdomen:  Abdomen Findings: Normal    Musculoskeletal:  Musculoskeletal Findings: Normal   Skin:  Skin Findings: Normal    Mental Status:  Mental Status Findings: Normal        Anesthesia Plan  Type of Anesthesia, risks & benefits discussed:  Anesthesia Type:  Gen Natural Airway    Patient's Preference:   Plan Factors:          Intra-op Monitoring Plan: Standard ASA Monitors  Intra-op Monitoring Plan Comments:   Post Op Pain Control Plan:   Post Op Pain Control Plan Comments:     Induction:   IV  Beta Blocker:  Patient is not currently on a Beta-Blocker (No further documentation  required).       Informed Consent: Informed consent signed with the Patient and all parties understand the risks and agree with anesthesia plan.  All questions answered.  Anesthesia consent signed with patient.  ASA Score: 2     Day of Surgery Review of History & Physical:    H&P Update referred to the surgeon/provider.          Ready For Surgery From Anesthesia Perspective.           Physical Exam  General: Well nourished    Airway:  Mallampati: I   Mouth Opening: Normal  TM Distance: Normal, at least 6 cm  Tongue: Normal    Dental:  In tact          Anesthesia Plan  Type of Anesthesia, risks & benefits discussed:    Anesthesia Type: Gen Natural Airway  Intra-op Monitoring Plan: Standard ASA Monitors  Induction:  IV  Informed Consent: Informed consent signed with the Patient and all parties understand the risks and agree with anesthesia plan.  All questions answered.   ASA Score: 2  Day of Surgery Review of History & Physical: H&P Update referred to the surgeon/provider.    Ready For Surgery From Anesthesia Perspective.       .

## 2023-05-31 ENCOUNTER — ANESTHESIA (OUTPATIENT)
Dept: ENDOSCOPY | Facility: HOSPITAL | Age: 63
End: 2023-05-31
Payer: MEDICARE

## 2023-05-31 ENCOUNTER — HOSPITAL ENCOUNTER (OUTPATIENT)
Facility: HOSPITAL | Age: 63
Discharge: HOME | End: 2023-05-31
Attending: STUDENT IN AN ORGANIZED HEALTH CARE EDUCATION/TRAINING PROGRAM | Admitting: STUDENT IN AN ORGANIZED HEALTH CARE EDUCATION/TRAINING PROGRAM
Payer: MEDICARE

## 2023-05-31 VITALS
HEIGHT: 61 IN | WEIGHT: 125 LBS | SYSTOLIC BLOOD PRESSURE: 128 MMHG | TEMPERATURE: 98 F | BODY MASS INDEX: 23.6 KG/M2 | OXYGEN SATURATION: 100 % | DIASTOLIC BLOOD PRESSURE: 65 MMHG | HEART RATE: 71 BPM | RESPIRATION RATE: 18 BRPM

## 2023-05-31 DIAGNOSIS — R13.10 DYSPHAGIA, UNSPECIFIED TYPE: Primary | ICD-10-CM

## 2023-05-31 DIAGNOSIS — R13.10 DYSPHAGIA: ICD-10-CM

## 2023-05-31 PROCEDURE — 43249 PR EGD, FLEX, W/BALL DILATION, < 30MM: ICD-10-PCS | Mod: ,,, | Performed by: STUDENT IN AN ORGANIZED HEALTH CARE EDUCATION/TRAINING PROGRAM

## 2023-05-31 PROCEDURE — 94761 N-INVAS EAR/PLS OXIMETRY MLT: CPT

## 2023-05-31 PROCEDURE — 37000009 HC ANESTHESIA EA ADD 15 MINS: Performed by: STUDENT IN AN ORGANIZED HEALTH CARE EDUCATION/TRAINING PROGRAM

## 2023-05-31 PROCEDURE — 43249 ESOPH EGD DILATION <30 MM: CPT | Performed by: STUDENT IN AN ORGANIZED HEALTH CARE EDUCATION/TRAINING PROGRAM

## 2023-05-31 PROCEDURE — 43249 ESOPH EGD DILATION <30 MM: CPT | Mod: ,,, | Performed by: STUDENT IN AN ORGANIZED HEALTH CARE EDUCATION/TRAINING PROGRAM

## 2023-05-31 PROCEDURE — C1726 CATH, BAL DIL, NON-VASCULAR: HCPCS | Performed by: STUDENT IN AN ORGANIZED HEALTH CARE EDUCATION/TRAINING PROGRAM

## 2023-05-31 PROCEDURE — 37000008 HC ANESTHESIA 1ST 15 MINUTES: Performed by: STUDENT IN AN ORGANIZED HEALTH CARE EDUCATION/TRAINING PROGRAM

## 2023-05-31 PROCEDURE — D9220A PRA ANESTHESIA: Mod: ,,, | Performed by: NURSE ANESTHETIST, CERTIFIED REGISTERED

## 2023-05-31 PROCEDURE — 99900035 HC TECH TIME PER 15 MIN (STAT)

## 2023-05-31 PROCEDURE — D9220A PRA ANESTHESIA: ICD-10-PCS | Mod: ,,, | Performed by: NURSE ANESTHETIST, CERTIFIED REGISTERED

## 2023-05-31 PROCEDURE — 25000003 PHARM REV CODE 250: Performed by: NURSE ANESTHETIST, CERTIFIED REGISTERED

## 2023-05-31 RX ORDER — LIDOCAINE HYDROCHLORIDE 20 MG/ML
INJECTION INTRAVENOUS
Status: DISCONTINUED | OUTPATIENT
Start: 2023-05-31 | End: 2023-05-31

## 2023-05-31 RX ORDER — SODIUM CHLORIDE 9 MG/ML
INJECTION, SOLUTION INTRAVENOUS CONTINUOUS
Status: DISCONTINUED | OUTPATIENT
Start: 2023-05-31 | End: 2023-05-31 | Stop reason: HOSPADM

## 2023-05-31 RX ORDER — PROPOFOL 10 MG/ML
VIAL (ML) INTRAVENOUS
Status: DISCONTINUED | OUTPATIENT
Start: 2023-05-31 | End: 2023-05-31

## 2023-05-31 RX ORDER — PROPOFOL 10 MG/ML
VIAL (ML) INTRAVENOUS CONTINUOUS PRN
Status: DISCONTINUED | OUTPATIENT
Start: 2023-05-31 | End: 2023-05-31

## 2023-05-31 RX ADMIN — BENZOCAINE 1 EACH: 200 SPRAY DENTAL; ORAL; PERIODONTAL at 07:05

## 2023-05-31 RX ADMIN — SODIUM CHLORIDE: 0.9 INJECTION, SOLUTION INTRAVENOUS at 07:05

## 2023-05-31 RX ADMIN — Medication 150 MCG/KG/MIN: at 08:05

## 2023-05-31 RX ADMIN — Medication 80 MG: at 08:05

## 2023-05-31 RX ADMIN — LIDOCAINE HYDROCHLORIDE 100 MG: 20 INJECTION INTRAVENOUS at 08:05

## 2023-05-31 RX ADMIN — GLYCOPYRROLATE 0.2 MG: 0.2 INJECTION, SOLUTION INTRAMUSCULAR; INTRAVENOUS at 08:05

## 2023-05-31 NOTE — TRANSFER OF CARE
"Anesthesia Transfer of Care Note    Patient: Raquel Galan    Procedure(s) Performed: Procedure(s) (LRB):  EGD (ESOPHAGOGASTRODUODENOSCOPY) (N/A)    Patient location: PACU    Anesthesia Type: general    Transport from OR: Transported from OR on room air with adequate spontaneous ventilation    Post pain: adequate analgesia    Post assessment: no apparent anesthetic complications    Post vital signs: stable    Level of consciousness: sedated    Nausea/Vomiting: no nausea/vomiting    Complications: none    Transfer of care protocol was followed      Last vitals:   Visit Vitals  /82 (BP Location: Left arm, Patient Position: Lying)   Pulse 72   Temp 36.4 °C (97.5 °F) (Temporal)   Resp 18   Ht 5' 1" (1.549 m)   Wt 56.7 kg (125 lb)   LMP 04/15/2009   SpO2 100%   Breastfeeding No   BMI 23.62 kg/m²     "

## 2023-05-31 NOTE — PROVATION PATIENT INSTRUCTIONS
Discharge Summary/Instructions after an Endoscopic Procedure  Patient Name: Raquel Galan  Patient MRN: [id number]  Patient YOB: 1960  Wednesday, May 31, 2023  Lanre Vaughn MD  Dear patient,  As a result of recent federal legislation (The Federal Cures Act), you may   receive lab or pathology results from your procedure in your [facility]   account before your physician is able to contact you. Your physician or   their representative will relay the results to you with their   recommendations at their soonest availability.  Thank you,  RESTRICTIONS:  During your procedure today, you received medications for sedation.  These   medications may affect your judgment, balance and coordination.  Therefore,   for 24 hours, you have the following restrictions:   - DO NOT drive a car, operate machinery, make legal/financial decisions,   sign important papers or drink alcohol.    ACTIVITY:  Today: no heavy lifting, straining or running due to procedural   sedation/anesthesia.  The following day: return to full activity including work.  DIET:  Eat and drink normally unless instructed otherwise.     TREATMENT FOR COMMON SIDE EFFECTS:  - Mild abdominal pain, nausea, belching, bloating or excessive gas:  rest,   eat lightly and use a heating pad.  - Sore Throat: treat with throat lozenges and/or gargle with warm salt   water.  - Because air was used during the procedure, expelling large amounts of air   from your rectum or belching is normal.  - If a bowel prep was taken, you may not have a bowel movement for 1-3 days.    This is normal.  SYMPTOMS TO WATCH FOR AND REPORT TO YOUR PHYSICIAN:  1. Abdominal pain or bloating, other than gas cramps.  2. Chest pain.  3. Back pain.  4. Signs of infection such as: chills or fever occurring within 24 hours   after the procedure.  5. Rectal bleeding, which would show as bright red, maroon, or black stools.   (A tablespoon of blood from the rectum is not serious, especially  if   hemorrhoids are present.)  6. Vomiting.  7. Weakness or dizziness.  GO DIRECTLY TO THE NEAREST EMERGENCY ROOM IF YOU HAVE ANY OF THE FOLLOWING:      Difficulty breathing              Chills and/or fever over 101 F   Persistent vomiting and/or vomiting blood   Severe abdominal pain   Severe chest pain   Black, tarry stools   Bleeding- more than one tablespoon   Any other symptom or condition that you feel may need urgent attention  Your doctor recommends these additional instructions:  If any biopsies were taken, your doctors clinic will contact you in 1 to 2   weeks with any results.  - Discharge patient to home.   - The findings and recommendations were discussed with the patient.   - Repeat upper endoscopy PRN for retreatment.   - Patient has a contact number available for emergencies.  The signs and   symptoms of potential delayed complications were discussed with the   patient.  Return to normal activities tomorrow.  Written discharge   instructions were provided to the patient.  For questions, problems or results please call your physician - Lanre Vaughn MD at Work:  [phone].  [facility], EMERGENCY ROOM PHONE NUMBER: [phone]  IF A COMPLICATION OR EMERGENCY SITUATION ARISES AND YOU ARE UNABLE TO REACH   YOUR PHYSICIAN - GO DIRECTLY TO THE EMERGENCY ROOM.  Lanre Vaughn MD  5/31/2023 8:23:35 AM  This report has been verified and signed electronically.  Dear patient,  As a result of recent federal legislation (The Federal Cures Act), you may   receive lab or pathology results from your procedure in your [facility]   account before your physician is able to contact you. Your physician or   their representative will relay the results to you with their   recommendations at their soonest availability.  Thank you,  PROVATION

## 2023-05-31 NOTE — H&P
Short Stay Endoscopy History and Physical    PCP - Salazar Bird MD  Referring Physician - Lanre Vaughn MD  [address] [facility] - Dept of Gastroenterology 4th floor, [place] [address]    Procedure - EGD  ASA - per anesthesia  Mallampati - per anesthesia  History of Anesthesia problems - no  Family history Anesthesia problems -  no   Plan of anesthesia - General    HPI  63 y.o. female  Reason for procedure:  Dysphagia, unspecified type [R13.10]  History of esophageal dilatation [Z98.890]      ROS:  Constitutional: No fevers, chills, No weight loss  CV: No chest pain  Pulm: No cough, No shortness of breath  GI: see HPI    Medical History:  has a past medical history of Allergy, Back pain, Disorder of vestibular function of both ears, Fibrocystic breast disease in female, HEARING LOSS, Hyperlipidemia, Osteopenia, PONV (postoperative nausea and vomiting), PONV (postoperative nausea and vomiting), and Special screening for malignant neoplasms, colon (7/22/2013).    Surgical History:  has a past surgical history that includes Tonsillectomy; Appendectomy; Cyst Removal (Right); Adenoidectomy; Breast surgery; Mandible fracture surgery (Right); Dilation and curettage of uterus; Colonoscopy (N/A, 9/27/2018); Injection of joint (Left, 10/16/2018); Robot-assisted laparoscopic salpingo-oophorectomy using da Kika Xi (N/A, 6/12/2020); Robot-assisted laparoscopic abdominal hysterectomy using da Kika Xi (N/A, 6/12/2020); Hysterectomy; Breast biopsy (Left); and Esophagogastroduodenoscopy (N/A, 8/4/2021).    Family History: family history includes Arthritis in her father and mother; COPD in her mother; Cancer (age of onset: 75) in her father; Diabetes in her mother; Heart disease in her father and mother; Hyperlipidemia in her mother; Hypertension in her mother; Kidney disease in her mother; Miscarriages / Stillbirths in her mother; Stroke in her mother..    Social History:  reports that  she has never smoked. She has never used smokeless tobacco. She reports that she does not currently use alcohol after a past usage of about 2.0 standard drinks per week. She reports that she does not use drugs.    Review of patient's allergies indicates:  No Known Allergies    Medications:   Medications Prior to Admission   Medication Sig Dispense Refill Last Dose    b complex vitamins tablet Take 1 tablet by mouth once daily.       CALCIUM 600 WITH VITAMIN D3 600 mg(1,500mg) -200 unit Tab Take 1 tablet by mouth once daily.       estradioL (ESTRACE) 1 MG tablet TAKE 1 TABLET ONE TIME DAILY. 90 tablet 3     fexofenadine (ALLEGRA) 180 MG tablet Take 1 tablet (180 mg total) by mouth once daily. 30 tablet 11     fluticasone propionate (FLONASE) 50 mcg/actuation nasal spray 2 sprays (100 mcg total) by Each Nostril route once daily. 9.9 mL 0     glucosamine-chondroitin 500-400 mg tablet Take 1 tablet by mouth Daily.       omega-3 fatty acids/fish oil (FISH OIL-OMEGA-3 FATTY ACIDS) 300-1,000 mg capsule Take 1 capsule by mouth once daily.       pantoprazole (PROTONIX) 20 MG tablet Take 20 mg by mouth once daily.       tretinoin (RETIN-A) 0.025 % cream Compound tretinoin 0.025% / niacinamide 2% cream. Apply a pea-sized amount to entire face qhs. (Patient not taking: Reported on 5/5/2023) 30 g 5     YUVAFEM 10 mcg Tab Place 1 tablet vaginally twice a week.          Physical Exam:    Vital Signs: There were no vitals filed for this visit.    General Appearance: Well appearing in no acute distress  Abdomen: Soft, non tender, non distended with normal bowel sounds, no masses      Labs:  Lab Results   Component Value Date    WBC 8.57 04/26/2023    HGB 14.9 04/26/2023    HCT 44.9 04/26/2023     04/26/2023    CHOL 196 06/30/2022    TRIG 55 06/30/2022    HDL 69 06/30/2022    ALT 15 04/26/2023    AST 17 04/26/2023     04/26/2023    K 4.1 04/26/2023     04/26/2023    CREATININE 1.0 04/26/2023    BUN 19 04/26/2023     CO2 22 (L) 04/26/2023    TSH 2.309 06/30/2022    HGBA1C 5.2 06/30/2022       I have explained the risks and benefits of this endoscopic procedure to the patient including but not limited to bleeding, inflammation, infection, perforation, and death.      Lanre Vaughn MD

## 2023-05-31 NOTE — ANESTHESIA POSTPROCEDURE EVALUATION
Anesthesia Post Evaluation    Patient: Raquel Galan    Procedure(s) Performed: Procedure(s) (LRB):  EGD (ESOPHAGOGASTRODUODENOSCOPY) (N/A)    Final Anesthesia Type: general      Patient location during evaluation: GI PACU  Patient participation: Yes- Able to Participate  Level of consciousness: awake and alert  Post-procedure vital signs: reviewed and stable  Pain management: adequate  Airway patency: patent    PONV status at discharge: No PONV  Anesthetic complications: no      Cardiovascular status: blood pressure returned to baseline  Respiratory status: unassisted, spontaneous ventilation and room air  Hydration status: euvolemic  Follow-up not needed.          Vitals Value Taken Time   /65 05/31/23 0840   Temp 36.5 °C (97.7 °F) 05/31/23 0818   Pulse 71 05/31/23 0842   Resp 18 05/31/23 0842   SpO2 100 % 05/31/23 0842         Event Time   Out of Recovery 08:33:00         Pain/Abelardo Score: Abelardo Score: 10 (5/31/2023  8:33 AM)

## 2023-06-01 ENCOUNTER — PATIENT MESSAGE (OUTPATIENT)
Dept: PAIN MEDICINE | Facility: CLINIC | Age: 63
End: 2023-06-01
Payer: MEDICARE

## 2023-06-02 ENCOUNTER — TELEPHONE (OUTPATIENT)
Dept: ENDOSCOPY | Facility: HOSPITAL | Age: 63
End: 2023-06-02
Payer: MEDICARE

## 2023-06-13 ENCOUNTER — PES CALL (OUTPATIENT)
Dept: ADMINISTRATIVE | Facility: CLINIC | Age: 63
End: 2023-06-13
Payer: MEDICARE

## 2023-06-18 ENCOUNTER — PATIENT MESSAGE (OUTPATIENT)
Dept: INTERNAL MEDICINE | Facility: CLINIC | Age: 63
End: 2023-06-18
Payer: MEDICARE

## 2023-06-19 ENCOUNTER — PATIENT MESSAGE (OUTPATIENT)
Dept: INTERNAL MEDICINE | Facility: CLINIC | Age: 63
End: 2023-06-19
Payer: MEDICARE

## 2023-06-19 ENCOUNTER — TELEPHONE (OUTPATIENT)
Dept: PAIN MEDICINE | Facility: CLINIC | Age: 63
End: 2023-06-19
Payer: MEDICARE

## 2023-06-19 RX ORDER — ALPRAZOLAM 0.5 MG/1
0.5 TABLET ORAL 2 TIMES DAILY PRN
Qty: 40 TABLET | Refills: 1 | Status: ON HOLD | OUTPATIENT
Start: 2023-06-19 | End: 2024-02-01 | Stop reason: HOSPADM

## 2023-06-19 RX ORDER — MECLIZINE HCL 12.5 MG 12.5 MG/1
12.5 TABLET ORAL
Qty: 40 TABLET | Refills: 2 | Status: SHIPPED | OUTPATIENT
Start: 2023-06-19

## 2023-06-19 NOTE — TELEPHONE ENCOUNTER
Pt states she is going to [place] and would like Meclizine and Xanax, it not on med list to load. LOV- 07/28/22

## 2023-06-20 ENCOUNTER — PROCEDURE VISIT (OUTPATIENT)
Dept: PAIN MEDICINE | Facility: CLINIC | Age: 63
End: 2023-06-20
Attending: ANESTHESIOLOGY
Payer: MEDICARE

## 2023-06-20 VITALS
OXYGEN SATURATION: 100 % | TEMPERATURE: 98 F | BODY MASS INDEX: 23.77 KG/M2 | DIASTOLIC BLOOD PRESSURE: 98 MMHG | SYSTOLIC BLOOD PRESSURE: 130 MMHG | RESPIRATION RATE: 18 BRPM | HEIGHT: 61 IN | HEART RATE: 71 BPM | WEIGHT: 125.88 LBS

## 2023-06-20 DIAGNOSIS — M70.71 ISCHIAL BURSITIS OF RIGHT SIDE: Primary | ICD-10-CM

## 2023-06-20 DIAGNOSIS — M70.60 TROCHANTERIC BURSITIS, UNSPECIFIED LATERALITY: ICD-10-CM

## 2023-06-20 PROCEDURE — 20611 DRAIN/INJ JOINT/BURSA W/US: CPT | Mod: RT,GC,S$GLB, | Performed by: ANESTHESIOLOGY

## 2023-06-20 PROCEDURE — 20611 PR DRAIN/ASP/INJECT MAJOR JOINT/BURSA W/US GUIDANCE: ICD-10-PCS | Mod: RT,GC,S$GLB, | Performed by: ANESTHESIOLOGY

## 2023-06-20 RX ORDER — TRIAMCINOLONE ACETONIDE 40 MG/ML
40 INJECTION, SUSPENSION INTRA-ARTICULAR; INTRAMUSCULAR
Status: COMPLETED | OUTPATIENT
Start: 2023-06-20 | End: 2023-06-20

## 2023-06-20 RX ADMIN — TRIAMCINOLONE ACETONIDE 40 MG: 40 INJECTION, SUSPENSION INTRA-ARTICULAR; INTRAMUSCULAR at 10:06

## 2023-06-20 NOTE — PROCEDURES
"Right Greater Trochanteric and Right Ischial Bursa injection      Time-out taken to identify patient and procedure side prior to starting the procedure.   I attest that I have reviewed the patient's home medications prior to the procedure and no contraindication have been identified. I  re-evaluated the patient after the patient was positioned for the procedure in the procedure room immediately before the procedural time-out. The vital signs are current and represent the current state of the patient which has not significantly changed since the preprocedure assessment.  Date of Service: 06/20/2023    PCP: Salazar Bird MD    Referring Physician:    PROCEDURE:   Right GREATER TROCHANTERIC BURSA INJECTION under ultrasound guidance  Right ischial bursa injection under Ultrasound guidance    REASON FOR PROCEDURE: Right greater trochanteric bursitis and Right ischial bursitis  POSTOP DIAGNOSIS:  Right greater trochanteric bursitis and Right ischial bursitis  PHYSICIAN: Danielle Tillman MD  ASSISTANTS: Rickey Quintana MD  SEDATION: None  MEDICATIONS INJECTED: Kenalog 40mg, bupivacaine 0.5% 9 mLESTIMATED BLOOD LOSS: None.   COMPLICATIONS: None.   TECHNIQUE:     The patient was brought to the procedure room. . The patient was positioned left lateral position on table. The skin overlying the RIGHT greater trochanter was prepped with povidone-iodine three times and draped in a sterile fashion.  A  21 gauge 4." simplex needle was slowly advanced through under ultrasound guidance into the RIGHT greater trochanteric bursa. . Negative aspiration was confirmed. . A combination of 5 cc of Bupivacaine 0.25% and 40 mg kenalog was easily injected. The needle was removed and bleeding was nil. A sterile dressing was applied.   Attention was then directed to the right ischial bursa. The patient was then transirtioned to prone position and the ischial tuberosity was palpated. The area was prepped and draped in the usual sterile fashion. A 3-1/2 " inch 22-gauge needle was introduced under ultrasound guidance until the tip reached the ischial tuberosity. The tip of the needle was directed to the inferior aspect of the ischial tuberosity. When the tip of the needle was thought to be in appropriate position, contrast was injected to confirm proper placement. Medication was then injected slowly. The patient tolerated the procedure well.       The patient was monitored for 20-30 minutes after the procedure and was   given post procedure and discharge instructions to follow at home. The   patient is to follow-up with me in two weeks by calling. The patient was discharged in a stable condtion.   Rickey Quintana, [facility] Resident   I have personally taken the history and examined this patient and agree with the resident's note as stated above.

## 2023-06-21 ENCOUNTER — TELEPHONE (OUTPATIENT)
Dept: OBSTETRICS AND GYNECOLOGY | Facility: CLINIC | Age: 63
End: 2023-06-21
Payer: MEDICARE

## 2023-06-21 DIAGNOSIS — Z12.31 ENCOUNTER FOR SCREENING MAMMOGRAM FOR MALIGNANT NEOPLASM OF BREAST: Primary | ICD-10-CM

## 2023-06-21 NOTE — TELEPHONE ENCOUNTER
----- Message from Karen Veloz sent at 6/21/2023 11:05 AM CDT -----  Regarding: Mammogram  Contact: [phone]  Pt requesting a call back to have orders placed in the system so that she can schedule her mammogram.  Please call to discuss further.

## 2023-06-27 RX ORDER — PROMETHAZINE HYDROCHLORIDE 25 MG/1
25 TABLET ORAL EVERY 4 HOURS PRN
Qty: 30 TABLET | Refills: 2 | Status: SHIPPED | OUTPATIENT
Start: 2023-06-27 | End: 2023-08-23

## 2023-06-27 NOTE — TELEPHONE ENCOUNTER
No care due was identified.  [facility] Embedded Care Due Messages. Reference number: [id number].   6/27/2023 1:28:34 PM CDT

## 2023-06-30 ENCOUNTER — OFFICE VISIT (OUTPATIENT)
Dept: OPTOMETRY | Facility: CLINIC | Age: 63
End: 2023-06-30
Payer: COMMERCIAL

## 2023-06-30 ENCOUNTER — OFFICE VISIT (OUTPATIENT)
Dept: OBSTETRICS AND GYNECOLOGY | Facility: CLINIC | Age: 63
End: 2023-06-30
Payer: MEDICARE

## 2023-06-30 VITALS
BODY MASS INDEX: 23.6 KG/M2 | WEIGHT: 125 LBS | SYSTOLIC BLOOD PRESSURE: 130 MMHG | DIASTOLIC BLOOD PRESSURE: 79 MMHG | HEIGHT: 61 IN

## 2023-06-30 DIAGNOSIS — H52.4 PRESBYOPIA: ICD-10-CM

## 2023-06-30 DIAGNOSIS — N95.1 MENOPAUSAL SYMPTOM: ICD-10-CM

## 2023-06-30 DIAGNOSIS — H04.123 DRY EYE SYNDROME OF BOTH EYES: ICD-10-CM

## 2023-06-30 DIAGNOSIS — N95.2 VAGINAL ATROPHY: ICD-10-CM

## 2023-06-30 DIAGNOSIS — Z01.419 ENCOUNTER FOR GYNECOLOGICAL EXAMINATION WITHOUT ABNORMAL FINDING: Primary | ICD-10-CM

## 2023-06-30 DIAGNOSIS — H25.13 NUCLEAR SCLEROSIS, BILATERAL: Primary | ICD-10-CM

## 2023-06-30 DIAGNOSIS — Z13.5 GLAUCOMA SCREENING: ICD-10-CM

## 2023-06-30 PROCEDURE — G0101 CA SCREEN;PELVIC/BREAST EXAM: HCPCS | Mod: S$GLB,,, | Performed by: PHYSICIAN ASSISTANT

## 2023-06-30 PROCEDURE — 99999 PR PBB SHADOW E&M-EST. PATIENT-LVL III: CPT | Mod: PBBFAC,,, | Performed by: PHYSICIAN ASSISTANT

## 2023-06-30 PROCEDURE — 92015 DETERMINE REFRACTIVE STATE: CPT | Mod: S$GLB,,, | Performed by: OPTOMETRIST

## 2023-06-30 PROCEDURE — 3008F BODY MASS INDEX DOCD: CPT | Mod: CPTII,S$GLB,, | Performed by: PHYSICIAN ASSISTANT

## 2023-06-30 PROCEDURE — 1160F RVW MEDS BY RX/DR IN RCRD: CPT | Mod: CPTII,S$GLB,, | Performed by: PHYSICIAN ASSISTANT

## 2023-06-30 PROCEDURE — 1159F MED LIST DOCD IN RCRD: CPT | Mod: CPTII,S$GLB,, | Performed by: PHYSICIAN ASSISTANT

## 2023-06-30 PROCEDURE — G0101 PR CA SCREEN;PELVIC/BREAST EXAM: ICD-10-PCS | Mod: S$GLB,,, | Performed by: PHYSICIAN ASSISTANT

## 2023-06-30 PROCEDURE — 92014 COMPRE OPH EXAM EST PT 1/>: CPT | Mod: S$GLB,,, | Performed by: OPTOMETRIST

## 2023-06-30 PROCEDURE — 3078F DIAST BP <80 MM HG: CPT | Mod: CPTII,S$GLB,, | Performed by: PHYSICIAN ASSISTANT

## 2023-06-30 PROCEDURE — 3075F PR MOST RECENT SYSTOLIC BLOOD PRESS GE 130-139MM HG: ICD-10-PCS | Mod: CPTII,S$GLB,, | Performed by: PHYSICIAN ASSISTANT

## 2023-06-30 PROCEDURE — 92014 PR EYE EXAM, EST PATIENT,COMPREHESV: ICD-10-PCS | Mod: S$GLB,,, | Performed by: OPTOMETRIST

## 2023-06-30 PROCEDURE — 3078F PR MOST RECENT DIASTOLIC BLOOD PRESSURE < 80 MM HG: ICD-10-PCS | Mod: CPTII,S$GLB,, | Performed by: PHYSICIAN ASSISTANT

## 2023-06-30 PROCEDURE — 3008F PR BODY MASS INDEX (BMI) DOCUMENTED: ICD-10-PCS | Mod: CPTII,S$GLB,, | Performed by: PHYSICIAN ASSISTANT

## 2023-06-30 PROCEDURE — 99999 PR PBB SHADOW E&M-EST. PATIENT-LVL III: CPT | Mod: PBBFAC,,, | Performed by: OPTOMETRIST

## 2023-06-30 PROCEDURE — 92015 PR REFRACTION: ICD-10-PCS | Mod: S$GLB,,, | Performed by: OPTOMETRIST

## 2023-06-30 PROCEDURE — 1160F PR REVIEW ALL MEDS BY PRESCRIBER/CLIN PHARMACIST DOCUMENTED: ICD-10-PCS | Mod: CPTII,S$GLB,, | Performed by: PHYSICIAN ASSISTANT

## 2023-06-30 PROCEDURE — 99999 PR PBB SHADOW E&M-EST. PATIENT-LVL III: ICD-10-PCS | Mod: PBBFAC,,, | Performed by: PHYSICIAN ASSISTANT

## 2023-06-30 PROCEDURE — 1159F PR MEDICATION LIST DOCUMENTED IN MEDICAL RECORD: ICD-10-PCS | Mod: CPTII,S$GLB,, | Performed by: PHYSICIAN ASSISTANT

## 2023-06-30 PROCEDURE — 3075F SYST BP GE 130 - 139MM HG: CPT | Mod: CPTII,S$GLB,, | Performed by: PHYSICIAN ASSISTANT

## 2023-06-30 PROCEDURE — 99999 PR PBB SHADOW E&M-EST. PATIENT-LVL III: ICD-10-PCS | Mod: PBBFAC,,, | Performed by: OPTOMETRIST

## 2023-06-30 RX ORDER — ESTRADIOL 10 UG/1
1 TABLET VAGINAL
Qty: 24 TABLET | Refills: 3 | Status: SHIPPED | OUTPATIENT
Start: 2023-07-03 | End: 2023-08-07

## 2023-06-30 RX ORDER — ESTRADIOL 1 MG/1
1 TABLET ORAL DAILY
Qty: 90 TABLET | Refills: 3 | Status: SHIPPED | OUTPATIENT
Start: 2023-06-30

## 2023-06-30 NOTE — PROGRESS NOTES
HPI    64 Y/o female is here for routine eye exam with no C/o about ocular health     Pt denies pain and discomfort   No f/f    Eye med: OTC DRY DROP BID PRN   Last edited by Pierre Ruiz, OD on 6/30/2023 10:05 AM.            Assessment /Plan     For exam results, see Encounter Report.    Nuclear sclerosis, bilateral    Dry eye syndrome of both eyes    Glaucoma screening    Presbyopia        1. Small cats OU--pt happy w otc readers  2. YELENA--was on RESTASIS, but ran out and now using otc drops and feels OK.    PLAN:    1. Pt to cont ATs QID+  2. rtc 1 yr

## 2023-06-30 NOTE — PROGRESS NOTES
CC: Well woman exam    Raquel Galan is a 63 y.o. female  presents for well woman exam.  LMP: Patient's last menstrual period was 04/15/2009. She is s/p hyst/BSO on 2020 with Dr. Valenzuela for PMB. Final pathology shows complex hyperplasia without atypia in a polyp. No further treatment is required following hysterectomy.  No issues, problems, or complaints.  She is on HRT, taking estrace 1mg PO QD. She is also using Yuvafem 10mcg BIW for vaginal dryness. Wishes to continue. Feeling well.     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     Fibrocystic breast disease in female     HEARING LOSS     right side - no hearing aid    Hyperlipidemia     Osteopenia     PONV (postoperative nausea and vomiting)     PONV (postoperative nausea and vomiting)     Special screening for malignant neoplasms, colon 2013     Past Surgical History:   Procedure Laterality Date    ADENOIDECTOMY      APPENDECTOMY      BREAST BIOPSY Left     exc bx, benign per pt    BREAST SURGERY      left breast biopsy    COLONOSCOPY N/A 2018    Procedure: COLONOSCOPY;  Surgeon: Uche Smiley MD;  Location: [facility] (4TH FLR);  Service: Endoscopy;  Laterality: N/A;  pt/instructed on prep day before and day of importance    CYST REMOVAL Right     neck    DILATION AND CURETTAGE OF UTERUS      ESOPHAGOGASTRODUODENOSCOPY N/A 2021    Procedure: ESOPHAGOGASTRODUODENOSCOPY (EGD);  Surgeon: Ameya Medeiros MD;  Location: [facility] (2ND FLR);  Service: Endoscopy;  Laterality: N/A;  Food gets hung up. Dilatation likely needed.     COVID test in [place] on -GT  -new instructions via portal-tb    ESOPHAGOGASTRODUODENOSCOPY N/A 2023    Procedure: EGD  (ESOPHAGOGASTRODUODENOSCOPY);  Surgeon: Lanre Vaughn MD;  Location: [facility] ENDOSCOPY;  Service: Endoscopy;  Laterality: N/A;  cardiac clearance received from -see note 5/9/23/ referred by KEMAL Krishna/ inst portal-RB  5/30 Pre call complete, ride confirmed, no wt loss inj;SG    HYSTERECTOMY      INJECTION OF JOINT Left 10/16/2018    Procedure: INJECTION, JOINT, LEFT SI JOINT INJECTION UNDER FLUORO;  Surgeon: lAirio Lei MD;  Location: [facility] PAIN MGT;  Service: Pain Management;  Laterality: Left;  NEEDS CONSENT    MANDIBLE FRACTURE SURGERY Right     scope    ROBOT-ASSISTED LAPAROSCOPIC ABDOMINAL HYSTERECTOMY USING DA ERICA XI N/A 6/12/2020    Procedure: XI ROBOTIC HYSTERECTOMY;  Surgeon: Alice Valenzuela MD;  Location: [facility] OR;  Service: OB/GYN;  Laterality: N/A;    ROBOT-ASSISTED LAPAROSCOPIC SALPINGO-OOPHORECTOMY USING DA ERICA XI N/A 6/12/2020    Procedure: XI ROBOTIC SALPINGO-OOPHORECTOMY;  Surgeon: Alice Valenzuela MD;  Location: [facility] OR;  Service: OB/GYN;  Laterality: N/A;    TONSILLECTOMY       Social History     Socioeconomic History    Marital status:    Tobacco Use    Smoking status: Never    Smokeless tobacco: Never   Substance and Sexual Activity    Alcohol use: Not Currently     Alcohol/week: 2.0 standard drinks     Types: 2 Glasses of wine per week     Comment: social    Drug use: Never    Sexual activity: Yes     Partners: Male     Birth control/protection: Partner-Vasectomy, Post-menopausal, None   Other Topics Concern    Are you pregnant or think you may be? No    Breast-feeding No   Social History Narrative    Lives with spouse and feels safe in her home.     Social Determinants of Health     Financial Resource Strain: Low Risk     Difficulty of Paying Living Expenses: Not very hard   Food Insecurity: No Food Insecurity    Worried About Running Out of Food in the Last Year: Never true    Ran Out of Food in the Last Year: Never true   Transportation Needs: No  Transportation Needs    Lack of Transportation (Medical): No    Lack of Transportation (Non-Medical): No   Physical Activity: Insufficiently Active    Days of Exercise per Week: 4 days    Minutes of Exercise per Session: 30 min   Stress: Stress Concern Present    Feeling of Stress : To some extent   Social Connections: Unknown    Frequency of Communication with Friends and Family: More than three times a week    Frequency of Social Gatherings with Friends and Family: More than three times a week    Active Member of Clubs or Organizations: Yes    Attends Club or Organization Meetings: More than 4 times per year    Marital Status:    Housing Stability: Low Risk     Unable to Pay for Housing in the Last Year: No    Number of Places Lived in the Last Year: 1    Unstable Housing in the Last Year: No     Family History   Problem Relation Age of Onset    Heart disease Mother     Kidney disease Mother     Diabetes Mother     Hypertension Mother     Hyperlipidemia Mother     COPD Mother     Arthritis Mother     Miscarriages / Stillbirths Mother     Stroke Mother     Heart disease Father     Cancer Father 75        lymphoma, bladder tumors    Arthritis Father     Breast cancer Neg Hx     Ovarian cancer Neg Hx     Colon cancer Neg Hx     Lupus Neg Hx     Psoriasis Neg Hx     Rheum arthritis Neg Hx     Inflammatory bowel disease Neg Hx      OB History          0    Para        Term   0            AB        Living             SAB        IAB        Ectopic        Multiple        Live Births                     Current Outpatient Medications:     ALPRAZolam (XANAX) 0.5 MG tablet, Take 1 tablet (0.5 mg total) by mouth 2 (two) times daily as needed for Anxiety., Disp: 40 tablet, Rfl: 1    b complex vitamins tablet, Take 1 tablet by mouth once daily., Disp: , Rfl:     CALCIUM 600 WITH VITAMIN D3 600 mg(1,500mg) -200 unit Tab, Take 1 tablet by mouth once daily., Disp: , Rfl:     estradioL (ESTRACE) 1 MG tablet,  "Take 1 tablet (1 mg total) by mouth once daily., Disp: 90 tablet, Rfl: 3    fexofenadine (ALLEGRA) 180 MG tablet, Take 1 tablet (180 mg total) by mouth once daily., Disp: 30 tablet, Rfl: 11    glucosamine-chondroitin 500-400 mg tablet, Take 1 tablet by mouth Daily., Disp: , Rfl:     meclizine (ANTIVERT) 12.5 mg tablet, Take 1 tablet (12.5 mg total) by mouth every 6 to 8 hours as needed (vertigo)., Disp: 40 tablet, Rfl: 2    omega-3 fatty acids/fish oil (FISH OIL-OMEGA-3 FATTY ACIDS) 300-1,000 mg capsule, Take 1 capsule by mouth once daily., Disp: , Rfl:     promethazine (PHENERGAN) 25 MG tablet, Take 1 tablet (25 mg total) by mouth every 4 (four) hours as needed for Nausea., Disp: 30 tablet, Rfl: 2    tretinoin (RETIN-A) 0.025 % cream, Compound tretinoin 0.025% / niacinamide 2% cream. Apply a pea-sized amount to entire face qhs., Disp: 30 g, Rfl: 5    [START ON 7/3/2023] YUVAFEM 10 mcg Tab, Place 1 tablet (10 mcg total) vaginally twice a week., Disp: 24 tablet, Rfl: 3    The 10-year ASCVD risk score (Garry CORREA, et al., 2019) is: 5.2%    Values used to calculate the score:      Age: 63 years      Sex: Female      Is Non- : No      Diabetic: No      Tobacco smoker: No      Systolic Blood Pressure: 130 mmHg      Is BP treated: No      HDL Cholesterol: 69 mg/dL      Total Cholesterol: 196 mg/dL    /79   Ht 5' 1" (1.549 m)   Wt 56.7 kg (125 lb)   LMP 04/15/2009   BMI 23.62 kg/m²       ROS:  GENERAL: Denies weight gain or weight loss. Feeling well overall.   SKIN: Denies rash or lesions.   HEAD: Denies head injury or headache.   NODES: Denies enlarged lymph nodes.   CHEST: Denies chest pain or shortness of breath.   CARDIOVASCULAR: Denies palpitations or left sided chest pain.   ABDOMEN: No abdominal pain, constipation, diarrhea, nausea, vomiting or rectal bleeding.   URINARY: No frequency, dysuria, hematuria, or burning on urination.  REPRODUCTIVE: See HPI.   BREASTS: Denies pain, lumps, " or nipple discharge.   HEMATOLOGIC: No easy bruisability or excessive bleeding.   MUSCULOSKELETAL: Denies joint pain or swelling.   NEUROLOGIC: Denies syncope or weakness.   PSYCHIATRIC: Denies depression, anxiety or mood swings.    PHYSICAL EXAM:  APPEARANCE: Well nourished, well developed, in no acute distress.  AFFECT: WNL, alert and oriented x 3  CHEST: Good respiratory effect  ABDOMEN: Soft.  No tenderness or masses.  No hepatosplenomegaly.  No hernias.  BREASTS: Symmetrical, no skin changes or visible lesions.  No palpable masses, nipple discharge bilaterally.  PELVIC: Normal external genitalia without lesions.  Normal hair distribution.  Adequate perineal body, normal urethral meatus.  Vagina moist and well rugated without lesions or discharge.  Cervix and uterus are surgically absent. Adnexa without masses or tenderness.    EXTREMITIES: No edema.    ASSESSMENT:   Encounter for gynecological examination without abnormal finding    Visit for screening mammogram    Pap test, as part of routine gynecological examination    Vaginal atrophy  -     YUVAFEM 10 mcg Tab; Place 1 tablet (10 mcg total) vaginally twice a week.  Dispense: 24 tablet; Refill: 3    Menopausal symptom  -     estradioL (ESTRACE) 1 MG tablet; Take 1 tablet (1 mg total) by mouth once daily.  Dispense: 90 tablet; Refill: 3      PLAN:   Annual mammogram scheduled  Continue estrace 1mg QD  Continue Yuvafem 10mcg BIW  Follow up annually or sooner PRN.    Patient was counseled today on A.C.S. Pap guidelines and recommendations for yearly pelvic exams, mammograms and monthly self breast exams; to see her PCP for other health maintenance.

## 2023-07-03 ENCOUNTER — OFFICE VISIT (OUTPATIENT)
Dept: URGENT CARE | Facility: CLINIC | Age: 63
End: 2023-07-03
Payer: MEDICARE

## 2023-07-03 VITALS
TEMPERATURE: 97 F | SYSTOLIC BLOOD PRESSURE: 129 MMHG | BODY MASS INDEX: 23.6 KG/M2 | HEIGHT: 61 IN | DIASTOLIC BLOOD PRESSURE: 81 MMHG | WEIGHT: 125 LBS | HEART RATE: 73 BPM | RESPIRATION RATE: 16 BRPM | OXYGEN SATURATION: 96 %

## 2023-07-03 DIAGNOSIS — T63.481A INSECT STINGS, ACCIDENTAL OR UNINTENTIONAL, INITIAL ENCOUNTER: Primary | ICD-10-CM

## 2023-07-03 DIAGNOSIS — L03.012 CELLULITIS OF FINGER OF LEFT HAND: ICD-10-CM

## 2023-07-03 PROCEDURE — 96372 THER/PROPH/DIAG INJ SC/IM: CPT | Mod: S$GLB,,, | Performed by: EMERGENCY MEDICINE

## 2023-07-03 PROCEDURE — 99213 PR OFFICE/OUTPT VISIT, EST, LEVL III, 20-29 MIN: ICD-10-PCS | Mod: 25,S$GLB,, | Performed by: STUDENT IN AN ORGANIZED HEALTH CARE EDUCATION/TRAINING PROGRAM

## 2023-07-03 PROCEDURE — 99213 OFFICE O/P EST LOW 20 MIN: CPT | Mod: 25,S$GLB,, | Performed by: STUDENT IN AN ORGANIZED HEALTH CARE EDUCATION/TRAINING PROGRAM

## 2023-07-03 PROCEDURE — 96372 PR INJECTION,THERAP/PROPH/DIAG2ST, IM OR SUBCUT: ICD-10-PCS | Mod: S$GLB,,, | Performed by: EMERGENCY MEDICINE

## 2023-07-03 RX ORDER — DEXAMETHASONE SODIUM PHOSPHATE 4 MG/ML
8 INJECTION, SOLUTION INTRA-ARTICULAR; INTRALESIONAL; INTRAMUSCULAR; INTRAVENOUS; SOFT TISSUE
Status: COMPLETED | OUTPATIENT
Start: 2023-07-03 | End: 2023-07-03

## 2023-07-03 RX ORDER — CEPHALEXIN 500 MG/1
500 CAPSULE ORAL 4 TIMES DAILY
Qty: 28 CAPSULE | Refills: 0 | Status: SHIPPED | OUTPATIENT
Start: 2023-07-03 | End: 2023-07-10

## 2023-07-03 RX ADMIN — DEXAMETHASONE SODIUM PHOSPHATE 8 MG: 4 INJECTION, SOLUTION INTRA-ARTICULAR; INTRALESIONAL; INTRAMUSCULAR; INTRAVENOUS; SOFT TISSUE at 11:07

## 2023-07-03 NOTE — PROGRESS NOTES
"Subjective:      Patient ID: Raquel Galan is a 63 y.o. female.    Vitals:  height is 5' 1" (1.549 m) and weight is 56.7 kg (125 lb). Her oral temperature is 97.3 °F (36.3 °C). Her blood pressure is 129/81 and her pulse is 73. Her respiration is 16 and oxygen saturation is 96%.     Chief Complaint: Insect Bite (Left Hand/ Finger)    Patient is a 63-year-old female with a past medical history of chronic pain syndrome, allergic rhinitis, hyperlipidemia, and dysphagia who presents to clinic for evaluation of wasp sting.  Patient reports was stung yesterday.  Patient reports history of environmental allergies.  Patient reports last time she was stung she had to have both steroid and antibiotics.  Patient states her sting lead to cellulitis.  Patient states she leaves out of state next week and wants to assure symptoms are resolved by that point.  Patient states that she has experienced redness, pain, and swelling to the left hand primarily the index finger.  Patient states that she has not yet experienced any abnormal sensations including numbness or tingling or any decreased range of motion to the hand or finger at this point.  Patient reports no over-the-counter medications for symptoms at this point.    Insect Bite  This is a new problem. The current episode started yesterday. The problem has been gradually worsening. Associated symptoms include joint swelling (Left hand and index finger). Pertinent negatives include no abdominal pain, chest pain, chills, coughing, diaphoresis, fatigue, fever, headaches, nausea or vomiting.     Constitution: Negative. Negative for chills, sweating, fatigue and fever.   HENT: Negative.  Negative for drooling, facial swelling and trouble swallowing.    Neck: neck negative.   Cardiovascular: Negative.  Negative for chest pain and palpitations.   Eyes: Negative.    Respiratory: Negative.  Negative for chest tightness, cough and shortness of breath.    Gastrointestinal: Negative.  " Negative for abdominal pain, nausea, vomiting and diarrhea.   Endocrine: negative.   Genitourinary: Negative.    Musculoskeletal:  Positive for joint swelling (Left hand and index finger).   Skin:  Positive for erythema (Left hand and index finger).        Bee sting   Allergic/Immunologic: Positive for environmental allergies and itching.   Neurological: Negative.  Negative for dizziness, headaches, disorientation and altered mental status.   Hematologic/Lymphatic: Negative.    Psychiatric/Behavioral: Negative.  Negative for altered mental status, disorientation and confusion.     Objective:     Physical Exam   Constitutional: She is oriented to person, place, and time. She appears well-developed. She is cooperative.  Non-toxic appearance. She does not appear ill. No distress.   HENT:   Head: Normocephalic and atraumatic.   Ears:   Right Ear: Hearing, tympanic membrane, external ear and ear canal normal.   Left Ear: Hearing, tympanic membrane, external ear and ear canal normal.   Nose: Nose normal. No mucosal edema or nasal deformity. No epistaxis. Right sinus exhibits no maxillary sinus tenderness and no frontal sinus tenderness. Left sinus exhibits no maxillary sinus tenderness and no frontal sinus tenderness.   Mouth/Throat: Uvula is midline, oropharynx is clear and moist and mucous membranes are normal. No trismus in the jaw. Normal dentition. No uvula swelling.   Eyes: Conjunctivae and lids are normal. Pupils are equal, round, and reactive to light.   Neck: Trachea normal and phonation normal. Neck supple.   Cardiovascular: Normal rate, regular rhythm, normal heart sounds and normal pulses.   Pulmonary/Chest: Effort normal and breath sounds normal. No respiratory distress. She has no wheezes. She has no rhonchi. She has no rales.   Abdominal: Normal appearance and bowel sounds are normal. She exhibits no distension. Soft. There is no abdominal tenderness.   Musculoskeletal: Normal range of motion.          General: Normal range of motion.      Left hand: Left index finger: Exhibits swelling and tenderness.        Hands:    Neurological: She is alert and oriented to person, place, and time. She exhibits normal muscle tone.   Skin: Skin is warm, dry, intact and not diaphoretic. Capillary refill takes 2 to 3 seconds. erythema (Left hand and index finger)   Psychiatric: Her speech is normal and behavior is normal. Judgment and thought content normal.   Nursing note and vitals reviewed.    Assessment:     1. Insect stings, accidental or unintentional, initial encounter    2. Cellulitis of finger of left hand        Plan:       Insect stings, accidental or unintentional, initial encounter    Cellulitis of finger of left hand    Other orders  -     cephALEXin (KEFLEX) 500 MG capsule; Take 1 capsule (500 mg total) by mouth 4 (four) times daily. for 7 days  Dispense: 28 capsule; Refill: 0  -     dexAMETHasone injection 8 mg                Decadron 8 mg IM in clinic.  Patient tolerated well.  No complications noted.    Take medications as prescribed.    Tylenol/Motrin per package instructions for any pain or fever.    Recommend over-the-counter antihistamine of choice and Pepcid 40 mg daily for the next 5-7 days.    Follow-up with PCP in 1-2 days.    Return to clinic as needed.    To ED for any new or acutely worsening symptoms.    Patient in agreement with plan of care.    DISCLAIMER: Please note that my documentation in this Electronic Healthcare Record was produced using speech recognition software and therefore may contain errors related to that software system.These could include grammar, punctuation and spelling errors or the inclusion/exclusion of phrases that were not intended. Garbled syntax, mangled pronouns, and other bizarre constructions may be attributed to that software system.

## 2023-08-02 ENCOUNTER — LAB VISIT (OUTPATIENT)
Dept: LAB | Facility: HOSPITAL | Age: 63
End: 2023-08-02
Attending: INTERNAL MEDICINE
Payer: MEDICARE

## 2023-08-02 DIAGNOSIS — E78.5 HYPERLIPIDEMIA, UNSPECIFIED HYPERLIPIDEMIA TYPE: ICD-10-CM

## 2023-08-02 DIAGNOSIS — Z00.00 WELL ADULT EXAM: ICD-10-CM

## 2023-08-02 DIAGNOSIS — I10 HYPERTENSION, ESSENTIAL: ICD-10-CM

## 2023-08-02 DIAGNOSIS — R79.9 ABNORMAL FINDING OF BLOOD CHEMISTRY, UNSPECIFIED: ICD-10-CM

## 2023-08-02 LAB
ALBUMIN SERPL BCP-MCNC: 3.6 G/DL (ref 3.5–5.2)
ALP SERPL-CCNC: 86 U/L (ref 55–135)
ALT SERPL W/O P-5'-P-CCNC: 32 U/L (ref 10–44)
ANION GAP SERPL CALC-SCNC: 10 MMOL/L (ref 8–16)
AST SERPL-CCNC: 23 U/L (ref 10–40)
BASOPHILS # BLD AUTO: 0.04 K/UL (ref 0–0.2)
BASOPHILS NFR BLD: 0.6 % (ref 0–1.9)
BILIRUB SERPL-MCNC: 0.8 MG/DL (ref 0.1–1)
BILIRUB UR QL STRIP: NEGATIVE
BUN SERPL-MCNC: 16 MG/DL (ref 8–23)
CALCIUM SERPL-MCNC: 8.9 MG/DL (ref 8.7–10.5)
CHLORIDE SERPL-SCNC: 109 MMOL/L (ref 95–110)
CHOLEST SERPL-MCNC: 181 MG/DL (ref 120–199)
CHOLEST/HDLC SERPL: 3.2 {RATIO} (ref 2–5)
CLARITY UR REFRACT.AUTO: CLEAR
CO2 SERPL-SCNC: 21 MMOL/L (ref 23–29)
COLOR UR AUTO: YELLOW
CREAT SERPL-MCNC: 0.8 MG/DL (ref 0.5–1.4)
DIFFERENTIAL METHOD: ABNORMAL
EOSINOPHIL # BLD AUTO: 0.1 K/UL (ref 0–0.5)
EOSINOPHIL NFR BLD: 2.1 % (ref 0–8)
ERYTHROCYTE [DISTWIDTH] IN BLOOD BY AUTOMATED COUNT: 12.8 % (ref 11.5–14.5)
EST. GFR  (NO RACE VARIABLE): >60 ML/MIN/1.73 M^2
ESTIMATED AVG GLUCOSE: 105 MG/DL (ref 68–131)
GLUCOSE SERPL-MCNC: 81 MG/DL (ref 70–110)
GLUCOSE UR QL STRIP: NEGATIVE
HBA1C MFR BLD: 5.3 % (ref 4–5.6)
HCT VFR BLD AUTO: 43.3 % (ref 37–48.5)
HDLC SERPL-MCNC: 57 MG/DL (ref 40–75)
HDLC SERPL: 31.5 % (ref 20–50)
HGB BLD-MCNC: 13.7 G/DL (ref 12–16)
HGB UR QL STRIP: NEGATIVE
IMM GRANULOCYTES # BLD AUTO: 0.03 K/UL (ref 0–0.04)
IMM GRANULOCYTES NFR BLD AUTO: 0.5 % (ref 0–0.5)
KETONES UR QL STRIP: NEGATIVE
LDLC SERPL CALC-MCNC: 106 MG/DL (ref 63–159)
LEUKOCYTE ESTERASE UR QL STRIP: ABNORMAL
LYMPHOCYTES # BLD AUTO: 2.4 K/UL (ref 1–4.8)
LYMPHOCYTES NFR BLD: 39.4 % (ref 18–48)
MCH RBC QN AUTO: 32.4 PG (ref 27–31)
MCHC RBC AUTO-ENTMCNC: 31.6 G/DL (ref 32–36)
MCV RBC AUTO: 102 FL (ref 82–98)
MICROSCOPIC COMMENT: NORMAL
MONOCYTES # BLD AUTO: 0.7 K/UL (ref 0.3–1)
MONOCYTES NFR BLD: 10.8 % (ref 4–15)
NEUTROPHILS # BLD AUTO: 2.9 K/UL (ref 1.8–7.7)
NEUTROPHILS NFR BLD: 46.6 % (ref 38–73)
NITRITE UR QL STRIP: NEGATIVE
NONHDLC SERPL-MCNC: 124 MG/DL
NRBC BLD-RTO: 0 /100 WBC
PH UR STRIP: 5 [PH] (ref 5–8)
PLATELET # BLD AUTO: 357 K/UL (ref 150–450)
PMV BLD AUTO: 9.8 FL (ref 9.2–12.9)
POTASSIUM SERPL-SCNC: 4 MMOL/L (ref 3.5–5.1)
PROT SERPL-MCNC: 6.7 G/DL (ref 6–8.4)
PROT UR QL STRIP: NEGATIVE
RBC # BLD AUTO: 4.23 M/UL (ref 4–5.4)
RBC #/AREA URNS AUTO: 0 /HPF (ref 0–4)
SODIUM SERPL-SCNC: 140 MMOL/L (ref 136–145)
SP GR UR STRIP: 1.03 (ref 1–1.03)
SQUAMOUS #/AREA URNS AUTO: 2 /HPF
T4 FREE SERPL-MCNC: 0.98 NG/DL (ref 0.71–1.51)
TRIGL SERPL-MCNC: 90 MG/DL (ref 30–150)
TSH SERPL DL<=0.005 MIU/L-ACNC: 1.46 UIU/ML (ref 0.4–4)
URN SPEC COLLECT METH UR: ABNORMAL
WBC # BLD AUTO: 6.19 K/UL (ref 3.9–12.7)
WBC #/AREA URNS AUTO: 2 /HPF (ref 0–5)

## 2023-08-02 PROCEDURE — 85025 COMPLETE CBC W/AUTO DIFF WBC: CPT | Mod: HCNC | Performed by: INTERNAL MEDICINE

## 2023-08-02 PROCEDURE — 81001 URINALYSIS AUTO W/SCOPE: CPT | Mod: HCNC | Performed by: INTERNAL MEDICINE

## 2023-08-02 PROCEDURE — 36415 COLL VENOUS BLD VENIPUNCTURE: CPT | Mod: HCNC,PO | Performed by: INTERNAL MEDICINE

## 2023-08-02 PROCEDURE — 84443 ASSAY THYROID STIM HORMONE: CPT | Mod: HCNC | Performed by: INTERNAL MEDICINE

## 2023-08-02 PROCEDURE — 83036 HEMOGLOBIN GLYCOSYLATED A1C: CPT | Mod: HCNC | Performed by: INTERNAL MEDICINE

## 2023-08-02 PROCEDURE — 84439 ASSAY OF FREE THYROXINE: CPT | Mod: HCNC | Performed by: INTERNAL MEDICINE

## 2023-08-02 PROCEDURE — 80053 COMPREHEN METABOLIC PANEL: CPT | Mod: HCNC | Performed by: INTERNAL MEDICINE

## 2023-08-02 PROCEDURE — 80061 LIPID PANEL: CPT | Mod: HCNC | Performed by: INTERNAL MEDICINE

## 2023-08-04 ENCOUNTER — PATIENT MESSAGE (OUTPATIENT)
Dept: OBSTETRICS AND GYNECOLOGY | Facility: CLINIC | Age: 63
End: 2023-08-04

## 2023-08-04 DIAGNOSIS — N95.2 VAGINAL ATROPHY: Primary | ICD-10-CM

## 2023-08-07 ENCOUNTER — HOSPITAL ENCOUNTER (OUTPATIENT)
Dept: RADIOLOGY | Facility: HOSPITAL | Age: 63
Discharge: HOME | End: 2023-08-07
Attending: OBSTETRICS & GYNECOLOGY
Payer: MEDICARE

## 2023-08-07 ENCOUNTER — OFFICE VISIT (OUTPATIENT)
Dept: INTERNAL MEDICINE | Facility: CLINIC | Age: 63
End: 2023-08-07
Payer: MEDICARE

## 2023-08-07 VITALS — BODY MASS INDEX: 23.24 KG/M2 | WEIGHT: 123 LBS

## 2023-08-07 VITALS
TEMPERATURE: 97 F | RESPIRATION RATE: 18 BRPM | SYSTOLIC BLOOD PRESSURE: 116 MMHG | OXYGEN SATURATION: 97 % | HEIGHT: 61 IN | HEART RATE: 79 BPM | BODY MASS INDEX: 23.68 KG/M2 | WEIGHT: 125.44 LBS | DIASTOLIC BLOOD PRESSURE: 68 MMHG

## 2023-08-07 DIAGNOSIS — K22.2 ESOPHAGEAL STENOSIS: ICD-10-CM

## 2023-08-07 DIAGNOSIS — Z12.31 ENCOUNTER FOR SCREENING MAMMOGRAM FOR MALIGNANT NEOPLASM OF BREAST: ICD-10-CM

## 2023-08-07 DIAGNOSIS — N95.1 MENOPAUSAL SYNDROME ON HORMONE REPLACEMENT THERAPY: Chronic | ICD-10-CM

## 2023-08-07 DIAGNOSIS — R05.9 COUGH, UNSPECIFIED TYPE: ICD-10-CM

## 2023-08-07 DIAGNOSIS — J30.9 CHRONIC ALLERGIC RHINITIS: Primary | ICD-10-CM

## 2023-08-07 DIAGNOSIS — K21.9 GASTROESOPHAGEAL REFLUX DISEASE, UNSPECIFIED WHETHER ESOPHAGITIS PRESENT: ICD-10-CM

## 2023-08-07 DIAGNOSIS — F41.9 ANXIETY: ICD-10-CM

## 2023-08-07 DIAGNOSIS — Z79.890 MENOPAUSAL SYNDROME ON HORMONE REPLACEMENT THERAPY: Chronic | ICD-10-CM

## 2023-08-07 PROCEDURE — 1100F PR PT FALLS ASSESS DOC 2+ FALLS/FALL W/INJURY/YR: ICD-10-PCS | Mod: HCNC,CPTII,S$GLB, | Performed by: INTERNAL MEDICINE

## 2023-08-07 PROCEDURE — 3078F DIAST BP <80 MM HG: CPT | Mod: HCNC,CPTII,S$GLB, | Performed by: INTERNAL MEDICINE

## 2023-08-07 PROCEDURE — 3044F PR MOST RECENT HEMOGLOBIN A1C LEVEL <7.0%: ICD-10-PCS | Mod: HCNC,CPTII,S$GLB, | Performed by: INTERNAL MEDICINE

## 2023-08-07 PROCEDURE — 99999 PR PBB SHADOW E&M-EST. PATIENT-LVL IV: CPT | Mod: PBBFAC,HCNC,, | Performed by: INTERNAL MEDICINE

## 2023-08-07 PROCEDURE — 3078F PR MOST RECENT DIASTOLIC BLOOD PRESSURE < 80 MM HG: ICD-10-PCS | Mod: HCNC,CPTII,S$GLB, | Performed by: INTERNAL MEDICINE

## 2023-08-07 PROCEDURE — 77067 MAMMO DIGITAL SCREENING BILAT WITH TOMO: ICD-10-PCS | Mod: 26,HCNC,, | Performed by: RADIOLOGY

## 2023-08-07 PROCEDURE — 90677 PNEUMOCOCCAL CONJUGATE VACCINE 20-VALENT: ICD-10-PCS | Mod: HCNC,S$GLB,, | Performed by: INTERNAL MEDICINE

## 2023-08-07 PROCEDURE — G0009 ADMIN PNEUMOCOCCAL VACCINE: HCPCS | Mod: HCNC,S$GLB,, | Performed by: INTERNAL MEDICINE

## 2023-08-07 PROCEDURE — 3288F PR FALLS RISK ASSESSMENT DOCUMENTED: ICD-10-PCS | Mod: HCNC,CPTII,S$GLB, | Performed by: INTERNAL MEDICINE

## 2023-08-07 PROCEDURE — 99999 PR PBB SHADOW E&M-EST. PATIENT-LVL IV: ICD-10-PCS | Mod: PBBFAC,HCNC,, | Performed by: INTERNAL MEDICINE

## 2023-08-07 PROCEDURE — G0009 PNEUMOCOCCAL CONJUGATE VACCINE 20-VALENT: ICD-10-PCS | Mod: HCNC,S$GLB,, | Performed by: INTERNAL MEDICINE

## 2023-08-07 PROCEDURE — 77063 BREAST TOMOSYNTHESIS BI: CPT | Mod: 26,HCNC,, | Performed by: RADIOLOGY

## 2023-08-07 PROCEDURE — 3074F SYST BP LT 130 MM HG: CPT | Mod: HCNC,CPTII,S$GLB, | Performed by: INTERNAL MEDICINE

## 2023-08-07 PROCEDURE — 77063 MAMMO DIGITAL SCREENING BILAT WITH TOMO: ICD-10-PCS | Mod: 26,HCNC,, | Performed by: RADIOLOGY

## 2023-08-07 PROCEDURE — 77067 SCR MAMMO BI INCL CAD: CPT | Mod: 26,HCNC,, | Performed by: RADIOLOGY

## 2023-08-07 PROCEDURE — 77067 SCR MAMMO BI INCL CAD: CPT | Mod: TC,HCNC

## 2023-08-07 PROCEDURE — 3074F PR MOST RECENT SYSTOLIC BLOOD PRESSURE < 130 MM HG: ICD-10-PCS | Mod: HCNC,CPTII,S$GLB, | Performed by: INTERNAL MEDICINE

## 2023-08-07 PROCEDURE — 3044F HG A1C LEVEL LT 7.0%: CPT | Mod: HCNC,CPTII,S$GLB, | Performed by: INTERNAL MEDICINE

## 2023-08-07 PROCEDURE — 3288F FALL RISK ASSESSMENT DOCD: CPT | Mod: HCNC,CPTII,S$GLB, | Performed by: INTERNAL MEDICINE

## 2023-08-07 PROCEDURE — 1100F PTFALLS ASSESS-DOCD GE2>/YR: CPT | Mod: HCNC,CPTII,S$GLB, | Performed by: INTERNAL MEDICINE

## 2023-08-07 PROCEDURE — 90677 PCV20 VACCINE IM: CPT | Mod: HCNC,S$GLB,, | Performed by: INTERNAL MEDICINE

## 2023-08-07 PROCEDURE — 3008F BODY MASS INDEX DOCD: CPT | Mod: HCNC,CPTII,S$GLB, | Performed by: INTERNAL MEDICINE

## 2023-08-07 PROCEDURE — 99214 OFFICE O/P EST MOD 30 MIN: CPT | Mod: HCNC,S$GLB,, | Performed by: INTERNAL MEDICINE

## 2023-08-07 PROCEDURE — 3008F PR BODY MASS INDEX (BMI) DOCUMENTED: ICD-10-PCS | Mod: HCNC,CPTII,S$GLB, | Performed by: INTERNAL MEDICINE

## 2023-08-07 PROCEDURE — 99214 PR OFFICE/OUTPT VISIT, EST, LEVL IV, 30-39 MIN: ICD-10-PCS | Mod: HCNC,S$GLB,, | Performed by: INTERNAL MEDICINE

## 2023-08-07 RX ORDER — ESTRADIOL 10 UG/1
INSERT VAGINAL
Qty: 8 EACH | Refills: 11 | Status: SHIPPED | OUTPATIENT
Start: 2023-08-07 | End: 2023-08-30

## 2023-08-07 RX ORDER — AZELASTINE 1 MG/ML
2 SPRAY, METERED NASAL 2 TIMES DAILY
Qty: 30 ML | Refills: 2 | Status: SHIPPED | OUTPATIENT
Start: 2023-08-07

## 2023-08-15 ENCOUNTER — OFFICE VISIT (OUTPATIENT)
Dept: PAIN MEDICINE | Facility: CLINIC | Age: 63
End: 2023-08-15
Payer: MEDICARE

## 2023-08-15 DIAGNOSIS — M70.60 TROCHANTERIC BURSITIS, UNSPECIFIED LATERALITY: ICD-10-CM

## 2023-08-15 DIAGNOSIS — M70.70 ISCHIAL BURSITIS, UNSPECIFIED LATERALITY: ICD-10-CM

## 2023-08-15 DIAGNOSIS — M70.71 ISCHIAL BURSITIS OF RIGHT SIDE: Primary | ICD-10-CM

## 2023-08-15 PROCEDURE — 99213 OFFICE O/P EST LOW 20 MIN: CPT | Mod: 95,HCNC,ICN, | Performed by: NURSE PRACTITIONER

## 2023-08-15 PROCEDURE — 1160F PR REVIEW ALL MEDS BY PRESCRIBER/CLIN PHARMACIST DOCUMENTED: ICD-10-PCS | Mod: HCNC,CPTII,95,ICN | Performed by: NURSE PRACTITIONER

## 2023-08-15 PROCEDURE — 1160F RVW MEDS BY RX/DR IN RCRD: CPT | Mod: HCNC,CPTII,95,ICN | Performed by: NURSE PRACTITIONER

## 2023-08-15 PROCEDURE — 1159F PR MEDICATION LIST DOCUMENTED IN MEDICAL RECORD: ICD-10-PCS | Mod: HCNC,CPTII,95,ICN | Performed by: NURSE PRACTITIONER

## 2023-08-15 PROCEDURE — 99213 PR OFFICE/OUTPT VISIT, EST, LEVL III, 20-29 MIN: ICD-10-PCS | Mod: 95,HCNC,ICN, | Performed by: NURSE PRACTITIONER

## 2023-08-15 PROCEDURE — 1159F MED LIST DOCD IN RCRD: CPT | Mod: HCNC,CPTII,95,ICN | Performed by: NURSE PRACTITIONER

## 2023-08-15 PROCEDURE — 3044F HG A1C LEVEL LT 7.0%: CPT | Mod: HCNC,CPTII,95,ICN | Performed by: NURSE PRACTITIONER

## 2023-08-15 PROCEDURE — 3044F PR MOST RECENT HEMOGLOBIN A1C LEVEL <7.0%: ICD-10-PCS | Mod: HCNC,CPTII,95,ICN | Performed by: NURSE PRACTITIONER

## 2023-08-15 NOTE — PROGRESS NOTES
Chronic Pain-Tele-Medicine-Established Note (Follow up visit)        The patient location is: Home  The chief complaint leading to consultation is: pain  Visit type: Virtual visit with synchronous audio and video  Total time spent with patient: 13 min  Each patient to whom he or she provides medical services by telemedicine is:  (1) informed of the relationship between the physician and patient and the respective role of any other health care provider with respect to management of the patient; and (2) notified that he or she may decline to receive medical services by telemedicine and may withdraw from such care at any time.          Referring Physician: No ref. provider found    Chief Complaint:   No chief complaint on file.       SUBJECTIVE: Disclaimer: This note has been generated using voice-recognition software. There may be typographical errors that have been missed during proof-reading    Interval History 8/15/2023:  The patient has a virtual follow up for right sided buttock and hip pain. There was difficulty with audio through the bess so the latter portion of the visit was conducted via phone audio. She is now s/p GTB and ischial bursa injection with about 90% relief. She went on a long vacation recently and was able to walk and be active. She is restarting Silver [employer]neakers this week. Her pain is tolerable at this time. Her pain today is 2/10.    Interval History 2/9/2023:  The patient presents for increased right sided hip and buttock pain. She has been having more pain over the past couple of weeks. The pain is located to the right buttock/lateral hip and radiates down the side of the leg. No numbness. She has mild symptoms on the left. The pain bothers her more when she is laying at night or putting pressure on the right side. Her pain today is 8/10.    Interval History 12/5/2022:  The patient is here for follow up of right sided hip and buttock pain. She is s/p right GTB and ischial bursa injections in  office on 10/19/22 with Dr. Tillman. She reports 90% relief of her pain. She was able to take a trip and walk a lot after the procedure without significant pain. She has very mild, intermittent pain since the procedure. Her pain today is 0/10.    Interval History 10/17/2022:  The patient presents today for re-evaluation of back and hip pain. I last saw her in May 2021 and she had hip injections with benefit. She says that her pain was manageable until recently. She is now having more right lateral hip and buttock pain. She had benefit with right GTB and ischial bursa in April 2021. She is no longer taking NSAIDs due to stomach issues. She takes Tylenol PRN. Her pain today is 8/10.    Interval History 5/14/2021:  The patient has a virtual video appointment for follow up of right hip and buttock pain. She is s/p right ischial bursa and GTB injection with 90% pain relief.  She now only has some mild soreness after she exercises from the right side.  She says her pain has significantly improved.  She mild pain on the left which also worsens after exercise.  She is working on walking and increasing activity for weight loss.  No new complaints noted.  Her pain today is 1/10.    Interval History 4/15/2021:  The patient reports today for follow up of of right-sided buttock and hip pain.  We have not seen the patient in was 2 years.  She reports that she has been doing fairly well but had a recent injury that caused increased pain.  Her buttock pain is located to the bottom of her buttock and posterior thigh.  It worsens with sitting and applying pressure.  She also has right-sided lateral hip pain.  She would like to discuss injections today.  Her pain today is 8/10.    Interval History 8/28/2019:  The patient is here for follow up of left sided buttock pain.  She has intermittent Toradol injections as well as SI joint injections as needed.  She had a Toradol shot in April which controlled her symptoms well until recently.   She is requesting a repeat today.  She takes Ibuprofen sparingly as well.  Her pain worsens with sitting and standing.  She is not having any shooting into the legs.  Her pain today is 3/10.    Interval History 4/5/2019:  The patient returns to discuss pain.  After her last visit, I gave her a Toradol shot which provided her with significant benefit for some time.  She has been taking 800 mg Ibuprofen as needed with benefit, usually about once per week.  Her pain worsens with sitting and driving.  She does not have very much pain with walking.  Her pain today is 9/10.     Interval History 11/26/2018:  The patient returns for follow up of left sided lower back pain.  She is s/p left SI joint injection on 10/16/18 with minimal benefit.  Previous injection helped significantly.  She continues to report pain over the left buttock.  She does have pain over the lumbar spine, but this is mild.  Her pain is most severe when she sits for prolonged periods or changes from sitting to standing.  She is active and has been exercising and gardening.  Her previous XRAYs from last year show right sided curvature and retrolisthesis without instability.  She has not had imaging of hips or pelvis recently.  Her pain today is 6/10.  She takes OTC Ibuprofen with some benefit.    Interval History 2/1/2018:  The patient presents today for follow up.  She is s/p left SI joint injection on 1/16/18 with about 80% pain relief overall.  She has intermittent pain to left buttock which is mild.  This is mainly with bending and lifting.  Since her last visit, she has also changed her body mechanics which she thinks is helping.  She takes OTC NSAIDs sparingly as needed.  She has also increased her activity and has been walking.  Her pain today is 3/10.    Interval History 12/7/2017:  The patient presents today for follow up of left sided lower back and buttock pain.  She is s/p left L3,4,5 MBB on 11/7/17.  She reports that she felt numbness to her  lower back for about one hour after the procedure.  Immediately after the procedure, she went to walk her dog in the park and started to have her usual pain.  Her pain is mainly to the left buttock at this time.  It is worsened with prolonged sitting, changing from sitting to standing, and lifting her 20 lb nephew.  She also notices that she carried him on her left hip which worsens her pain.  She does have some benefit with OTC NSAIDs.  Voltaren gel provided limited benefit.      Initial encounter:    Raquel Galan presents to the clinic for the evaluation of left sided low back pain. The pain started 10 years ago and symptoms have been worsening.    Brief history:  Patient had a history of radicular symptoms but they are not occurring now.    Pain Description:    The pain is located in the left side of lower back area and does not radiate.      At BEST  2/10     At WORST  9/10 on the WORST day.      On average pain is rated as 5/10.     Today the pain is rated as 4/10    The pain is described as aching and sharp      Symptoms interfere with daily activity.     Exacerbating factors: Sitting, Standing, Laying, Bending, Coughing/Sneezing, Walking, Morning, Lifting and Getting out of bed/chair.      Mitigating factors heat, ice, massage and medications.     Patient denies night fever/night sweats, urinary incontinence, bowel incontinence, significant weight loss, significant motor weakness and loss of sensations.  Patient denies any suicidal or homicidal ideations    Pain Medications:  Current:  Voltaren gel  Tylenol PRN    Tried in Past:  NSAIDs - Ibuprofen and Voltaren gel  TCA -Never  SNRI -Never  Anti-convulsants -asprin 81mg  Muscle Relaxants -Never  Opioids-Never    Physical Therapy/Home Exercise: yes  -works with a physical therapist and psychiatrist     report:  Reviewed and consistent with medication use as prescribed.    Pain Procedures:   11/7/17 Left L3,4,5 MBB- limited relief after 1 hr with  activity  1/16/18 Left SI joint injection- 80% relief  10/16/18 Left SI joint injection  4/29/21 Right GTB and ischial bursa injection- 90% relief  10/19/22 Right GTB and ischial bursa injection- 90% relief  6/20/23 Right GTB and ischial bursa injection- 90% relief    Chiropractor -never  Acupuncture - never  TENS unit -never  Spinal decompression -never  Joint replacement -never    CMP  Sodium   Date Value Ref Range Status   08/02/2023 140 136 - 145 mmol/L Final     Potassium   Date Value Ref Range Status   08/02/2023 4.0 3.5 - 5.1 mmol/L Final     Chloride   Date Value Ref Range Status   08/02/2023 109 95 - 110 mmol/L Final     CO2   Date Value Ref Range Status   08/02/2023 21 (L) 23 - 29 mmol/L Final     Glucose   Date Value Ref Range Status   08/02/2023 81 70 - 110 mg/dL Final     BUN   Date Value Ref Range Status   08/02/2023 16 8 - 23 mg/dL Final     Creatinine   Date Value Ref Range Status   08/02/2023 0.8 0.5 - 1.4 mg/dL Final     Calcium   Date Value Ref Range Status   08/02/2023 8.9 8.7 - 10.5 mg/dL Final     Total Protein   Date Value Ref Range Status   08/02/2023 6.7 6.0 - 8.4 g/dL Final     Albumin   Date Value Ref Range Status   08/02/2023 3.6 3.5 - 5.2 g/dL Final     Total Bilirubin   Date Value Ref Range Status   08/02/2023 0.8 0.1 - 1.0 mg/dL Final     Comment:     For infants and newborns, interpretation of results should be based  on gestational age, weight and in agreement with clinical  observations.    Premature Infant recommended reference ranges:  Up to 24 hours.............<8.0 mg/dL  Up to 48 hours............<12.0 mg/dL  3-5 days..................<15.0 mg/dL  6-29 days.................<15.0 mg/dL       Alkaline Phosphatase   Date Value Ref Range Status   08/02/2023 86 55 - 135 U/L Final     AST   Date Value Ref Range Status   08/02/2023 23 10 - 40 U/L Final     ALT   Date Value Ref Range Status   08/02/2023 32 10 - 44 U/L Final     Anion Gap   Date Value Ref Range Status   08/02/2023 10 8  - 16 mmol/L Final     eGFR if    Date Value Ref Range Status   06/30/2022 >60.0 >60 mL/min/1.73 m^2 Final     eGFR if non    Date Value Ref Range Status   06/30/2022 >60.0 >60 mL/min/1.73 m^2 Final     Comment:     Calculation used to obtain the estimated glomerular filtration  rate (eGFR) is the CKD-EPI equation.        Lab Results   Component Value Date    WBC 6.19 08/02/2023    HGB 13.7 08/02/2023    HCT 43.3 08/02/2023     (H) 08/02/2023     08/02/2023       Imaging:  Narrative     EXAMINATION:  XR HIPS BILATERAL 2 VIEW INCL AP PELVIS    CLINICAL HISTORY:  Sacroiliitis, not elsewhere classified    TECHNIQUE:  AP view of the pelvis and frogleg lateral views of both hips were performed.    COMPARISON:  None.    FINDINGS:  There is no evidence of acute fracture, dislocation, or bone destruction.  Joint spaces are well preserved.  There is mild degenerative change of the sacroiliac joints.  No erosions are seen      Impression       See above.     Narrative     EXAMINATION:  XR LUMBAR SPINE 5 VIEW WITH FLEX AND EXT    CLINICAL HISTORY:  Low back pain, >6wks conservative tx, persistent-progressive sx, surgical candidate;  Sacroiliitis, not elsewhere classified    TECHNIQUE:  Five views of the lumbar spine plus flexion extension views were performed.    COMPARISON:  Prior dated 10/26/2017    FINDINGS:  There is dextroscoliosis of the lumbar spine.  There is retrolisthesis of L2-3 and L3 on L4 (grade 1) measuring approximately 4 mm.  This is similar to previous exam.  There is no significant change on flexion/extension views to suggest translational instability.  There are endplate degenerative changes with subchondral sclerosis and marginal osteophyte formation and disc height narrowing at L3-4.  Mild facet arthropathy is present at the lower lumbar levels.      Impression       Degenerative change of the lumbar spine with associated dextroscoliosis and L2-3/L3-4  retrolisthesis, similar to previous exam.  No translational instability.       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     Fibrocystic breast disease in female     HEARING LOSS     right side - no hearing aid    Hyperlipidemia     Osteopenia     PONV (postoperative nausea and vomiting)     PONV (postoperative nausea and vomiting)     Special screening for malignant neoplasms, colon 7/22/2013     Past Surgical History:   Procedure Laterality Date    ADENOIDECTOMY      APPENDECTOMY      BREAST BIOPSY Left     exc bx, benign per pt    BREAST SURGERY      left breast biopsy    COLONOSCOPY N/A 9/27/2018    Procedure: COLONOSCOPY;  Surgeon: Uche Smiley MD;  Location: [facility] ENDO (4TH FLR);  Service: Endoscopy;  Laterality: N/A;  pt/instructed on prep day before and day of importance    CYST REMOVAL Right     neck    DILATION AND CURETTAGE OF UTERUS      ESOPHAGOGASTRODUODENOSCOPY N/A 8/4/2021    Procedure: ESOPHAGOGASTRODUODENOSCOPY (EGD);  Surgeon: Ameya Medeiros MD;  Location: [facility] (2ND FLR);  Service: Endoscopy;  Laterality: N/A;  Food gets hung up. Dilatation likely needed.     COVID test in [place] on 8/1-GT  8/2-new instructions via portal-tb    ESOPHAGOGASTRODUODENOSCOPY N/A 5/31/2023    Procedure: EGD (ESOPHAGOGASTRODUODENOSCOPY);  Surgeon: Lanre Vaughn MD;  Location: [facility] ENDOSCOPY;  Service: Endoscopy;  Laterality: N/A;  cardiac clearance received from -see note 5/9/23/ referred by KEMAL Krishna/ inst portal-RB  5/30 Pre call complete, ride confirmed, no wt loss inj;SG    HYSTERECTOMY      INJECTION OF JOINT Left 10/16/2018    Procedure: INJECTION, JOINT, LEFT SI JOINT INJECTION UNDER FLUORO;  Surgeon: Alirio Lei MD;  Location: [facility] PAIN MGT;  Service: Pain Management;  Laterality: Left;  NEEDS CONSENT    MANDIBLE FRACTURE SURGERY Right     scope    ROBOT-ASSISTED  LAPAROSCOPIC ABDOMINAL HYSTERECTOMY USING DA ERICA XI N/A 6/12/2020    Procedure: XI ROBOTIC HYSTERECTOMY;  Surgeon: Alice Valenzuela MD;  Location: [facility] OR;  Service: OB/GYN;  Laterality: N/A;    ROBOT-ASSISTED LAPAROSCOPIC SALPINGO-OOPHORECTOMY USING DA ERICA XI N/A 6/12/2020    Procedure: XI ROBOTIC SALPINGO-OOPHORECTOMY;  Surgeon: Alice Valenzuela MD;  Location: [facility] OR;  Service: OB/GYN;  Laterality: N/A;    TONSILLECTOMY       Social History     Socioeconomic History    Marital status:    Tobacco Use    Smoking status: Never    Smokeless tobacco: Never   Substance and Sexual Activity    Alcohol use: Not Currently     Alcohol/week: 2.0 standard drinks of alcohol     Types: 2 Glasses of wine per week     Comment: social    Drug use: Never    Sexual activity: Yes     Partners: Male     Birth control/protection: Partner-Vasectomy, Post-menopausal, None   Other Topics Concern    Are you pregnant or think you may be? No    Breast-feeding No   Social History Narrative    Lives with spouse and feels safe in her home.     Social Determinants of Health     Financial Resource Strain: Low Risk  (8/2/2023)    Overall Financial Resource Strain (CARDIA)     Difficulty of Paying Living Expenses: Not very hard   Food Insecurity: No Food Insecurity (8/2/2023)    Hunger Vital Sign     Worried About Running Out of Food in the Last Year: Never true     Ran Out of Food in the Last Year: Never true   Transportation Needs: No Transportation Needs (8/2/2023)    PRAPARE - Transportation     Lack of Transportation (Medical): No     Lack of Transportation (Non-Medical): No   Physical Activity: Insufficiently Active (8/2/2023)    Exercise Vital Sign     Days of Exercise per Week: 3 days     Minutes of Exercise per Session: 30 min   Stress: No Stress Concern Present (8/2/2023)    [employer] - Occupational Stress Questionnaire     Feeling of Stress : Only a little   Social Connections: Unknown (8/2/2023)     Social Connection and Isolation Panel [NHANES]     Frequency of Communication with Friends and Family: More than three times a week     Frequency of Social Gatherings with Friends and Family: Three times a week     Active Member of Clubs or Organizations: Yes     Attends Club or Organization Meetings: More than 4 times per year     Marital Status:    Housing Stability: Low Risk  (8/2/2023)    Housing Stability Vital Sign     Unable to Pay for Housing in the Last Year: No     Number of Places Lived in the Last Year: 2     Unstable Housing in the Last Year: No     Family History   Problem Relation Age of Onset    Heart disease Mother     Kidney disease Mother     Diabetes Mother     Hypertension Mother     Hyperlipidemia Mother     COPD Mother     Arthritis Mother     Miscarriages / Stillbirths Mother     Stroke Mother     Heart disease Father     Cancer Father 75        lymphoma, bladder tumors    Arthritis Father     Breast cancer Neg Hx     Ovarian cancer Neg Hx     Colon cancer Neg Hx     Lupus Neg Hx     Psoriasis Neg Hx     Rheum arthritis Neg Hx     Inflammatory bowel disease Neg Hx        Review of patient's allergies indicates:   Allergen Reactions    Dexamethasone      Flushing and headache with medrol dosepack & tachycardia       Current Outpatient Medications   Medication Sig    ALPRAZolam (XANAX) 0.5 MG tablet Take 1 tablet (0.5 mg total) by mouth 2 (two) times daily as needed for Anxiety.    azelastine (ASTELIN) 137 mcg (0.1 %) nasal spray 2 sprays (274 mcg total) by Nasal route 2 (two) times daily.    b complex vitamins tablet Take 1 tablet by mouth once daily.    CALCIUM 600 WITH VITAMIN D3 600 mg(1,500mg) -200 unit Tab Take 1 tablet by mouth once daily.    estradioL (ESTRACE) 1 MG tablet Take 1 tablet (1 mg total) by mouth once daily.    estradioL (IMVEXXY MAINTENANCE PACK) 10 mcg Inst Place 1 suppository vagianlly at night BIW    fexofenadine (ALLEGRA) 180 MG tablet Take 1 tablet (180 mg  total) by mouth once daily.    glucosamine-chondroitin 500-400 mg tablet Take 1 tablet by mouth Daily.    meclizine (ANTIVERT) 12.5 mg tablet Take 1 tablet (12.5 mg total) by mouth every 6 to 8 hours as needed (vertigo).    omega-3 fatty acids/fish oil (FISH OIL-OMEGA-3 FATTY ACIDS) 300-1,000 mg capsule Take 1 capsule by mouth once daily.    promethazine (PHENERGAN) 25 MG tablet Take 1 tablet (25 mg total) by mouth every 4 (four) hours as needed for Nausea. (Patient not taking: Reported on 8/7/2023)    tretinoin (RETIN-A) 0.025 % cream Compound tretinoin 0.025% / niacinamide 2% cream. Apply a pea-sized amount to entire face qhs.     No current facility-administered medications for this visit.       REVIEW OF SYSTEMS:    GENERAL:  No weight loss, malaise or fevers.  HEENT:   No recent changes in vision or hearing  NECK:  Negative for lumps, no difficulty with swallowing.  RESPIRATORY:  Negative for cough, wheezing or shortness of breath, patient denies any recent URI.  CARDIOVASCULAR:  Negative for chest pain, leg swelling or palpitations.  GI:  Negative for abdominal discomfort, blood in stools or black stools or change in bowel habits.  MUSCULOSKELETAL:  See HPI.  SKIN:  Negative for lesions, rash, and itching.  PSYCH:  No mood disorder or recent psychosocial stressors.  Patient's sleep is disturbed secondary to pain.  HEMATOLOGY/LYMPHOLOGY:  Negative for prolonged bleeding, bruising easily or swollen nodes.  Patient is not currently taking any anti-coagulants  ENDO: No history of diabetes or thyroid dysfunction  NEURO:   No history of headaches, syncope, paralysis, seizures or tremors.  All other reviewed and negative other than HPI.    OBJECTIVE:    PHYSICAL EXAMINATION:    General appearance: Well appearing, in no acute distress, alert and oriented x3.  Psych:  Mood and affect appropriate.  Skin: Skin color normal, no rashes or lesions, in both upper and lower body.  Extremities: Moves all visualized extremities  freely.      PREVIOUS PHYSICAL EXAMINATION:    GENERAL: Well appearing, in no acute distress, alert and oriented x3.  PSYCH:  Mood and affect appropriate.  SKIN: Skin color, texture, turgor normal, no rashes or lesions.  HEAD/FACE:  Normocephalic, atraumatic. Cranial nerves grossly intact.  BACK: Straight leg raising in the sitting and supine positions is negative to radicular pain. There is no pain with palpation over the facet joints of the lumbar spine. There is full range of motion with extension to 15 degrees, and facet loading maneuvers cause reproducible pain on the right.  EXTREMITIES: Peripheral joint ROM is full and pain free without obvious instability or laxity in all four extremities. No deformities, edema, or skin discoloration. Good capillary refill.  MUSCULOSKELETAL: TTP over right ischial bursa and greater trochanteric bursa. Bilateral lower extremity strength is normal and symmetric.  No atrophy or tone abnormalities are noted.  NEURO: No loss of sensation noted.   GAIT: Normal.    ASSESSMENT: 63 y.o. year old female with left sided back/buttock pain, consistent with the following:    Encounter Diagnoses   Name Primary?    Ischial bursitis of right side Yes    Trochanteric bursitis, unspecified laterality     Ischial bursitis, unspecified laterality        PLAN:     - Previous imaging was reviewed and discussed with the patient today.    - She is s/p repeat right GTB and right ischial bursa injection in office with 90% relief. Will continue to monitor progress. Can recall to repeat as needed.    - The patient will continue a home exercise routine to help with pain and strengthening. She is restarting her exercise routine this week.    - RTC as needed.      The above plan and management options were discussed at length with patient. Patient is in agreement with the above and verbalized understanding.    Jennifer Pickens  08/15/2023

## 2023-08-24 NOTE — PROGRESS NOTES
Subjective:       Patient ID: Raquel Galan is a 63 y.o. female.    Chief Complaint: Annual Exam and Cough    HPI  The patient presents for annual exam and follow-up of medical conditions which include hyperlipidemia, chronic allergic rhinitis, GERD, esophageal stenosis, postmenopausal on hormone replacement therapy, and anxiety.  The patient reports she has been experiencing chronic cough following COVID infection earlier this year.  She does focused drainage postnasal drip with sinus congestion.  She is currently using Flonase and Allegra as needed.  She is also recovering from a dental infection involving the lower jaw.  She completed antibiotic therapy for an infected bee sting recently.    Her esophageal stenosis has been adequately treated with EGD with dilatation.  Intermittent symptoms of acid reflux symptoms are noted.  She is not currently experiencing dysphagia.  Her appetite is normal.  Bowel movements are normal.    Immunization record was reviewed.    Screening tests were reviewed.    No interval change in past medical history, family history, or social history since prior evaluations.    Review of Systems   Constitutional:  Negative for fatigue, fever and unexpected weight change.   HENT:  Positive for dental problem, postnasal drip and sinus pressure/congestion. Negative for nasal congestion, rhinorrhea and sore throat.    Eyes:  Negative for visual disturbance.   Respiratory:  Positive for cough. Negative for chest tightness, shortness of breath and wheezing.    Cardiovascular:  Negative for chest pain, palpitations and leg swelling.   Gastrointestinal:  Positive for reflux. Negative for abdominal pain and blood in stool.   Genitourinary:  Negative for dysuria, frequency and hematuria.   Musculoskeletal:  Negative for arthralgias, back pain, joint swelling and myalgias.   Integumentary:  Negative for rash.   Neurological:  Negative for dizziness, syncope, weakness, numbness and headaches.    Psychiatric/Behavioral:  Negative for sleep disturbance. The patient is not nervous/anxious.             Physical Exam  Vitals and nursing note reviewed.   Constitutional:       General: She is not in acute distress.     Appearance: Normal appearance. She is well-developed.   HENT:      Head: Normocephalic and atraumatic.      Right Ear: External ear normal.      Left Ear: External ear normal.      Nose: Nose normal.      Mouth/Throat:      Mouth: Mucous membranes are moist.      Pharynx: Oropharynx is clear. No oropharyngeal exudate.   Eyes:      General: No scleral icterus.     Extraocular Movements: Extraocular movements intact.      Conjunctiva/sclera: Conjunctivae normal.   Neck:      Thyroid: No thyromegaly.      Vascular: No carotid bruit or JVD.   Cardiovascular:      Rate and Rhythm: Normal rate and regular rhythm.      Pulses: Normal pulses.      Heart sounds: Normal heart sounds. No murmur heard.     No friction rub. No gallop.   Pulmonary:      Effort: Pulmonary effort is normal. No respiratory distress.      Breath sounds: Normal breath sounds. No wheezing or rales.   Abdominal:      General: Bowel sounds are normal. There is no abdominal bruit.      Palpations: Abdomen is soft. There is no hepatomegaly, splenomegaly or mass.      Tenderness: There is no abdominal tenderness.      Hernia: No hernia is present.   Musculoskeletal:         General: No tenderness. Normal range of motion.      Right shoulder: No deformity or effusion.      Cervical back: Normal range of motion and neck supple.      Right lower leg: No edema.      Left lower leg: No edema.   Lymphadenopathy:      Cervical: No cervical adenopathy.      Upper Body:      Right upper body: No supraclavicular adenopathy.      Left upper body: No supraclavicular adenopathy.   Skin:     General: Skin is warm and dry.      Findings: No rash.   Neurological:      Mental Status: She is alert and oriented to person, place, and time.      Cranial  Nerves: No cranial nerve deficit.   Psychiatric:         Mood and Affect: Mood normal.         Speech: Speech normal.         Behavior: Behavior normal.           Lab Visit on 08/02/2023   Component Date Value Ref Range Status    Sodium 08/02/2023 140  136 - 145 mmol/L Final    Potassium 08/02/2023 4.0  3.5 - 5.1 mmol/L Final    Chloride 08/02/2023 109  95 - 110 mmol/L Final    CO2 08/02/2023 21 (L)  23 - 29 mmol/L Final    Glucose 08/02/2023 81  70 - 110 mg/dL Final    BUN 08/02/2023 16  8 - 23 mg/dL Final    Creatinine 08/02/2023 0.8  0.5 - 1.4 mg/dL Final    Calcium 08/02/2023 8.9  8.7 - 10.5 mg/dL Final    Total Protein 08/02/2023 6.7  6.0 - 8.4 g/dL Final    Albumin 08/02/2023 3.6  3.5 - 5.2 g/dL Final    Total Bilirubin 08/02/2023 0.8  0.1 - 1.0 mg/dL Final    Comment: For infants and newborns, interpretation of results should be based  on gestational age, weight and in agreement with clinical  observations.    Premature Infant recommended reference ranges:  Up to 24 hours.............<8.0 mg/dL  Up to 48 hours............<12.0 mg/dL  3-5 days..................<15.0 mg/dL  6-29 days.................<15.0 mg/dL      Alkaline Phosphatase 08/02/2023 86  55 - 135 U/L Final    AST 08/02/2023 23  10 - 40 U/L Final    ALT 08/02/2023 32  10 - 44 U/L Final    eGFR 08/02/2023 >60.0  >60 mL/min/1.73 m^2 Final    Anion Gap 08/02/2023 10  8 - 16 mmol/L Final    Cholesterol 08/02/2023 181  120 - 199 mg/dL Final    Comment: The National Cholesterol Education Program (NCEP) has set the  following guidelines (reference ranges) for Cholesterol:  Optimal.....................<200 mg/dL  Borderline High.............200-239 mg/dL  High........................> or = 240 mg/dL      Triglycerides 08/02/2023 90  30 - 150 mg/dL Final    Comment: The National Cholesterol Education Program (NCEP) has set the  following guidelines (reference values) for triglycerides:  Normal......................<150 mg/dL  Borderline  High.............150-199 mg/dL  High........................200-499 mg/dL      HDL 08/02/2023 57  40 - 75 mg/dL Final    Comment: The National Cholesterol Education Program (NCEP) has set the  following guidelines (reference values) for HDL Cholesterol:  Low...............<40 mg/dL  Optimal...........>60 mg/dL      LDL Cholesterol 08/02/2023 106.0  63.0 - 159.0 mg/dL Final    Comment: The National Cholesterol Education Program (NCEP) has set the  following guidelines (reference values) for LDL Cholesterol:  Optimal.......................<130 mg/dL  Borderline High...............130-159 mg/dL  High..........................160-189 mg/dL  Very High.....................>190 mg/dL      HDL/Cholesterol Ratio 08/02/2023 31.5  20.0 - 50.0 % Final    Total Cholesterol/HDL Ratio 08/02/2023 3.2  2.0 - 5.0 Final    Non-HDL Cholesterol 08/02/2023 124  mg/dL Final    Comment: Risk category and Non-HDL cholesterol goals:  Coronary heart disease (CHD)or equivalent (10-year risk of CHD >20%):  Non-HDL cholesterol goal     <130 mg/dL  Two or more CHD risk factors and 10-year risk of CHD <= 20%:  Non-HDL cholesterol goal     <160 mg/dL  0 to 1 CHD risk factor:  Non-HDL cholesterol goal     <190 mg/dL      WBC 08/02/2023 6.19  3.90 - 12.70 K/uL Final    RBC 08/02/2023 4.23  4.00 - 5.40 M/uL Final    Hemoglobin 08/02/2023 13.7  12.0 - 16.0 g/dL Final    Hematocrit 08/02/2023 43.3  37.0 - 48.5 % Final    MCV 08/02/2023 102 (H)  82 - 98 fL Final    MCH 08/02/2023 32.4 (H)  27.0 - 31.0 pg Final    MCHC 08/02/2023 31.6 (L)  32.0 - 36.0 g/dL Final    RDW 08/02/2023 12.8  11.5 - 14.5 % Final    Platelets 08/02/2023 357  150 - 450 K/uL Final    MPV 08/02/2023 9.8  9.2 - 12.9 fL Final    Immature Granulocytes 08/02/2023 0.5  0.0 - 0.5 % Final    Gran # (ANC) 08/02/2023 2.9  1.8 - 7.7 K/uL Final    Immature Grans (Abs) 08/02/2023 0.03  0.00 - 0.04 K/uL Final    Comment: Mild elevation in immature granulocytes is non specific and   can be seen  in a variety of conditions including stress response,   acute inflammation, trauma and pregnancy. Correlation with other   laboratory and clinical findings is essential.      Lymph # 08/02/2023 2.4  1.0 - 4.8 K/uL Final    Mono # 08/02/2023 0.7  0.3 - 1.0 K/uL Final    Eos # 08/02/2023 0.1  0.0 - 0.5 K/uL Final    Baso # 08/02/2023 0.04  0.00 - 0.20 K/uL Final    nRBC 08/02/2023 0  0 /100 WBC Final    Gran % 08/02/2023 46.6  38.0 - 73.0 % Final    Lymph % 08/02/2023 39.4  18.0 - 48.0 % Final    Mono % 08/02/2023 10.8  4.0 - 15.0 % Final    Eosinophil % 08/02/2023 2.1  0.0 - 8.0 % Final    Basophil % 08/02/2023 0.6  0.0 - 1.9 % Final    Differential Method 08/02/2023 Automated   Final    Free T4 08/02/2023 0.98  0.71 - 1.51 ng/dL Final    TSH 08/02/2023 1.462  0.400 - 4.000 uIU/mL Final    Hemoglobin A1C 08/02/2023 5.3  4.0 - 5.6 % Final    Comment: ADA Screening Guidelines:  5.7-6.4%  Consistent with prediabetes  >or=6.5%  Consistent with diabetes    High levels of fetal hemoglobin interfere with the HbA1C  assay. Heterozygous hemoglobin variants (HbS, HgC, etc)do  not significantly interfere with this assay.   However, presence of multiple variants may affect accuracy.      Estimated Avg Glucose 08/02/2023 105  68 - 131 mg/dL Final    Specimen UA 08/02/2023 Urine, Clean Catch   Final    Color, UA 08/02/2023 Yellow  Yellow, Straw, Genie Final    Appearance, UA 08/02/2023 Clear  Clear Final    pH, UA 08/02/2023 5.0  5.0 - 8.0 Final    Specific Gravity, UA 08/02/2023 1.030  1.005 - 1.030 Final    Protein, UA 08/02/2023 Negative  Negative Final    Comment: Recommend a 24 hour urine protein or a urine   protein/creatinine ratio if globulin induced proteinuria is  clinically suspected.      Glucose, UA 08/02/2023 Negative  Negative Final    Ketones, UA 08/02/2023 Negative  Negative Final    Bilirubin (UA) 08/02/2023 Negative  Negative Final    Occult Blood UA 08/02/2023 Negative  Negative Final    Nitrite, UA 08/02/2023  Negative  Negative Final    Leukocytes, UA 08/02/2023 Trace (A)  Negative Final    RBC, UA 08/02/2023 0  0 - 4 /hpf Final    WBC, UA 08/02/2023 2  0 - 5 /hpf Final    Squam Epithel, UA 08/02/2023 2  /hpf Final    Microscopic Comment 08/02/2023 SEE COMMENT   Final    Comment: Other formed elements not mentioned in the report are not   present in the microscopic examination.          Assessment & Plan:      Raquel was seen today for annual exam and cough.  Patient has been encouraged to continue Astelin nasal spray Flonase for treatment of reflux symptoms.  Xyzal may be used in place of Allegra for better management of congestion and postnasal drainage if needed.    Prevnar-20 vaccine will be administered today.    Diagnoses and all orders for this visit:    Chronic allergic rhinitis    Gastroesophageal reflux disease, unspecified whether esophagitis present    Menopausal syndrome on hormone replacement therapy    Esophageal stenosis    Cough, unspecified type    Anxiety    Other orders  -     azelastine (ASTELIN) 137 mcg (0.1 %) nasal spray; 2 sprays (274 mcg total) by Nasal route 2 (two) times daily.  -     (In Office Administered) Pneumococcal Conjugate Vaccine (20 Valent) (IM)         Follow up in about 1 year (around 8/7/2024).     Salazar Bird MD

## 2023-08-29 ENCOUNTER — PATIENT MESSAGE (OUTPATIENT)
Dept: INTERNAL MEDICINE | Facility: CLINIC | Age: 63
End: 2023-08-29

## 2023-08-30 ENCOUNTER — PATIENT MESSAGE (OUTPATIENT)
Dept: INTERNAL MEDICINE | Facility: CLINIC | Age: 63
End: 2023-08-30

## 2023-08-30 ENCOUNTER — PATIENT MESSAGE (OUTPATIENT)
Dept: OBSTETRICS AND GYNECOLOGY | Facility: CLINIC | Age: 63
End: 2023-08-30

## 2023-08-30 DIAGNOSIS — N95.2 VAGINAL ATROPHY: Primary | ICD-10-CM

## 2023-08-30 RX ORDER — ESTRADIOL 10 UG/1
INSERT VAGINAL
Qty: 24 TABLET | Refills: 3 | Status: ON HOLD | OUTPATIENT
Start: 2023-08-30 | End: 2024-02-01 | Stop reason: HOSPADM

## 2023-10-09 ENCOUNTER — PATIENT MESSAGE (OUTPATIENT)
Dept: PAIN MEDICINE | Facility: CLINIC | Age: 63
End: 2023-10-09

## 2023-10-24 ENCOUNTER — TELEPHONE (OUTPATIENT)
Dept: PAIN MEDICINE | Facility: CLINIC | Age: 63
End: 2023-10-24

## 2023-10-24 ENCOUNTER — OFFICE VISIT (OUTPATIENT)
Dept: PAIN MEDICINE | Facility: CLINIC | Age: 63
End: 2023-10-24
Attending: ANESTHESIOLOGY
Payer: MEDICARE

## 2023-10-24 DIAGNOSIS — M70.71 ISCHIAL BURSITIS OF RIGHT SIDE: ICD-10-CM

## 2023-10-24 DIAGNOSIS — M25.551 BILATERAL HIP PAIN: ICD-10-CM

## 2023-10-24 DIAGNOSIS — M25.552 BILATERAL HIP PAIN: ICD-10-CM

## 2023-10-24 DIAGNOSIS — M70.60 TROCHANTERIC BURSITIS, UNSPECIFIED LATERALITY: Primary | ICD-10-CM

## 2023-10-24 PROCEDURE — 99213 PR OFFICE/OUTPT VISIT, EST, LEVL III, 20-29 MIN: ICD-10-PCS | Mod: HCNC,95,GC, | Performed by: ANESTHESIOLOGY

## 2023-10-24 PROCEDURE — 1160F PR REVIEW ALL MEDS BY PRESCRIBER/CLIN PHARMACIST DOCUMENTED: ICD-10-PCS | Mod: HCNC,CPTII,95, | Performed by: ANESTHESIOLOGY

## 2023-10-24 PROCEDURE — 1159F PR MEDICATION LIST DOCUMENTED IN MEDICAL RECORD: ICD-10-PCS | Mod: HCNC,CPTII,95, | Performed by: ANESTHESIOLOGY

## 2023-10-24 PROCEDURE — 3044F HG A1C LEVEL LT 7.0%: CPT | Mod: HCNC,CPTII,95, | Performed by: ANESTHESIOLOGY

## 2023-10-24 PROCEDURE — 1159F MED LIST DOCD IN RCRD: CPT | Mod: HCNC,CPTII,95, | Performed by: ANESTHESIOLOGY

## 2023-10-24 PROCEDURE — 3044F PR MOST RECENT HEMOGLOBIN A1C LEVEL <7.0%: ICD-10-PCS | Mod: HCNC,CPTII,95, | Performed by: ANESTHESIOLOGY

## 2023-10-24 PROCEDURE — 99213 OFFICE O/P EST LOW 20 MIN: CPT | Mod: HCNC,95,GC, | Performed by: ANESTHESIOLOGY

## 2023-10-24 PROCEDURE — 1160F RVW MEDS BY RX/DR IN RCRD: CPT | Mod: HCNC,CPTII,95, | Performed by: ANESTHESIOLOGY

## 2023-10-24 NOTE — PROGRESS NOTES
Chronic Pain-Tele-Medicine-Established Note (Follow up visit)        The patient location is: Home  The chief complaint leading to consultation is: pain  Visit type: Virtual visit with synchronous audio and video  Total time spent with patient: 13 min  Each patient to whom he or she provides medical services by telemedicine is:  (1) informed of the relationship between the physician and patient and the respective role of any other health care provider with respect to management of the patient; and (2) notified that he or she may decline to receive medical services by telemedicine and may withdraw from such care at any time.          Referring Physician: No ref. provider found    Chief Complaint:   No chief complaint on file.       SUBJECTIVE: Disclaimer: This note has been generated using voice-recognition software. There may be typographical errors that have been missed during proof-reading    Interval History 10/24/2023:  Raquel Galan returns today for follow-up. Since last seen, they report their pain has been returning. It seems her right hip and ischial bursa are inflammed again and she feels she needs a repeat injection. Notes her left hip is starting to hurt also. She is to have a garden party and wants to do work outside before and needs the injections to tolerate. Still doing regular PT and exercise with silver sneakers. Still having balance difficulties from inner ear condition that leads to her stabilizing muscle overuse/strain. No numbness, tingling or weakness.     Interval History 8/15/2023:  The patient has a virtual follow up for right sided buttock and hip pain. There was difficulty with audio through the bess so the latter portion of the visit was conducted via phone audio. She is now s/p GTB and ischial bursa injection with about 90% relief. She went on a long vacation recently and was able to walk and be active. She is restarting Silver Sneakers this week. Her pain is tolerable at this time.  Her pain today is 2/10.    Interval History 2/9/2023:  The patient presents for increased right sided hip and buttock pain. She has been having more pain over the past couple of weeks. The pain is located to the right buttock/lateral hip and radiates down the side of the leg. No numbness. She has mild symptoms on the left. The pain bothers her more when she is laying at night or putting pressure on the right side. Her pain today is 8/10.    Interval History 12/5/2022:  The patient is here for follow up of right sided hip and buttock pain. She is s/p right GTB and ischial bursa injections in office on 10/19/22 with Dr. Tillman. She reports 90% relief of her pain. She was able to take a trip and walk a lot after the procedure without significant pain. She has very mild, intermittent pain since the procedure. Her pain today is 0/10.    Interval History 10/17/2022:  The patient presents today for re-evaluation of back and hip pain. I last saw her in May 2021 and she had hip injections with benefit. She says that her pain was manageable until recently. She is now having more right lateral hip and buttock pain. She had benefit with right GTB and ischial bursa in April 2021. She is no longer taking NSAIDs due to stomach issues. She takes Tylenol PRN. Her pain today is 8/10.    Interval History 5/14/2021:  The patient has a virtual video appointment for follow up of right hip and buttock pain. She is s/p right ischial bursa and GTB injection with 90% pain relief.  She now only has some mild soreness after she exercises from the right side.  She says her pain has significantly improved.  She mild pain on the left which also worsens after exercise.  She is working on walking and increasing activity for weight loss.  No new complaints noted.  Her pain today is 1/10.    Interval History 4/15/2021:  The patient reports today for follow up of of right-sided buttock and hip pain.  We have not seen the patient in was 2 years.  She  reports that she has been doing fairly well but had a recent injury that caused increased pain.  Her buttock pain is located to the bottom of her buttock and posterior thigh.  It worsens with sitting and applying pressure.  She also has right-sided lateral hip pain.  She would like to discuss injections today.  Her pain today is 8/10.    Interval History 8/28/2019:  The patient is here for follow up of left sided buttock pain.  She has intermittent Toradol injections as well as SI joint injections as needed.  She had a Toradol shot in April which controlled her symptoms well until recently.  She is requesting a repeat today.  She takes Ibuprofen sparingly as well.  Her pain worsens with sitting and standing.  She is not having any shooting into the legs.  Her pain today is 3/10.    Interval History 4/5/2019:  The patient returns to discuss pain.  After her last visit, I gave her a Toradol shot which provided her with significant benefit for some time.  She has been taking 800 mg Ibuprofen as needed with benefit, usually about once per week.  Her pain worsens with sitting and driving.  She does not have very much pain with walking.  Her pain today is 9/10.     Interval History 11/26/2018:  The patient returns for follow up of left sided lower back pain.  She is s/p left SI joint injection on 10/16/18 with minimal benefit.  Previous injection helped significantly.  She continues to report pain over the left buttock.  She does have pain over the lumbar spine, but this is mild.  Her pain is most severe when she sits for prolonged periods or changes from sitting to standing.  She is active and has been exercising and gardening.  Her previous XRAYs from last year show right sided curvature and retrolisthesis without instability.  She has not had imaging of hips or pelvis recently.  Her pain today is 6/10.  She takes OTC Ibuprofen with some benefit.    Interval History 2/1/2018:  The patient presents today for follow up.   She is s/p left SI joint injection on 1/16/18 with about 80% pain relief overall.  She has intermittent pain to left buttock which is mild.  This is mainly with bending and lifting.  Since her last visit, she has also changed her body mechanics which she thinks is helping.  She takes OTC NSAIDs sparingly as needed.  She has also increased her activity and has been walking.  Her pain today is 3/10.    Interval History 12/7/2017:  The patient presents today for follow up of left sided lower back and buttock pain.  She is s/p left L3,4,5 MBB on 11/7/17.  She reports that she felt numbness to her lower back for about one hour after the procedure.  Immediately after the procedure, she went to walk her dog in the park and started to have her usual pain.  Her pain is mainly to the left buttock at this time.  It is worsened with prolonged sitting, changing from sitting to standing, and lifting her 20 lb nephew.  She also notices that she carried him on her left hip which worsens her pain.  She does have some benefit with OTC NSAIDs.  Voltaren gel provided limited benefit.      Initial encounter:    Raquel Galan presents to the clinic for the evaluation of left sided low back pain. The pain started 10 years ago and symptoms have been worsening.    Brief history:  Patient had a history of radicular symptoms but they are not occurring now.    Pain Description:    The pain is located in the left side of lower back area and does not radiate.      At BEST  2/10     At WORST  9/10 on the WORST day.      On average pain is rated as 5/10.     Today the pain is rated as 4/10    The pain is described as aching and sharp      Symptoms interfere with daily activity.     Exacerbating factors: Sitting, Standing, Laying, Bending, Coughing/Sneezing, Walking, Morning, Lifting and Getting out of bed/chair.      Mitigating factors heat, ice, massage and medications.     Patient denies night fever/night sweats, urinary incontinence, bowel  incontinence, significant weight loss, significant motor weakness and loss of sensations.  Patient denies any suicidal or homicidal ideations    Pain Medications:  Current:  Voltaren gel  Tylenol PRN    Tried in Past:  NSAIDs - Ibuprofen and Voltaren gel  TCA -Never  SNRI -Never  Anti-convulsants -asprin 81mg  Muscle Relaxants -Never  Opioids-Never    Physical Therapy/Home Exercise: yes  -works with a physical therapist and psychiatrist     report:  Reviewed and consistent with medication use as prescribed.    Pain Procedures:   11/7/17 Left L3,4,5 MBB- limited relief after 1 hr with activity  1/16/18 Left SI joint injection- 80% relief  10/16/18 Left SI joint injection  4/29/21 Right GTB and ischial bursa injection- 90% relief  10/19/22 Right GTB and ischial bursa injection- 90% relief  6/20/23 Right GTB and ischial bursa injection- 90% relief    Chiropractor -never  Acupuncture - never  TENS unit -never  Spinal decompression -never  Joint replacement -never    CMP  Sodium   Date Value Ref Range Status   08/02/2023 140 136 - 145 mmol/L Final     Potassium   Date Value Ref Range Status   08/02/2023 4.0 3.5 - 5.1 mmol/L Final     Chloride   Date Value Ref Range Status   08/02/2023 109 95 - 110 mmol/L Final     CO2   Date Value Ref Range Status   08/02/2023 21 (L) 23 - 29 mmol/L Final     Glucose   Date Value Ref Range Status   08/02/2023 81 70 - 110 mg/dL Final     BUN   Date Value Ref Range Status   08/02/2023 16 8 - 23 mg/dL Final     Creatinine   Date Value Ref Range Status   08/02/2023 0.8 0.5 - 1.4 mg/dL Final     Calcium   Date Value Ref Range Status   08/02/2023 8.9 8.7 - 10.5 mg/dL Final     Total Protein   Date Value Ref Range Status   08/02/2023 6.7 6.0 - 8.4 g/dL Final     Albumin   Date Value Ref Range Status   08/02/2023 3.6 3.5 - 5.2 g/dL Final     Total Bilirubin   Date Value Ref Range Status   08/02/2023 0.8 0.1 - 1.0 mg/dL Final     Comment:     For infants and newborns, interpretation of results  should be based  on gestational age, weight and in agreement with clinical  observations.    Premature Infant recommended reference ranges:  Up to 24 hours.............<8.0 mg/dL  Up to 48 hours............<12.0 mg/dL  3-5 days..................<15.0 mg/dL  6-29 days.................<15.0 mg/dL       Alkaline Phosphatase   Date Value Ref Range Status   08/02/2023 86 55 - 135 U/L Final     AST   Date Value Ref Range Status   08/02/2023 23 10 - 40 U/L Final     ALT   Date Value Ref Range Status   08/02/2023 32 10 - 44 U/L Final     Anion Gap   Date Value Ref Range Status   08/02/2023 10 8 - 16 mmol/L Final     eGFR if    Date Value Ref Range Status   06/30/2022 >60.0 >60 mL/min/1.73 m^2 Final     eGFR if non    Date Value Ref Range Status   06/30/2022 >60.0 >60 mL/min/1.73 m^2 Final     Comment:     Calculation used to obtain the estimated glomerular filtration  rate (eGFR) is the CKD-EPI equation.        Lab Results   Component Value Date    WBC 6.19 08/02/2023    HGB 13.7 08/02/2023    HCT 43.3 08/02/2023     (H) 08/02/2023     08/02/2023       Imaging:  Narrative     EXAMINATION:  XR HIPS BILATERAL 2 VIEW INCL AP PELVIS    CLINICAL HISTORY:  Sacroiliitis, not elsewhere classified    TECHNIQUE:  AP view of the pelvis and frogleg lateral views of both hips were performed.    COMPARISON:  None.    FINDINGS:  There is no evidence of acute fracture, dislocation, or bone destruction.  Joint spaces are well preserved.  There is mild degenerative change of the sacroiliac joints.  No erosions are seen      Impression       See above.     Narrative     EXAMINATION:  XR LUMBAR SPINE 5 VIEW WITH FLEX AND EXT    CLINICAL HISTORY:  Low back pain, >6wks conservative tx, persistent-progressive sx, surgical candidate;  Sacroiliitis, not elsewhere classified    TECHNIQUE:  Five views of the lumbar spine plus flexion extension views were performed.    COMPARISON:  Prior dated  10/26/2017    FINDINGS:  There is dextroscoliosis of the lumbar spine.  There is retrolisthesis of L2-3 and L3 on L4 (grade 1) measuring approximately 4 mm.  This is similar to previous exam.  There is no significant change on flexion/extension views to suggest translational instability.  There are endplate degenerative changes with subchondral sclerosis and marginal osteophyte formation and disc height narrowing at L3-4.  Mild facet arthropathy is present at the lower lumbar levels.      Impression       Degenerative change of the lumbar spine with associated dextroscoliosis and L2-3/L3-4 retrolisthesis, similar to previous exam.  No translational instability.       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     Fibrocystic breast disease in female     HEARING LOSS     right side - no hearing aid    Hyperlipidemia     Osteopenia     PONV (postoperative nausea and vomiting)     PONV (postoperative nausea and vomiting)     Special screening for malignant neoplasms, colon 7/22/2013     Past Surgical History:   Procedure Laterality Date    ADENOIDECTOMY      APPENDECTOMY      BREAST BIOPSY Left     exc bx, benign per pt    BREAST SURGERY      left breast biopsy    COLONOSCOPY N/A 9/27/2018    Procedure: COLONOSCOPY;  Surgeon: Uche Smiley MD;  Location: [facility] (4TH FLR);  Service: Endoscopy;  Laterality: N/A;  pt/instructed on prep day before and day of importance    CYST REMOVAL Right     neck    DILATION AND CURETTAGE OF UTERUS      ESOPHAGOGASTRODUODENOSCOPY N/A 8/4/2021    Procedure: ESOPHAGOGASTRODUODENOSCOPY (EGD);  Surgeon: Ameya Medeiros MD;  Location: [facility] (2ND FLR);  Service: Endoscopy;  Laterality: N/A;  Food gets hung up. Dilatation likely needed.     COVID test in [place] on 8/1-GT  8/2-new instructions via portal-tb    ESOPHAGOGASTRODUODENOSCOPY N/A 5/31/2023    Procedure: EGD  (ESOPHAGOGASTRODUODENOSCOPY);  Surgeon: Lanre Vaughn MD;  Location: [facility] ENDOSCOPY;  Service: Endoscopy;  Laterality: N/A;  cardiac clearance received from -see note 5/9/23/ referred by KEMAL Krishna/ inst portal-RB  5/30 Pre call complete, ride confirmed, no wt loss inj;SG    HYSTERECTOMY      INJECTION OF JOINT Left 10/16/2018    Procedure: INJECTION, JOINT, LEFT SI JOINT INJECTION UNDER FLUORO;  Surgeon: Alirio Lei MD;  Location: [facility] PAIN MGT;  Service: Pain Management;  Laterality: Left;  NEEDS CONSENT    MANDIBLE FRACTURE SURGERY Right     scope    ROBOT-ASSISTED LAPAROSCOPIC ABDOMINAL HYSTERECTOMY USING DA ERICA XI N/A 6/12/2020    Procedure: XI ROBOTIC HYSTERECTOMY;  Surgeon: Alice Valenzuela MD;  Location: [facility] OR;  Service: OB/GYN;  Laterality: N/A;    ROBOT-ASSISTED LAPAROSCOPIC SALPINGO-OOPHORECTOMY USING DA ERICA XI N/A 6/12/2020    Procedure: XI ROBOTIC SALPINGO-OOPHORECTOMY;  Surgeon: Alice Valenzuela MD;  Location: [facility] OR;  Service: OB/GYN;  Laterality: N/A;    TONSILLECTOMY       Social History     Socioeconomic History    Marital status:    Tobacco Use    Smoking status: Never    Smokeless tobacco: Never   Substance and Sexual Activity    Alcohol use: Not Currently     Alcohol/week: 2.0 standard drinks of alcohol     Types: 2 Glasses of wine per week     Comment: social    Drug use: Never    Sexual activity: Yes     Partners: Male     Birth control/protection: Partner-Vasectomy, Post-menopausal, None   Other Topics Concern    Are you pregnant or think you may be? No    Breast-feeding No   Social History Narrative    Lives with spouse and feels safe in her home.     Social Determinants of Health     Financial Resource Strain: Low Risk  (8/2/2023)    Overall Financial Resource Strain (CARDIA)     Difficulty of Paying Living Expenses: Not very hard   Food Insecurity: No Food Insecurity (8/2/2023)    Hunger Vital Sign     Worried About Running Out of Food in the Last  Year: Never true     Ran Out of Food in the Last Year: Never true   Transportation Needs: No Transportation Needs (8/2/2023)    PRAPARE - Transportation     Lack of Transportation (Medical): No     Lack of Transportation (Non-Medical): No   Physical Activity: Insufficiently Active (8/2/2023)    Exercise Vital Sign     Days of Exercise per Week: 3 days     Minutes of Exercise per Session: 30 min   Stress: No Stress Concern Present (8/2/2023)    [employer] [place] [employer] - Occupational Stress Questionnaire     Feeling of Stress : Only a little   Social Connections: Unknown (8/2/2023)    Social Connection and Isolation Panel [NHANES]     Frequency of Communication with Friends and Family: More than three times a week     Frequency of Social Gatherings with Friends and Family: Three times a week     Active Member of Clubs or Organizations: Yes     Attends Club or Organization Meetings: More than 4 times per year     Marital Status:    Housing Stability: Low Risk  (8/2/2023)    Housing Stability Vital Sign     Unable to Pay for Housing in the Last Year: No     Number of Places Lived in the Last Year: 2     Unstable Housing in the Last Year: No     Family History   Problem Relation Age of Onset    Heart disease Mother     Kidney disease Mother     Diabetes Mother     Hypertension Mother     Hyperlipidemia Mother     COPD Mother     Arthritis Mother     Miscarriages / Stillbirths Mother     Stroke Mother     Heart disease Father     Cancer Father 75        lymphoma, bladder tumors    Arthritis Father     Breast cancer Neg Hx     Ovarian cancer Neg Hx     Colon cancer Neg Hx     Lupus Neg Hx     Psoriasis Neg Hx     Rheum arthritis Neg Hx     Inflammatory bowel disease Neg Hx        Review of patient's allergies indicates:   Allergen Reactions    Dexamethasone      Flushing and headache with medrol dosepack & tachycardia       Current Outpatient Medications   Medication Sig    ALPRAZolam (XANAX) 0.5 MG  tablet Take 1 tablet (0.5 mg total) by mouth 2 (two) times daily as needed for Anxiety.    azelastine (ASTELIN) 137 mcg (0.1 %) nasal spray 2 sprays (274 mcg total) by Nasal route 2 (two) times daily.    b complex vitamins tablet Take 1 tablet by mouth once daily.    CALCIUM 600 WITH VITAMIN D3 600 mg(1,500mg) -200 unit Tab Take 1 tablet by mouth once daily.    estradioL (ESTRACE) 1 MG tablet Take 1 tablet (1 mg total) by mouth once daily.    estradioL (YUVAFEM) 10 mcg Tab Place vaginally twice a week    fexofenadine (ALLEGRA) 180 MG tablet Take 1 tablet (180 mg total) by mouth once daily.    glucosamine-chondroitin 500-400 mg tablet Take 1 tablet by mouth Daily.    meclizine (ANTIVERT) 12.5 mg tablet Take 1 tablet (12.5 mg total) by mouth every 6 to 8 hours as needed (vertigo).    omega-3 fatty acids/fish oil (FISH OIL-OMEGA-3 FATTY ACIDS) 300-1,000 mg capsule Take 1 capsule by mouth once daily.    tretinoin (RETIN-A) 0.025 % cream Compound tretinoin 0.025% / niacinamide 2% cream. Apply a pea-sized amount to entire face qhs.     No current facility-administered medications for this visit.       REVIEW OF SYSTEMS:    GENERAL:  No weight loss, malaise or fevers.  HEENT:   No recent changes in vision or hearing  NECK:  Negative for lumps, no difficulty with swallowing.  RESPIRATORY:  Negative for cough, wheezing or shortness of breath, patient denies any recent URI.  CARDIOVASCULAR:  Negative for chest pain, leg swelling or palpitations.  GI:  Negative for abdominal discomfort, blood in stools or black stools or change in bowel habits.  MUSCULOSKELETAL:  See HPI.  SKIN:  Negative for lesions, rash, and itching.  PSYCH:  No mood disorder or recent psychosocial stressors.  Patient's sleep is disturbed secondary to pain.  HEMATOLOGY/LYMPHOLOGY:  Negative for prolonged bleeding, bruising easily or swollen nodes.  Patient is not currently taking any anti-coagulants  ENDO: No history of diabetes or thyroid  dysfunction  NEURO:   No history of headaches, syncope, paralysis, seizures or tremors.  All other reviewed and negative other than HPI.    OBJECTIVE:    PHYSICAL EXAMINATION:    General appearance: Well appearing, in no acute distress, alert and oriented x3.  Psych:  Mood and affect appropriate.  Skin: Skin color normal, no rashes or lesions, in both upper and lower body.  Extremities: Moves all visualized extremities freely.      PREVIOUS PHYSICAL EXAMINATION:    GENERAL: Well appearing, in no acute distress, alert and oriented x3.  PSYCH:  Mood and affect appropriate.  SKIN: Skin color, texture, turgor normal, no rashes or lesions.  HEAD/FACE:  Normocephalic, atraumatic. Cranial nerves grossly intact.  BACK: Straight leg raising in the sitting and supine positions is negative to radicular pain. There is no pain with palpation over the facet joints of the lumbar spine. There is full range of motion with extension to 15 degrees, and facet loading maneuvers cause reproducible pain on the right.  EXTREMITIES: Peripheral joint ROM is full and pain free without obvious instability or laxity in all four extremities. No deformities, edema, or skin discoloration. Good capillary refill.  MUSCULOSKELETAL: TTP over right ischial bursa and greater trochanteric bursa. Bilateral lower extremity strength is normal and symmetric.  No atrophy or tone abnormalities are noted.  NEURO: No loss of sensation noted.   GAIT: Normal.    ASSESSMENT: 63 y.o. year old female with left sided back/buttock pain, consistent with the following:    Encounter Diagnoses   Name Primary?    Trochanteric bursitis, unspecified laterality Yes    Ischial bursitis of right side     Bilateral hip pain          PLAN:   We discussed with the patient the assessment and recommendations. The following is the plan we agreed on:   - Previous imaging was reviewed and discussed with the patient today.    - Schedule for follow up in person to evaluate possible pain  sources and make decision on procedure accordingly      The above plan and management options were discussed at length with patient. Patient is in agreement with the above and verbalized understanding.    Aleks Maria  10/24/2023    I have personally taken the history and examined this patient and agree with the fellow's note as stated above.

## 2023-10-24 NOTE — TELEPHONE ENCOUNTER
----- Message from Dnaielle Tillman MD sent at 10/24/2023  9:45 AM CDT -----  Please schedule in person appointment. We need to check her out before we arrange for an injection

## 2024-01-19 ENCOUNTER — PATIENT MESSAGE (OUTPATIENT)
Dept: INTERNAL MEDICINE | Facility: CLINIC | Age: 64
End: 2024-01-19
Payer: MEDICARE

## 2024-01-19 DIAGNOSIS — K62.5 RECTAL BLEEDING: Primary | ICD-10-CM

## 2024-01-19 DIAGNOSIS — R10.9 ABDOMINAL CRAMPING: ICD-10-CM

## 2024-01-19 NOTE — TELEPHONE ENCOUNTER
Pt would like endoscopy referral      Pt has had blood in her stool and cramping  Do you suggest office visit first?

## 2024-01-25 ENCOUNTER — TELEPHONE (OUTPATIENT)
Dept: ENDOSCOPY | Facility: HOSPITAL | Age: 64
End: 2024-01-25
Payer: MEDICARE

## 2024-01-25 ENCOUNTER — OFFICE VISIT (OUTPATIENT)
Dept: GASTROENTEROLOGY | Facility: CLINIC | Age: 64
End: 2024-01-25
Payer: MEDICARE

## 2024-01-25 VITALS
HEIGHT: 61 IN | WEIGHT: 127.44 LBS | BODY MASS INDEX: 24.06 KG/M2 | HEART RATE: 83 BPM | SYSTOLIC BLOOD PRESSURE: 111 MMHG | DIASTOLIC BLOOD PRESSURE: 69 MMHG

## 2024-01-25 DIAGNOSIS — K62.5 RECTAL BLEEDING: ICD-10-CM

## 2024-01-25 DIAGNOSIS — R10.9 ABDOMINAL CRAMPING: ICD-10-CM

## 2024-01-25 DIAGNOSIS — K59.00 CONSTIPATION, UNSPECIFIED CONSTIPATION TYPE: Primary | ICD-10-CM

## 2024-01-25 PROCEDURE — 99999 PR PBB SHADOW E&M-EST. PATIENT-LVL IV: CPT | Mod: PBBFAC,,, | Performed by: STUDENT IN AN ORGANIZED HEALTH CARE EDUCATION/TRAINING PROGRAM

## 2024-01-25 PROCEDURE — 99214 OFFICE O/P EST MOD 30 MIN: CPT | Mod: S$GLB,,, | Performed by: STUDENT IN AN ORGANIZED HEALTH CARE EDUCATION/TRAINING PROGRAM

## 2024-01-25 RX ORDER — PANTOPRAZOLE SODIUM 40 MG/1
40 TABLET, DELAYED RELEASE ORAL DAILY
Qty: 30 TABLET | Refills: 5 | Status: SHIPPED | OUTPATIENT
Start: 2024-01-25 | End: 2025-01-24

## 2024-01-25 NOTE — TELEPHONE ENCOUNTER
"Telephoned pt and pt's  to schedule EGD/Colonoscopy.   Voicemail message left with endoscopy scheduling number for pt to return call.  Unable to leave voicemail on 's line as voicemail box is full.  Hold placed on 24.          From: Lanre Vaughn MD   Sent: 2024   9:17 AM CST   To: [facility]   Subject: Endo Case Request                                Procedure: EGD/Colonoscopy     Diagnosis: Screening colonoscopy and Dysphagia     Procedure Timin-4 weeks     *If within 4 weeks selected, please mj as high priority*     *If greater than 12 weeks, please select "5-12 weeks" and delay sending until 2 months prior to requested date*     Provider: Myself     Location: [facility]     Additional Scheduling Information: No scheduling concerns     Prep Specifications:Standard prep     Is the patient taking a GLP-1 Agonist:no     Have you attached a patient to this message: yes     "

## 2024-01-25 NOTE — PROGRESS NOTES
"    [facility] Consultation Note    Reason for Consult:  The primary encounter diagnosis was Constipation, unspecified constipation type. Diagnoses of Rectal bleeding and Abdominal cramping were also pertinent to this visit.    PCP:   Salazar Bird   [address] [facility] [place] / [place] [id number]    Referring MD:  Salazar Bird Md  [address] [facility] [place] [address]    HPI:  This is a 63 y.o. female here for evaluation of abdominal cramping and rectal bleeding.    She notes that last week she developed some abdominal cramping throughout the day.  She compares the cramping to menstrual cramps.  Along the same time period, she modified her diet a bit and eliminated granola from her diet.  She subsequently had a bowel movement that was larger than usual.  She also saw blood in the stool.     At present, she has not seen any further blood in her stool.  She does feel a fullness in her chest and that her breakfast is stuck.      She is moving her bowels at present, but feels full.  She is not passing complete bowel movements.    She did recently begin pantoprazole as needed without any significant improvement.          Objective Findings:    Vital Signs:  /69   Pulse 83   Ht 5' 1" (1.549 m)   Wt 57.8 kg (127 lb 6.8 oz)   LMP 04/15/2009   BMI 24.08 kg/m²   Body mass index is 24.08 kg/m².    Physical Exam:  General Appearance: Well appearing in no acute distress  Abdomen: Soft, non tender, non distended with positive bowel sounds in all four quadrants. No hepatosplenomegaly, ascites, or mass      Assessment:  1. Constipation, unspecified constipation type    2. Rectal bleeding    3. Abdominal cramping      I suspect the patient abdominal cramping is related to recent bowel habit changes.  She developed constipation after modifying her diet.  I have recommended she begin a daily dose of miralax to optimize her bowel habits.    As well, she will continue PPI " therapy until her endoscopic evaluation is complete.     She last had a colonoscopy 6 years ago and I have recommended we repeat this for screening.  Prior notes comment on an increased risk due to family history and she is about due.  I will also add an EGD to her procedure to assess for ongoing sources of dysphagia.  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     Will plan for an EGD/Colonoscopy in the coming weeks.        Follow up in about 3 months (around 4/25/2024).      Order summary:  Orders Placed This Encounter    pantoprazole (PROTONIX) 40 MG tablet         Thank you so much for allowing me to participate in the care of Raquel Vaughn MD

## 2024-01-25 NOTE — PATIENT INSTRUCTIONS
-We will schedule an EGD/Colonoscopy  -Begin miralax 17g once daily - 1 capful  -Continue pantoprazole 40mg once daily prior to breakfast

## 2024-01-26 ENCOUNTER — TELEPHONE (OUTPATIENT)
Dept: ENDOSCOPY | Facility: HOSPITAL | Age: 64
End: 2024-01-26
Payer: MEDICARE

## 2024-01-26 DIAGNOSIS — R13.10 DYSPHAGIA, UNSPECIFIED TYPE: ICD-10-CM

## 2024-01-26 DIAGNOSIS — Z12.11 SCREEN FOR COLON CANCER: Primary | ICD-10-CM

## 2024-01-26 RX ORDER — SOD SULF/POT CHLORIDE/MAG SULF 1.479 G
12 TABLET ORAL DAILY
Qty: 24 TABLET | Refills: 0 | Status: SHIPPED | OUTPATIENT
Start: 2024-01-26 | End: 2024-04-04

## 2024-01-26 NOTE — TELEPHONE ENCOUNTER
Returning patient's voicemail to endoscopy scheduling main line to schedule an endoscopy procedure(s):  EGD/colonoscopy. The patient did not answer the call. Voice message left requesting a call back.

## 2024-01-26 NOTE — TELEPHONE ENCOUNTER
"Spoke to pt to schedule procedure(s) Colonoscopy/EGD       Physician to perform procedure(s) Dr. SYLVIA Vaughn  Date of Procedure (s) 24  Arrival Time 12:45 PM  Time of Procedure(s) 1:45 PM   Location of Procedure(s) [facility] 2nd Floor  Type of Rx Prep sent to patient: Sutab  Instructions provided to patient via [facility]    Patient was informed on the following information and verbalized understanding. Screening questionnaire reviewed with patient and complete. If procedure requires anesthesia, a responsible adult needs to be present to accompany the patient home, patient cannot drive after receiving anesthesia. Appointment details are tentative, especially check-in time. Patient will receive a prep-op call 7 days prior to confirm check-in time for procedure. If applicable the patient should contact their pharmacy to verify Rx for procedure prep is ready for pick-up. Patient was advised to call the scheduling department at [phone] if pharmacy states no Rx is available. Patient was advised to call the endoscopy scheduling department if any questions or concerns arise.       Endoscopy Scheduling Department      ----- Message -----   From: Lanre Vaughn MD   Sent: 2024   9:17 AM CST   To: [facility]   Subject: Endo Case Request                                Procedure: EGD/Colonoscopy     Diagnosis: Screening colonoscopy and Dysphagia     Procedure Timin-4 weeks     *If within 4 weeks selected, please mj as high priority*     *If greater than 12 weeks, please select "5-12 weeks" and delay sending until 2 months prior to requested date*     Provider: Myself     Location: [facility]     Additional Scheduling Information: No scheduling concerns     Prep Specifications:Standard prep     Is the patient taking a GLP-1 Agonist:no     Have you attached a patient to this message: yes   "

## 2024-02-01 ENCOUNTER — HOSPITAL ENCOUNTER (OUTPATIENT)
Facility: HOSPITAL | Age: 64
Discharge: HOME | End: 2024-02-01
Attending: STUDENT IN AN ORGANIZED HEALTH CARE EDUCATION/TRAINING PROGRAM | Admitting: STUDENT IN AN ORGANIZED HEALTH CARE EDUCATION/TRAINING PROGRAM
Payer: MEDICARE

## 2024-02-01 ENCOUNTER — ANESTHESIA EVENT (OUTPATIENT)
Dept: ENDOSCOPY | Facility: HOSPITAL | Age: 64
End: 2024-02-01
Payer: MEDICARE

## 2024-02-01 ENCOUNTER — ANESTHESIA (OUTPATIENT)
Dept: ENDOSCOPY | Facility: HOSPITAL | Age: 64
End: 2024-02-01
Payer: MEDICARE

## 2024-02-01 ENCOUNTER — PATIENT MESSAGE (OUTPATIENT)
Dept: ENDOSCOPY | Facility: HOSPITAL | Age: 64
End: 2024-02-01

## 2024-02-01 VITALS
RESPIRATION RATE: 16 BRPM | HEART RATE: 77 BPM | SYSTOLIC BLOOD PRESSURE: 138 MMHG | DIASTOLIC BLOOD PRESSURE: 65 MMHG | TEMPERATURE: 98 F | OXYGEN SATURATION: 100 %

## 2024-02-01 DIAGNOSIS — Z12.11 COLON CANCER SCREENING: Primary | ICD-10-CM

## 2024-02-01 PROCEDURE — G0121 COLON CA SCRN NOT HI RSK IND: HCPCS | Mod: 74 | Performed by: STUDENT IN AN ORGANIZED HEALTH CARE EDUCATION/TRAINING PROGRAM

## 2024-02-01 PROCEDURE — 99900035 HC TECH TIME PER 15 MIN (STAT)

## 2024-02-01 PROCEDURE — 37000009 HC ANESTHESIA EA ADD 15 MINS: Performed by: STUDENT IN AN ORGANIZED HEALTH CARE EDUCATION/TRAINING PROGRAM

## 2024-02-01 PROCEDURE — 37000008 HC ANESTHESIA 1ST 15 MINUTES: Performed by: STUDENT IN AN ORGANIZED HEALTH CARE EDUCATION/TRAINING PROGRAM

## 2024-02-01 PROCEDURE — 27201012 HC FORCEPS, HOT/COLD, DISP: Performed by: STUDENT IN AN ORGANIZED HEALTH CARE EDUCATION/TRAINING PROGRAM

## 2024-02-01 PROCEDURE — D9220A PRA ANESTHESIA: Mod: ,,, | Performed by: NURSE ANESTHETIST, CERTIFIED REGISTERED

## 2024-02-01 PROCEDURE — 43249 ESOPH EGD DILATION <30 MM: CPT | Mod: 51,,, | Performed by: STUDENT IN AN ORGANIZED HEALTH CARE EDUCATION/TRAINING PROGRAM

## 2024-02-01 PROCEDURE — 88305 TISSUE EXAM BY PATHOLOGIST: CPT | Performed by: STUDENT IN AN ORGANIZED HEALTH CARE EDUCATION/TRAINING PROGRAM

## 2024-02-01 PROCEDURE — G0121 COLON CA SCRN NOT HI RSK IND: HCPCS | Mod: 53,,, | Performed by: STUDENT IN AN ORGANIZED HEALTH CARE EDUCATION/TRAINING PROGRAM

## 2024-02-01 PROCEDURE — 63600175 PHARM REV CODE 636 W HCPCS: Performed by: NURSE ANESTHETIST, CERTIFIED REGISTERED

## 2024-02-01 PROCEDURE — 43249 ESOPH EGD DILATION <30 MM: CPT | Performed by: STUDENT IN AN ORGANIZED HEALTH CARE EDUCATION/TRAINING PROGRAM

## 2024-02-01 PROCEDURE — C1726 CATH, BAL DIL, NON-VASCULAR: HCPCS | Performed by: STUDENT IN AN ORGANIZED HEALTH CARE EDUCATION/TRAINING PROGRAM

## 2024-02-01 PROCEDURE — 94761 N-INVAS EAR/PLS OXIMETRY MLT: CPT

## 2024-02-01 PROCEDURE — 88305 TISSUE EXAM BY PATHOLOGIST: CPT | Mod: 26,,, | Performed by: STUDENT IN AN ORGANIZED HEALTH CARE EDUCATION/TRAINING PROGRAM

## 2024-02-01 PROCEDURE — 25000003 PHARM REV CODE 250: Performed by: NURSE ANESTHETIST, CERTIFIED REGISTERED

## 2024-02-01 RX ORDER — SODIUM CHLORIDE 9 MG/ML
INJECTION, SOLUTION INTRAVENOUS CONTINUOUS
Status: DISCONTINUED | OUTPATIENT
Start: 2024-02-01 | End: 2024-02-01 | Stop reason: HOSPADM

## 2024-02-01 RX ORDER — LIDOCAINE HYDROCHLORIDE 20 MG/ML
INJECTION INTRAVENOUS
Status: DISCONTINUED | OUTPATIENT
Start: 2024-02-01 | End: 2024-02-01

## 2024-02-01 RX ORDER — FENTANYL CITRATE 50 UG/ML
INJECTION, SOLUTION INTRAMUSCULAR; INTRAVENOUS
Status: DISCONTINUED | OUTPATIENT
Start: 2024-02-01 | End: 2024-02-01

## 2024-02-01 RX ORDER — PROPOFOL 10 MG/ML
VIAL (ML) INTRAVENOUS
Status: DISCONTINUED | OUTPATIENT
Start: 2024-02-01 | End: 2024-02-01

## 2024-02-01 RX ADMIN — PROPOFOL 60 MG: 10 INJECTION, EMULSION INTRAVENOUS at 01:02

## 2024-02-01 RX ADMIN — GLYCOPYRROLATE 0.2 MG: 0.2 INJECTION, SOLUTION INTRAMUSCULAR; INTRAVENOUS at 01:02

## 2024-02-01 RX ADMIN — LIDOCAINE HYDROCHLORIDE 100 MG: 20 INJECTION INTRAVENOUS at 01:02

## 2024-02-01 RX ADMIN — FENTANYL CITRATE 50 MCG: 50 INJECTION, SOLUTION INTRAMUSCULAR; INTRAVENOUS at 01:02

## 2024-02-01 RX ADMIN — SODIUM CHLORIDE: 0.9 INJECTION, SOLUTION INTRAVENOUS at 01:02

## 2024-02-01 NOTE — ANESTHESIA PREPROCEDURE EVALUATION
02/01/2024  Raquel Galan is a 63 y.o., female.  Past Medical History:   Diagnosis Date    Allergy     Back pain     Disorder of vestibular function of both ears     Fibrocystic breast disease in female     HEARING LOSS     right side - no hearing aid    Hyperlipidemia     Osteopenia     PONV (postoperative nausea and vomiting)     PONV (postoperative nausea and vomiting)     Special screening for malignant neoplasms, colon 7/22/2013     Past Surgical History:   Procedure Laterality Date    ADENOIDECTOMY      APPENDECTOMY      BREAST BIOPSY Left     exc bx, benign per pt    BREAST SURGERY      left breast biopsy    COLONOSCOPY N/A 9/27/2018    Procedure: COLONOSCOPY;  Surgeon: Uche Smiley MD;  Location: [facility] (4TH FLR);  Service: Endoscopy;  Laterality: N/A;  pt/instructed on prep day before and day of importance    CYST REMOVAL Right     neck    DILATION AND CURETTAGE OF UTERUS      ESOPHAGOGASTRODUODENOSCOPY N/A 8/4/2021    Procedure: ESOPHAGOGASTRODUODENOSCOPY (EGD);  Surgeon: Ameya Medeiros MD;  Location: [facility] (2ND FLR);  Service: Endoscopy;  Laterality: N/A;  Food gets hung up. Dilatation likely needed.     COVID test in [place] on 8/1-GT  8/2-new instructions via portal-tb    ESOPHAGOGASTRODUODENOSCOPY N/A 5/31/2023    Procedure: EGD (ESOPHAGOGASTRODUODENOSCOPY);  Surgeon: Lanre Vaughn MD;  Location: [facility] ENDOSCOPY;  Service: Endoscopy;  Laterality: N/A;  cardiac clearance received from -see note 5/9/23/ referred by KEMAL Krishna/ inst portal-RB  5/30 Pre call complete, ride confirmed, no wt loss inj;SG    HYSTERECTOMY      INJECTION OF JOINT Left 10/16/2018    Procedure: INJECTION, JOINT, LEFT SI JOINT INJECTION UNDER FLUORO;  Surgeon: Alirio Lei MD;  Location: [facility];  Service: Pain Management;  Laterality: Left;  NEEDS CONSENT    MANDIBLE  FRACTURE SURGERY Right     scope    ROBOT-ASSISTED LAPAROSCOPIC ABDOMINAL HYSTERECTOMY USING DA ERICA XI N/A 6/12/2020    Procedure: XI ROBOTIC HYSTERECTOMY;  Surgeon: Alice Valenzuela MD;  Location: [facility];  Service: OB/GYN;  Laterality: N/A;    ROBOT-ASSISTED LAPAROSCOPIC SALPINGO-OOPHORECTOMY USING DA ERICA XI N/A 6/12/2020    Procedure: XI ROBOTIC SALPINGO-OOPHORECTOMY;  Surgeon: Alice Valenzuela MD;  Location: [facility];  Service: OB/GYN;  Laterality: N/A;    TONSILLECTOMY           Pre-op Assessment    I have reviewed the Patient Summary Reports.     I have reviewed the Nursing Notes. I have reviewed the NPO Status.   I have reviewed the Medications.     Review of Systems  Anesthesia Hx:  No problems with previous Anesthesia                Social:  Non-Smoker, No Alcohol Use       Hematology/Oncology:  Hematology Normal   Oncology Normal                                   EENT/Dental:  EENT/Dental Normal           Cardiovascular:  Cardiovascular Normal                                            Pulmonary:  Pulmonary Normal                       Renal/:  Renal/ Normal                 Hepatic/GI:  Hepatic/GI Normal                 Musculoskeletal:  Arthritis               Neurological:  Neurology Normal                                      Endocrine:  Endocrine Normal            Dermatological:  Skin Normal    Psych:  Psychiatric Normal                    Physical Exam  General: Well nourished, Cooperative, Alert and Oriented    Airway:  TM Distance: Normal  Tongue: Normal  Neck ROM: Normal ROM    Chest/Lungs:  Clear to auscultation    Heart:  Rate: Normal    Abdomen:  Normal        Anesthesia Plan  Type of Anesthesia, risks & benefits discussed:    Anesthesia Type: Gen Natural Airway  Intra-op Monitoring Plan: Standard ASA Monitors  Induction:  IV  Informed Consent: Informed consent signed with the Patient and all parties understand the risks and agree with anesthesia plan.  All questions answered.   ASA  Score: 2  Day of Surgery Review of History & Physical: H&P Update referred to the surgeon/provider.    Ready For Surgery From Anesthesia Perspective.     .

## 2024-02-01 NOTE — ANESTHESIA POSTPROCEDURE EVALUATION
Anesthesia Post Evaluation    Patient: Raquel Galan    Procedure(s) Performed: Procedure(s) (LRB):  EGD (ESOPHAGOGASTRODUODENOSCOPY) (N/A)  COLONOSCOPY (N/A)    Final Anesthesia Type: general      Patient location during evaluation: GI PACU  Patient participation: Yes- Able to Participate  Level of consciousness: awake and alert, oriented and awake  Post-procedure vital signs: reviewed and stable  Pain management: adequate  Airway patency: patent  CANDY mitigation strategies: Multimodal analgesia  PONV status at discharge: No PONV  Anesthetic complications: no      Cardiovascular status: blood pressure returned to baseline and hemodynamically stable  Respiratory status: unassisted and spontaneous ventilation  Hydration status: euvolemic  Follow-up not needed.              Vitals Value Taken Time   /65 02/01/24 1435   Temp 36.7 °C (98.1 °F) 02/01/24 1409   Pulse 77 02/01/24 1435   Resp 16 02/01/24 1435   SpO2 100 % 02/01/24 1435         Event Time   Out of Recovery 14:25:00         Pain/Abelardo Score: Abelardo Score: 10 (2/1/2024  2:29 PM)

## 2024-02-01 NOTE — H&P
Short Stay Endoscopy History and Physical    PCP - Salazar Bird MD  Referring Physician - Lanre Vaughn MD  [address] [facility] 4th floor, [place] [address]    Procedure - EGD and Colonoscopy  ASA - per anesthesia  Mallampati - per anesthesia  History of Anesthesia problems - no  Family history Anesthesia problems -  no   Plan of anesthesia - General    HPI  63 y.o. female    Reason for procedure:   Screen for colon cancer [Z12.11]  Dysphagia, unspecified type [R13.10]      ROS:  Constitutional: No fevers, chills, No weight loss  CV: No chest pain  Pulm: No cough, No shortness of breath  GI: see HPI    Medical History:  has a past medical history of Allergy, Back pain, Disorder of vestibular function of both ears, Fibrocystic breast disease in female, HEARING LOSS, Hyperlipidemia, Osteopenia, PONV (postoperative nausea and vomiting), PONV (postoperative nausea and vomiting), and Special screening for malignant neoplasms, colon (7/22/2013).    Surgical History:  has a past surgical history that includes Tonsillectomy; Appendectomy; Cyst Removal (Right); Adenoidectomy; Breast surgery; Mandible fracture surgery (Right); Dilation and curettage of uterus; Colonoscopy (N/A, 9/27/2018); Injection of joint (Left, 10/16/2018); Robot-assisted laparoscopic salpingo-oophorectomy using da Kika Xi (N/A, 6/12/2020); Robot-assisted laparoscopic abdominal hysterectomy using da Kika Xi (N/A, 6/12/2020); Hysterectomy; Breast biopsy (Left); Esophagogastroduodenoscopy (N/A, 8/4/2021); and Esophagogastroduodenoscopy (N/A, 5/31/2023).    Family History: family history includes Arthritis in her father and mother; COPD in her mother; Cancer (age of onset: 75) in her father; Diabetes in her mother; Heart disease in her father and mother; Hyperlipidemia in her mother; Hypertension in her mother; Kidney disease in her mother; Miscarriages / Stillbirths in her  mother; Stroke in her mother.    Social History:  reports that she has never smoked. She has never used smokeless tobacco. She reports that she does not currently use alcohol after a past usage of about 2.0 standard drinks of alcohol per week. She reports that she does not use drugs.    Review of patient's allergies indicates:  No Known Allergies    Medications:   Medications Prior to Admission   Medication Sig Dispense Refill Last Dose    ALPRAZolam (XANAX) 0.5 MG tablet Take 1 tablet (0.5 mg total) by mouth 2 (two) times daily as needed for Anxiety. 40 tablet 1     azelastine (ASTELIN) 137 mcg (0.1 %) nasal spray 2 sprays (274 mcg total) by Nasal route 2 (two) times daily. 30 mL 2     b complex vitamins tablet Take 1 tablet by mouth once daily.       CALCIUM 600 WITH VITAMIN D3 600 mg(1,500mg) -200 unit Tab Take 1 tablet by mouth once daily.       estradioL (ESTRACE) 1 MG tablet Take 1 tablet (1 mg total) by mouth once daily. 90 tablet 3     estradioL (YUVAFEM) 10 mcg Tab Place vaginally twice a week 24 tablet 3     fexofenadine (ALLEGRA) 180 MG tablet Take 1 tablet (180 mg total) by mouth once daily. 30 tablet 11     glucosamine-chondroitin 500-400 mg tablet Take 1 tablet by mouth Daily.       meclizine (ANTIVERT) 12.5 mg tablet Take 1 tablet (12.5 mg total) by mouth every 6 to 8 hours as needed (vertigo). (Patient not taking: Reported on 1/25/2024) 40 tablet 2     omega-3 fatty acids/fish oil (FISH OIL-OMEGA-3 FATTY ACIDS) 300-1,000 mg capsule Take 1 capsule by mouth once daily.       pantoprazole (PROTONIX) 40 MG tablet Take 1 tablet (40 mg total) by mouth once daily. 30 tablet 5     sod sulf-pot chloride-mag sulf (SUTAB) 1.479-0.188- 0.225 gram tablet Take 12 tablets by mouth once daily. Take according to package instructions with indicated amount of water. 24 tablet 0     tretinoin (RETIN-A) 0.025 % cream Compound tretinoin 0.025% / niacinamide 2% cream. Apply a pea-sized amount to entire face qhs. (Patient  not taking: Reported on 1/25/2024) 30 g 5        Physical Exam:    Vital Signs: There were no vitals filed for this visit.    General Appearance: Well appearing in no acute distress  Abdomen: Soft, non tender, non distended with normal bowel sounds, no masses    Labs:  Lab Results   Component Value Date    WBC 6.19 08/02/2023    HGB 13.7 08/02/2023    HCT 43.3 08/02/2023     08/02/2023    CHOL 181 08/02/2023    TRIG 90 08/02/2023    HDL 57 08/02/2023    ALT 32 08/02/2023    AST 23 08/02/2023     08/02/2023    K 4.0 08/02/2023     08/02/2023    CREATININE 0.8 08/02/2023    BUN 16 08/02/2023    CO2 21 (L) 08/02/2023    TSH 1.462 08/02/2023    HGBA1C 5.3 08/02/2023       I have explained the risks and benefits of this endoscopic procedure to the patient including but not limited to bleeding, inflammation, infection, perforation, and death.      Lanre Vaughn MD

## 2024-02-01 NOTE — TRANSFER OF CARE
Anesthesia Transfer of Care Note    Patient: Raquel Galan    Procedure(s) Performed: Procedure(s) (LRB):  EGD (ESOPHAGOGASTRODUODENOSCOPY) (N/A)  COLONOSCOPY (N/A)    Patient location: PACU    Anesthesia Type: general    Transport from OR: Transported from OR on room air with adequate spontaneous ventilation    Post pain: adequate analgesia    Post assessment: no apparent anesthetic complications    Post vital signs: stable    Level of consciousness: awake    Nausea/Vomiting: no nausea/vomiting    Complications: none    Transfer of care protocol was followed      Last vitals: Visit Vitals  LMP 04/15/2009   Breastfeeding No      no

## 2024-02-01 NOTE — PROVATION PATIENT INSTRUCTIONS
Discharge Summary/Instructions after an Endoscopic Procedure  Patient Name: Raquel Galan  Patient MRN: [id number]  Patient YOB: 1960 Thursday, February 1, 2024  Lanre Vaughn MD  Dear patient,  As a result of recent federal legislation (The Federal Cures Act), you may   receive lab or pathology results from your procedure in your [facility]   account before your physician is able to contact you. Your physician or   their representative will relay the results to you with their   recommendations at their soonest availability.  Thank you,  RESTRICTIONS:  During your procedure today, you received medications for sedation.  These   medications may affect your judgment, balance and coordination.  Therefore,   for 24 hours, you have the following restrictions:   - DO NOT drive a car, operate machinery, make legal/financial decisions,   sign important papers or drink alcohol.    ACTIVITY:  Today: no heavy lifting, straining or running due to procedural   sedation/anesthesia.  The following day: return to full activity including work.  DIET:  Eat and drink normally unless instructed otherwise.     TREATMENT FOR COMMON SIDE EFFECTS:  - Mild abdominal pain, nausea, belching, bloating or excessive gas:  rest,   eat lightly and use a heating pad.  - Sore Throat: treat with throat lozenges and/or gargle with warm salt   water.  - Because air was used during the procedure, expelling large amounts of air   from your rectum or belching is normal.  - If a bowel prep was taken, you may not have a bowel movement for 1-3 days.    This is normal.  SYMPTOMS TO WATCH FOR AND REPORT TO YOUR PHYSICIAN:  1. Abdominal pain or bloating, other than gas cramps.  2. Chest pain.  3. Back pain.  4. Signs of infection such as: chills or fever occurring within 24 hours   after the procedure.  5. Rectal bleeding, which would show as bright red, maroon, or black stools.   (A tablespoon of blood from the rectum is not serious,  especially if   hemorrhoids are present.)  6. Vomiting.  7. Weakness or dizziness.  GO DIRECTLY TO THE NEAREST EMERGENCY ROOM IF YOU HAVE ANY OF THE FOLLOWING:      Difficulty breathing              Chills and/or fever over 101 F   Persistent vomiting and/or vomiting blood   Severe abdominal pain   Severe chest pain   Black, tarry stools   Bleeding- more than one tablespoon   Any other symptom or condition that you feel may need urgent attention  Your doctor recommends these additional instructions:  If any biopsies were taken, your doctors clinic will contact you in 1 to 2   weeks with any results.  - Discharge patient to home (ambulatory).   - Patient has a contact number available for emergencies.  The signs and   symptoms of potential delayed complications were discussed with the   patient.  Return to normal activities tomorrow.  Written discharge   instructions were provided to the patient.   - Resume previous diet.   - Continue present medications.   - Return to primary care physician as previously scheduled.   - Repeat colonoscopy at next available appointment (within 3 months) because   the bowel preparation was poor.  For questions, problems or results please call your physician - Lanre Vaughn MD at Work:  [phone].  [facility], EMERGENCY ROOM PHONE NUMBER: [phone]  IF A COMPLICATION OR EMERGENCY SITUATION ARISES AND YOU ARE UNABLE TO REACH   YOUR PHYSICIAN - GO DIRECTLY TO THE EMERGENCY ROOM.  Lanre Vaughn MD  2/1/2024 2:06:43 PM  This report has been verified and signed electronically.  Dear patient,  As a result of recent federal legislation (The Federal Cures Act), you may   receive lab or pathology results from your procedure in your [facility]   account before your physician is able to contact you. Your physician or   their representative will relay the results to you with their   recommendations at their soonest availability.  Thank you,  PROVATION

## 2024-02-01 NOTE — PROVATION PATIENT INSTRUCTIONS
Discharge Summary/Instructions after an Endoscopic Procedure  Patient Name: Raquel Galan  Patient MRN: [id number]  Patient YOB: 1960 Thursday, February 1, 2024  Lanre Vaughn MD  Dear patient,  As a result of recent federal legislation (The Federal Cures Act), you may   receive lab or pathology results from your procedure in your [facility]   account before your physician is able to contact you. Your physician or   their representative will relay the results to you with their   recommendations at their soonest availability.  Thank you,  RESTRICTIONS:  During your procedure today, you received medications for sedation.  These   medications may affect your judgment, balance and coordination.  Therefore,   for 24 hours, you have the following restrictions:   - DO NOT drive a car, operate machinery, make legal/financial decisions,   sign important papers or drink alcohol.    ACTIVITY:  Today: no heavy lifting, straining or running due to procedural   sedation/anesthesia.  The following day: return to full activity including work.  DIET:  Eat and drink normally unless instructed otherwise.     TREATMENT FOR COMMON SIDE EFFECTS:  - Mild abdominal pain, nausea, belching, bloating or excessive gas:  rest,   eat lightly and use a heating pad.  - Sore Throat: treat with throat lozenges and/or gargle with warm salt   water.  - Because air was used during the procedure, expelling large amounts of air   from your rectum or belching is normal.  - If a bowel prep was taken, you may not have a bowel movement for 1-3 days.    This is normal.  SYMPTOMS TO WATCH FOR AND REPORT TO YOUR PHYSICIAN:  1. Abdominal pain or bloating, other than gas cramps.  2. Chest pain.  3. Back pain.  4. Signs of infection such as: chills or fever occurring within 24 hours   after the procedure.  5. Rectal bleeding, which would show as bright red, maroon, or black stools.   (A tablespoon of blood from the rectum is not serious,  especially if   hemorrhoids are present.)  6. Vomiting.  7. Weakness or dizziness.  GO DIRECTLY TO THE NEAREST EMERGENCY ROOM IF YOU HAVE ANY OF THE FOLLOWING:      Difficulty breathing              Chills and/or fever over 101 F   Persistent vomiting and/or vomiting blood   Severe abdominal pain   Severe chest pain   Black, tarry stools   Bleeding- more than one tablespoon   Any other symptom or condition that you feel may need urgent attention  Your doctor recommends these additional instructions:  If any biopsies were taken, your doctors clinic will contact you in 1 to 2   weeks with any results.  - Discharge patient to home.   - Continue present medications.   - Patient has a contact number available for emergencies.  The signs and   symptoms of potential delayed complications were discussed with the   patient.  Return to normal activities tomorrow.  Written discharge   instructions were provided to the patient.   - The findings and recommendations were discussed with the patient.  For questions, problems or results please call your physician - Lanre Vaughn MD at Work:  [phone].  [facility], EMERGENCY ROOM PHONE NUMBER: [phone]  IF A COMPLICATION OR EMERGENCY SITUATION ARISES AND YOU ARE UNABLE TO REACH   YOUR PHYSICIAN - GO DIRECTLY TO THE EMERGENCY ROOM.  Lanre Vaughn MD  2/1/2024 1:58:45 PM  This report has been verified and signed electronically.  Dear patient,  As a result of recent federal legislation (The Federal Cures Act), you may   receive lab or pathology results from your procedure in your [facility]   account before your physician is able to contact you. Your physician or   their representative will relay the results to you with their   recommendations at their soonest availability.  Thank you,  PROVATION

## 2024-02-02 ENCOUNTER — PATIENT MESSAGE (OUTPATIENT)
Dept: ENDOSCOPY | Facility: HOSPITAL | Age: 64
End: 2024-02-02
Payer: MEDICARE

## 2024-02-02 ENCOUNTER — TELEPHONE (OUTPATIENT)
Dept: ENDOSCOPY | Facility: HOSPITAL | Age: 64
End: 2024-02-02
Payer: MEDICARE

## 2024-02-02 DIAGNOSIS — Z12.11 COLON CANCER SCREENING: Primary | ICD-10-CM

## 2024-02-02 NOTE — TELEPHONE ENCOUNTER
Spoke to patient to schedule procedure(s) Colonoscopy       Physician to perform procedure(s) Dr. SYLVIA Vaughn  Date of Procedure (s) 4/11/24  Arrival Time 12:15 PM  Time of Procedure(s) 1:15 PM   Location of Procedure(s) [place] [facility] [address] Floor  Type of Rx Prep sent to patient: Miralax  Instructions provided to patient via [facility]    Patient was informed on the following information and verbalized understanding. Screening questionnaire reviewed with patient and complete. If procedure requires anesthesia, a responsible adult needs to be present to accompany the patient home, patient cannot drive after receiving anesthesia. Appointment details are tentative, especially check-in time. Patient will receive a prep-op call 7 days prior to confirm check-in time for procedure. If applicable the patient should contact their pharmacy to verify Rx for procedure prep is ready for pick-up. Patient was advised to call the scheduling department at [phone] if pharmacy states no Rx is available. Patient was advised to call the endoscopy scheduling department if any questions or concerns arise.      [facility] Scheduling Department

## 2024-02-06 LAB
FINAL PATHOLOGIC DIAGNOSIS: NORMAL
GROSS: NORMAL
Lab: NORMAL

## 2024-02-19 ENCOUNTER — PATIENT MESSAGE (OUTPATIENT)
Dept: GASTROENTEROLOGY | Facility: CLINIC | Age: 64
End: 2024-02-19
Payer: MEDICARE

## 2024-02-19 DIAGNOSIS — K59.04 CHRONIC IDIOPATHIC CONSTIPATION: Primary | ICD-10-CM

## 2024-02-23 ENCOUNTER — TELEPHONE (OUTPATIENT)
Dept: INTERNAL MEDICINE | Facility: CLINIC | Age: 64
End: 2024-02-23
Payer: MEDICARE

## 2024-02-23 DIAGNOSIS — I10 HYPERTENSION, ESSENTIAL: ICD-10-CM

## 2024-02-23 DIAGNOSIS — Z00.00 WELL ADULT EXAM: Primary | ICD-10-CM

## 2024-02-23 DIAGNOSIS — E78.5 HYPERLIPIDEMIA, UNSPECIFIED HYPERLIPIDEMIA TYPE: ICD-10-CM

## 2024-02-23 NOTE — TELEPHONE ENCOUNTER
----- Message from Bety Miguel sent at 2/23/2024 10:35 AM CST -----  Contact: [phone]  type: Lab    Caller is requesting to schedule their Lab appointment prior to annual appointment.  Order is not listed in EPIC.  Please enter order and contact patient to schedule.    Name of Caller: Raquel Scherer Date and Time of Labs:morning     Date of Annual Physical Appointment: 8/15/2024    Where would they like the lab performed? [place]    Would the patient rather a call back or a response via [facility]? call    Best Call Back Number:[phone]     Additional Information: none

## 2024-02-23 NOTE — TELEPHONE ENCOUNTER
Pt scheduled for annual in August and is requesting labs prior to visit.  Labs ordered.  I spoke to pt and scheduled lab appt on 8-8-24 at 7 am.  Pt verbalized understanding

## 2024-03-04 ENCOUNTER — PATIENT MESSAGE (OUTPATIENT)
Dept: GASTROENTEROLOGY | Facility: CLINIC | Age: 64
End: 2024-03-04
Payer: MEDICARE

## 2024-03-05 ENCOUNTER — TELEPHONE (OUTPATIENT)
Dept: PAIN MEDICINE | Facility: CLINIC | Age: 64
End: 2024-03-05
Payer: MEDICARE

## 2024-04-04 ENCOUNTER — OFFICE VISIT (OUTPATIENT)
Dept: PAIN MEDICINE | Facility: CLINIC | Age: 64
End: 2024-04-04
Attending: ANESTHESIOLOGY
Payer: MEDICARE

## 2024-04-04 ENCOUNTER — TELEPHONE (OUTPATIENT)
Dept: PAIN MEDICINE | Facility: CLINIC | Age: 64
End: 2024-04-04
Payer: MEDICARE

## 2024-04-04 ENCOUNTER — TELEPHONE (OUTPATIENT)
Dept: ENDOSCOPY | Facility: HOSPITAL | Age: 64
End: 2024-04-04
Payer: MEDICARE

## 2024-04-04 VITALS
HEART RATE: 74 BPM | BODY MASS INDEX: 24.26 KG/M2 | HEIGHT: 61 IN | SYSTOLIC BLOOD PRESSURE: 121 MMHG | DIASTOLIC BLOOD PRESSURE: 85 MMHG | WEIGHT: 128.5 LBS

## 2024-04-04 DIAGNOSIS — M76.899 TENDINITIS OF HIP, UNSPECIFIED LATERALITY: ICD-10-CM

## 2024-04-04 DIAGNOSIS — M70.61 TROCHANTERIC BURSITIS OF RIGHT HIP: Primary | ICD-10-CM

## 2024-04-04 PROCEDURE — 1125F AMNT PAIN NOTED PAIN PRSNT: CPT | Mod: CPTII,S$GLB,, | Performed by: ANESTHESIOLOGY

## 2024-04-04 PROCEDURE — 3079F DIAST BP 80-89 MM HG: CPT | Mod: CPTII,S$GLB,, | Performed by: ANESTHESIOLOGY

## 2024-04-04 PROCEDURE — 99999 PR PBB SHADOW E&M-EST. PATIENT-LVL III: CPT | Mod: PBBFAC,,, | Performed by: ANESTHESIOLOGY

## 2024-04-04 PROCEDURE — 1101F PT FALLS ASSESS-DOCD LE1/YR: CPT | Mod: CPTII,S$GLB,, | Performed by: ANESTHESIOLOGY

## 2024-04-04 PROCEDURE — 3074F SYST BP LT 130 MM HG: CPT | Mod: CPTII,S$GLB,, | Performed by: ANESTHESIOLOGY

## 2024-04-04 PROCEDURE — 99214 OFFICE O/P EST MOD 30 MIN: CPT | Mod: GC,S$GLB,, | Performed by: ANESTHESIOLOGY

## 2024-04-04 PROCEDURE — 3008F BODY MASS INDEX DOCD: CPT | Mod: CPTII,S$GLB,, | Performed by: ANESTHESIOLOGY

## 2024-04-04 PROCEDURE — 1160F RVW MEDS BY RX/DR IN RCRD: CPT | Mod: CPTII,S$GLB,, | Performed by: ANESTHESIOLOGY

## 2024-04-04 PROCEDURE — 1159F MED LIST DOCD IN RCRD: CPT | Mod: CPTII,S$GLB,, | Performed by: ANESTHESIOLOGY

## 2024-04-04 PROCEDURE — 3288F FALL RISK ASSESSMENT DOCD: CPT | Mod: CPTII,S$GLB,, | Performed by: ANESTHESIOLOGY

## 2024-04-04 NOTE — PROGRESS NOTES
Chronic Pain-Established Note (Follow up visit)        Referring Physician: No ref. provider found    Chief Complaint:   No chief complaint on file.       SUBJECTIVE: Disclaimer: This note has been generated using voice-recognition software. There may be typographical errors that have been missed during proof-reading    Interval History 4/4/2024:  Raquel Galan returns today for follow-up. She reports excellent relief of previous GTB injections. She reports return of her lateral hip pain in the same quality and character as previous. The pain is located to the lateral hip bilaterally and radiates to the knee. She denies weakness, numbness/tingling, or low back pain. Previous injection over 1 year ago with excellent relief. She is interested in repeat injections at this time.     Interval History 10/24/2023:  Raquel Galan returns today for follow-up. Since last seen, they report their pain has been returning. It seems her right hip and ischial bursa are inflammed again and she feels she needs a repeat injection. Notes her left hip is starting to hurt also. She is to have a garden party and wants to do work outside before and needs the injections to tolerate. Still doing regular PT and exercise with silver sneakers. Still having balance difficulties from inner ear condition that leads to her stabilizing muscle overuse/strain. No numbness, tingling or weakness.     Interval History 8/15/2023:  The patient has a virtual follow up for right sided buttock and hip pain. There was difficulty with audio through the bess so the latter portion of the visit was conducted via phone audio. She is now s/p GTB and ischial bursa injection with about 90% relief. She went on a long vacation recently and was able to walk and be active. She is restarting Silver Sneakers this week. Her pain is tolerable at this time. Her pain today is 2/10.    Interval History 2/9/2023:  The patient presents for increased right sided hip and  buttock pain. She has been having more pain over the past couple of weeks. The pain is located to the right buttock/lateral hip and radiates down the side of the leg. No numbness. She has mild symptoms on the left. The pain bothers her more when she is laying at night or putting pressure on the right side. Her pain today is 8/10.    Interval History 12/5/2022:  The patient is here for follow up of right sided hip and buttock pain. She is s/p right GTB and ischial bursa injections in office on 10/19/22 with Dr. Tillman. She reports 90% relief of her pain. She was able to take a trip and walk a lot after the procedure without significant pain. She has very mild, intermittent pain since the procedure. Her pain today is 0/10.    Interval History 10/17/2022:  The patient presents today for re-evaluation of back and hip pain. I last saw her in May 2021 and she had hip injections with benefit. She says that her pain was manageable until recently. She is now having more right lateral hip and buttock pain. She had benefit with right GTB and ischial bursa in April 2021. She is no longer taking NSAIDs due to stomach issues. She takes Tylenol PRN. Her pain today is 8/10.    Interval History 5/14/2021:  The patient has a virtual video appointment for follow up of right hip and buttock pain. She is s/p right ischial bursa and GTB injection with 90% pain relief.  She now only has some mild soreness after she exercises from the right side.  She says her pain has significantly improved.  She mild pain on the left which also worsens after exercise.  She is working on walking and increasing activity for weight loss.  No new complaints noted.  Her pain today is 1/10.    Interval History 4/15/2021:  The patient reports today for follow up of of right-sided buttock and hip pain.  We have not seen the patient in was 2 years.  She reports that she has been doing fairly well but had a recent injury that caused increased pain.  Her buttock  pain is located to the bottom of her buttock and posterior thigh.  It worsens with sitting and applying pressure.  She also has right-sided lateral hip pain.  She would like to discuss injections today.  Her pain today is 8/10.    Interval History 8/28/2019:  The patient is here for follow up of left sided buttock pain.  She has intermittent Toradol injections as well as SI joint injections as needed.  She had a Toradol shot in April which controlled her symptoms well until recently.  She is requesting a repeat today.  She takes Ibuprofen sparingly as well.  Her pain worsens with sitting and standing.  She is not having any shooting into the legs.  Her pain today is 3/10.    Interval History 4/5/2019:  The patient returns to discuss pain.  After her last visit, I gave her a Toradol shot which provided her with significant benefit for some time.  She has been taking 800 mg Ibuprofen as needed with benefit, usually about once per week.  Her pain worsens with sitting and driving.  She does not have very much pain with walking.  Her pain today is 9/10.     Interval History 11/26/2018:  The patient returns for follow up of left sided lower back pain.  She is s/p left SI joint injection on 10/16/18 with minimal benefit.  Previous injection helped significantly.  She continues to report pain over the left buttock.  She does have pain over the lumbar spine, but this is mild.  Her pain is most severe when she sits for prolonged periods or changes from sitting to standing.  She is active and has been exercising and gardening.  Her previous XRAYs from last year show right sided curvature and retrolisthesis without instability.  She has not had imaging of hips or pelvis recently.  Her pain today is 6/10.  She takes OTC Ibuprofen with some benefit.    Interval History 2/1/2018:  The patient presents today for follow up.  She is s/p left SI joint injection on 1/16/18 with about 80% pain relief overall.  She has intermittent pain  to left buttock which is mild.  This is mainly with bending and lifting.  Since her last visit, she has also changed her body mechanics which she thinks is helping.  She takes OTC NSAIDs sparingly as needed.  She has also increased her activity and has been walking.  Her pain today is 3/10.    Interval History 12/7/2017:  The patient presents today for follow up of left sided lower back and buttock pain.  She is s/p left L3,4,5 MBB on 11/7/17.  She reports that she felt numbness to her lower back for about one hour after the procedure.  Immediately after the procedure, she went to walk her dog in the park and started to have her usual pain.  Her pain is mainly to the left buttock at this time.  It is worsened with prolonged sitting, changing from sitting to standing, and lifting her 20 lb nephew.  She also notices that she carried him on her left hip which worsens her pain.  She does have some benefit with OTC NSAIDs.  Voltaren gel provided limited benefit.      Initial encounter:    Raquel Galan presents to the clinic for the evaluation of left sided low back pain. The pain started 10 years ago and symptoms have been worsening.    Brief history:  Patient had a history of radicular symptoms but they are not occurring now.    Pain Description:    The pain is located in the left side of lower back area and does not radiate.      At BEST  2/10     At WORST  9/10 on the WORST day.      On average pain is rated as 5/10.     Today the pain is rated as 4/10    The pain is described as aching and sharp      Symptoms interfere with daily activity.     Exacerbating factors: Sitting, Standing, Laying, Bending, Coughing/Sneezing, Walking, Morning, Lifting and Getting out of bed/chair.      Mitigating factors heat, ice, massage and medications.     Patient denies night fever/night sweats, urinary incontinence, bowel incontinence, significant weight loss, significant motor weakness and loss of sensations.  Patient denies any  suicidal or homicidal ideations    Pain Medications:  Current:  Voltaren gel  Tylenol PRN    Tried in Past:  NSAIDs - Ibuprofen and Voltaren gel  TCA -Never  SNRI -Never  Anti-convulsants -asprin 81mg  Muscle Relaxants -Never  Opioids-Never    Physical Therapy/Home Exercise: yes  -works with a physical therapist and psychiatrist     report:  Reviewed and consistent with medication use as prescribed.    Pain Procedures:   11/7/17 Left L3,4,5 MBB- limited relief after 1 hr with activity  1/16/18 Left SI joint injection- 80% relief  10/16/18 Left SI joint injection  4/29/21 Right GTB and ischial bursa injection- 90% relief  10/19/22 Right GTB and ischial bursa injection- 90% relief  6/20/23 Right GTB and ischial bursa injection- 90% relief    Chiropractor -never  Acupuncture - never  TENS unit -never  Spinal decompression -never  Joint replacement -never    CMP  Sodium   Date Value Ref Range Status   08/02/2023 140 136 - 145 mmol/L Final     Potassium   Date Value Ref Range Status   08/02/2023 4.0 3.5 - 5.1 mmol/L Final     Chloride   Date Value Ref Range Status   08/02/2023 109 95 - 110 mmol/L Final     CO2   Date Value Ref Range Status   08/02/2023 21 (L) 23 - 29 mmol/L Final     Glucose   Date Value Ref Range Status   08/02/2023 81 70 - 110 mg/dL Final     BUN   Date Value Ref Range Status   08/02/2023 16 8 - 23 mg/dL Final     Creatinine   Date Value Ref Range Status   08/02/2023 0.8 0.5 - 1.4 mg/dL Final     Calcium   Date Value Ref Range Status   08/02/2023 8.9 8.7 - 10.5 mg/dL Final     Total Protein   Date Value Ref Range Status   08/02/2023 6.7 6.0 - 8.4 g/dL Final     Albumin   Date Value Ref Range Status   08/02/2023 3.6 3.5 - 5.2 g/dL Final     Total Bilirubin   Date Value Ref Range Status   08/02/2023 0.8 0.1 - 1.0 mg/dL Final     Comment:     For infants and newborns, interpretation of results should be based  on gestational age, weight and in agreement with clinical  observations.    Premature Infant  recommended reference ranges:  Up to 24 hours.............<8.0 mg/dL  Up to 48 hours............<12.0 mg/dL  3-5 days..................<15.0 mg/dL  6-29 days.................<15.0 mg/dL       Alkaline Phosphatase   Date Value Ref Range Status   08/02/2023 86 55 - 135 U/L Final     AST   Date Value Ref Range Status   08/02/2023 23 10 - 40 U/L Final     ALT   Date Value Ref Range Status   08/02/2023 32 10 - 44 U/L Final     Anion Gap   Date Value Ref Range Status   08/02/2023 10 8 - 16 mmol/L Final     eGFR if    Date Value Ref Range Status   06/30/2022 >60.0 >60 mL/min/1.73 m^2 Final     eGFR if non    Date Value Ref Range Status   06/30/2022 >60.0 >60 mL/min/1.73 m^2 Final     Comment:     Calculation used to obtain the estimated glomerular filtration  rate (eGFR) is the CKD-EPI equation.        Lab Results   Component Value Date    WBC 6.19 08/02/2023    HGB 13.7 08/02/2023    HCT 43.3 08/02/2023     (H) 08/02/2023     08/02/2023       Imaging:  Narrative     EXAMINATION:  XR HIPS BILATERAL 2 VIEW INCL AP PELVIS    CLINICAL HISTORY:  Sacroiliitis, not elsewhere classified    TECHNIQUE:  AP view of the pelvis and frogleg lateral views of both hips were performed.    COMPARISON:  None.    FINDINGS:  There is no evidence of acute fracture, dislocation, or bone destruction.  Joint spaces are well preserved.  There is mild degenerative change of the sacroiliac joints.  No erosions are seen      Impression       See above.     Narrative     EXAMINATION:  XR LUMBAR SPINE 5 VIEW WITH FLEX AND EXT    CLINICAL HISTORY:  Low back pain, >6wks conservative tx, persistent-progressive sx, surgical candidate;  Sacroiliitis, not elsewhere classified    TECHNIQUE:  Five views of the lumbar spine plus flexion extension views were performed.    COMPARISON:  Prior dated 10/26/2017    FINDINGS:  There is dextroscoliosis of the lumbar spine.  There is retrolisthesis of L2-3 and L3 on L4  (grade 1) measuring approximately 4 mm.  This is similar to previous exam.  There is no significant change on flexion/extension views to suggest translational instability.  There are endplate degenerative changes with subchondral sclerosis and marginal osteophyte formation and disc height narrowing at L3-4.  Mild facet arthropathy is present at the lower lumbar levels.      Impression       Degenerative change of the lumbar spine with associated dextroscoliosis and L2-3/L3-4 retrolisthesis, similar to previous exam.  No translational instability.       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     Fibrocystic breast disease in female     HEARING LOSS     right side - no hearing aid    Hyperlipidemia     Osteopenia     PONV (postoperative nausea and vomiting)     PONV (postoperative nausea and vomiting)     Special screening for malignant neoplasms, colon 7/22/2013     Past Surgical History:   Procedure Laterality Date    ADENOIDECTOMY      APPENDECTOMY      BREAST BIOPSY Left     exc bx, benign per pt    BREAST SURGERY      left breast biopsy    COLONOSCOPY N/A 9/27/2018    Procedure: COLONOSCOPY;  Surgeon: Uche Smiley MD;  Location: [facility] (4TH FLR);  Service: Endoscopy;  Laterality: N/A;  pt/instructed on prep day before and day of importance    COLONOSCOPY N/A 2/1/2024    Procedure: COLONOSCOPY;  Surgeon: Lanre Vaughn MD;  Location: [facility] ENDOSCOPY;  Service: Endoscopy;  Laterality: N/A;    CYST REMOVAL Right     neck    DILATION AND CURETTAGE OF UTERUS      ESOPHAGOGASTRODUODENOSCOPY N/A 8/4/2021    Procedure: ESOPHAGOGASTRODUODENOSCOPY (EGD);  Surgeon: Ameya Medeiros MD;  Location: [facility] (2ND FLR);  Service: Endoscopy;  Laterality: N/A;  Food gets hung up. Dilatation likely needed.     COVID test in [place] on 8/1-GT  8/2-new instructions via portal-tb    ESOPHAGOGASTRODUODENOSCOPY N/A  5/31/2023    Procedure: EGD (ESOPHAGOGASTRODUODENOSCOPY);  Surgeon: Lanre Vaughn MD;  Location: [facility] ENDOSCOPY;  Service: Endoscopy;  Laterality: N/A;  cardiac clearance received from -see note 5/9/23/ referred by KEMAL Krishna/ inst portal-RB  5/30 Pre call complete, ride confirmed, no wt loss inj;SG    ESOPHAGOGASTRODUODENOSCOPY N/A 2/1/2024    Procedure: EGD (ESOPHAGOGASTRODUODENOSCOPY);  Surgeon: Lanre Vaughn MD;  Location: [facility] ENDOSCOPY;  Service: Endoscopy;  Laterality: N/A;  1/26/24-Roderick pt, [place], instr portal-DS    HYSTERECTOMY      INJECTION OF JOINT Left 10/16/2018    Procedure: INJECTION, JOINT, LEFT SI JOINT INJECTION UNDER FLUORO;  Surgeon: Alirio Lei MD;  Location: [facility] PAIN MGT;  Service: Pain Management;  Laterality: Left;  NEEDS CONSENT    MANDIBLE FRACTURE SURGERY Right     scope    ROBOT-ASSISTED LAPAROSCOPIC ABDOMINAL HYSTERECTOMY USING DA ERICA XI N/A 6/12/2020    Procedure: XI ROBOTIC HYSTERECTOMY;  Surgeon: Alice Valenzuela MD;  Location: [facility] OR;  Service: OB/GYN;  Laterality: N/A;    ROBOT-ASSISTED LAPAROSCOPIC SALPINGO-OOPHORECTOMY USING DA ERICA XI N/A 6/12/2020    Procedure: XI ROBOTIC SALPINGO-OOPHORECTOMY;  Surgeon: Alice Valenzuela MD;  Location: [facility] OR;  Service: OB/GYN;  Laterality: N/A;    TONSILLECTOMY       Social History     Socioeconomic History    Marital status:    Tobacco Use    Smoking status: Never    Smokeless tobacco: Never   Substance and Sexual Activity    Alcohol use: Not Currently     Alcohol/week: 2.0 standard drinks of alcohol     Types: 2 Glasses of wine per week     Comment: social    Drug use: Never    Sexual activity: Yes     Partners: Male     Birth control/protection: Partner-Vasectomy, Post-menopausal, None   Other Topics Concern    Are you pregnant or think you may be? No    Breast-feeding No   Social History Narrative    Lives with spouse and feels safe in her home.     Social Determinants of Health      Financial Resource Strain: Low Risk  (8/2/2023)    Overall Financial Resource Strain (CARDIA)     Difficulty of Paying Living Expenses: Not very hard   Food Insecurity: No Food Insecurity (8/2/2023)    Hunger Vital Sign     Worried About Running Out of Food in the Last Year: Never true     Ran Out of Food in the Last Year: Never true   Transportation Needs: No Transportation Needs (8/2/2023)    PRAPARE - Transportation     Lack of Transportation (Medical): No     Lack of Transportation (Non-Medical): No   Physical Activity: Insufficiently Active (8/2/2023)    Exercise Vital Sign     Days of Exercise per Week: 3 days     Minutes of Exercise per Session: 30 min   Stress: No Stress Concern Present (8/2/2023)    [employer] - Occupational Stress Questionnaire     Feeling of Stress : Only a little   Social Connections: Unknown (8/2/2023)    Social Connection and Isolation Panel [NHANES]     Frequency of Communication with Friends and Family: More than three times a week     Frequency of Social Gatherings with Friends and Family: Three times a week     Active Member of Clubs or Organizations: Yes     Attends Club or Organization Meetings: More than 4 times per year     Marital Status:    Housing Stability: Low Risk  (8/2/2023)    Housing Stability Vital Sign     Unable to Pay for Housing in the Last Year: No     Number of Places Lived in the Last Year: 2     Unstable Housing in the Last Year: No     Family History   Problem Relation Age of Onset    Heart disease Mother     Kidney disease Mother     Diabetes Mother     Hypertension Mother     Hyperlipidemia Mother     COPD Mother     Arthritis Mother     Miscarriages / Stillbirths Mother     Stroke Mother     Heart disease Father     Cancer Father 75        lymphoma, bladder tumors    Arthritis Father     Breast cancer Neg Hx     Ovarian cancer Neg Hx     Colon cancer Neg Hx     Lupus Neg Hx     Psoriasis Neg Hx     Rheum arthritis  Neg Hx     Inflammatory bowel disease Neg Hx        Review of patient's allergies indicates:   Allergen Reactions    Dexamethasone      Flushing and headache with medrol dosepack & tachycardia       Current Outpatient Medications   Medication Sig    b complex vitamins tablet Take 1 tablet by mouth once daily.    CALCIUM 600 WITH VITAMIN D3 600 mg(1,500mg) -200 unit Tab Take 1 tablet by mouth once daily.    estradioL (ESTRACE) 1 MG tablet Take 1 tablet (1 mg total) by mouth once daily.    glucosamine-chondroitin 500-400 mg tablet Take 1 tablet by mouth Daily.    linaCLOtide (LINZESS) 145 mcg Cap capsule Take 1 capsule (145 mcg total) by mouth before breakfast.    omega-3 fatty acids/fish oil (FISH OIL-OMEGA-3 FATTY ACIDS) 300-1,000 mg capsule Take 1 capsule by mouth once daily.    pantoprazole (PROTONIX) 40 MG tablet Take 1 tablet (40 mg total) by mouth once daily.    tretinoin (RETIN-A) 0.025 % cream Compound tretinoin 0.025% / niacinamide 2% cream. Apply a pea-sized amount to entire face qhs.    fexofenadine (ALLEGRA) 180 MG tablet Take 1 tablet (180 mg total) by mouth once daily.     No current facility-administered medications for this visit.       REVIEW OF SYSTEMS:    GENERAL:  No weight loss, malaise or fevers.  HEENT:   No recent changes in vision or hearing  NECK:  Negative for lumps, no difficulty with swallowing.  RESPIRATORY:  Negative for cough, wheezing or shortness of breath, patient denies any recent URI.  CARDIOVASCULAR:  Negative for chest pain, leg swelling or palpitations.  GI:  Negative for abdominal discomfort, blood in stools or black stools or change in bowel habits.  MUSCULOSKELETAL:  See HPI.  SKIN:  Negative for lesions, rash, and itching.  PSYCH:  No mood disorder or recent psychosocial stressors.  Patient's sleep is disturbed secondary to pain.  HEMATOLOGY/LYMPHOLOGY:  Negative for prolonged bleeding, bruising easily or swollen nodes.  Patient is not currently taking any  anti-coagulants  ENDO: No history of diabetes or thyroid dysfunction  NEURO:   No history of headaches, syncope, paralysis, seizures or tremors.  All other reviewed and negative other than HPI.    OBJECTIVE:    PHYSICAL EXAMINATION:    General appearance: Well appearing, in no acute distress, alert and oriented x3.  Psych:  Mood and affect appropriate.  Skin: Skin color normal, no rashes or lesions, in both upper and lower body.  Extremities: Moves all visualized extremities freely.      PREVIOUS PHYSICAL EXAMINATION:    GENERAL: Well appearing, in no acute distress, alert and oriented x3.  PSYCH:  Mood and affect appropriate.  SKIN: Skin color, texture, turgor normal, no rashes or lesions.  HEAD/FACE:  Normocephalic, atraumatic. Cranial nerves grossly intact.  BACK: Straight leg raising in the sitting and supine positions is negative to radicular pain. There is no pain with palpation over the facet joints of the lumbar spine. Full ROM without pain.   EXTREMITIES: Peripheral joint ROM is full and pain free without obvious instability or laxity in all four extremities. No deformities, edema, or skin discoloration. Good capillary refill.  MUSCULOSKELETAL: No muscular atrophy, gross deformity or overlying skin change. TTP to bilateral greater trochanters. No TTP to bilateral SIJ or lumbar paraspinals. Internal rotation of bilateral hips reproduces lateral hip pain.  Bilateral lower extremity strength is normal and symmetric.  No atrophy or tone abnormalities are noted. +TARA for lateral hip pain. -FADIR, -Yeomans, -Thigh Thrust, negative Gaenslen's. Log roll.   NEURO: No loss of sensation noted.   GAIT: Normal.    ASSESSMENT: 63 y.o. year old female with left sided back/buttock pain, consistent with the following:    Encounter Diagnoses   Name Primary?    Trochanteric bursitis of right hip Yes    Tendinitis of hip, unspecified laterality            PLAN:   We discussed with the patient the assessment and  recommendations. The following is the plan we agreed on:     - Previous imaging was reviewed and discussed with the patient today.    - Schedule for bilateral gluteal tendon sheath injections under ultrasound guidance.     - Return to clinic 2 weeks following procedure for follow up.       The above plan and management options were discussed at length with patient. Patient is in agreement with the above and verbalized understanding.    Juliet Kovacs  04/04/2024    I have personally taken the history and examined this patient and agree with the resident's note as stated above.

## 2024-04-08 ENCOUNTER — ANESTHESIA EVENT (OUTPATIENT)
Dept: ENDOSCOPY | Facility: HOSPITAL | Age: 64
End: 2024-04-08
Payer: MEDICARE

## 2024-04-08 ENCOUNTER — TELEPHONE (OUTPATIENT)
Dept: ENDOSCOPY | Facility: HOSPITAL | Age: 64
End: 2024-04-08
Payer: MEDICARE

## 2024-04-08 NOTE — ANESTHESIA PREPROCEDURE EVALUATION
04/08/2024  Raquel Galan is a 63 y.o., female.    [facility]  Anesthesia Pre-Operative Evaluation       Patient Name: Raquel Galan  YOB: 1960  MRN: [id number]  CSN: [id number]      Code Status: Prior   Date of Procedure: 4/11/2024  Anesthesia: Choice Procedure: Procedure(s) (LRB):  COLONOSCOPY (N/A)  Pre-Operative Diagnosis: Colon cancer screening [Z12.11]  Proceduralist: Surgeon(s) and Role:     * Lanre Vaughn MD - Primary Nurse: (Unknown)      SUBJECTIVE:   Raquel Galan is a 63 y.o. 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     she has a current medication list which includes the following long-term medication(s): calcium 600 with vitamin d3, estradiol, fexofenadine, and pantoprazole.     ALLERGIES:   Review of patient's allergies indicates:  No Known Allergies  LDA:          Lines/Drains/Airways       None                  Anesthesia Evaluation      Airway   Dental      Pulmonary    Cardiovascular     ECG reviewed    Neuro/Psych      GI/Hepatic/Renal      Endo/Other    (+) arthritis  Abdominal                     MEDICATIONS:     Antibiotics (From admission, onward)      None          VTE Risk Mitigation (From admission, onward)      None              No current facility-administered medications for this encounter.     Current Outpatient Medications   Medication Sig Dispense Refill    b complex vitamins tablet Take 1 tablet by mouth once daily.      CALCIUM 600 WITH VITAMIN D3 600 mg(1,500mg) -200 unit Tab Take 1 tablet by mouth once daily.      estradioL (ESTRACE) 1  MG tablet Take 1 tablet (1 mg total) by mouth once daily. 90 tablet 3    fexofenadine (ALLEGRA) 180 MG tablet Take 1 tablet (180 mg total) by mouth once daily. 30 tablet 11    glucosamine-chondroitin 500-400 mg tablet Take 1 tablet by mouth Daily.      linaCLOtide (LINZESS) 145 mcg Cap capsule Take 1 capsule (145 mcg total) by mouth before breakfast. 30 capsule 5    omega-3 fatty acids/fish oil (FISH OIL-OMEGA-3 FATTY ACIDS) 300-1,000 mg capsule Take 1 capsule by mouth once daily.      pantoprazole (PROTONIX) 40 MG tablet Take 1 tablet (40 mg total) by mouth once daily. 30 tablet 5    tretinoin (RETIN-A) 0.025 % cream Compound tretinoin 0.025% / niacinamide 2% cream. Apply a pea-sized amount to entire face qhs. 30 g 5          History:   There are no hospital problems to display for this patient.    Surgical History:    has a past surgical history that includes Tonsillectomy; Appendectomy; Cyst Removal (Right); Adenoidectomy; Breast surgery; Mandible fracture surgery (Right); Dilation and curettage of uterus; Colonoscopy (N/A, 9/27/2018); Injection of joint (Left, 10/16/2018); Robot-assisted laparoscopic salpingo-oophorectomy using da Kika Xi (N/A, 6/12/2020); Robot-assisted laparoscopic abdominal hysterectomy using da Kika Xi (N/A, 6/12/2020); Hysterectomy; Breast biopsy (Left); Esophagogastroduodenoscopy (N/A, 8/4/2021); Esophagogastroduodenoscopy (N/A, 5/31/2023); Esophagogastroduodenoscopy (N/A, 2/1/2024); and Colonoscopy (N/A, 2/1/2024).   Social History:    reports being sexually active and has had partner(s) who are male. She reports using the following methods of birth control/protection: Partner-Vasectomy, Post-menopausal, and None.  reports that she has never smoked. She has never used smokeless tobacco. She reports that she does not currently use alcohol after a past usage of about 2.0 standard drinks of alcohol per week. She reports that she does not use drugs.     OBJECTIVE:     Vital Signs (Most  "Recent):    Vital Signs Range (Last 24H):          There is no height or weight on file to calculate BMI.   Wt Readings from Last 4 Encounters:   04/04/24 58.3 kg (128 lb 8.5 oz)   01/25/24 57.8 kg (127 lb 6.8 oz)   08/07/23 55.8 kg (123 lb)   08/07/23 56.9 kg (125 lb 7.1 oz)       Significant Labs:  Lab Results   Component Value Date    WBC 6.19 08/02/2023    HGB 13.7 08/02/2023    HCT 43.3 08/02/2023     08/02/2023     08/02/2023    K 4.0 08/02/2023     08/02/2023    CREATININE 0.8 08/02/2023    BUN 16 08/02/2023    CO2 21 (L) 08/02/2023    GLU 81 08/02/2023    CALCIUM 8.9 08/02/2023    ALKPHOS 86 08/02/2023    ALT 32 08/02/2023    AST 23 08/02/2023    ALBUMIN 3.6 08/02/2023    HGBA1C 5.3 08/02/2023    TROPONINI <0.006 04/26/2023    BNP 24 04/26/2023     Patient's last menstrual period was 04/15/2009.  No results found for this or any previous visit (from the past 72 hour(s)).    EKG:   Results for orders placed or performed during the hospital encounter of 04/26/23   EKG 12-lead    Collection Time: 04/26/23  2:54 PM    Narrative    Test Reason : R07.9,    Vent. Rate : 078 BPM     Atrial Rate : 078 BPM     P-R Int : 146 ms          QRS Dur : 080 ms      QT Int : 394 ms       P-R-T Axes : 073 058 081 degrees     QTc Int : 449 ms    Normal sinus rhythm  Normal ECG  When compared with ECG of 17-AUG-2022 08:18,  No change  Confirmed by Andrew CHAUDHARI MD ([id number]) on 4/26/2023 3:39:05 PM    Referred By: [email or web address]   SELF           Confirmed By:Andrew CHAUDHARI MD       TTE:  No results found for this or any previous visit.  EF   Date Value Ref Range Status   08/17/2022 65 % Final      No results found for this or any previous visit.  KEENAN:  No results found for this or any previous visit.  Stress Test:  No results found for this or any previous visit.     LHC:  No results found for this or any previous visit.     PFT:  No results found for: "FEV1", "FVC", "VKD1PBE", "TLC", "DLCO"     ASSESSMENT/PLAN:       Pre-op " Assessment    I have reviewed the Patient Summary Reports.     I have reviewed the Nursing Notes. I have reviewed the NPO Status.   I have reviewed the Medications.     Review of Systems  Anesthesia Hx:  No problems with previous Anesthesia             Denies Family Hx of Anesthesia complications.   Personal Hx of Anesthesia complications, Post-Operative Nausea/Vomiting                    Social:  Non-Smoker, No Alcohol Use       Hematology/Oncology:  Hematology Normal   Oncology Normal                                   EENT/Dental:  EENT/Dental Normal           Cardiovascular:  Cardiovascular Normal                  ECG has been reviewed.                          Pulmonary:  Pulmonary Normal                       Renal/:  Renal/ Normal                 Hepatic/GI:  Hepatic/GI Normal                 Musculoskeletal:  Arthritis               Neurological:  Neurology Normal                                      Endocrine:  Endocrine Normal            Dermatological:  Skin Normal    Psych:  Psychiatric Normal                       Anesthesia Plan  Type of Anesthesia, risks & benefits discussed:    Anesthesia Type: Gen Natural Airway  Intra-op Monitoring Plan: Standard ASA Monitors  Induction:  IV  Informed Consent: Informed consent signed with the Patient and all parties understand the risks and agree with anesthesia plan.  All questions answered. Patient consented to blood products? No  ASA Score: 2  Day of Surgery Review of History & Physical: H&P Update referred to the surgeon/provider.    Ready For Surgery From Anesthesia Perspective.     .

## 2024-04-11 ENCOUNTER — HOSPITAL ENCOUNTER (OUTPATIENT)
Facility: HOSPITAL | Age: 64
Discharge: HOME | End: 2024-04-11
Attending: STUDENT IN AN ORGANIZED HEALTH CARE EDUCATION/TRAINING PROGRAM | Admitting: STUDENT IN AN ORGANIZED HEALTH CARE EDUCATION/TRAINING PROGRAM
Payer: MEDICARE

## 2024-04-11 ENCOUNTER — ANESTHESIA (OUTPATIENT)
Dept: ENDOSCOPY | Facility: HOSPITAL | Age: 64
End: 2024-04-11
Payer: MEDICARE

## 2024-04-11 VITALS
DIASTOLIC BLOOD PRESSURE: 63 MMHG | HEIGHT: 61 IN | SYSTOLIC BLOOD PRESSURE: 135 MMHG | TEMPERATURE: 98 F | RESPIRATION RATE: 16 BRPM | OXYGEN SATURATION: 100 % | HEART RATE: 62 BPM | BODY MASS INDEX: 23.6 KG/M2 | WEIGHT: 125 LBS

## 2024-04-11 DIAGNOSIS — Z12.11 COLON CANCER SCREENING: Primary | ICD-10-CM

## 2024-04-11 PROCEDURE — 88305 TISSUE EXAM BY PATHOLOGIST: CPT | Performed by: STUDENT IN AN ORGANIZED HEALTH CARE EDUCATION/TRAINING PROGRAM

## 2024-04-11 PROCEDURE — 63600175 PHARM REV CODE 636 W HCPCS: Performed by: REGISTERED NURSE

## 2024-04-11 PROCEDURE — 37000008 HC ANESTHESIA 1ST 15 MINUTES: Performed by: STUDENT IN AN ORGANIZED HEALTH CARE EDUCATION/TRAINING PROGRAM

## 2024-04-11 PROCEDURE — 45385 COLONOSCOPY W/LESION REMOVAL: CPT | Mod: PT | Performed by: STUDENT IN AN ORGANIZED HEALTH CARE EDUCATION/TRAINING PROGRAM

## 2024-04-11 PROCEDURE — 88305 TISSUE EXAM BY PATHOLOGIST: CPT | Mod: 26,,, | Performed by: STUDENT IN AN ORGANIZED HEALTH CARE EDUCATION/TRAINING PROGRAM

## 2024-04-11 PROCEDURE — 27201089 HC SNARE, DISP (ANY): Performed by: STUDENT IN AN ORGANIZED HEALTH CARE EDUCATION/TRAINING PROGRAM

## 2024-04-11 PROCEDURE — 94761 N-INVAS EAR/PLS OXIMETRY MLT: CPT

## 2024-04-11 PROCEDURE — 37000009 HC ANESTHESIA EA ADD 15 MINS: Performed by: STUDENT IN AN ORGANIZED HEALTH CARE EDUCATION/TRAINING PROGRAM

## 2024-04-11 PROCEDURE — 99900035 HC TECH TIME PER 15 MIN (STAT)

## 2024-04-11 PROCEDURE — 45385 COLONOSCOPY W/LESION REMOVAL: CPT | Mod: PT,,, | Performed by: STUDENT IN AN ORGANIZED HEALTH CARE EDUCATION/TRAINING PROGRAM

## 2024-04-11 PROCEDURE — 25000003 PHARM REV CODE 250: Performed by: STUDENT IN AN ORGANIZED HEALTH CARE EDUCATION/TRAINING PROGRAM

## 2024-04-11 PROCEDURE — D9220A PRA ANESTHESIA: Mod: PT,,, | Performed by: REGISTERED NURSE

## 2024-04-11 PROCEDURE — 25000003 PHARM REV CODE 250: Performed by: REGISTERED NURSE

## 2024-04-11 RX ORDER — LIDOCAINE HYDROCHLORIDE 20 MG/ML
INJECTION INTRAVENOUS
Status: DISCONTINUED | OUTPATIENT
Start: 2024-04-11 | End: 2024-04-11

## 2024-04-11 RX ORDER — SODIUM CHLORIDE 9 MG/ML
INJECTION, SOLUTION INTRAVENOUS CONTINUOUS
Status: DISCONTINUED | OUTPATIENT
Start: 2024-04-11 | End: 2024-04-11 | Stop reason: HOSPADM

## 2024-04-11 RX ORDER — PROPOFOL 10 MG/ML
VIAL (ML) INTRAVENOUS CONTINUOUS PRN
Status: DISCONTINUED | OUTPATIENT
Start: 2024-04-11 | End: 2024-04-11

## 2024-04-11 RX ORDER — PROPOFOL 10 MG/ML
VIAL (ML) INTRAVENOUS
Status: DISCONTINUED | OUTPATIENT
Start: 2024-04-11 | End: 2024-04-11

## 2024-04-11 RX ADMIN — LIDOCAINE HYDROCHLORIDE 100 MG: 20 INJECTION INTRAVENOUS at 01:04

## 2024-04-11 RX ADMIN — PROPOFOL 100 MG: 10 INJECTION, EMULSION INTRAVENOUS at 01:04

## 2024-04-11 RX ADMIN — SODIUM CHLORIDE: 0.9 INJECTION, SOLUTION INTRAVENOUS at 12:04

## 2024-04-11 RX ADMIN — PROPOFOL 150 MCG/KG/MIN: 10 INJECTION, EMULSION INTRAVENOUS at 01:04

## 2024-04-11 NOTE — TRANSFER OF CARE
"Anesthesia Transfer of Care Note    Patient: Raquel Galan    Procedure(s) Performed: Procedure(s) (LRB):  COLONOSCOPY (N/A)    Patient location: PACU    Anesthesia Type: general    Transport from OR: Transported from OR on room air with adequate spontaneous ventilation    Post pain: adequate analgesia    Post assessment: no apparent anesthetic complications and tolerated procedure well    Post vital signs: stable    Level of consciousness: awake    Nausea/Vomiting: no nausea/vomiting    Complications: none    Transfer of care protocol was followed      Last vitals: Visit Vitals  /60 (BP Location: Left arm, Patient Position: Sitting)   Pulse 73   Temp 36.4 °C (97.5 °F) (Skin)   Resp 16   Ht 5' 1" (1.549 m)   Wt 56.7 kg (125 lb)   LMP 04/15/2009   SpO2 98%   Breastfeeding No   BMI 23.62 kg/m²     "

## 2024-04-11 NOTE — PLAN OF CARE
Pt in preop bay 6, VSS and IV inserted. Pt denies any open wounds on body or the use of any weight loss injections. Pt needs an  procedural consents and anesthesia consents,

## 2024-04-11 NOTE — H&P
Short Stay Endoscopy History and Physical    PCP - Salazar Bird MD  Referring Physician - Salazar Bird MD  [address] [facility] [place] [id number]    Procedure - Colonoscopy  ASA - per anesthesia  Mallampati - per anesthesia  History of Anesthesia problems - no  Family history Anesthesia problems -  no   Plan of anesthesia - General    HPI  63 y.o. 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    Surgical History:  has a past surgical history that includes Tonsillectomy; Appendectomy; Cyst Removal (Right); Adenoidectomy; Breast surgery; Mandible fracture surgery (Right); Dilation and curettage of uterus; Colonoscopy (N/A, 9/27/2018); Injection of joint (Left, 10/16/2018); Robot-assisted laparoscopic salpingo-oophorectomy using da Kika Xi (N/A, 6/12/2020); Robot-assisted laparoscopic abdominal hysterectomy using da Kika Xi (N/A, 6/12/2020); Hysterectomy; Breast biopsy (Left); Esophagogastroduodenoscopy (N/A, 8/4/2021); Esophagogastroduodenoscopy (N/A, 5/31/2023); Esophagogastroduodenoscopy (N/A, 2/1/2024); and Colonoscopy (N/A, 2/1/2024).    Family History: family history includes Arthritis in her father and mother; COPD in her mother; Cancer (age of onset: 75) in her father; Diabetes in her mother; Heart disease in her father and mother; Hyperlipidemia in her mother; Hypertension in her mother; Kidney disease in her mother; Miscarriages / Stillbirths in her mother; Stroke in her  mother..    Social History:  reports that she has never smoked. She has never used smokeless tobacco. She reports that she does not currently use alcohol after a past usage of about 2.0 standard drinks of alcohol per week. She reports that she does not use drugs.    Review of patient's allergies indicates:  No Known Allergies    Medications:   Medications Prior to Admission   Medication Sig Dispense Refill Last Dose    b complex vitamins tablet Take 1 tablet by mouth once daily.       CALCIUM 600 WITH VITAMIN D3 600 mg(1,500mg) -200 unit Tab Take 1 tablet by mouth once daily.       estradioL (ESTRACE) 1 MG tablet Take 1 tablet (1 mg total) by mouth once daily. 90 tablet 3     fexofenadine (ALLEGRA) 180 MG tablet Take 1 tablet (180 mg total) by mouth once daily. 30 tablet 11     glucosamine-chondroitin 500-400 mg tablet Take 1 tablet by mouth Daily.       linaCLOtide (LINZESS) 145 mcg Cap capsule Take 1 capsule (145 mcg total) by mouth before breakfast. 30 capsule 5     omega-3 fatty acids/fish oil (FISH OIL-OMEGA-3 FATTY ACIDS) 300-1,000 mg capsule Take 1 capsule by mouth once daily.       pantoprazole (PROTONIX) 40 MG tablet Take 1 tablet (40 mg total) by mouth once daily. 30 tablet 5     tretinoin (RETIN-A) 0.025 % cream Compound tretinoin 0.025% / niacinamide 2% cream. Apply a pea-sized amount to entire face qhs. 30 g 5        Physical Exam:    Vital Signs: There were no vitals filed for this visit.    General Appearance: Well appearing in no acute distress  Abdomen: Soft, non tender, non distended with normal bowel sounds, no masses    Labs:  Lab Results   Component Value Date    WBC 6.19 08/02/2023    HGB 13.7 08/02/2023    HCT 43.3 08/02/2023     08/02/2023    CHOL 181 08/02/2023    TRIG 90 08/02/2023    HDL 57 08/02/2023    ALT 32 08/02/2023    AST 23 08/02/2023     08/02/2023    K 4.0 08/02/2023     08/02/2023    CREATININE 0.8 08/02/2023    BUN 16 08/02/2023    CO2 21 (L) 08/02/2023     TSH 1.462 08/02/2023    HGBA1C 5.3 08/02/2023       I have explained the risks and benefits of this endoscopic procedure to the patient including but not limited to bleeding, inflammation, infection, perforation, and death.      Lanre Vaughn MD

## 2024-04-11 NOTE — ANESTHESIA POSTPROCEDURE EVALUATION
Anesthesia Post Evaluation    Patient: Raquel Galan    Procedure(s) Performed: Procedure(s) (LRB):  COLONOSCOPY (N/A)    Final Anesthesia Type: general      Patient location during evaluation: PACU  Patient participation: Yes- Able to Participate  Level of consciousness: awake and alert  Post-procedure vital signs: reviewed and stable  Pain management: adequate  Airway patency: patent    PONV status at discharge: No PONV  Anesthetic complications: no      Cardiovascular status: blood pressure returned to baseline  Respiratory status: unassisted  Hydration status: euvolemic  Follow-up not needed.          Vitals Value Taken Time   /63 04/11/24 1345   Temp 36.6 °C (97.9 °F) 04/11/24 1345   Pulse 62 04/11/24 1345   Resp 16 04/11/24 1345   SpO2 100 % 04/11/24 1345         Event Time   Out of Recovery 13:41:00         Pain/Abelardo Score: Abelardo Score: 10 (4/11/2024  1:47 PM)

## 2024-04-11 NOTE — PROVATION PATIENT INSTRUCTIONS
Discharge Summary/Instructions after an Endoscopic Procedure  Patient Name: Raquel Galan  Patient MRN: [id number]  Patient YOB: 1960 Thursday, April 11, 2024  Lanre Vaughn MD  Dear patient,  As a result of recent federal legislation (The Federal Cures Act), you may   receive lab or pathology results from your procedure in your [facility]   account before your physician is able to contact you. Your physician or   their representative will relay the results to you with their   recommendations at their soonest availability.  Thank you,  RESTRICTIONS:  During your procedure today, you received medications for sedation.  These   medications may affect your judgment, balance and coordination.  Therefore,   for 24 hours, you have the following restrictions:   - DO NOT drive a car, operate machinery, make legal/financial decisions,   sign important papers or drink alcohol.    ACTIVITY:  Today: no heavy lifting, straining or running due to procedural   sedation/anesthesia.  The following day: return to full activity including work.  DIET:  Eat and drink normally unless instructed otherwise.     TREATMENT FOR COMMON SIDE EFFECTS:  - Mild abdominal pain, nausea, belching, bloating or excessive gas:  rest,   eat lightly and use a heating pad.  - Sore Throat: treat with throat lozenges and/or gargle with warm salt   water.  - Because air was used during the procedure, expelling large amounts of air   from your rectum or belching is normal.  - If a bowel prep was taken, you may not have a bowel movement for 1-3 days.    This is normal.  SYMPTOMS TO WATCH FOR AND REPORT TO YOUR PHYSICIAN:  1. Abdominal pain or bloating, other than gas cramps.  2. Chest pain.  3. Back pain.  4. Signs of infection such as: chills or fever occurring within 24 hours   after the procedure.  5. Rectal bleeding, which would show as bright red, maroon, or black stools.   (A tablespoon of blood from the rectum is not serious, especially  if   hemorrhoids are present.)  6. Vomiting.  7. Weakness or dizziness.  GO DIRECTLY TO THE NEAREST EMERGENCY ROOM IF YOU HAVE ANY OF THE FOLLOWING:      Difficulty breathing              Chills and/or fever over 101 F   Persistent vomiting and/or vomiting blood   Severe abdominal pain   Severe chest pain   Black, tarry stools   Bleeding- more than one tablespoon   Any other symptom or condition that you feel may need urgent attention  Your doctor recommends these additional instructions:  If any biopsies were taken, your doctors clinic will contact you in 1 to 2   weeks with any results.  - Discharge patient to home (ambulatory).   - Patient has a contact number available for emergencies.  The signs and   symptoms of potential delayed complications were discussed with the   patient.  Return to normal activities tomorrow.  Written discharge   instructions were provided to the patient.   - Resume previous diet.   - Continue present medications.   - Return to primary care physician as previously scheduled.   - Repeat colonoscopy in 5 years for screening purposes.  For questions, problems or results please call your physician - Lanre Vaughn MD at Work:  [phone].  [facility], EMERGENCY ROOM PHONE NUMBER: [phone]  IF A COMPLICATION OR EMERGENCY SITUATION ARISES AND YOU ARE UNABLE TO REACH   YOUR PHYSICIAN - GO DIRECTLY TO THE EMERGENCY ROOM.  Lanre Vaughn MD  4/11/2024 1:25:11 PM  This report has been verified and signed electronically.  Dear patient,  As a result of recent federal legislation (The Federal Cures Act), you may   receive lab or pathology results from your procedure in your [facility]   account before your physician is able to contact you. Your physician or   their representative will relay the results to you with their   recommendations at their soonest availability.  Thank you,  [employer]

## 2024-04-22 LAB
FINAL PATHOLOGIC DIAGNOSIS: NORMAL
GROSS: NORMAL
Lab: NORMAL

## 2024-04-23 ENCOUNTER — PATIENT MESSAGE (OUTPATIENT)
Dept: PAIN MEDICINE | Facility: CLINIC | Age: 64
End: 2024-04-23
Payer: MEDICARE

## 2024-04-23 ENCOUNTER — TELEPHONE (OUTPATIENT)
Dept: PAIN MEDICINE | Facility: CLINIC | Age: 64
End: 2024-04-23
Payer: MEDICARE

## 2024-04-23 NOTE — TELEPHONE ENCOUNTER
Attempted to contact patient in reference to procedure on 4/25. No answer, lvm and sent portal msg.

## 2024-04-26 ENCOUNTER — TELEPHONE (OUTPATIENT)
Dept: GASTROENTEROLOGY | Facility: CLINIC | Age: 64
End: 2024-04-26
Payer: MEDICARE

## 2024-05-23 ENCOUNTER — PROCEDURE VISIT (OUTPATIENT)
Dept: PAIN MEDICINE | Facility: CLINIC | Age: 64
End: 2024-05-23
Attending: ANESTHESIOLOGY
Payer: MEDICARE

## 2024-05-23 VITALS
HEART RATE: 77 BPM | OXYGEN SATURATION: 98 % | WEIGHT: 125 LBS | DIASTOLIC BLOOD PRESSURE: 74 MMHG | RESPIRATION RATE: 18 BRPM | SYSTOLIC BLOOD PRESSURE: 124 MMHG | TEMPERATURE: 98 F | BODY MASS INDEX: 23.62 KG/M2

## 2024-05-23 DIAGNOSIS — M70.60 TROCHANTERIC BURSITIS, UNSPECIFIED LATERALITY: ICD-10-CM

## 2024-05-23 DIAGNOSIS — M47.26 OSTEOARTHRITIS OF SPINE WITH RADICULOPATHY, LUMBAR REGION: ICD-10-CM

## 2024-05-23 DIAGNOSIS — M53.3 SACROILIAC DYSFUNCTION: ICD-10-CM

## 2024-05-23 DIAGNOSIS — M76.30 TENDINITIS OF ILIOTIBIAL BAND, UNSPECIFIED LATERALITY: Primary | ICD-10-CM

## 2024-05-23 PROCEDURE — 20550 NJX 1 TENDON SHEATH/LIGAMENT: CPT | Mod: 50,59,GC,S$GLB | Performed by: ANESTHESIOLOGY

## 2024-05-23 PROCEDURE — 20611 DRAIN/INJ JOINT/BURSA W/US: CPT | Mod: 50,GC,S$GLB, | Performed by: ANESTHESIOLOGY

## 2024-05-23 RX ORDER — TRIAMCINOLONE ACETONIDE 40 MG/ML
40 INJECTION, SUSPENSION INTRA-ARTICULAR; INTRAMUSCULAR
Status: COMPLETED | OUTPATIENT
Start: 2024-05-23 | End: 2024-05-23

## 2024-05-23 RX ADMIN — TRIAMCINOLONE ACETONIDE 40 MG: 40 INJECTION, SUSPENSION INTRA-ARTICULAR; INTRAMUSCULAR at 04:05

## 2024-05-23 NOTE — PROCEDURES
GTB/tendon sheath patient reports she is Injection Procedure Note      Time-out taken to identify patient and procedure side prior to starting the procedure.   I attest that I have reviewed the patient's home medications prior to the procedure and no contraindication have been identified. I  re-evaluated the patient after the patient was positioned for the procedure in the procedure room immediately before the procedural time-out. The vital signs are current and represent the current state of the patient which has not significantly changed since the preprocedure assessment.     Date of Service: 05/23/2024    PCP: Salazar Bird MD                 PROCEDURE:  Bilateral  injection of:  Greater trochanteric bursa  Muscle tendon sheath (gluteus medius/ITB)      REASON FOR PROCEDURE:  * No surgery found *  1. Tendinitis of iliotibial band, unspecified laterality    2. Sacroiliac dysfunction    3. Trochanteric bursitis, unspecified laterality    4. Osteoarthritis of spine with radiculopathy, lumbar region      POSTOP DIAGNOSIS: * No surgery found *  1. Tendinitis of iliotibial band, unspecified laterality    2. Sacroiliac dysfunction    3. Trochanteric bursitis, unspecified laterality    4. Osteoarthritis of spine with radiculopathy, lumbar region      PHYSICIAN: Danielle Tillman MD  ASSISTANTS: Kang Rhodes MD  PM&R       MEDICATIONS INJECTED: 0.5 mL of Triamcinolone 40mg/ml X with 10 mL of bupivacaine 0.25%    SEDATION MEDICATIONS: None    ESTIMATED BLOOD LOSS:  None.    COMPLICATIONS:  None.    TECHNIQUE:   The patient was brought to the procedure room and placed on exam table in comfortable position.  Patient was in a lateral position contralateral to the side being done.  The trochanter was identified.  We prepped the area with ChloraPrep.  A 26 gauge spinal needle was used to reach the greater trochanter and adjusted to the bursa under ultrasound guidance.  We injected 2-5 mL of the medication mixture in this area.   The needle was retracted adjusted to tendon sheath and the same other medication was injected in that area.  This was repeated on the contralateral side.  The patient tolerated procedure well.  There were given discharge instructions and a follow-up appointment.      Danielle Tillman

## 2024-06-10 ENCOUNTER — CLINICAL SUPPORT (OUTPATIENT)
Dept: REHABILITATION | Facility: HOSPITAL | Age: 64
End: 2024-06-10
Payer: MEDICARE

## 2024-06-10 DIAGNOSIS — M47.26 OSTEOARTHRITIS OF SPINE WITH RADICULOPATHY, LUMBAR REGION: ICD-10-CM

## 2024-06-10 DIAGNOSIS — M53.86 DECREASED RANGE OF MOTION OF LUMBAR SPINE: ICD-10-CM

## 2024-06-10 DIAGNOSIS — M53.3 SACROILIAC DYSFUNCTION: ICD-10-CM

## 2024-06-10 DIAGNOSIS — M70.60 TROCHANTERIC BURSITIS, UNSPECIFIED LATERALITY: ICD-10-CM

## 2024-06-10 DIAGNOSIS — R29.898 DECREASED STRENGTH OF LOWER EXTREMITY: Primary | ICD-10-CM

## 2024-06-10 PROCEDURE — 97112 NEUROMUSCULAR REEDUCATION: CPT

## 2024-06-10 PROCEDURE — 97161 PT EVAL LOW COMPLEX 20 MIN: CPT

## 2024-06-11 ENCOUNTER — PATIENT MESSAGE (OUTPATIENT)
Dept: ADMINISTRATIVE | Facility: HOSPITAL | Age: 64
End: 2024-06-11
Payer: MEDICARE

## 2024-06-17 ENCOUNTER — CLINICAL SUPPORT (OUTPATIENT)
Dept: REHABILITATION | Facility: HOSPITAL | Age: 64
End: 2024-06-17
Payer: MEDICARE

## 2024-06-17 DIAGNOSIS — R29.898 DECREASED STRENGTH OF LOWER EXTREMITY: Primary | ICD-10-CM

## 2024-06-17 DIAGNOSIS — M53.86 DECREASED RANGE OF MOTION OF LUMBAR SPINE: ICD-10-CM

## 2024-06-17 PROCEDURE — 97112 NEUROMUSCULAR REEDUCATION: CPT

## 2024-06-17 PROCEDURE — 97530 THERAPEUTIC ACTIVITIES: CPT

## 2024-06-17 NOTE — PLAN OF CARE
[facility]   Physical Therapy Initial Evaluation      Name: Raquel Galan  Clinic Number: [id number]    Therapy Diagnosis:   Encounter Diagnoses   Name Primary?    Sacroiliac dysfunction     Trochanteric bursitis, unspecified laterality     Osteoarthritis of spine with radiculopathy, lumbar region     Decreased strength of lower extremity Yes    Decreased range of motion of lumbar spine      Physician: Danielle Tillman MD     Physician Orders: PT Eval and Treat   Medical Diagnosis from Referral: Sacroiliac dysfunction [M53.3], Trochanteric bursitis, unspecified laterality [M70.60], Osteoarthritis of spine with radiculopathy, lumbar region [M47.26]   Evaluation Date: 6/10/2024  Authorization Period Expiration: 12/31/2024  Plan of Care Expiration: 8/30/2024  Progress Note Due: 7/15/2024  Visit # / Visits authorized: 1/1   FOTO: 1/3    Precautions: Standard     Time In: 2:00 pm   Time Out: 2:51 pm   Total Appointment Time (timed & untimed codes): 51 minutes    Subjective     Date of onset: ~6 months     History of current condition - Raquel reports: She used to be very active and exercising all the time and no has not been able to lately with increased pain. She has recently started babysitting and has had difficulty bending over picking up the baby and holding him as well as she feels weaker and she has some pain in her back. She reports difficulty with picking up and carrying her dog as well. Bending over, pulling/stretching her back bothers her as well. She reports bilateral hip pain for a few years with the back pain being more recent in the last about 6 months mainly on the left side. She has gotten injections in the hips as well which help. Ice helps as well as lidocaine spray and tylenol. She has also noted difficulty putting pants on balancing and lifting her legs. She denies any numbness but reports tingling at times bilateral legs on the outside.    Prior Therapy: Yes  Social History:  Lives with    Occupation: Retired   Prior Level of Function: Bilateral hip pain at times, independent in ADLs   Current Level of Function: Increased discomfort in bilateral hips and low back with left side greater than left affecting ADLs, babysitting, and taking care of her dog.     Pain:  Current 4/10, worst 9/10, best 2/10   Location: bilateral back  and left hip area   Description: Left back pain is sharp, hips are constant aching.    Aggravating Factors: bending, getting up and down, stretching  Easing Factors: ice, heat, tylenol, injections    Patients goals: To be able to relieve the pain in her back, return to exercises and return strength.       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     Fibrocystic breast disease in female     HEARING LOSS     right side - no hearing aid    Hyperlipidemia     Osteopenia     PONV (postoperative nausea and vomiting)     PONV (postoperative nausea and vomiting)     Special screening for malignant neoplasms, colon 7/22/2013       Surgical History:   Raquel Galan  has a past surgical history that includes Tonsillectomy; Appendectomy; Cyst Removal (Right); Adenoidectomy; Breast surgery; Mandible fracture surgery (Right); Dilation and curettage of uterus; Colonoscopy (N/A, 9/27/2018); Injection of joint (Left, 10/16/2018); Robot-assisted laparoscopic salpingo-oophorectomy using da Kika Xi (N/A, 6/12/2020); Robot-assisted laparoscopic abdominal hysterectomy using da Kika Xi (N/A, 6/12/2020); Hysterectomy; Breast biopsy (Left); Esophagogastroduodenoscopy (N/A, 8/4/2021); Esophagogastroduodenoscopy (N/A, 5/31/2023); Esophagogastroduodenoscopy (N/A, 2/1/2024); Colonoscopy (N/A, 2/1/2024); and Colonoscopy (N/A, 4/11/2024).    Medications:   Raquel has a current medication list which includes the following prescription(s): b complex vitamins, calcium 600 with vitamin d3, estradiol,  fexofenadine, glucosamine-chondroitin, linaclotide, fish oil-omega-3 fatty acids, pantoprazole, and tretinoin.    Allergies:   Review of patient's allergies indicates:  No Known Allergies     Objective      Observation: Patient presents to physical therapy with an overall pleasant general affect who does not appear to be in any acute distress.     Posture: Standing posture displays increased lumbar lordosis.     Gait: Patient ambulates independently with no assistance, decreased hip extension, and hip drop noted.     Dermatomes:   Right Left    L2 Intact Intact   L3 Intact Intact   L4 Intact Intact   L5 Intact Intact   S1 Intact Intact   S2 Intact Intact     Myotomes:   Right  Left    L2 4/5 5/5   L3 5/5 5/5   L4 5/5 5/5   L5 5/5 5/5   S1 5/5 5/5   S2 5/5 5/5     Range of Motion:  Lumbar    Percentage Pain   Flexion 75% No Pain    Extension 50% Pain L side    Right Sidebend  50% Most pain L side    Left Sidebend 70% No Pain   Right Rotation 70% Discomfort    Left Rotation 90% No Pain     Hip   Right Left    Flexion 100 degrees  100 degrees    Extension 5 degrees  0 degrees    Internal Rotation 25 degrees  20 degrees    External Rotation 40 degrees  40 degrees      Lower Extremity Strength (MMT):   Right Left    Hip Flexion 4-/5 4-/5   Hip Abduction 3+/5 3+/5   Hip Extension 3/5 3/5   Knee Flexion 4/5 4/5   Knee Extension 4+/5 4/5   Dorsiflexion 4/5 4/5   Plantarflexion 4/5 4/5     Joint Mobility: limited posterior to anterior hip mobility, increased lower lumbar PA joint mobility     Palpation: No notable tenderness to palpation     Special Tests:  - TARA: (+) on L, (-) on R  - FADIR: (+) on L, (-) on R  - Supine to Sit: L anteriorly rotated innominate   - Flick: Hypomobilie on R     Functional Tests:  - Double Leg Squat: Able to perform, quad dominant and forward trunk lean.   - Single Leg Balance: Right = 8 sec, Left = 5 sec with pain       Intake Outcome Measure for FOTO Lumbar Survey    Therapist reviewed FOTO  "scores for Raquel Galan on 6/10/2024.   FOTO documents entered into [employer] - see Media section.    Intake Score: 54%       Treatment     Total Treatment time (time-based codes) separate from Evaluation: 18 minutes     Raquel received the treatments listed below:      therapeutic exercises to develop strength, endurance, ROM, flexibility, posture, and core stabilization for 00 minutes including:    manual therapy techniques: Joint mobilizations and Soft tissue Mobilization were applied to the: SI for 02 minutes, including:    Prone 2-person SI manipulation grade V     neuromuscular re-education activities to improve: Balance, Coordination, Kinesthetic, Sense, Proprioception, and Posture for 16 minutes. The following activities were included:    Pt education on PT POC, Prognosis, and HEP  Hip Abduction with gait belt 1 x 5 x 10" holds   Hip Adduction with ball 1 x 5 x 10" holds   Bridge with GreenTB and B lat pulldown RedTB 1 x 8 x 3" holds  Paloff press with 10# on cables 1 x 5 reps each direction  Sidelying clamshells with GreenTB 1 x 10 reps x 3" holds     therapeutic activities to improve functional performance for 00 minutes, including:      gait training to improve functional mobility and safety for 00 minutes, including:      Patient Education and Home Exercises     Education provided:   - PT POC, Prognosis, and HEP     Written Home Exercises Provided: yes. Exercises were reviewed and Raquel was able to demonstrate them prior to the end of the session.  Raquel demonstrated good  understanding of the education provided. See EMR under Patient Instructions for exercises provided during therapy sessions.    Assessment     Raquel is a 64 y.o. female referred to outpatient Physical Therapy with a medical diagnosis of Sacroiliac dysfunction [M53.3], Trochanteric bursitis, unspecified laterality [M70.60], Osteoarthritis of spine with radiculopathy, lumbar region [M47.26]. Patient presents with decreased range of " motion, decreased strength, gait abnormality, and functional limitations in prolonged positions, bending over to pick things up and ADLs. Patient would benefit from skilled PT intervention to address above stated deficits to improve overall functional mobility.     Patient prognosis is Good.   Patient will benefit from skilled outpatient Physical Therapy to address the deficits stated above and in the chart below, provide patient /family education, and to maximize patientt's level of independence.     Plan of care discussed with patient: Yes  Patient's spiritual, cultural and educational needs considered and patient is agreeable to the plan of care and goals as stated below:     Anticipated Barriers for therapy: Scheduling    Medical Necessity is demonstrated by the following  History  Co-morbidities and personal factors that may impact the plan of care [] LOW: no personal factors / co-morbidities  [x] MODERATE: 1-2 personal factors / co-morbidities  [] HIGH: 3+ personal factors / co-morbidities    Moderate / High Support Documentation: Age, ostoepenia     Examination  Body Structures and Functions, activity limitations and participation restrictions that may impact the plan of care [] LOW: addressing 1-2 elements  [] MODERATE: 3+ elements  [x] HIGH: 3+ elements (please support below)    Moderate / High Support Documentation: range of motion, strength, posture, gait, motor control, neuromuscular re-education      Clinical Presentation [x] LOW: stable  [] MODERATE: Evolving  [] HIGH: Unstable     Decision Making/ Complexity Score: low       Goals:  Short Term Goals: 4 weeks   Patient will be independent in initial HEP to help supplement PT.  Patient will improve lumbar AROM to >/= 70% in all planes to help with ADLs.   Patient will improve hip extension to 5 degrees to help with gait mechanics.    Long Term Goals: 8-10 weeks   Patient will be independent in updated HEP to help supplement PT.   Patient will improve  FOTO score to >/= predicted (72) to show improvement in condition.   Patient will improve hip abduction strength by 1/2 MMT to help with stairs.   Patient will report no pain with squatting down to  dog or grandson.   Plan     Plan of care Certification: 6/10/2024 to 8/30/2024.    Outpatient Physical Therapy 1-2 times weekly for 10 weeks to include the following interventions: Electrical Stimulation , Gait Training, Manual Therapy, Moist Heat/ Ice, Neuromuscular Re-ed, Patient Education, Self Care, Therapeutic Activities, and Therapeutic Exercise.     Melissa Ricketts, PT, DPT, OCS

## 2024-06-17 NOTE — PROGRESS NOTES
"[facility]   Physical Therapy Treatment Note     Name: Raquel SMITH [facility]  Clinic Number: [id number]    Therapy Diagnosis:   Encounter Diagnoses   Name Primary?    Decreased strength of lower extremity Yes    Decreased range of motion of lumbar spine      Physician: Danielle Tillman MD    Visit Date: 6/17/2024  Physician Orders: PT Eval and Treat   Medical Diagnosis from Referral: Sacroiliac dysfunction [M53.3], Trochanteric bursitis, unspecified laterality [M70.60], Osteoarthritis of spine with radiculopathy, lumbar region [M47.26]   Evaluation Date: 6/10/2024  Authorization Period Expiration: 12/31/2024  Plan of Care Expiration: 8/30/2024  Progress Note Due: 7/15/2024  Visit # / Visits authorized: 1/20   FOTO: 1/3    PTA Visit #: 0/5     FOTO first follow up:   FOTO second follow up:     Time In: 8:00 am   Time Out: 8:51 am   Total Billable Time: 51 minutes (30 min)    SUBJECTIVE     Pt reports: Felt really good after last time so went and did more, then come Tuesday after babysitting again pain came right bike. The clamshells exercise kind of bothers her as well otherwise doing ok.   She was compliant with home exercise program.  Response to previous treatment: Independent in HEP   Functional change: Ongoing     Pain: 5/10  Location: left back      OBJECTIVE     Objective Measures updated at progress report unless specified.     Treatment     Raquel received the treatments listed below:      therapeutic exercises to develop strength, endurance, ROM, flexibility, posture, and core stabilization for 00 minutes including:      manual therapy techniques: Joint mobilizations and Soft tissue Mobilization were applied to the: SI, lumbar, Hip for 02 minutes, including:    Sidelying MET for rotated innominate 3 x 6" holds     Not Today:  Prone 2-person SI manipulation grade V     neuromuscular re-education activities to improve: Balance, Coordination, Kinesthetic, Sense, Proprioception, and Posture for " "37 minutes. The following activities were included:    Pt education on modifying emotions at home to help offload her back and time it takes for strengthening   Hip abduction gait belt 10 x 10" holds  Hip adduction with ball 10 x 10" holds   PPT with BP cuff (40-60 mmHg) 10 x 10" holds  PPT with BP cuff and BKFO 1 x 10 reps each  PPT with BP cuff and heel slides 1 x 10 reps each   Paloff press with GreenTB 1 x 10 x 3" holds each   Bent over wedge hip extension emphasis on glute squeeze 2 x 8 x 3" holds     Not Today:  Bridge with GreenTB and B lat pulldown RedTB 1 x 8 x 3" holds  Paloff press with 10# on cables 1 x 5 reps each direction  Sidelying clamshells with GreenTB 1 x 10 reps x 3" holds     therapeutic activities to improve functional performance for 12 minutes, including:    Step up on 6-inch with B lat pulldowns GreenTB 2 x 8 reps each   Hip hinge with dowel 1 x 15 reps   Hip hinge into mini squat 24-inch 2 x 8 reps     gait training to improve functional mobility and safety for 00 minutes, including:      Patient Education and Home Exercises     Home Exercises Provided and Patient Education Provided     Education provided:   - Continue with HEP, hold on clamshells     Written Home Exercises Provided: Patient instructed to cont prior HEP. Exercises were reviewed and Raquel was able to demonstrate them prior to the end of the session.  Raquel demonstrated good  understanding of the education provided. See EMR under Patient Instructions for exercises provided during therapy sessions    ASSESSMENT     Raquel tolerated physical therapy session well. She presents with improvement in symptoms following initial evaluation which returned following babysitting her grandson re-aggravating it. She presented with SI joint dysfunction at start and improvement following METs. Added blood pressure cuff for biofeedback with pelvic tilts with good response. Further core stability and posterior sling work added today as " well. Worked on hip hinge pattern end of session to help improve mechanics with bending up and down taking care of her grandson. She required cueing throughout with improvement with dowel and tactile/verbal cueing. Will continue to monitor and progress as able.     Raquel Is progressing well towards her goals.   Pt prognosis is Good.     Pt will continue to benefit from skilled outpatient physical therapy to address the deficits listed in the problem list box on initial evaluation, provide pt/family education and to maximize pt's level of independence in the home and community environment.     Pt's spiritual, cultural and educational needs considered and pt agreeable to plan of care and goals.     Anticipated barriers to physical therapy: Scheduling     Goals:   Short Term Goals: 4 weeks (Progressing, not met)  Patient will be independent in initial HEP to help supplement PT.  Patient will improve lumbar AROM to >/= 70% in all planes to help with ADLs.   Patient will improve hip extension to 5 degrees to help with gait mechanics.     Long Term Goals: 8-10 weeks (Progressing, not met)  Patient will be independent in updated HEP to help supplement PT.   Patient will improve FOTO score to >/= predicted (72) to show improvement in condition.   Patient will improve hip abduction strength by 1/2 MMT to help with stairs.   Patient will report no pain with squatting down to  dog or grandson.     PLAN   Plan of care Certification: 6/10/2024 to 8/30/2024.     Continue with current PT POC     Melissa Ricketts, PT, DPT, OCS

## 2024-06-19 ENCOUNTER — CLINICAL SUPPORT (OUTPATIENT)
Dept: REHABILITATION | Facility: HOSPITAL | Age: 64
End: 2024-06-19
Payer: MEDICARE

## 2024-06-19 DIAGNOSIS — R29.898 DECREASED STRENGTH OF LOWER EXTREMITY: Primary | ICD-10-CM

## 2024-06-19 DIAGNOSIS — M53.86 DECREASED RANGE OF MOTION OF LUMBAR SPINE: ICD-10-CM

## 2024-06-19 PROCEDURE — 97530 THERAPEUTIC ACTIVITIES: CPT

## 2024-06-19 PROCEDURE — 97140 MANUAL THERAPY 1/> REGIONS: CPT

## 2024-06-19 NOTE — PROGRESS NOTES
"[facility]   Physical Therapy Treatment Note     Name: Raquel SMITH [facility]  Clinic Number: [id number]    Therapy Diagnosis:   Encounter Diagnoses   Name Primary?    Decreased strength of lower extremity Yes    Decreased range of motion of lumbar spine      Physician: Danielle Tillman MD    Visit Date: 6/19/2024  Physician Orders: PT Eval and Treat   Medical Diagnosis from Referral: Sacroiliac dysfunction [M53.3], Trochanteric bursitis, unspecified laterality [M70.60], Osteoarthritis of spine with radiculopathy, lumbar region [M47.26]   Evaluation Date: 6/10/2024  Authorization Period Expiration: 12/31/2024  Plan of Care Expiration: 8/30/2024  Progress Note Due: 7/15/2024  Visit # / Visits authorized: 2/20   FOTO: 1/3    PTA Visit #: 0/5     FOTO first follow up:   FOTO second follow up:     Time In: 2:49 pm   Time Out: 3:45 pm   Total Billable Time: 56 minutes (30 min)    SUBJECTIVE     Pt reports: She again felt good after session and then woke up sore the next morning and discomfort/pain in left side of back feeling stiff. She did work on the hip hinging with getting up and down and has noticed that has helped a lot.   She was compliant with home exercise program.  Response to previous treatment: Independent in HEP   Functional change: Ongoing     Pain: 5/10  Location: left back      OBJECTIVE     Objective Measures updated at progress report unless specified.     Treatment     Raquel received the treatments listed below:      therapeutic exercises to develop strength, endurance, ROM, flexibility, posture, and core stabilization for 00 minutes including:      manual therapy techniques: Joint mobilizations and Soft tissue Mobilization were applied to the: SI, lumbar, Hip for 12 minutes, including:    Prone 2-person SI manipulation grade V   Sidelying lumbar gapping for L opening   Long axis hip distraction grade II-III    Not Today:  Sidelying MET for rotated innominate 3 x 6" holds " "    neuromuscular re-education activities to improve: Balance, Coordination, Kinesthetic, Sense, Proprioception, and Posture for 34 minutes. The following activities were included:    Pt education on modifying emotions at home to help offload her back and time it takes for strengthening   Hip abduction BlueTB 10 x 10" holds  Hip adduction with ball 10 x 10" holds   TA activation with physioball 2 x 10 x 5" holds  TA activation with physioball and LE ext 2 x 5 each   Bridge with GreenTB and B lat pulldown RedTB 2 x 8 x 3" holds  Seated on physioball paloff press with reach GreenTB 2 x 8 reps each     Not Today:  Paloff press with 10# on cables 1 x 5 reps each direction  Sidelying clamshells with GreenTB 1 x 10 reps x 3" holds   PPT with BP cuff (40-60 mmHg) 10 x 10" holds  PPT with BP cuff and BKFO 1 x 10 reps each  PPT with BP cuff and heel slides 1 x 10 reps each   Paloff press with GreenTB 1 x 10 x 3" holds each   Bent over wedge hip extension emphasis on glute squeeze 2 x 8 x 3" holds     therapeutic activities to improve functional performance for 10 minutes, including:    Hip hinge pattern sit to stand 22-inch 2 x 8 reps   Hip hinge pattern mini lift 8# KB from 12-inch box 2 x 5 reps     Not today:  Step up on 6-inch with B lat pulldowns GreenTB 2 x 8 reps each   Hip hinge with dowel 1 x 15 reps     gait training to improve functional mobility and safety for 00 minutes, including:      Patient Education and Home Exercises     Home Exercises Provided and Patient Education Provided     Education provided:   - Continue with HEP, hold on clamshells     Written Home Exercises Provided: Patient instructed to cont prior HEP. Exercises were reviewed and Raquel was able to demonstrate them prior to the end of the session.  Raquel demonstrated good  understanding of the education provided. See EMR under Patient Instructions for exercises provided during therapy sessions    ASSESSMENT     Raquel continues with SI joint " dysfunction and left sided low back / hip pain. Further manual therapy utilized to help improve with good response. Continued focus on core stability to help improve carryover between session and symptoms at home. Continued emphasis on hip hinge pattern and progressing with proper lifting form today to help with taking care of her grandson and dog as these activities continue to increase her pain. Good response and carryover with hip hinge to seated position. Continues to benefit from cueing when going from sit to stand as she wants to utilize increased lumbar spine to help. End of session reported improvement in symptoms. Will continue to monitor and progress as able.     Raquel Is progressing well towards her goals.   Pt prognosis is Good.     Pt will continue to benefit from skilled outpatient physical therapy to address the deficits listed in the problem list box on initial evaluation, provide pt/family education and to maximize pt's level of independence in the home and community environment.     Pt's spiritual, cultural and educational needs considered and pt agreeable to plan of care and goals.     Anticipated barriers to physical therapy: Scheduling     Goals:   Short Term Goals: 4 weeks (Progressing, not met)  Patient will be independent in initial HEP to help supplement PT. - MET  Patient will improve lumbar AROM to >/= 70% in all planes to help with ADLs.   Patient will improve hip extension to 5 degrees to help with gait mechanics.     Long Term Goals: 8-10 weeks (Progressing, not met)  Patient will be independent in updated HEP to help supplement PT.   Patient will improve FOTO score to >/= predicted (72) to show improvement in condition.   Patient will improve hip abduction strength by 1/2 MMT to help with stairs.   Patient will report no pain with squatting down to  dog or grandson.     PLAN   Plan of care Certification: 6/10/2024 to 8/30/2024.     Continue with current PT SIERRA Torres  Jamarcus, PT, DPT, OCS

## 2024-06-26 ENCOUNTER — OFFICE VISIT (OUTPATIENT)
Dept: OPTOMETRY | Facility: CLINIC | Age: 64
End: 2024-06-26
Payer: MEDICARE

## 2024-06-26 ENCOUNTER — CLINICAL SUPPORT (OUTPATIENT)
Dept: REHABILITATION | Facility: HOSPITAL | Age: 64
End: 2024-06-26
Payer: MEDICARE

## 2024-06-26 DIAGNOSIS — H52.4 PRESBYOPIA: ICD-10-CM

## 2024-06-26 DIAGNOSIS — M53.86 DECREASED RANGE OF MOTION OF LUMBAR SPINE: ICD-10-CM

## 2024-06-26 DIAGNOSIS — Z13.5 GLAUCOMA SCREENING: ICD-10-CM

## 2024-06-26 DIAGNOSIS — H25.13 NUCLEAR SCLEROSIS, BILATERAL: Primary | ICD-10-CM

## 2024-06-26 DIAGNOSIS — R29.898 DECREASED STRENGTH OF LOWER EXTREMITY: Primary | ICD-10-CM

## 2024-06-26 PROCEDURE — 92015 DETERMINE REFRACTIVE STATE: CPT | Mod: S$GLB,,, | Performed by: OPTOMETRIST

## 2024-06-26 PROCEDURE — 92014 COMPRE OPH EXAM EST PT 1/>: CPT | Mod: S$GLB,,, | Performed by: OPTOMETRIST

## 2024-06-26 PROCEDURE — 1101F PT FALLS ASSESS-DOCD LE1/YR: CPT | Mod: CPTII,S$GLB,, | Performed by: OPTOMETRIST

## 2024-06-26 PROCEDURE — 1160F RVW MEDS BY RX/DR IN RCRD: CPT | Mod: CPTII,S$GLB,, | Performed by: OPTOMETRIST

## 2024-06-26 PROCEDURE — 3288F FALL RISK ASSESSMENT DOCD: CPT | Mod: CPTII,S$GLB,, | Performed by: OPTOMETRIST

## 2024-06-26 PROCEDURE — 99999 PR PBB SHADOW E&M-EST. PATIENT-LVL III: CPT | Mod: PBBFAC,,, | Performed by: OPTOMETRIST

## 2024-06-26 PROCEDURE — 1126F AMNT PAIN NOTED NONE PRSNT: CPT | Mod: CPTII,S$GLB,, | Performed by: OPTOMETRIST

## 2024-06-26 PROCEDURE — 1159F MED LIST DOCD IN RCRD: CPT | Mod: CPTII,S$GLB,, | Performed by: OPTOMETRIST

## 2024-06-26 PROCEDURE — 97140 MANUAL THERAPY 1/> REGIONS: CPT

## 2024-06-26 PROCEDURE — 97530 THERAPEUTIC ACTIVITIES: CPT

## 2024-06-26 NOTE — PROGRESS NOTES
HPI    63 Y/o female is here for routine eye exam with C/o pt states she notices   that her near vision has change states her vision is a little more blurry.   Pt wears otc readers +2.50  Pt denies pain and discomfort   Occ floaters     Eye med: OTC AT'S OU PRN   Last edited by Brianne Llamas MA on 6/26/2024  9:01 AM.            Assessment /Plan     For exam results, see Encounter Report.    Nuclear sclerosis, bilateral    Glaucoma screening    Presbyopia        1. Small cats OU--pt happy w otc readers  2. YELENA--was on RESTASIS, but ran out and now using otc drops and feels OK.    PLAN:    1. Pt to cont ATs QID+  2. rtc 1 yr

## 2024-06-26 NOTE — PROGRESS NOTES
"[facility]   Physical Therapy Treatment Note     Name: Raquel SMITH [facility]  Clinic Number: [id number]    Therapy Diagnosis:   Encounter Diagnoses   Name Primary?    Decreased strength of lower extremity Yes    Decreased range of motion of lumbar spine      Physician: Danielle Tillman MD    Visit Date: 6/26/2024  Physician Orders: PT Eval and Treat   Medical Diagnosis from Referral: Sacroiliac dysfunction [M53.3], Trochanteric bursitis, unspecified laterality [M70.60], Osteoarthritis of spine with radiculopathy, lumbar region [M47.26]   Evaluation Date: 6/10/2024  Authorization Period Expiration: 12/31/2024  Plan of Care Expiration: 8/30/2024  Progress Note Due: 7/15/2024  Visit # / Visits authorized: 3/20   FOTO: 1/3    PTA Visit #: 0/5     FOTO first follow up:   FOTO second follow up:     Time In: 3:01 pm   Time Out: 3:51 pm   Total Billable Time: 50 minutes (30 min)    SUBJECTIVE     Pt reports: She was feeling really good after last session with significant improvement for 3 days. Babysat her grandson yesterday and in increased pain since then. Has noticed the hinging getting up and down has significantly helped.       She was compliant with home exercise program.  Response to previous treatment: Independent in HEP   Functional change: Ongoing     Pain: 5/10  Location: left back      OBJECTIVE     Objective Measures updated at progress report unless specified.     Treatment     Raquel received the treatments listed below:      therapeutic exercises to develop strength, endurance, ROM, flexibility, posture, and core stabilization for 00 minutes including:      manual therapy techniques: Joint mobilizations and Soft tissue Mobilization were applied to the: SI, lumbar, Hip for ** minutes, including:    Prone 2-person SI manipulation grade V - NP  Sidelying lumbar gapping for L opening - NP   Long axis hip distraction grade II-III    Not Today:  Sidelying MET for rotated innominate 3 x 6" holds " "    neuromuscular re-education activities to improve: Balance, Coordination, Kinesthetic, Sense, Proprioception, and Posture for 34 minutes. The following activities were included:    Pt education on modifying emotions at home to help offload her back and time it takes for strengthening   Hip abduction BlueTB 10 x 10" holds  Hip adduction with ball 10 x 10" holds   TA activation with physioball 2 x 10 x 5" holds  TA activation with physioball and LE ext 2 x 5 each         Bridge with GreenTB and B lat pulldown RedTB 2 x 8 x 3" holds  Seated on physioball paloff press with reach GreenTB 2 x 8 reps each     Not Today:  Paloff press with 10# on cables 1 x 5 reps each direction  Sidelying clamshells with GreenTB 1 x 10 reps x 3" holds   PPT with BP cuff (40-60 mmHg) 10 x 10" holds  PPT with BP cuff and BKFO 1 x 10 reps each  PPT with BP cuff and heel slides 1 x 10 reps each   Paloff press with GreenTB 1 x 10 x 3" holds each   Bent over wedge hip extension emphasis on glute squeeze 2 x 8 x 3" holds     therapeutic activities to improve functional performance for 10 minutes, including:    Hip hinge pattern sit to stand 22-inch 2 x 8 reps   Hip hinge pattern mini lift 8# KB from 12-inch box 2 x 5 reps     Not today:  Step up on 6-inch with B lat pulldowns GreenTB 2 x 8 reps each   Hip hinge with dowel 1 x 15 reps     gait training to improve functional mobility and safety for 00 minutes, including:      Patient Education and Home Exercises     Home Exercises Provided and Patient Education Provided     Education provided:   - Continue with HEP, hold on clamshells     Written Home Exercises Provided: Patient instructed to cont prior HEP. Exercises were reviewed and Raquel was able to demonstrate them prior to the end of the session.  Raquel demonstrated good  understanding of the education provided. See EMR under Patient Instructions for exercises provided during therapy sessions    ASSESSMENT     Raquel continues with SI " joint dysfunction and left sided low back / hip pain. Further manual therapy utilized to help improve with good response. Continued focus on core stability to help improve carryover between session and symptoms at home. Continued emphasis on hip hinge pattern and progressing with proper lifting form today to help with taking care of her grandson and dog as these activities continue to increase her pain. Good response and carryover with hip hinge to seated position. Continues to benefit from cueing when going from sit to stand as she wants to utilize increased lumbar spine to help. End of session reported improvement in symptoms. Will continue to monitor and progress as able.     Raquel Is progressing well towards her goals.   Pt prognosis is Good.     Pt will continue to benefit from skilled outpatient physical therapy to address the deficits listed in the problem list box on initial evaluation, provide pt/family education and to maximize pt's level of independence in the home and community environment.     Pt's spiritual, cultural and educational needs considered and pt agreeable to plan of care and goals.     Anticipated barriers to physical therapy: Scheduling     Goals:   Short Term Goals: 4 weeks (Progressing, not met)  Patient will be independent in initial HEP to help supplement PT. - MET  Patient will improve lumbar AROM to >/= 70% in all planes to help with ADLs.   Patient will improve hip extension to 5 degrees to help with gait mechanics.     Long Term Goals: 8-10 weeks (Progressing, not met)  Patient will be independent in updated HEP to help supplement PT.   Patient will improve FOTO score to >/= predicted (72) to show improvement in condition.   Patient will improve hip abduction strength by 1/2 MMT to help with stairs.   Patient will report no pain with squatting down to  dog or grandson.     PLAN   Plan of care Certification: 6/10/2024 to 8/30/2024.     Continue with current PT SIERRA Torres  Jamarcus, PT, DPT, OCS

## 2024-07-01 ENCOUNTER — CLINICAL SUPPORT (OUTPATIENT)
Dept: REHABILITATION | Facility: HOSPITAL | Age: 64
End: 2024-07-01
Payer: MEDICARE

## 2024-07-01 DIAGNOSIS — R29.898 DECREASED STRENGTH OF LOWER EXTREMITY: Primary | ICD-10-CM

## 2024-07-01 DIAGNOSIS — M53.86 DECREASED RANGE OF MOTION OF LUMBAR SPINE: ICD-10-CM

## 2024-07-01 PROCEDURE — 97112 NEUROMUSCULAR REEDUCATION: CPT

## 2024-07-01 PROCEDURE — 97140 MANUAL THERAPY 1/> REGIONS: CPT

## 2024-07-03 ENCOUNTER — CLINICAL SUPPORT (OUTPATIENT)
Dept: REHABILITATION | Facility: HOSPITAL | Age: 64
End: 2024-07-03
Payer: MEDICARE

## 2024-07-03 DIAGNOSIS — R29.898 DECREASED STRENGTH OF LOWER EXTREMITY: Primary | ICD-10-CM

## 2024-07-03 DIAGNOSIS — M53.86 DECREASED RANGE OF MOTION OF LUMBAR SPINE: ICD-10-CM

## 2024-07-03 PROCEDURE — 97112 NEUROMUSCULAR REEDUCATION: CPT | Mod: KX

## 2024-07-03 PROCEDURE — 97140 MANUAL THERAPY 1/> REGIONS: CPT | Mod: KX

## 2024-07-03 NOTE — PROGRESS NOTES
[facility]   Physical Therapy Re-Assessment / Treatment Note     Name: Raquel Galan  Clinic Number: [id number]    Therapy Diagnosis:   Encounter Diagnoses   Name Primary?    Decreased strength of lower extremity Yes    Decreased range of motion of lumbar spine      Physician: Danielle Tillman MD    Visit Date: 7/3/2024  Physician Orders: PT Eval and Treat   Medical Diagnosis from Referral: Sacroiliac dysfunction [M53.3], Trochanteric bursitis, unspecified laterality [M70.60], Osteoarthritis of spine with radiculopathy, lumbar region [M47.26]   Evaluation Date: 6/10/2024  Authorization Period Expiration: 12/31/2024  Plan of Care Expiration: 8/30/2024  Progress Note Due: 7/15/2024  Visit # / Visits authorized: 5/20   FOTO: 1/3    PTA Visit #: 0/5     FOTO first follow up:   FOTO second follow up:     Time In: 2:00 pm   Time Out: 2:55 pm    Total Billable Time: 55 minutes (30 min)    SUBJECTIVE     Pt reports: She feels PT has really helped, she still has the discomfort at times but much improved and always feels better following PT.   She was compliant with home exercise program.  Response to previous treatment: Independent in HEP   Functional change: Ongoing     Pain: 2/10  Location: left back      OBJECTIVE     Objective Measures updated at progress report unless specified.     Range of Motion:  Lumbar      Percentage Pain   Flexion 80% No Pain    Extension 60% No Pain   Right Sidebend  75% No Pain   Left Sidebend 75% No Pain   Right Rotation 90% No Pain   Left Rotation 90% No Pain      Hip    Right Left    Flexion 110 degrees  100 degrees    Extension 5 degrees  5 degrees    Internal Rotation 25 degrees  20 degrees    External Rotation 40 degrees  40 degrees       Lower Extremity Strength (MMT):    Right Left    Hip Flexion 4-/5 4-/5   Hip Abduction 3+/5 3+/5   Hip Extension 3/5 3/5   Knee Flexion 4/5 4/5   Knee Extension 4+/5 4/5   Dorsiflexion 4/5 4-/5   Plantarflexion 4/5 4/5  "    Functional Tests:  - Double Leg Squat: Able to perform, quad dominant and forward trunk lean.   - Single Leg Balance: Right = 10 sec, Left = 6 sec     FOTO = 59 (initial = 54)    Treatment     Raquel received the treatments listed below:      therapeutic exercises to develop strength, endurance, ROM, flexibility, posture, and core stabilization for 00 minutes including:      manual therapy techniques: Joint mobilizations and Soft tissue Mobilization were applied to the: SI, lumbar, Hip for 10 minutes, including:    Long axis hip distraction grade II-III  Lateral/inferior hip glides with belt     Not Today:  Prone 2-person SI manipulation grade V   Sidelying lumbar gapping for L opening  Sidelying MET for rotated innominate 3 x 6" holds     neuromuscular re-education activities to improve: Balance, Coordination, Kinesthetic, Sense, Proprioception, and Posture for 35 minutes. The following activities were included:    Pt education on modifying emotions at home to help offload her back and time it takes for strengthening   Assessment as above   Hip abduction BlueTB 10 x 10" holds  Hip adduction with ball 10 x 10" holds   Seated on physioball paloff press with reach GreenTB 2 x 8 reps each   Bridge with GreenTB and B lat pulldown RedTB 2 x 8 x 3" holds    Not Today:  Paloff press with 10# on cables 1 x 5 reps each direction  Sidelying clamshells with GreenTB 1 x 10 reps x 3" holds   PPT with BP cuff (40-60 mmHg) 10 x 10" holds  PPT with BP cuff and BKFO 1 x 10 reps each  PPT with BP cuff and heel slides 1 x 10 reps each   Bent over wedge hip extension emphasis on glute squeeze 2 x 8 x 3" holds   TA activation with physioball 2 x 10 x 5" holds  TA activation with physioball and LE ext 2 x 5 each   Sidelying hip ER 2 x 8 x 3" holds     therapeutic activities to improve functional performance for 10 minutes, including:    Hip hinge pattern sit to stand 22-inch 3 x 8 reps   Step up on 6-inch with B lat pulldowns GreenTB " 2 x 8 reps each     Not today:  Hip hinge pattern mini lift 8# KB from 12-inch box 2 x 5 reps   Hip hinge with dowel 1 x 15 reps     gait training to improve functional mobility and safety for 00 minutes, including:      Patient Education and Home Exercises     Home Exercises Provided and Patient Education Provided     Education provided:   - Continue with HEP, hold on clamshells     Written Home Exercises Provided: Patient instructed to cont prior HEP. Exercises were reviewed and Raquel was able to demonstrate them prior to the end of the session.  Raquel demonstrated good  understanding of the education provided. See EMR under Patient Instructions for exercises provided during therapy sessions    ASSESSMENT     Raquel demonstrates significant improvement in her range of motion and subjective reports of improvement in symptoms. She continues with hip strength deficits and continued focus on improving as well as further core stability work with good tolerance. Continued posterior sling strengthening to help improve stability with good tolerance. She continues to fatigue quickly with hip/glute strengthening and continued focus. She was adequately challenged and fatigued end of session. Will continue to monitor and progress as able.     Raquel Is progressing well towards her goals.   Pt prognosis is Good.     Pt will continue to benefit from skilled outpatient physical therapy to address the deficits listed in the problem list box on initial evaluation, provide pt/family education and to maximize pt's level of independence in the home and community environment.     Pt's spiritual, cultural and educational needs considered and pt agreeable to plan of care and goals.     Anticipated barriers to physical therapy: Scheduling     Goals:   Short Term Goals: 4 weeks (Progressing, not met)  Patient will be independent in initial HEP to help supplement PT. - MET  Patient will improve lumbar AROM to >/= 70% in all planes to  help with ADLs.   Patient will improve hip extension to 5 degrees to help with gait mechanics. - MET     Long Term Goals: 8-10 weeks (Progressing, not met)  Patient will be independent in updated HEP to help supplement PT.   Patient will improve FOTO score to >/= predicted (72) to show improvement in condition.   Patient will improve hip abduction strength by 1/2 MMT to help with stairs.   Patient will report no pain with squatting down to  dog or grandson.     PLAN   Plan of care Certification: 6/10/2024 to 8/30/2024.     Continue with current PT POC     Melissa Ricketts, PT, DPT, OCS

## 2024-07-15 NOTE — PROGRESS NOTES
[facility]   Physical Therapy Re-Assessment / Treatment Note     Name: Raquel Galan  Clinic Number: [id number]    Therapy Diagnosis:   Encounter Diagnoses   Name Primary?    Decreased strength of lower extremity Yes    Decreased range of motion of lumbar spine      Physician: Danielle Tillman MD    Visit Date: 7/1/2024  Physician Orders: PT Eval and Treat   Medical Diagnosis from Referral: Sacroiliac dysfunction [M53.3], Trochanteric bursitis, unspecified laterality [M70.60], Osteoarthritis of spine with radiculopathy, lumbar region [M47.26]   Evaluation Date: 6/10/2024  Authorization Period Expiration: 12/31/2024  Plan of Care Expiration: 8/30/2024  Progress Note Due: 7/15/2024  Visit # / Visits authorized: 3/20   FOTO: 1/3    PTA Visit #: 0/5     FOTO first follow up:   FOTO second follow up:     Time In: 3:00 pm   Time Out: 3:54 pm   Total Billable Time: 54 minutes (30 min)    SUBJECTIVE     Pt reports: Always feels better after PT and has been adjusting how she lifts her dog and the baby up which does help.    She was compliant with home exercise program.  Response to previous treatment: Independent in HEP   Functional change: Ongoing     Pain: 5/10  Location: left back      OBJECTIVE     Objective Measures updated at progress report unless specified.     Range of Motion:  Lumbar     Percentage Pain   Flexion 80% No Pain    Extension 60% Pain R side    Right Sidebend  60% No Pain   Left Sidebend 70% Pain on R side   Right Rotation 90% No Pain   Left Rotation 90% No Pain     Hip    Right Left    Flexion 100 degrees  100 degrees    Extension 5 degrees  0 degrees    Internal Rotation 25 degrees  20 degrees    External Rotation 40 degrees  40 degrees        Treatment     Raquel received the treatments listed below:      therapeutic exercises to develop strength, endurance, ROM, flexibility, posture, and core stabilization for 00 minutes including:      manual therapy techniques: Joint  "mobilizations and Soft tissue Mobilization were applied to the: SI, lumbar, Hip for 12 minutes, including:    Long axis hip distraction grade II-III  Inferior hip glides   Sidelying MET for rotated innominate 3 x 6" holds     Not Today:  Prone 2-person SI manipulation grade V   Sidelying lumbar gapping for L opening    neuromuscular re-education activities to improve: Balance, Coordination, Kinesthetic, Sense, Proprioception, and Posture for 30 minutes. The following activities were included:    Pt education on modifying emotions at home to help offload her back and time it takes for strengthening   Assessment as above   Hip abduction BlueTB 10 x 10" holds  Hip adduction with ball 10 x 10" holds   TA activation with physioball 1 x 10 x 10" holds  TA activation with physioball and LE ext 2 x 6 each   Bridge with GreenTB and B lat pulldown RedTB 2 x 8 x 3" holds  Seated on physioball paloff press with reach GreenTB 2 x 8 reps each     Not Today:  Paloff press with 10# on cables 1 x 5 reps each direction  Sidelying clamshells with GreenTB 1 x 10 reps x 3" holds   PPT with BP cuff (40-60 mmHg) 10 x 10" holds  PPT with BP cuff and BKFO 1 x 10 reps each  PPT with BP cuff and heel slides 1 x 10 reps each   Bent over wedge hip extension emphasis on glute squeeze 2 x 8 x 3" holds   Sidelying hip ER 2 x 8 x 3" holds     therapeutic activities to improve functional performance for 12 minutes, including:    Step up on 6-inch with B lat pulldowns GreenTB 2 x 8 reps each   DL shuttle press with RedTB around knees 3 x 10 reps 50#   SL shuttle press with RedTB to control valgus 2 x 8 reps each 25#    Not today:  Hip hinge pattern sit to stand 22-inch 2 x 8 reps   Hip hinge pattern mini lift 8# KB from 12-inch box 2 x 5 reps   Hip hinge with dowel 1 x 15 reps     gait training to improve functional mobility and safety for 00 minutes, including:      Patient Education and Home Exercises     Home Exercises Provided and Patient " Education Provided     Education provided:   - Continue with HEP, hold on clamshells     Written Home Exercises Provided: Patient instructed to cont prior HEP. Exercises were reviewed and Raquel was able to demonstrate them prior to the end of the session.  Raquel demonstrated good  understanding of the education provided. See EMR under Patient Instructions for exercises provided during therapy sessions    ASSESSMENT     Raquel presents with improvement in mobility and symptoms compared to initial evaluation. Continued manual therapy working on maximizing mobility and offloading her lumbar spine. Continued progression with hip/glute strengthening and core stability. Was further challenged with lower extremity strengthening with shuttle work and continued hip strengthening. She continues to fatigue quickly with hip/glute strengthening but able to tolerate all sets and reps. Will continue to monitor and progress as able.      Raquel Is progressing well towards her goals.   Pt prognosis is Good.     Pt will continue to benefit from skilled outpatient physical therapy to address the deficits listed in the problem list box on initial evaluation, provide pt/family education and to maximize pt's level of independence in the home and community environment.     Pt's spiritual, cultural and educational needs considered and pt agreeable to plan of care and goals.     Anticipated barriers to physical therapy: Scheduling     Goals:   Short Term Goals: 4 weeks (Progressing, not met)  Patient will be independent in initial HEP to help supplement PT. - MET  Patient will improve lumbar AROM to >/= 70% in all planes to help with ADLs.   Patient will improve hip extension to 5 degrees to help with gait mechanics.     Long Term Goals: 8-10 weeks (Progressing, not met)  Patient will be independent in updated HEP to help supplement PT.   Patient will improve FOTO score to >/= predicted (72) to show improvement in condition.   Patient  will improve hip abduction strength by 1/2 MMT to help with stairs.   Patient will report no pain with squatting down to  dog or grandson.     PLAN   Plan of care Certification: 6/10/2024 to 8/30/2024.     Continue with current PT POC     Melissa Ricketts, PT, DPT, OCS

## 2024-07-15 NOTE — PLAN OF CARE
[facility]   Physical Therapy Re-Assessment / Treatment Note     Name: Raquel Galan  Clinic Number: [id number]    Therapy Diagnosis:   Encounter Diagnoses   Name Primary?    Decreased strength of lower extremity Yes    Decreased range of motion of lumbar spine      Physician: Danielle Tillman MD    Visit Date: 7/1/2024  Physician Orders: PT Eval and Treat   Medical Diagnosis from Referral: Sacroiliac dysfunction [M53.3], Trochanteric bursitis, unspecified laterality [M70.60], Osteoarthritis of spine with radiculopathy, lumbar region [M47.26]   Evaluation Date: 6/10/2024  Authorization Period Expiration: 12/31/2024  Plan of Care Expiration: 8/30/2024  Progress Note Due: 7/15/2024  Visit # / Visits authorized: 3/20   FOTO: 1/3    PTA Visit #: 0/5     FOTO first follow up:   FOTO second follow up:     Time In: 3:00 pm   Time Out: 3:54 pm   Total Billable Time: 54 minutes (30 min)    SUBJECTIVE     Pt reports: Always feels better after PT and has been adjusting how she lifts her dog and the baby up which does help.    She was compliant with home exercise program.  Response to previous treatment: Independent in HEP   Functional change: Ongoing     Pain: 5/10  Location: left back      OBJECTIVE     Objective Measures updated at progress report unless specified.     Range of Motion:  Lumbar     Percentage Pain   Flexion 80% No Pain    Extension 60% Pain R side    Right Sidebend  60% No Pain   Left Sidebend 70% Pain on R side   Right Rotation 90% No Pain   Left Rotation 90% No Pain     Hip    Right Left    Flexion 100 degrees  100 degrees    Extension 5 degrees  0 degrees    Internal Rotation 25 degrees  20 degrees    External Rotation 40 degrees  40 degrees        Treatment     Raquel received the treatments listed below:      therapeutic exercises to develop strength, endurance, ROM, flexibility, posture, and core stabilization for 00 minutes including:      manual therapy techniques: Joint  "mobilizations and Soft tissue Mobilization were applied to the: SI, lumbar, Hip for 12 minutes, including:    Long axis hip distraction grade II-III  Inferior hip glides   Sidelying MET for rotated innominate 3 x 6" holds     Not Today:  Prone 2-person SI manipulation grade V   Sidelying lumbar gapping for L opening    neuromuscular re-education activities to improve: Balance, Coordination, Kinesthetic, Sense, Proprioception, and Posture for 30 minutes. The following activities were included:    Pt education on modifying emotions at home to help offload her back and time it takes for strengthening   Assessment as above   Hip abduction BlueTB 10 x 10" holds  Hip adduction with ball 10 x 10" holds   TA activation with physioball 1 x 10 x 10" holds  TA activation with physioball and LE ext 2 x 6 each   Bridge with GreenTB and B lat pulldown RedTB 2 x 8 x 3" holds  Seated on physioball paloff press with reach GreenTB 2 x 8 reps each     Not Today:  Paloff press with 10# on cables 1 x 5 reps each direction  Sidelying clamshells with GreenTB 1 x 10 reps x 3" holds   PPT with BP cuff (40-60 mmHg) 10 x 10" holds  PPT with BP cuff and BKFO 1 x 10 reps each  PPT with BP cuff and heel slides 1 x 10 reps each   Bent over wedge hip extension emphasis on glute squeeze 2 x 8 x 3" holds   Sidelying hip ER 2 x 8 x 3" holds     therapeutic activities to improve functional performance for 12 minutes, including:    Step up on 6-inch with B lat pulldowns GreenTB 2 x 8 reps each   DL shuttle press with RedTB around knees 3 x 10 reps 50#   SL shuttle press with RedTB to control valgus 2 x 8 reps each 25#    Not today:  Hip hinge pattern sit to stand 22-inch 2 x 8 reps   Hip hinge pattern mini lift 8# KB from 12-inch box 2 x 5 reps   Hip hinge with dowel 1 x 15 reps     gait training to improve functional mobility and safety for 00 minutes, including:      Patient Education and Home Exercises     Home Exercises Provided and Patient " Education Provided     Education provided:   - Continue with HEP, hold on clamshells     Written Home Exercises Provided: Patient instructed to cont prior HEP. Exercises were reviewed and Raquel was able to demonstrate them prior to the end of the session.  Raquel demonstrated good  understanding of the education provided. See EMR under Patient Instructions for exercises provided during therapy sessions    ASSESSMENT     Raquel presents with improvement in mobility and symptoms compared to initial evaluation. Continued manual therapy working on maximizing mobility and offloading her lumbar spine. Continued progression with hip/glute strengthening and core stability. Was further challenged with lower extremity strengthening with shuttle work and continued hip strengthening. She continues to fatigue quickly with hip/glute strengthening but able to tolerate all sets and reps. Will continue to monitor and progress as able.      Raquel Is progressing well towards her goals.   Pt prognosis is Good.     Pt will continue to benefit from skilled outpatient physical therapy to address the deficits listed in the problem list box on initial evaluation, provide pt/family education and to maximize pt's level of independence in the home and community environment.     Pt's spiritual, cultural and educational needs considered and pt agreeable to plan of care and goals.     Anticipated barriers to physical therapy: Scheduling     Goals:   Short Term Goals: 4 weeks (Progressing, not met)  Patient will be independent in initial HEP to help supplement PT. - MET  Patient will improve lumbar AROM to >/= 70% in all planes to help with ADLs.   Patient will improve hip extension to 5 degrees to help with gait mechanics.     Long Term Goals: 8-10 weeks (Progressing, not met)  Patient will be independent in updated HEP to help supplement PT.   Patient will improve FOTO score to >/= predicted (72) to show improvement in condition.   Patient  will improve hip abduction strength by 1/2 MMT to help with stairs.   Patient will report no pain with squatting down to  dog or grandson.     PLAN   Plan of care Certification: 6/10/2024 to 8/30/2024.     Continue with current PT POC     Melissa Ricketts, PT, DPT, OCS

## 2024-07-16 ENCOUNTER — PATIENT MESSAGE (OUTPATIENT)
Dept: OBSTETRICS AND GYNECOLOGY | Facility: CLINIC | Age: 64
End: 2024-07-16
Payer: MEDICARE

## 2024-07-17 ENCOUNTER — CLINICAL SUPPORT (OUTPATIENT)
Dept: REHABILITATION | Facility: HOSPITAL | Age: 64
End: 2024-07-17
Payer: MEDICARE

## 2024-07-17 DIAGNOSIS — M53.86 DECREASED RANGE OF MOTION OF LUMBAR SPINE: ICD-10-CM

## 2024-07-17 DIAGNOSIS — R29.898 DECREASED STRENGTH OF LOWER EXTREMITY: Primary | ICD-10-CM

## 2024-07-17 PROCEDURE — 97530 THERAPEUTIC ACTIVITIES: CPT | Mod: KX

## 2024-07-17 PROCEDURE — 97112 NEUROMUSCULAR REEDUCATION: CPT | Mod: KX

## 2024-07-17 NOTE — PROGRESS NOTES
"[facility]   Physical Therapy Treatment Note     Name: Raquel SMITH [facility]  Clinic Number: [id number]    Therapy Diagnosis:   Encounter Diagnoses   Name Primary?    Decreased strength of lower extremity Yes    Decreased range of motion of lumbar spine      Physician: Danielle Tillman MD    Visit Date: 7/17/2024  Physician Orders: PT Eval and Treat   Medical Diagnosis from Referral: Sacroiliac dysfunction [M53.3], Trochanteric bursitis, unspecified laterality [M70.60], Osteoarthritis of spine with radiculopathy, lumbar region [M47.26]   Evaluation Date: 6/10/2024  Authorization Period Expiration: 12/31/2024  Plan of Care Expiration: 8/30/2024  Progress Note Due: 7/15/2024  Visit # / Visits authorized: 6/20   FOTO: 2/3    PTA Visit #: 0/5     FOTO first follow up: 7/3/2024  FOTO second follow up:     Time In: 12:55 pm   Time Out: 1:51 pm  Total Billable Time: 56 minutes (25 min)    SUBJECTIVE     Pt reports: Getting much better. Is really noticing the difference with less pain and able to do more without difficulty. Happy with her progress thus far.    She was compliant with home exercise program.  Response to previous treatment: Independent in HEP   Functional change: Ongoing     Pain: 5/10  Location: left back      OBJECTIVE     Objective Measures updated at progress report unless specified.     Treatment       Raquel received the treatments listed below:      therapeutic exercises to develop strength, endurance, ROM, flexibility, posture, and core stabilization for 00 minutes including:      manual therapy techniques: Joint mobilizations and Soft tissue Mobilization were applied to the: SI, lumbar, Hip for 05 minutes, including:    Long axis hip distraction grade II-III    Not Today:  Prone 2-person SI manipulation grade V   Sidelying lumbar gapping for L opening  Sidelying MET for rotated innominate 3 x 6" holds     neuromuscular re-education activities to improve: Balance, Coordination, " "Kinesthetic, Sense, Proprioception, and Posture for 22 minutes. The following activities were included:    Pt education on modifying emotions at home to help offload her back and time it takes for strengthening   Bridge with BlueTB and B lat pulldown GreenTB 3 x 8 x 3" holds  (B) Sidelying hip ER 2 x 10 x 3" holds 1#   Paloff press with 10# on cables 2 x 10 reps each direction  Lateral band walks with RedTB 3 laps each direction     Not Today:  Sidelying clamshells with GreenTB 1 x 10 reps x 3" holds   PPT with BP cuff (40-60 mmHg) 10 x 10" holds  PPT with BP cuff and BKFO 1 x 10 reps each  PPT with BP cuff and heel slides 1 x 10 reps each   Paloff press with GreenTB 1 x 10 x 3" holds each   Bent over wedge hip extension emphasis on glute squeeze 2 x 8 x 3" holds   Hip abduction BlueTB 10 x 10" holds  Hip adduction with ball 10 x 10" holds   TA activation with physioball 2 x 10 x 5" holds  TA activation with physioball and LE ext 2 x 5 each   Seated on physioball paloff press with reach GreenTB 2 x 8 reps each     therapeutic activities to improve functional performance for 29 minutes, including:    Hip hinge pattern sit to stand 22-inch 2 x 10 reps with GreenTB around knees   Hip hinge pattern sit to stand 22-inch with 10# foam roll hold GreenTB around knees 2 x 10 reps   Step up on 6-inch with B lat pulldowns 10# on cables 2 x 8 reps each   2-up1--down on shuttle 37.5# 3 x 6 reps each LE      Not today:  Hip hinge pattern mini lift 8# KB from 12-inch box 2 x 5 reps  Hip hinge with dowel 1 x 15 reps     gait training to improve functional mobility and safety for 00 minutes, including:      Patient Education and Home Exercises     Home Exercises Provided and Patient Education Provided     Education provided:   - Continue with HEP, hold on clamshells     Written Home Exercises Provided: Patient instructed to cont prior HEP. Exercises were reviewed and Raquel was able to demonstrate them prior to the end of the " session.  Raquel demonstrated good  understanding of the education provided. See EMR under Patient Instructions for exercises provided during therapy sessions    ASSESSMENT     Raquel tolerated therapy session well. She presented with improvement in symptoms compared to previous sessions. She presented with good SI alignment with only slight limitation in hip mobility on left and manual therapy utilized to improve. She was further challenged with continued core stability and progressing with hip intrinsic strengthening with good response. Progressed to hip hinge pattern sit to stands working on holding a weight to simulate taking care of her dog and grandson with good challenge. She demonstrated difficulty with eccentric quad control and added work on shuttle to improve. Will continue to monitor and progress as able.     Raquel Is progressing well towards her goals.   Pt prognosis is Good.     Pt will continue to benefit from skilled outpatient physical therapy to address the deficits listed in the problem list box on initial evaluation, provide pt/family education and to maximize pt's level of independence in the home and community environment.     Pt's spiritual, cultural and educational needs considered and pt agreeable to plan of care and goals.     Anticipated barriers to physical therapy: Scheduling     Goals:   Short Term Goals: 4 weeks (Progressing, not met)  Patient will be independent in initial HEP to help supplement PT. - MET  Patient will improve lumbar AROM to >/= 70% in all planes to help with ADLs.   Patient will improve hip extension to 5 degrees to help with gait mechanics.     Long Term Goals: 8-10 weeks (Progressing, not met)  Patient will be independent in updated HEP to help supplement PT.   Patient will improve FOTO score to >/= predicted (72) to show improvement in condition.   Patient will improve hip abduction strength by 1/2 MMT to help with stairs.   Patient will report no pain with  squatting down to  dog or grandson.     PLAN   Plan of care Certification: 6/10/2024 to 8/30/2024.     Continue with current PT POC     Melissa Ricketts, PT, DPT, OCS

## 2024-07-21 NOTE — PLAN OF CARE
[facility]   Physical Therapy Re-Assessment / Treatment Note     Name: Raquel Galan  Clinic Number: [id number]    Therapy Diagnosis:   Encounter Diagnoses   Name Primary?    Decreased strength of lower extremity Yes    Decreased range of motion of lumbar spine      Physician: Danielle Tillman MD    Visit Date: 7/3/2024  Physician Orders: PT Eval and Treat   Medical Diagnosis from Referral: Sacroiliac dysfunction [M53.3], Trochanteric bursitis, unspecified laterality [M70.60], Osteoarthritis of spine with radiculopathy, lumbar region [M47.26]   Evaluation Date: 6/10/2024  Authorization Period Expiration: 12/31/2024  Plan of Care Expiration: 8/30/2024  Progress Note Due: 7/15/2024  Visit # / Visits authorized: 5/20   FOTO: 1/3    PTA Visit #: 0/5     FOTO first follow up:   FOTO second follow up:     Time In: 2:00 pm   Time Out: 2:55 pm    Total Billable Time: 55 minutes (30 min)    SUBJECTIVE     Pt reports: She feels PT has really helped, she still has the discomfort at times but much improved and always feels better following PT.   She was compliant with home exercise program.  Response to previous treatment: Independent in HEP   Functional change: Ongoing     Pain: 2/10  Location: left back      OBJECTIVE     Objective Measures updated at progress report unless specified.     Range of Motion:  Lumbar      Percentage Pain   Flexion 80% No Pain    Extension 60% No Pain   Right Sidebend  75% No Pain   Left Sidebend 75% No Pain   Right Rotation 90% No Pain   Left Rotation 90% No Pain      Hip    Right Left    Flexion 110 degrees  100 degrees    Extension 5 degrees  5 degrees    Internal Rotation 25 degrees  20 degrees    External Rotation 40 degrees  40 degrees       Lower Extremity Strength (MMT):    Right Left    Hip Flexion 4-/5 4-/5   Hip Abduction 3+/5 3+/5   Hip Extension 3/5 3/5   Knee Flexion 4/5 4/5   Knee Extension 4+/5 4/5   Dorsiflexion 4/5 4-/5   Plantarflexion 4/5 4/5  "    Functional Tests:  - Double Leg Squat: Able to perform, quad dominant and forward trunk lean.   - Single Leg Balance: Right = 10 sec, Left = 6 sec     FOTO = 59 (initial = 54)    Treatment     Raquel received the treatments listed below:      therapeutic exercises to develop strength, endurance, ROM, flexibility, posture, and core stabilization for 00 minutes including:      manual therapy techniques: Joint mobilizations and Soft tissue Mobilization were applied to the: SI, lumbar, Hip for 10 minutes, including:    Long axis hip distraction grade II-III  Lateral/inferior hip glides with belt     Not Today:  Prone 2-person SI manipulation grade V   Sidelying lumbar gapping for L opening  Sidelying MET for rotated innominate 3 x 6" holds     neuromuscular re-education activities to improve: Balance, Coordination, Kinesthetic, Sense, Proprioception, and Posture for 35 minutes. The following activities were included:    Pt education on modifying emotions at home to help offload her back and time it takes for strengthening   Assessment as above   Hip abduction BlueTB 10 x 10" holds  Hip adduction with ball 10 x 10" holds   Seated on physioball paloff press with reach GreenTB 2 x 8 reps each   Bridge with GreenTB and B lat pulldown RedTB 2 x 8 x 3" holds    Not Today:  Paloff press with 10# on cables 1 x 5 reps each direction  Sidelying clamshells with GreenTB 1 x 10 reps x 3" holds   PPT with BP cuff (40-60 mmHg) 10 x 10" holds  PPT with BP cuff and BKFO 1 x 10 reps each  PPT with BP cuff and heel slides 1 x 10 reps each   Bent over wedge hip extension emphasis on glute squeeze 2 x 8 x 3" holds   TA activation with physioball 2 x 10 x 5" holds  TA activation with physioball and LE ext 2 x 5 each   Sidelying hip ER 2 x 8 x 3" holds     therapeutic activities to improve functional performance for 10 minutes, including:    Hip hinge pattern sit to stand 22-inch 3 x 8 reps   Step up on 6-inch with B lat pulldowns GreenTB " 2 x 8 reps each     Not today:  Hip hinge pattern mini lift 8# KB from 12-inch box 2 x 5 reps   Hip hinge with dowel 1 x 15 reps     gait training to improve functional mobility and safety for 00 minutes, including:      Patient Education and Home Exercises     Home Exercises Provided and Patient Education Provided     Education provided:   - Continue with HEP, hold on clamshells     Written Home Exercises Provided: Patient instructed to cont prior HEP. Exercises were reviewed and Raquel was able to demonstrate them prior to the end of the session.  Raquel demonstrated good  understanding of the education provided. See EMR under Patient Instructions for exercises provided during therapy sessions    ASSESSMENT     Raquel demonstrates significant improvement in her range of motion and subjective reports of improvement in symptoms. She continues with hip strength deficits and continued focus on improving as well as further core stability work with good tolerance. Continued posterior sling strengthening to help improve stability with good tolerance. She continues to fatigue quickly with hip/glute strengthening and continued focus. She was adequately challenged and fatigued end of session. Will continue to monitor and progress as able.     Raquel Is progressing well towards her goals.   Pt prognosis is Good.     Pt will continue to benefit from skilled outpatient physical therapy to address the deficits listed in the problem list box on initial evaluation, provide pt/family education and to maximize pt's level of independence in the home and community environment.     Pt's spiritual, cultural and educational needs considered and pt agreeable to plan of care and goals.     Anticipated barriers to physical therapy: Scheduling     Goals:   Short Term Goals: 4 weeks (Progressing, not met)  Patient will be independent in initial HEP to help supplement PT. - MET  Patient will improve lumbar AROM to >/= 70% in all planes to  help with ADLs.   Patient will improve hip extension to 5 degrees to help with gait mechanics. - MET     Long Term Goals: 8-10 weeks (Progressing, not met)  Patient will be independent in updated HEP to help supplement PT.   Patient will improve FOTO score to >/= predicted (72) to show improvement in condition.   Patient will improve hip abduction strength by 1/2 MMT to help with stairs.   Patient will report no pain with squatting down to  dog or grandson.     PLAN   Plan of care Certification: 6/10/2024 to 8/30/2024.     Continue with current PT POC     Melissa Ricketts, PT, DPT, OCS

## 2024-07-27 ENCOUNTER — PATIENT MESSAGE (OUTPATIENT)
Dept: INTERNAL MEDICINE | Facility: CLINIC | Age: 64
End: 2024-07-27
Payer: MEDICARE

## 2024-07-29 ENCOUNTER — PATIENT MESSAGE (OUTPATIENT)
Dept: INTERNAL MEDICINE | Facility: CLINIC | Age: 64
End: 2024-07-29
Payer: MEDICARE

## 2024-07-29 ENCOUNTER — OFFICE VISIT (OUTPATIENT)
Dept: PAIN MEDICINE | Facility: CLINIC | Age: 64
End: 2024-07-29
Payer: MEDICARE

## 2024-07-29 VITALS
RESPIRATION RATE: 16 BRPM | WEIGHT: 127.44 LBS | BODY MASS INDEX: 24.08 KG/M2 | DIASTOLIC BLOOD PRESSURE: 75 MMHG | SYSTOLIC BLOOD PRESSURE: 128 MMHG | HEART RATE: 86 BPM

## 2024-07-29 DIAGNOSIS — M76.30 TENDINITIS OF ILIOTIBIAL BAND, UNSPECIFIED LATERALITY: ICD-10-CM

## 2024-07-29 DIAGNOSIS — M53.3 SACROILIAC DYSFUNCTION: Primary | ICD-10-CM

## 2024-07-29 DIAGNOSIS — M70.60 TROCHANTERIC BURSITIS, UNSPECIFIED LATERALITY: ICD-10-CM

## 2024-07-29 DIAGNOSIS — M47.26 OSTEOARTHRITIS OF SPINE WITH RADICULOPATHY, LUMBAR REGION: ICD-10-CM

## 2024-07-29 DIAGNOSIS — K22.2 ESOPHAGEAL STENOSIS: Primary | ICD-10-CM

## 2024-07-29 PROCEDURE — 99213 OFFICE O/P EST LOW 20 MIN: CPT | Mod: S$GLB,,, | Performed by: NURSE PRACTITIONER

## 2024-07-29 PROCEDURE — 3008F BODY MASS INDEX DOCD: CPT | Mod: CPTII,S$GLB,, | Performed by: NURSE PRACTITIONER

## 2024-07-29 PROCEDURE — 1125F AMNT PAIN NOTED PAIN PRSNT: CPT | Mod: CPTII,S$GLB,, | Performed by: NURSE PRACTITIONER

## 2024-07-29 PROCEDURE — 3078F DIAST BP <80 MM HG: CPT | Mod: CPTII,S$GLB,, | Performed by: NURSE PRACTITIONER

## 2024-07-29 PROCEDURE — 1101F PT FALLS ASSESS-DOCD LE1/YR: CPT | Mod: CPTII,S$GLB,, | Performed by: NURSE PRACTITIONER

## 2024-07-29 PROCEDURE — 3074F SYST BP LT 130 MM HG: CPT | Mod: CPTII,S$GLB,, | Performed by: NURSE PRACTITIONER

## 2024-07-29 PROCEDURE — 3288F FALL RISK ASSESSMENT DOCD: CPT | Mod: CPTII,S$GLB,, | Performed by: NURSE PRACTITIONER

## 2024-07-29 PROCEDURE — 1160F RVW MEDS BY RX/DR IN RCRD: CPT | Mod: CPTII,S$GLB,, | Performed by: NURSE PRACTITIONER

## 2024-07-29 PROCEDURE — 1159F MED LIST DOCD IN RCRD: CPT | Mod: CPTII,S$GLB,, | Performed by: NURSE PRACTITIONER

## 2024-07-29 PROCEDURE — 99999 PR PBB SHADOW E&M-EST. PATIENT-LVL III: CPT | Mod: PBBFAC,,, | Performed by: NURSE PRACTITIONER

## 2024-07-29 NOTE — TELEPHONE ENCOUNTER
She sent 3 messages  She  prefers a referral to  female gastro  at [facility] to see   She thinks she needs her esophagus stretched again.

## 2024-07-29 NOTE — PROGRESS NOTES
Chronic Pain-Established Note (Follow up visit)        Referring Physician: No ref. provider found    Chief Complaint:   Chief Complaint   Patient presents with    Hip Pain        SUBJECTIVE: Disclaimer: This note has been generated using voice-recognition software. There may be typographical errors that have been missed during proof-reading    Interval History 7/29/2024:  The patient is here for follow up of bilateral hip pain. She is s/p bilateral GTB and bilateral gluteal tendon sheath injections on 5/23/24 with 90% relief. Her pain is currently tolerable. She is currently in PT which she finds helpful. She is working on strengthening as well. Her pain today is 3/10.    Interval History 4/4/2024:  Raquel Galan returns today for follow-up. She reports excellent relief of previous GTB injections. She reports return of her lateral hip pain in the same quality and character as previous. The pain is located to the lateral hip bilaterally and radiates to the knee. She denies weakness, numbness/tingling, or low back pain. Previous injection over 1 year ago with excellent relief. She is interested in repeat injections at this time.     Interval History 10/24/2023:  Raquel Galan returns today for follow-up. Since last seen, they report their pain has been returning. It seems her right hip and ischial bursa are inflammed again and she feels she needs a repeat injection. Notes her left hip is starting to hurt also. She is to have a garden party and wants to do work outside before and needs the injections to tolerate. Still doing regular PT and exercise with silver sneakers. Still having balance difficulties from inner ear condition that leads to her stabilizing muscle overuse/strain. No numbness, tingling or weakness.     Interval History 8/15/2023:  The patient has a virtual follow up for right sided buttock and hip pain. There was difficulty with audio through the bess so the latter portion of the visit was  conducted via phone audio. She is now s/p GTB and ischial bursa injection with about 90% relief. She went on a long vacation recently and was able to walk and be active. She is restarting Silver Sneakers this week. Her pain is tolerable at this time. Her pain today is 2/10.    Interval History 2/9/2023:  The patient presents for increased right sided hip and buttock pain. She has been having more pain over the past couple of weeks. The pain is located to the right buttock/lateral hip and radiates down the side of the leg. No numbness. She has mild symptoms on the left. The pain bothers her more when she is laying at night or putting pressure on the right side. Her pain today is 8/10.    Interval History 12/5/2022:  The patient is here for follow up of right sided hip and buttock pain. She is s/p right GTB and ischial bursa injections in office on 10/19/22 with Dr. Tillman. She reports 90% relief of her pain. She was able to take a trip and walk a lot after the procedure without significant pain. She has very mild, intermittent pain since the procedure. Her pain today is 0/10.    Interval History 10/17/2022:  The patient presents today for re-evaluation of back and hip pain. I last saw her in May 2021 and she had hip injections with benefit. She says that her pain was manageable until recently. She is now having more right lateral hip and buttock pain. She had benefit with right GTB and ischial bursa in April 2021. She is no longer taking NSAIDs due to stomach issues. She takes Tylenol PRN. Her pain today is 8/10.    Interval History 5/14/2021:  The patient has a virtual video appointment for follow up of right hip and buttock pain. She is s/p right ischial bursa and GTB injection with 90% pain relief.  She now only has some mild soreness after she exercises from the right side.  She says her pain has significantly improved.  She mild pain on the left which also worsens after exercise.  She is working on walking and  increasing activity for weight loss.  No new complaints noted.  Her pain today is 1/10.    Interval History 4/15/2021:  The patient reports today for follow up of of right-sided buttock and hip pain.  We have not seen the patient in was 2 years.  She reports that she has been doing fairly well but had a recent injury that caused increased pain.  Her buttock pain is located to the bottom of her buttock and posterior thigh.  It worsens with sitting and applying pressure.  She also has right-sided lateral hip pain.  She would like to discuss injections today.  Her pain today is 8/10.    Interval History 8/28/2019:  The patient is here for follow up of left sided buttock pain.  She has intermittent Toradol injections as well as SI joint injections as needed.  She had a Toradol shot in April which controlled her symptoms well until recently.  She is requesting a repeat today.  She takes Ibuprofen sparingly as well.  Her pain worsens with sitting and standing.  She is not having any shooting into the legs.  Her pain today is 3/10.    Interval History 4/5/2019:  The patient returns to discuss pain.  After her last visit, I gave her a Toradol shot which provided her with significant benefit for some time.  She has been taking 800 mg Ibuprofen as needed with benefit, usually about once per week.  Her pain worsens with sitting and driving.  She does not have very much pain with walking.  Her pain today is 9/10.     Interval History 11/26/2018:  The patient returns for follow up of left sided lower back pain.  She is s/p left SI joint injection on 10/16/18 with minimal benefit.  Previous injection helped significantly.  She continues to report pain over the left buttock.  She does have pain over the lumbar spine, but this is mild.  Her pain is most severe when she sits for prolonged periods or changes from sitting to standing.  She is active and has been exercising and gardening.  Her previous XRAYs from last year show right  sided curvature and retrolisthesis without instability.  She has not had imaging of hips or pelvis recently.  Her pain today is 6/10.  She takes OTC Ibuprofen with some benefit.    Interval History 2/1/2018:  The patient presents today for follow up.  She is s/p left SI joint injection on 1/16/18 with about 80% pain relief overall.  She has intermittent pain to left buttock which is mild.  This is mainly with bending and lifting.  Since her last visit, she has also changed her body mechanics which she thinks is helping.  She takes OTC NSAIDs sparingly as needed.  She has also increased her activity and has been walking.  Her pain today is 3/10.    Interval History 12/7/2017:  The patient presents today for follow up of left sided lower back and buttock pain.  She is s/p left L3,4,5 MBB on 11/7/17.  She reports that she felt numbness to her lower back for about one hour after the procedure.  Immediately after the procedure, she went to walk her dog in the park and started to have her usual pain.  Her pain is mainly to the left buttock at this time.  It is worsened with prolonged sitting, changing from sitting to standing, and lifting her 20 lb nephew.  She also notices that she carried him on her left hip which worsens her pain.  She does have some benefit with OTC NSAIDs.  Voltaren gel provided limited benefit.      Initial encounter:    Raquel Galan presents to the clinic for the evaluation of left sided low back pain. The pain started 10 years ago and symptoms have been worsening.    Brief history:  Patient had a history of radicular symptoms but they are not occurring now.    Pain Description:    The pain is located in the left side of lower back area and does not radiate.      At BEST  2/10     At WORST  9/10 on the WORST day.      On average pain is rated as 5/10.     Today the pain is rated as 4/10    The pain is described as aching and sharp      Symptoms interfere with daily activity.     Exacerbating  factors: Sitting, Standing, Laying, Bending, Coughing/Sneezing, Walking, Morning, Lifting and Getting out of bed/chair.      Mitigating factors heat, ice, massage and medications.     Patient denies night fever/night sweats, urinary incontinence, bowel incontinence, significant weight loss, significant motor weakness and loss of sensations.  Patient denies any suicidal or homicidal ideations    Pain Medications:  Current:  Voltaren gel  Tylenol PRN    Tried in Past:  NSAIDs - Ibuprofen and Voltaren gel  TCA -Never  SNRI -Never  Anti-convulsants -asprin 81mg  Muscle Relaxants -Never  Opioids-Never    Physical Therapy/Home Exercise: yes  -works with a physical therapist and psychiatrist     report:  Reviewed and consistent with medication use as prescribed.    Pain Procedures:   11/7/17 Left L3,4,5 MBB- limited relief after 1 hr with activity  1/16/18 Left SI joint injection- 80% relief  10/16/18 Left SI joint injection  4/29/21 Right GTB and ischial bursa injection- 90% relief  10/19/22 Right GTB and ischial bursa injection- 90% relief  6/20/23 Right GTB and ischial bursa injection- 90% relief  5/23/24 bilateral GTB and gluteal tendon sheath injections- 90% relief    Chiropractor -never  Acupuncture - never  TENS unit -never  Spinal decompression -never  Joint replacement -never    CMP  Sodium   Date Value Ref Range Status   08/02/2023 140 136 - 145 mmol/L Final     Potassium   Date Value Ref Range Status   08/02/2023 4.0 3.5 - 5.1 mmol/L Final     Chloride   Date Value Ref Range Status   08/02/2023 109 95 - 110 mmol/L Final     CO2   Date Value Ref Range Status   08/02/2023 21 (L) 23 - 29 mmol/L Final     Glucose   Date Value Ref Range Status   08/02/2023 81 70 - 110 mg/dL Final     BUN   Date Value Ref Range Status   08/02/2023 16 8 - 23 mg/dL Final     Creatinine   Date Value Ref Range Status   08/02/2023 0.8 0.5 - 1.4 mg/dL Final     Calcium   Date Value Ref Range Status   08/02/2023 8.9 8.7 - 10.5 mg/dL Final      Total Protein   Date Value Ref Range Status   08/02/2023 6.7 6.0 - 8.4 g/dL Final     Albumin   Date Value Ref Range Status   08/02/2023 3.6 3.5 - 5.2 g/dL Final     Total Bilirubin   Date Value Ref Range Status   08/02/2023 0.8 0.1 - 1.0 mg/dL Final     Comment:     For infants and newborns, interpretation of results should be based  on gestational age, weight and in agreement with clinical  observations.    Premature Infant recommended reference ranges:  Up to 24 hours.............<8.0 mg/dL  Up to 48 hours............<12.0 mg/dL  3-5 days..................<15.0 mg/dL  6-29 days.................<15.0 mg/dL       Alkaline Phosphatase   Date Value Ref Range Status   08/02/2023 86 55 - 135 U/L Final     AST   Date Value Ref Range Status   08/02/2023 23 10 - 40 U/L Final     ALT   Date Value Ref Range Status   08/02/2023 32 10 - 44 U/L Final     Anion Gap   Date Value Ref Range Status   08/02/2023 10 8 - 16 mmol/L Final     eGFR if    Date Value Ref Range Status   06/30/2022 >60.0 >60 mL/min/1.73 m^2 Final     eGFR if non    Date Value Ref Range Status   06/30/2022 >60.0 >60 mL/min/1.73 m^2 Final     Comment:     Calculation used to obtain the estimated glomerular filtration  rate (eGFR) is the CKD-EPI equation.        Lab Results   Component Value Date    WBC 6.19 08/02/2023    HGB 13.7 08/02/2023    HCT 43.3 08/02/2023     (H) 08/02/2023     08/02/2023       Imaging:  Narrative     EXAMINATION:  XR HIPS BILATERAL 2 VIEW INCL AP PELVIS    CLINICAL HISTORY:  Sacroiliitis, not elsewhere classified    TECHNIQUE:  AP view of the pelvis and frogleg lateral views of both hips were performed.    COMPARISON:  None.    FINDINGS:  There is no evidence of acute fracture, dislocation, or bone destruction.  Joint spaces are well preserved.  There is mild degenerative change of the sacroiliac joints.  No erosions are seen      Impression       See above.     Narrative      EXAMINATION:  XR LUMBAR SPINE 5 VIEW WITH FLEX AND EXT    CLINICAL HISTORY:  Low back pain, >6wks conservative tx, persistent-progressive sx, surgical candidate;  Sacroiliitis, not elsewhere classified    TECHNIQUE:  Five views of the lumbar spine plus flexion extension views were performed.    COMPARISON:  Prior dated 10/26/2017    FINDINGS:  There is dextroscoliosis of the lumbar spine.  There is retrolisthesis of L2-3 and L3 on L4 (grade 1) measuring approximately 4 mm.  This is similar to previous exam.  There is no significant change on flexion/extension views to suggest translational instability.  There are endplate degenerative changes with subchondral sclerosis and marginal osteophyte formation and disc height narrowing at L3-4.  Mild facet arthropathy is present at the lower lumbar levels.      Impression       Degenerative change of the lumbar spine with associated dextroscoliosis and L2-3/L3-4 retrolisthesis, similar to previous exam.  No translational instability.       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     Fibrocystic breast disease in female     HEARING LOSS     right side - no hearing aid    Hyperlipidemia     Osteopenia     PONV (postoperative nausea and vomiting)     PONV (postoperative nausea and vomiting)     Special screening for malignant neoplasms, colon 7/22/2013     Past Surgical History:   Procedure Laterality Date    ADENOIDECTOMY      APPENDECTOMY      BREAST BIOPSY Left     exc bx, benign per pt    BREAST SURGERY      left breast biopsy    COLONOSCOPY N/A 9/27/2018    Procedure: COLONOSCOPY;  Surgeon: Uche Smiley MD;  Location: [facility] ([address] [place] [address]);  Service: Endoscopy;  Laterality: N/A;  pt/instructed on prep day before and day of importance    COLONOSCOPY N/A 2/1/2024    Procedure: COLONOSCOPY;  Surgeon: Lanre Vaughn MD;  Location: [facility] ENDOSCOPY;  Service:  Endoscopy;  Laterality: N/A;    COLONOSCOPY N/A 4/11/2024    Procedure: COLONOSCOPY;  Surgeon: Lanre Vaughn MD;  Location: [facility] ENDOSCOPY;  Service: Endoscopy;  Laterality: N/A;  roderick-miralax extended-instr portal-full liquids 1 day prior-tb  4/4- pc complete. DBM    CYST REMOVAL Right     neck    DILATION AND CURETTAGE OF UTERUS      ESOPHAGOGASTRODUODENOSCOPY N/A 8/4/2021    Procedure: ESOPHAGOGASTRODUODENOSCOPY (EGD);  Surgeon: Ameya Medeiros MD;  Location: [facility] ENDO (2ND FLR);  Service: Endoscopy;  Laterality: N/A;  Food gets hung up. Dilatation likely needed.     COVID test in [place] on 8/1-GT  8/2-new instructions via portal-tb    ESOPHAGOGASTRODUODENOSCOPY N/A 5/31/2023    Procedure: EGD (ESOPHAGOGASTRODUODENOSCOPY);  Surgeon: Lanre Vaughn MD;  Location: [facility] ENDOSCOPY;  Service: Endoscopy;  Laterality: N/A;  cardiac clearance received from -see note 5/9/23/ referred by KEMAL Krishna/ inst portal-RB  5/30 Pre call complete, ride confirmed, no wt loss inj;SG    ESOPHAGOGASTRODUODENOSCOPY N/A 2/1/2024    Procedure: EGD (ESOPHAGOGASTRODUODENOSCOPY);  Surgeon: Lanre Vaughn MD;  Location: [facility] ENDOSCOPY;  Service: Endoscopy;  Laterality: N/A;  1/26/24-Roderick pt, Sutab, instr portal-DS    HYSTERECTOMY      INJECTION OF JOINT Left 10/16/2018    Procedure: INJECTION, JOINT, LEFT SI JOINT INJECTION UNDER FLUORO;  Surgeon: Alirio Lei MD;  Location: [facility] PAIN MGT;  Service: Pain Management;  Laterality: Left;  NEEDS CONSENT    MANDIBLE FRACTURE SURGERY Right     scope    ROBOT-ASSISTED LAPAROSCOPIC ABDOMINAL HYSTERECTOMY USING DA ERICA XI N/A 6/12/2020    Procedure: XI ROBOTIC HYSTERECTOMY;  Surgeon: Alice Valenzuela MD;  Location: [facility] OR;  Service: OB/GYN;  Laterality: N/A;    ROBOT-ASSISTED LAPAROSCOPIC SALPINGO-OOPHORECTOMY USING DA ERICA XI N/A 6/12/2020    Procedure: XI ROBOTIC SALPINGO-OOPHORECTOMY;  Surgeon: Alice Valenzuela MD;  Location: [facility];  Service:  OB/GYN;  Laterality: N/A;    TONSILLECTOMY       Social History     Socioeconomic History    Marital status:    Tobacco Use    Smoking status: Never    Smokeless tobacco: Never   Substance and Sexual Activity    Alcohol use: Not Currently     Alcohol/week: 2.0 standard drinks of alcohol     Types: 2 Glasses of wine per week     Comment: social    Drug use: Never    Sexual activity: Yes     Partners: Male     Birth control/protection: Partner-Vasectomy, Post-menopausal, None   Other Topics Concern    Are you pregnant or think you may be? No    Breast-feeding No   Social History Narrative    Lives with spouse and feels safe in her home.     Social Determinants of Health     Financial Resource Strain: Low Risk  (6/19/2024)    Overall Financial Resource Strain (CARDIA)     Difficulty of Paying Living Expenses: Not very hard   Food Insecurity: No Food Insecurity (6/19/2024)    Hunger Vital Sign     Worried About Running Out of Food in the Last Year: Never true     Ran Out of Food in the Last Year: Never true   Transportation Needs: No Transportation Needs (8/2/2023)    PRAPARE - Transportation     Lack of Transportation (Medical): No     Lack of Transportation (Non-Medical): No   Physical Activity: Inactive (6/19/2024)    Exercise Vital Sign     Days of Exercise per Week: 0 days     Minutes of Exercise per Session: 30 min   Stress: No Stress Concern Present (6/19/2024)    [employer] - Occupational Stress Questionnaire     Feeling of Stress : Only a little   Housing Stability: Low Risk  (8/2/2023)    Housing Stability Vital Sign     Unable to Pay for Housing in the Last Year: No     Number of Places Lived in the Last Year: 2     Unstable Housing in the Last Year: No     Family History   Problem Relation Name Age of Onset    Heart disease Mother Tran Kaye     Kidney disease Mother Tran Kaye     Diabetes Mother Tran Kaye     Hypertension Mother Tran Kaye      Hyperlipidemia Mother Tran Kaye     COPD Mother Tran Kaye     Arthritis Mother Tran Kaye     Miscarriages / Stillbirths Mother Tran Kaye     Stroke Mother Tran Kaye     Heart disease Father Sandor Kaye     Cancer Father Sandor Kaye 75        lymphoma, bladder tumors    Arthritis Father Sandor Kaye     Breast cancer Neg Hx      Ovarian cancer Neg Hx      Colon cancer Neg Hx      Lupus Neg Hx      Psoriasis Neg Hx      Rheum arthritis Neg Hx      Inflammatory bowel disease Neg Hx         Review of patient's allergies indicates:   Allergen Reactions    Dexamethasone      Flushing and headache with medrol dosepack & tachycardia       Current Outpatient Medications   Medication Sig    b complex vitamins tablet Take 1 tablet by mouth once daily.    CALCIUM 600 WITH VITAMIN D3 600 mg(1,500mg) -200 unit Tab Take 1 tablet by mouth once daily.    estradioL (ESTRACE) 1 MG tablet Take 1 tablet (1 mg total) by mouth once daily.    glucosamine-chondroitin 500-400 mg tablet Take 1 tablet by mouth Daily.    linaCLOtide (LINZESS) 145 mcg Cap capsule Take 1 capsule (145 mcg total) by mouth before breakfast.    omega-3 fatty acids/fish oil (FISH OIL-OMEGA-3 FATTY ACIDS) 300-1,000 mg capsule Take 1 capsule by mouth once daily.    pantoprazole (PROTONIX) 40 MG tablet Take 1 tablet (40 mg total) by mouth once daily.    tretinoin (RETIN-A) 0.025 % cream Compound tretinoin 0.025% / niacinamide 2% cream. Apply a pea-sized amount to entire face qhs.    fexofenadine (ALLEGRA) 180 MG tablet Take 1 tablet (180 mg total) by mouth once daily.     No current facility-administered medications for this visit.       REVIEW OF SYSTEMS:    GENERAL:  No weight loss, malaise or fevers.  HEENT:   No recent changes in vision or hearing  NECK:  Negative for lumps, no difficulty with swallowing.  RESPIRATORY:  Negative for cough, wheezing or shortness of breath, patient denies any recent URI.  CARDIOVASCULAR:  Negative for  chest pain, leg swelling or palpitations.  GI:  Negative for abdominal discomfort, blood in stools or black stools or change in bowel habits.  MUSCULOSKELETAL:  See HPI.  SKIN:  Negative for lesions, rash, and itching.  PSYCH:  No mood disorder or recent psychosocial stressors.  Patient's sleep is disturbed secondary to pain.  HEMATOLOGY/LYMPHOLOGY:  Negative for prolonged bleeding, bruising easily or swollen nodes.  Patient is not currently taking any anti-coagulants  ENDO: No history of diabetes or thyroid dysfunction  NEURO:   No history of headaches, syncope, paralysis, seizures or tremors.  All other reviewed and negative other than HPI.    OBJECTIVE:    /75   Pulse 86   Resp 16   Wt 57.8 kg (127 lb 6.8 oz)   LMP 04/15/2009   BMI 24.08 kg/m²       PHYSICAL EXAMINATION:    GENERAL: Well appearing, in no acute distress, alert and oriented x3.  PSYCH:  Mood and affect appropriate.  SKIN: Skin color, texture, turgor normal, no rashes or lesions.  HEAD/FACE:  Normocephalic, atraumatic. Cranial nerves grossly intact.  BACK: Straight leg raising in the sitting and supine positions is negative to radicular pain. There is no pain with palpation over the facet joints of the lumbar spine. Full ROM without pain.   EXTREMITIES: Peripheral joint ROM is full and pain free without obvious instability or laxity in all four extremities. No deformities, edema, or skin discoloration. Good capillary refill.  MUSCULOSKELETAL: No muscular atrophy, gross deformity or overlying skin change. There is mild TTP to bilateral greater trochanter. Mild TTP to left SIJ. Internal rotation of bilateral hips reproduces lateral hip pain.  Bilateral lower extremity strength is normal and symmetric.  No atrophy or tone abnormalities are noted. +TARA for lateral hip pain.    NEURO: No loss of sensation noted.   GAIT: Normal.    ASSESSMENT: 64 y.o. year old female with left sided back/buttock pain, consistent with the  following:    Encounter Diagnoses   Name Primary?    Sacroiliac dysfunction Yes    Trochanteric bursitis, unspecified laterality     Osteoarthritis of spine with radiculopathy, lumbar region     Tendinitis of iliotibial band, unspecified laterality          PLAN:   We discussed with the patient the assessment and recommendations. The following is the plan we agreed on:     - Previous imaging was reviewed and discussed with the patient today.    - She is s/p bilateral GTB and gluteal tendon sheath injections under ultrasound guidance with benefit. Can call to repeat.    - Return to clinic as needed.      The above plan and management options were discussed at length with patient. Patient is in agreement with the above and verbalized understanding.    Jennifer Pickens  07/29/2024

## 2024-07-29 NOTE — TELEPHONE ENCOUNTER
I placed a referral order for EGD.  I am not sure about the availability of a female gastroenterologist at [facility].  Endoscopy department will let us know.

## 2024-07-31 ENCOUNTER — PATIENT MESSAGE (OUTPATIENT)
Dept: GASTROENTEROLOGY | Facility: CLINIC | Age: 64
End: 2024-07-31
Payer: MEDICARE

## 2024-07-31 ENCOUNTER — CLINICAL SUPPORT (OUTPATIENT)
Dept: REHABILITATION | Facility: HOSPITAL | Age: 64
End: 2024-07-31
Payer: MEDICARE

## 2024-07-31 DIAGNOSIS — R29.898 DECREASED STRENGTH OF LOWER EXTREMITY: Primary | ICD-10-CM

## 2024-07-31 DIAGNOSIS — M53.86 DECREASED RANGE OF MOTION OF LUMBAR SPINE: ICD-10-CM

## 2024-07-31 PROCEDURE — 97530 THERAPEUTIC ACTIVITIES: CPT | Mod: KX

## 2024-07-31 PROCEDURE — 97112 NEUROMUSCULAR REEDUCATION: CPT | Mod: KX

## 2024-07-31 NOTE — PLAN OF CARE
[facility]   Physical Therapy Discharge / Treatment Note     Name: Raquel Galan  Clinic Number: [id number]    Therapy Diagnosis:   Encounter Diagnoses   Name Primary?    Decreased strength of lower extremity Yes    Decreased range of motion of lumbar spine      Physician: Danielle Tillman MD    Visit Date: 7/31/2024  Physician Orders: PT Eval and Treat   Medical Diagnosis from Referral: Sacroiliac dysfunction [M53.3], Trochanteric bursitis, unspecified laterality [M70.60], Osteoarthritis of spine with radiculopathy, lumbar region [M47.26]   Evaluation Date: 6/10/2024  Authorization Period Expiration: 12/31/2024  Plan of Care Expiration: 8/30/2024  Progress Note Due: 7/15/2024  Visit # / Visits authorized: 7/20   FOTO: 3/3    PTA Visit #: 0/5     FOTO first follow up: 7/3/2024  FOTO second follow up: 7/31/2024    Time In: 2:52 pm  Time Out: 3:40 pm  Total Billable Time: 48 minutes (28 min)    SUBJECTIVE     Pt reports: She is doing really well. She has been keeping up with her exercises. She has noticed a huge difference. She knows she is still weak but getting better and feels confident with continuing to progress with exercises on her own.   She was compliant with home exercise program.  Response to previous treatment: Independent in HEP   Functional change: Ongoing     Pain: 5/10  Location: left back      OBJECTIVE     Objective Measures updated at progress report unless specified.     Observation 7/31/2024:    Range of Motion:  Lumbar      Percentage Pain   Flexion 90% No Pain    Extension 75% No Pain   Right Sidebend  90% No Pain   Left Sidebend 90% No Pain   Right Rotation 90% No Pain   Left Rotation 90% No Pain      Hip    Right Left    Flexion 110 degrees  100 degrees    Extension 5 degrees  5 degrees    Internal Rotation 25 degrees  20 degrees    External Rotation 45 degrees  40 degrees       Lower Extremity Strength (MMT):     Right Left    Hip Flexion 4-/5 4-/5   Hip Abduction  "3+/5 4-/5   Hip Extension 3/5 3/5   Knee Flexion 4/5 4/5   Knee Extension 4+/5 4/5   Dorsiflexion 4/5 4-/5   Plantarflexion 4/5 4/5     Functional Tests:  - Double Leg Squat: Able to perform, quad dominant and forward trunk lean. No pain.    - Single Leg Balance: Right = 12 sec (last 10 sec), Left = 10 sec (last 6 sec)     FOTO = 72 (initial = 54)     Treatment     Raquel received the treatments listed below:      therapeutic exercises to develop strength, endurance, ROM, flexibility, posture, and core stabilization for 00 minutes including:      manual therapy techniques: Joint mobilizations and Soft tissue Mobilization were applied to the: SI, lumbar, Hip for 00 minutes, including:    Not Today:  Prone 2-person SI manipulation grade V   Sidelying lumbar gapping for L opening  Sidelying MET for rotated innominate 3 x 6" holds   Long axis hip distraction grade II-III    neuromuscular re-education activities to improve: Balance, Coordination, Kinesthetic, Sense, Proprioception, and Posture for 34 minutes. The following activities were included:    Pt education on modifying motions at home to help offload her back and time it takes for strengthening  Assessment as above, updated HEP    Bridge with BlueTB and B lat pulldown GreenTB 3 x 8 x 3" holds  Lateral band walks with RedTB 3 laps each direction   Seated hip ER GreenTB 2 x 12 reps each    Not Today:  Sidelying clamshells with GreenTB 1 x 10 reps x 3" holds   PPT with BP cuff (40-60 mmHg) 10 x 10" holds  PPT with BP cuff and BKFO 1 x 10 reps each  PPT with BP cuff and heel slides 1 x 10 reps each   Paloff press with GreenTB 1 x 10 x 3" holds each   Bent over wedge hip extension emphasis on glute squeeze 2 x 8 x 3" holds   Hip abduction BlueTB 10 x 10" holds  Hip adduction with ball 10 x 10" holds   TA activation with physioball 2 x 10 x 5" holds  TA activation with physioball and LE ext 2 x 5 each   Seated on physioball paloff press with reach GreenTB 2 x 8 reps " "each   (B) Sidelying hip ER 2 x 10 x 3" holds 1#   Paloff press with 10# on cables 2 x 10 reps each direction    therapeutic activities to improve functional performance for 14 minutes, including:    Hip hinge pattern sit to stand 22-inch with 10# foam roll hold 3 x 10 reps   Step up on 6-inch with B lat pulldowns 10# on cables 2 x 10 reps each     Not today:  Hip hinge pattern mini lift 8# KB from 12-inch box 2 x 5 reps  Hip hinge with dowel 1 x 15 reps   Hip hinge pattern sit to stand 22-inch 2 x 10 reps with GreenTB around knees   2-up1--down on shuttle 37.5# 3 x 6 reps each LE     gait training to improve functional mobility and safety for 00 minutes, including:      Patient Education and Home Exercises     Home Exercises Provided and Patient Education Provided     Education provided:   - Continue with HEP, hold on clamshells     Written Home Exercises Provided: Patient instructed to cont prior HEP. Exercises were reviewed and Raquel was able to demonstrate them prior to the end of the session.  Raquel demonstrated good  understanding of the education provided. See EMR under Patient Instructions for exercises provided during therapy sessions    ASSESSMENT     Raquel has been seen for a total of 7 visits for a medial diagnosis of Sacrococcygeal disorders, not elsewhere classified [M53.3], Trochanteric bursitis, unspecified hip [M70.60], Other spondylosis with radiculopathy, lumbar region [M47.26]. She has shown significant improvement in her range of motion, strength, and function with improved FOTO score since beginning physical therapy. She continues with strength deficits and continued focus in session on improving. At this time she has met the majority of her goals and feels confident in discharging to continue to progress with exercises on her own at home. She was provided with updated exercises and educated on continued focus on progressing core strength and further hip/glute strengthening. At this time " patient to be discharged from skilled PT services.     Raquel Is progressing well towards her goals.   Pt prognosis is Good.     Pt will continue to benefit from skilled outpatient physical therapy to address the deficits listed in the problem list box on initial evaluation, provide pt/family education and to maximize pt's level of independence in the home and community environment.     Pt's spiritual, cultural and educational needs considered and pt agreeable to plan of care and goals.     Anticipated barriers to physical therapy: Scheduling     Goals:   Short Term Goals: 4 weeks   Patient will be independent in initial HEP to help supplement PT. - MET  Patient will improve lumbar AROM to >/= 70% in all planes to help with ADLs. - MET  Patient will improve hip extension to 5 degrees to help with gait mechanics. - MET     Long Term Goals: 8-10 weeks   Patient will be independent in updated HEP to help supplement PT. - MET  Patient will improve FOTO score to >/= predicted (72) to show improvement in condition. - MET  Patient will improve hip abduction strength by 1/2 MMT to help with stairs. - Not Met  Patient will report no pain with squatting down to  dog or grandson. - Somewhat met, not all the time like previously     PLAN   Plan of care Certification: 6/10/2024 to 8/30/2024.     Patient to be discharged from skilled PT services at this time with updated HEP.     Melissa Ricketts, PT, DPT, OCS

## 2024-07-31 NOTE — PROGRESS NOTES
[facility]   Physical Therapy Discharge / Treatment Note     Name: Raquel Galan  Clinic Number: [id number]    Therapy Diagnosis:   Encounter Diagnoses   Name Primary?    Decreased strength of lower extremity Yes    Decreased range of motion of lumbar spine      Physician: Danielle Tillman MD    Visit Date: 7/31/2024  Physician Orders: PT Eval and Treat   Medical Diagnosis from Referral: Sacroiliac dysfunction [M53.3], Trochanteric bursitis, unspecified laterality [M70.60], Osteoarthritis of spine with radiculopathy, lumbar region [M47.26]   Evaluation Date: 6/10/2024  Authorization Period Expiration: 12/31/2024  Plan of Care Expiration: 8/30/2024  Progress Note Due: 7/15/2024  Visit # / Visits authorized: 7/20   FOTO: 3/3    PTA Visit #: 0/5     FOTO first follow up: 7/3/2024  FOTO second follow up: 7/31/2024    Time In: 2:52 pm  Time Out: 3:40 pm  Total Billable Time: 48 minutes (28 min)    SUBJECTIVE     Pt reports: She is doing really well. She has been keeping up with her exercises. She has noticed a huge difference. She knows she is still weak but getting better and feels confident with continuing to progress with exercises on her own.   She was compliant with home exercise program.  Response to previous treatment: Independent in HEP   Functional change: Ongoing     Pain: 5/10  Location: left back      OBJECTIVE     Objective Measures updated at progress report unless specified.     Observation 7/31/2024:    Range of Motion:  Lumbar      Percentage Pain   Flexion 90% No Pain    Extension 75% No Pain   Right Sidebend  90% No Pain   Left Sidebend 90% No Pain   Right Rotation 90% No Pain   Left Rotation 90% No Pain      Hip    Right Left    Flexion 110 degrees  100 degrees    Extension 5 degrees  5 degrees    Internal Rotation 25 degrees  20 degrees    External Rotation 45 degrees  40 degrees       Lower Extremity Strength (MMT):     Right Left    Hip Flexion 4-/5 4-/5   Hip Abduction  "3+/5 4-/5   Hip Extension 3/5 3/5   Knee Flexion 4/5 4/5   Knee Extension 4+/5 4/5   Dorsiflexion 4/5 4-/5   Plantarflexion 4/5 4/5     Functional Tests:  - Double Leg Squat: Able to perform, quad dominant and forward trunk lean. No pain.    - Single Leg Balance: Right = 12 sec (last 10 sec), Left = 10 sec (last 6 sec)     FOTO = 72 (initial = 54)     Treatment     Raquel received the treatments listed below:      therapeutic exercises to develop strength, endurance, ROM, flexibility, posture, and core stabilization for 00 minutes including:      manual therapy techniques: Joint mobilizations and Soft tissue Mobilization were applied to the: SI, lumbar, Hip for 00 minutes, including:    Not Today:  Prone 2-person SI manipulation grade V   Sidelying lumbar gapping for L opening  Sidelying MET for rotated innominate 3 x 6" holds   Long axis hip distraction grade II-III    neuromuscular re-education activities to improve: Balance, Coordination, Kinesthetic, Sense, Proprioception, and Posture for 34 minutes. The following activities were included:    Pt education on modifying motions at home to help offload her back and time it takes for strengthening  Assessment as above, updated HEP    Bridge with BlueTB and B lat pulldown GreenTB 3 x 8 x 3" holds  Lateral band walks with RedTB 3 laps each direction   Seated hip ER GreenTB 2 x 12 reps each    Not Today:  Sidelying clamshells with GreenTB 1 x 10 reps x 3" holds   PPT with BP cuff (40-60 mmHg) 10 x 10" holds  PPT with BP cuff and BKFO 1 x 10 reps each  PPT with BP cuff and heel slides 1 x 10 reps each   Paloff press with GreenTB 1 x 10 x 3" holds each   Bent over wedge hip extension emphasis on glute squeeze 2 x 8 x 3" holds   Hip abduction BlueTB 10 x 10" holds  Hip adduction with ball 10 x 10" holds   TA activation with physioball 2 x 10 x 5" holds  TA activation with physioball and LE ext 2 x 5 each   Seated on physioball paloff press with reach GreenTB 2 x 8 reps " "each   (B) Sidelying hip ER 2 x 10 x 3" holds 1#   Paloff press with 10# on cables 2 x 10 reps each direction    therapeutic activities to improve functional performance for 14 minutes, including:    Hip hinge pattern sit to stand 22-inch with 10# foam roll hold 3 x 10 reps   Step up on 6-inch with B lat pulldowns 10# on cables 2 x 10 reps each     Not today:  Hip hinge pattern mini lift 8# KB from 12-inch box 2 x 5 reps  Hip hinge with dowel 1 x 15 reps   Hip hinge pattern sit to stand 22-inch 2 x 10 reps with GreenTB around knees   2-up1--down on shuttle 37.5# 3 x 6 reps each LE     gait training to improve functional mobility and safety for 00 minutes, including:      Patient Education and Home Exercises     Home Exercises Provided and Patient Education Provided     Education provided:   - Continue with HEP, hold on clamshells     Written Home Exercises Provided: Patient instructed to cont prior HEP. Exercises were reviewed and Raquel was able to demonstrate them prior to the end of the session.  Raquel demonstrated good  understanding of the education provided. See EMR under Patient Instructions for exercises provided during therapy sessions    ASSESSMENT     Raquel has been seen for a total of 7 visits for a medial diagnosis of Sacrococcygeal disorders, not elsewhere classified [M53.3], Trochanteric bursitis, unspecified hip [M70.60], Other spondylosis with radiculopathy, lumbar region [M47.26]. She has shown significant improvement in her range of motion, strength, and function with improved FOTO score since beginning physical therapy. She continues with strength deficits and continued focus in session on improving. At this time she has met the majority of her goals and feels confident in discharging to continue to progress with exercises on her own at home. She was provided with updated exercises and educated on continued focus on progressing core strength and further hip/glute strengthening. At this time " patient to be discharged from skilled PT services.     Raquel Is progressing well towards her goals.   Pt prognosis is Good.     Pt will continue to benefit from skilled outpatient physical therapy to address the deficits listed in the problem list box on initial evaluation, provide pt/family education and to maximize pt's level of independence in the home and community environment.     Pt's spiritual, cultural and educational needs considered and pt agreeable to plan of care and goals.     Anticipated barriers to physical therapy: Scheduling     Goals:   Short Term Goals: 4 weeks   Patient will be independent in initial HEP to help supplement PT. - MET  Patient will improve lumbar AROM to >/= 70% in all planes to help with ADLs. - MET  Patient will improve hip extension to 5 degrees to help with gait mechanics. - MET     Long Term Goals: 8-10 weeks   Patient will be independent in updated HEP to help supplement PT. - MET  Patient will improve FOTO score to >/= predicted (72) to show improvement in condition. - MET  Patient will improve hip abduction strength by 1/2 MMT to help with stairs. - Not Met  Patient will report no pain with squatting down to  dog or grandson. - Somewhat met, not all the time like previously     PLAN   Plan of care Certification: 6/10/2024 to 8/30/2024.     Patient to be discharged from skilled PT services at this time with updated HEP.     Melissa Ricketts, PT, DPT, OCS

## 2024-07-31 NOTE — TELEPHONE ENCOUNTER
, see Patient message,  there are no appt available for scheduling, we would have to use a 7 day Hold to schedule this patient.

## 2024-08-08 ENCOUNTER — OFFICE VISIT (OUTPATIENT)
Dept: OBSTETRICS AND GYNECOLOGY | Facility: CLINIC | Age: 64
End: 2024-08-08
Attending: OBSTETRICS & GYNECOLOGY
Payer: MEDICARE

## 2024-08-08 ENCOUNTER — HOSPITAL ENCOUNTER (OUTPATIENT)
Dept: RADIOLOGY | Facility: HOSPITAL | Age: 64
Discharge: HOME | End: 2024-08-08
Attending: INTERNAL MEDICINE
Payer: MEDICARE

## 2024-08-08 VITALS
DIASTOLIC BLOOD PRESSURE: 90 MMHG | BODY MASS INDEX: 23.79 KG/M2 | HEART RATE: 77 BPM | SYSTOLIC BLOOD PRESSURE: 136 MMHG | WEIGHT: 126 LBS | HEIGHT: 61 IN

## 2024-08-08 VITALS — HEIGHT: 61 IN | BODY MASS INDEX: 23.98 KG/M2 | WEIGHT: 127 LBS

## 2024-08-08 DIAGNOSIS — N95.1 MENOPAUSAL SYNDROME ON HORMONE REPLACEMENT THERAPY: Chronic | ICD-10-CM

## 2024-08-08 DIAGNOSIS — Z01.419 ENCOUNTER FOR GYNECOLOGICAL EXAMINATION WITHOUT ABNORMAL FINDING: Primary | ICD-10-CM

## 2024-08-08 DIAGNOSIS — Z12.31 ENCOUNTER FOR SCREENING MAMMOGRAM FOR BREAST CANCER: ICD-10-CM

## 2024-08-08 DIAGNOSIS — Z79.890 MENOPAUSAL SYNDROME ON HORMONE REPLACEMENT THERAPY: Chronic | ICD-10-CM

## 2024-08-08 PROCEDURE — 99999 PR PBB SHADOW E&M-EST. PATIENT-LVL III: CPT | Mod: PBBFAC,,, | Performed by: OBSTETRICS & GYNECOLOGY

## 2024-08-08 PROCEDURE — 77067 SCR MAMMO BI INCL CAD: CPT | Mod: TC

## 2024-08-08 RX ORDER — ESTRADIOL 0.04 MG/D
1 FILM, EXTENDED RELEASE TRANSDERMAL
Qty: 8 PATCH | Refills: 11 | Status: SHIPPED | OUTPATIENT
Start: 2024-08-08 | End: 2025-08-08

## 2024-08-08 RX ORDER — CONJUGATED ESTROGENS 0.62 MG/G
1 CREAM VAGINAL
Qty: 8 G | Refills: 12 | Status: SHIPPED | OUTPATIENT
Start: 2024-08-08 | End: 2024-09-07

## 2024-08-09 ENCOUNTER — PATIENT MESSAGE (OUTPATIENT)
Dept: OBSTETRICS AND GYNECOLOGY | Facility: CLINIC | Age: 64
End: 2024-08-09
Payer: MEDICARE

## 2024-08-12 ENCOUNTER — OFFICE VISIT (OUTPATIENT)
Dept: GASTROENTEROLOGY | Facility: CLINIC | Age: 64
End: 2024-08-12
Payer: MEDICARE

## 2024-08-12 VITALS
HEART RATE: 69 BPM | BODY MASS INDEX: 24.7 KG/M2 | DIASTOLIC BLOOD PRESSURE: 85 MMHG | WEIGHT: 130.75 LBS | SYSTOLIC BLOOD PRESSURE: 115 MMHG

## 2024-08-12 DIAGNOSIS — R14.0 BLOATING: ICD-10-CM

## 2024-08-12 DIAGNOSIS — K59.04 CHRONIC IDIOPATHIC CONSTIPATION: ICD-10-CM

## 2024-08-12 DIAGNOSIS — K59.00 CONSTIPATION, UNSPECIFIED CONSTIPATION TYPE: Primary | ICD-10-CM

## 2024-08-12 PROCEDURE — 3008F BODY MASS INDEX DOCD: CPT | Mod: CPTII,S$GLB,, | Performed by: STUDENT IN AN ORGANIZED HEALTH CARE EDUCATION/TRAINING PROGRAM

## 2024-08-12 PROCEDURE — 1101F PT FALLS ASSESS-DOCD LE1/YR: CPT | Mod: CPTII,S$GLB,, | Performed by: STUDENT IN AN ORGANIZED HEALTH CARE EDUCATION/TRAINING PROGRAM

## 2024-08-12 PROCEDURE — 99999 PR PBB SHADOW E&M-EST. PATIENT-LVL III: CPT | Mod: PBBFAC,,, | Performed by: STUDENT IN AN ORGANIZED HEALTH CARE EDUCATION/TRAINING PROGRAM

## 2024-08-12 PROCEDURE — 99214 OFFICE O/P EST MOD 30 MIN: CPT | Mod: S$GLB,,, | Performed by: STUDENT IN AN ORGANIZED HEALTH CARE EDUCATION/TRAINING PROGRAM

## 2024-08-12 PROCEDURE — 3044F HG A1C LEVEL LT 7.0%: CPT | Mod: CPTII,S$GLB,, | Performed by: STUDENT IN AN ORGANIZED HEALTH CARE EDUCATION/TRAINING PROGRAM

## 2024-08-12 PROCEDURE — 3074F SYST BP LT 130 MM HG: CPT | Mod: CPTII,S$GLB,, | Performed by: STUDENT IN AN ORGANIZED HEALTH CARE EDUCATION/TRAINING PROGRAM

## 2024-08-12 PROCEDURE — 1126F AMNT PAIN NOTED NONE PRSNT: CPT | Mod: CPTII,S$GLB,, | Performed by: STUDENT IN AN ORGANIZED HEALTH CARE EDUCATION/TRAINING PROGRAM

## 2024-08-12 PROCEDURE — 3079F DIAST BP 80-89 MM HG: CPT | Mod: CPTII,S$GLB,, | Performed by: STUDENT IN AN ORGANIZED HEALTH CARE EDUCATION/TRAINING PROGRAM

## 2024-08-12 PROCEDURE — 1159F MED LIST DOCD IN RCRD: CPT | Mod: CPTII,S$GLB,, | Performed by: STUDENT IN AN ORGANIZED HEALTH CARE EDUCATION/TRAINING PROGRAM

## 2024-08-12 PROCEDURE — 3288F FALL RISK ASSESSMENT DOCD: CPT | Mod: CPTII,S$GLB,, | Performed by: STUDENT IN AN ORGANIZED HEALTH CARE EDUCATION/TRAINING PROGRAM

## 2024-08-12 RX ORDER — PANTOPRAZOLE SODIUM 40 MG/1
40 TABLET, DELAYED RELEASE ORAL
Qty: 90 TABLET | Refills: 1 | Status: SHIPPED | OUTPATIENT
Start: 2024-08-12

## 2024-08-12 NOTE — PROGRESS NOTES
[facility] Follow-UP Note    Reason for Follow-Up:  The primary encounter diagnosis was Constipation, unspecified constipation type. Diagnoses of Bloating and Chronic idiopathic constipation were also pertinent to this visit.    PCP:   Salazar Bird         HPI:  This is a 64 y.o. female last seen in GI clinic on January 2024 for abdominal cramping, rectal bleeding, and dysphagia.  EGD was completed on 2/1/24 and notable for a patent schatzki ring.  This was dilated to 18mm.  Her colonoscopy on 2/1 was limited by poor prep, but repeat on 4/11 was notable for a single 8mm adenomatous polyp.    Interval History:  She notes that after her endoscopy she had done well, but symptoms of dysphagia had recurred.      At present, she is taking Linzess 145mcg once daily.  She is moving her bowels once every few days, but does occasionally have to take a Senna to help move her bowels.    When she goes a few days without moving her bowels, she does have bloating and abdominal discomfort.  This is associated with nausea and difficulty tolerating foods.  She has occasional regurgitation of food.    Objective Findings:    Vital Signs:  Wt 59.3 kg (130 lb 11.7 oz)   LMP 04/15/2009   BMI 24.70 kg/m²   Body mass index is 24.7 kg/m².    Physical Exam:  General Appearance: Well appearing in no acute distress  Abdomen: Soft, non tender, non distended with positive bowel sounds in all four quadrants. No hepatosplenomegaly, ascites, or mass        Assessment:  1. Constipation, unspecified constipation type    2. Bloating    3. Chronic idiopathic constipation      In brief, I suspect the patient's symptoms are largely related to ongoing constipation.  Her EGD/Colonoscopy were largely unremarkable without any clear cause for her symptoms.  Given her poor prep on first colonoscopy and irregular bowel movements, constipation is likely playing a role in her symptoms.  She initially did well on Linzess, but the  benefit was not sustained.  I have recommended she begin taking Senna once daily with her Linzess.  When she does this, she notes she is able to move her bowels.     Pending her response, we will consider increasing her Linzess to 290mcg daily.    Follow up in about 3 months (around 11/12/2024).      Order summary:  Orders Placed This Encounter    linaCLOtide (LINZESS) 145 mcg Cap capsule         Thank you so much for allowing me to participate in the care of Raquel Vaughn MD

## 2024-08-13 DIAGNOSIS — N95.1 MENOPAUSAL SYMPTOM: ICD-10-CM

## 2024-08-13 RX ORDER — ESTRADIOL 1 MG/1
1 TABLET ORAL DAILY
Qty: 90 TABLET | Refills: 3 | Status: SHIPPED | OUTPATIENT
Start: 2024-08-13

## 2024-08-13 NOTE — TELEPHONE ENCOUNTER
Pt's insurance denied coverage for the vivelle dot. Pt would like to go back on the estradiol tablet. Please send in prescription

## 2024-08-14 RX ORDER — ESTRADIOL 0.04 MG/D
1 FILM, EXTENDED RELEASE TRANSDERMAL
Qty: 8 PATCH | Refills: 11 | Status: SHIPPED | OUTPATIENT
Start: 2024-08-15

## 2024-08-15 ENCOUNTER — OFFICE VISIT (OUTPATIENT)
Dept: INTERNAL MEDICINE | Facility: CLINIC | Age: 64
End: 2024-08-15
Payer: MEDICARE

## 2024-08-15 VITALS
SYSTOLIC BLOOD PRESSURE: 118 MMHG | RESPIRATION RATE: 16 BRPM | DIASTOLIC BLOOD PRESSURE: 78 MMHG | HEART RATE: 68 BPM | BODY MASS INDEX: 23.6 KG/M2 | HEIGHT: 61 IN | TEMPERATURE: 98 F | WEIGHT: 125 LBS

## 2024-08-15 DIAGNOSIS — I10 HYPERTENSION, ESSENTIAL: Primary | ICD-10-CM

## 2024-08-15 DIAGNOSIS — K21.9 GASTROESOPHAGEAL REFLUX DISEASE, UNSPECIFIED WHETHER ESOPHAGITIS PRESENT: ICD-10-CM

## 2024-08-15 DIAGNOSIS — Z79.890 MENOPAUSAL SYNDROME ON HORMONE REPLACEMENT THERAPY: ICD-10-CM

## 2024-08-15 DIAGNOSIS — J30.9 CHRONIC ALLERGIC RHINITIS: ICD-10-CM

## 2024-08-15 DIAGNOSIS — L57.8 ACTINIC ELASTOSIS: ICD-10-CM

## 2024-08-15 DIAGNOSIS — N95.1 MENOPAUSAL SYNDROME ON HORMONE REPLACEMENT THERAPY: ICD-10-CM

## 2024-08-15 PROCEDURE — 3008F BODY MASS INDEX DOCD: CPT | Mod: CPTII,S$GLB,, | Performed by: INTERNAL MEDICINE

## 2024-08-15 PROCEDURE — 3288F FALL RISK ASSESSMENT DOCD: CPT | Mod: CPTII,S$GLB,, | Performed by: INTERNAL MEDICINE

## 2024-08-15 PROCEDURE — 3074F SYST BP LT 130 MM HG: CPT | Mod: CPTII,S$GLB,, | Performed by: INTERNAL MEDICINE

## 2024-08-15 PROCEDURE — 1126F AMNT PAIN NOTED NONE PRSNT: CPT | Mod: CPTII,S$GLB,, | Performed by: INTERNAL MEDICINE

## 2024-08-15 PROCEDURE — 99214 OFFICE O/P EST MOD 30 MIN: CPT | Mod: S$GLB,,, | Performed by: INTERNAL MEDICINE

## 2024-08-15 PROCEDURE — 3044F HG A1C LEVEL LT 7.0%: CPT | Mod: CPTII,S$GLB,, | Performed by: INTERNAL MEDICINE

## 2024-08-15 PROCEDURE — 1160F RVW MEDS BY RX/DR IN RCRD: CPT | Mod: CPTII,S$GLB,, | Performed by: INTERNAL MEDICINE

## 2024-08-15 PROCEDURE — 3078F DIAST BP <80 MM HG: CPT | Mod: CPTII,S$GLB,, | Performed by: INTERNAL MEDICINE

## 2024-08-15 PROCEDURE — 1159F MED LIST DOCD IN RCRD: CPT | Mod: CPTII,S$GLB,, | Performed by: INTERNAL MEDICINE

## 2024-08-15 PROCEDURE — 1101F PT FALLS ASSESS-DOCD LE1/YR: CPT | Mod: CPTII,S$GLB,, | Performed by: INTERNAL MEDICINE

## 2024-08-15 PROCEDURE — 99999 PR PBB SHADOW E&M-EST. PATIENT-LVL IV: CPT | Mod: PBBFAC,,, | Performed by: INTERNAL MEDICINE

## 2024-08-15 RX ORDER — AZELASTINE 1 MG/ML
2 SPRAY, METERED NASAL 2 TIMES DAILY
Qty: 30 ML | Refills: 11 | Status: SHIPPED | OUTPATIENT
Start: 2024-08-15

## 2024-08-15 RX ORDER — TRETINOIN 0.25 MG/G
CREAM TOPICAL
Qty: 30 G | Refills: 5 | Status: SHIPPED | OUTPATIENT
Start: 2024-08-15

## 2024-08-15 NOTE — PROGRESS NOTES
Subjective:       Patient ID: Raquel Galan is a 64 y.o. female.    Chief Complaint: Annual Exam (With labs to revu. ), Follow-up, chronic rhinitis, chronic constipation, and Gastroesophageal Reflux    HPI  The patient presents for follow-up medical conditions which include chronic rhinitis, GERD, chronic constipation, and chronic lower back pain.  The patient states she remains active.  She recently completed physical therapy for treatment of lower back pain.  She currently notes minimal symptoms of lumbar pain which is nonradiating.  She is not on any pain medication at the present time.    She has chronic rhinitis.  Symptoms include postnasal drainage and rhinorrhea.  She is currently using Benadryl.  She also uses Allegra daily.  Azelastine has been prescribed for additional control of rhinitis symptoms.    The patient has chronic constipation.  She is currently using Linzess and senna for management.  She follows a good diet.  Water intake is generally good.  No rectal bleeding is noted.    Screening tests were reviewed.  The patient had colonoscopy on 04/11/2024.  An adenomatous polyp was removed.  Follow-up screening colonoscopy in 5 years was recommended.    Immunization record was reviewed.    No interval change in past medical history, family history, or social history since prior evaluations.    Review of Systems   Constitutional:  Negative for fatigue, fever and unexpected weight change.   HENT:  Negative for nasal congestion, postnasal drip, rhinorrhea and sore throat.    Eyes:  Negative for visual disturbance.   Respiratory:  Negative for cough, chest tightness, shortness of breath and wheezing.    Cardiovascular:  Negative for chest pain, palpitations and leg swelling.   Gastrointestinal:  Positive for constipation and reflux. Negative for abdominal pain and blood in stool.   Genitourinary:  Negative for dysuria, frequency and hematuria.   Musculoskeletal:  Positive for back pain. Negative for  arthralgias, joint swelling and myalgias.   Integumentary:  Negative for rash.   Neurological:  Negative for dizziness, syncope, weakness, numbness and headaches.   Psychiatric/Behavioral:  Negative for sleep disturbance. The patient is not nervous/anxious.             Physical Exam  Vitals and nursing note reviewed.   Constitutional:       General: She is not in acute distress.     Appearance: Normal appearance. She is well-developed.   HENT:      Head: Normocephalic and atraumatic.      Right Ear: External ear normal.      Left Ear: External ear normal.      Nose: Nose normal.      Mouth/Throat:      Mouth: Mucous membranes are moist.      Pharynx: Oropharynx is clear. No oropharyngeal exudate.   Eyes:      General: No scleral icterus.     Extraocular Movements: Extraocular movements intact.      Conjunctiva/sclera: Conjunctivae normal.   Neck:      Thyroid: No thyromegaly.      Vascular: No carotid bruit or JVD.   Cardiovascular:      Rate and Rhythm: Normal rate and regular rhythm.      Pulses: Normal pulses.      Heart sounds: Normal heart sounds. No murmur heard.     No friction rub. No gallop.   Pulmonary:      Effort: Pulmonary effort is normal. No respiratory distress.      Breath sounds: Normal breath sounds. No wheezing or rales.   Abdominal:      General: Bowel sounds are normal. There is no abdominal bruit.      Palpations: Abdomen is soft. There is no hepatomegaly, splenomegaly or mass.      Tenderness: There is no abdominal tenderness.      Hernia: No hernia is present.   Musculoskeletal:         General: No tenderness. Normal range of motion.      Right shoulder: No deformity or effusion.      Cervical back: Normal range of motion and neck supple.      Right lower leg: No edema.      Left lower leg: No edema.      Comments: Back:  No lumbar paraspinous muscle spasm.  No vertebral percussion tenderness.  Negative straight leg raising test bilaterally.   Lymphadenopathy:      Cervical: No cervical  adenopathy.      Upper Body:      Right upper body: No supraclavicular adenopathy.      Left upper body: No supraclavicular adenopathy.   Skin:     General: Skin is warm and dry.      Findings: No rash.   Neurological:      Mental Status: She is alert and oriented to person, place, and time.      Cranial Nerves: No cranial nerve deficit.   Psychiatric:         Mood and Affect: Mood normal.         Speech: Speech normal.         Behavior: Behavior normal.           Lab Visit on 08/08/2024   Component Date Value Ref Range Status    Specimen UA 08/08/2024 Urine, Clean Catch   Final    Color, UA 08/08/2024 Orange (A)  Yellow, Straw, Genie Final    Appearance, UA 08/08/2024 Cloudy (A)  Clear Final    pH, UA 08/08/2024 5.0  5.0 - 8.0 Final    Specific Gravity, UA 08/08/2024 1.025  1.005 - 1.030 Final    Protein, UA 08/08/2024 Negative  Negative Final    Comment: Recommend a 24 hour urine protein or a urine   protein/creatinine ratio if globulin induced proteinuria is  clinically suspected.      Glucose, UA 08/08/2024 Negative  Negative Final    Ketones, UA 08/08/2024 Negative  Negative Final    Bilirubin (UA) 08/08/2024 Negative  Negative Final    Occult Blood UA 08/08/2024 Trace (A)  Negative Final    Nitrite, UA 08/08/2024 Negative  Negative Final    Leukocytes, UA 08/08/2024 Trace (A)  Negative Final    RBC, UA 08/08/2024 1  0 - 4 /hpf Final    WBC, UA 08/08/2024 3  0 - 5 /hpf Final    Bacteria 08/08/2024 Occasional  None-Occ /hpf Final    Amorphous, UA 08/08/2024 Many (A)  None-Moderate Final    Microscopic Comment 08/08/2024 SEE COMMENT   Final    Comment: Other formed elements not mentioned in the report are not   present in the microscopic examination.      Lab Visit on 08/08/2024   Component Date Value Ref Range Status    Hemoglobin A1C 08/08/2024 5.3  4.0 - 5.6 % Final    Comment: ADA Screening Guidelines:  5.7-6.4%  Consistent with prediabetes  >or=6.5%  Consistent with diabetes    High levels of fetal  hemoglobin interfere with the HbA1C  assay. Heterozygous hemoglobin variants (HbS, HgC, etc)do  not significantly interfere with this assay.   However, presence of multiple variants may affect accuracy.      Estimated Avg Glucose 08/08/2024 105  68 - 131 mg/dL Final    Free T4 08/08/2024 0.93  0.71 - 1.51 ng/dL Final    TSH 08/08/2024 1.367  0.400 - 4.000 uIU/mL Final    WBC 08/08/2024 5.99  3.90 - 12.70 K/uL Final    RBC 08/08/2024 4.17  4.00 - 5.40 M/uL Final    Hemoglobin 08/08/2024 13.5  12.0 - 16.0 g/dL Final    Hematocrit 08/08/2024 41.8  37.0 - 48.5 % Final    MCV 08/08/2024 100 (H)  82 - 98 fL Final    MCH 08/08/2024 32.4 (H)  27.0 - 31.0 pg Final    MCHC 08/08/2024 32.3  32.0 - 36.0 g/dL Final    RDW 08/08/2024 12.4  11.5 - 14.5 % Final    Platelets 08/08/2024 339  150 - 450 K/uL Final    MPV 08/08/2024 10.6  9.2 - 12.9 fL Final    Immature Granulocytes 08/08/2024 0.5  0.0 - 0.5 % Final    Gran # (ANC) 08/08/2024 2.7  1.8 - 7.7 K/uL Final    Immature Grans (Abs) 08/08/2024 0.03  0.00 - 0.04 K/uL Final    Comment: Mild elevation in immature granulocytes is non specific and   can be seen in a variety of conditions including stress response,   acute inflammation, trauma and pregnancy. Correlation with other   laboratory and clinical findings is essential.      Lymph # 08/08/2024 2.3  1.0 - 4.8 K/uL Final    Mono # 08/08/2024 0.6  0.3 - 1.0 K/uL Final    Eos # 08/08/2024 0.2  0.0 - 0.5 K/uL Final    Baso # 08/08/2024 0.04  0.00 - 0.20 K/uL Final    nRBC 08/08/2024 0  0 /100 WBC Final    Gran % 08/08/2024 45.7  38.0 - 73.0 % Final    Lymph % 08/08/2024 39.1  18.0 - 48.0 % Final    Mono % 08/08/2024 10.2  4.0 - 15.0 % Final    Eosinophil % 08/08/2024 3.8  0.0 - 8.0 % Final    Basophil % 08/08/2024 0.7  0.0 - 1.9 % Final    Differential Method 08/08/2024 Automated   Final    Sodium 08/08/2024 141  136 - 145 mmol/L Final    Potassium 08/08/2024 3.9  3.5 - 5.1 mmol/L Final    Chloride 08/08/2024 110  95 - 110 mmol/L  Final    CO2 08/08/2024 20 (L)  23 - 29 mmol/L Final    Glucose 08/08/2024 98  70 - 110 mg/dL Final    BUN 08/08/2024 10  8 - 23 mg/dL Final    Creatinine 08/08/2024 0.8  0.5 - 1.4 mg/dL Final    Calcium 08/08/2024 8.9  8.7 - 10.5 mg/dL Final    Total Protein 08/08/2024 6.9  6.0 - 8.4 g/dL Final    Albumin 08/08/2024 3.7  3.5 - 5.2 g/dL Final    Total Bilirubin 08/08/2024 0.6  0.1 - 1.0 mg/dL Final    Comment: For infants and newborns, interpretation of results should be based  on gestational age, weight and in agreement with clinical  observations.    Premature Infant recommended reference ranges:  Up to 24 hours.............<8.0 mg/dL  Up to 48 hours............<12.0 mg/dL  3-5 days..................<15.0 mg/dL  6-29 days.................<15.0 mg/dL      Alkaline Phosphatase 08/08/2024 75  55 - 135 U/L Final    AST 08/08/2024 16  10 - 40 U/L Final    ALT 08/08/2024 18  10 - 44 U/L Final    eGFR 08/08/2024 >60.0  >60 mL/min/1.73 m^2 Final    Anion Gap 08/08/2024 11  8 - 16 mmol/L Final    Cholesterol 08/08/2024 193  120 - 199 mg/dL Final    Comment: The National Cholesterol Education Program (NCEP) has set the  following guidelines (reference ranges) for Cholesterol:  Optimal.....................<200 mg/dL  Borderline High.............200-239 mg/dL  High........................> or = 240 mg/dL      Triglycerides 08/08/2024 73  30 - 150 mg/dL Final    Comment: The National Cholesterol Education Program (NCEP) has set the  following guidelines (reference values) for triglycerides:  Normal......................<150 mg/dL  Borderline High.............150-199 mg/dL  High........................200-499 mg/dL      HDL 08/08/2024 56  40 - 75 mg/dL Final    Comment: The National Cholesterol Education Program (NCEP) has set the  following guidelines (reference values) for HDL Cholesterol:  Low...............<40 mg/dL  Optimal...........>60 mg/dL      LDL Cholesterol 08/08/2024 122.4  63.0 - 159.0 mg/dL Final    Comment: The  National Cholesterol Education Program (NCEP) has set the  following guidelines (reference values) for LDL Cholesterol:  Optimal.......................<130 mg/dL  Borderline High...............130-159 mg/dL  High..........................160-189 mg/dL  Very High.....................>190 mg/dL      HDL/Cholesterol Ratio 08/08/2024 29.0  20.0 - 50.0 % Final    Total Cholesterol/HDL Ratio 08/08/2024 3.4  2.0 - 5.0 Final    Non-HDL Cholesterol 08/08/2024 137  mg/dL Final    Comment: Risk category and Non-HDL cholesterol goals:  Coronary heart disease (CHD)or equivalent (10-year risk of CHD >20%):  Non-HDL cholesterol goal     <130 mg/dL  Two or more CHD risk factors and 10-year risk of CHD <= 20%:  Non-HDL cholesterol goal     <160 mg/dL  0 to 1 CHD risk factor:  Non-HDL cholesterol goal     <190 mg/dL         Assessment & Plan:      Raquel was seen today for annual exam, follow-up, chronic rhinitis, chronic constipation and gastroesophageal reflux.  We discussed the recommended RSV vaccine which is available through the patient's pharmacy.  We also discussed the recommended influenza and COVID-19 vaccines which are also currently available through the patient's pharmacy.    Diagnoses and all orders for this visit:    Hypertension, essential - blood pressure is currently diet controlled.    Gastroesophageal reflux disease, unspecified whether esophagitis present - Protonix therapy will be continued for management.    Chronic allergic rhinitis - the patient will continue Allegra therapy for management.  Intermittent use of Benadryl can be continued for breakthrough symptoms.    Menopausal syndrome on hormone replacement therapy - the patient is currently on estrogen replacement therapy as recommended by her gynecologist for management of severe vasomotor symptoms.    Actinic elastosis  -     tretinoin (RETIN-A) 0.025 % cream; Compound tretinoin 0.025% / niacinamide 2% cream. Apply a pea-sized amount to entire face  qhs.    Other orders  -     azelastine (ASTELIN) 137 mcg (0.1 %) nasal spray; 2 sprays (274 mcg total) by Nasal route 2 (two) times daily.         Follow up in about 1 year (around 8/15/2025).     Salazar Bird MD

## 2024-10-01 ENCOUNTER — PATIENT MESSAGE (OUTPATIENT)
Dept: GASTROENTEROLOGY | Facility: CLINIC | Age: 64
End: 2024-10-01
Payer: MEDICARE

## 2024-10-03 ENCOUNTER — PATIENT MESSAGE (OUTPATIENT)
Dept: GASTROENTEROLOGY | Facility: CLINIC | Age: 64
End: 2024-10-03
Payer: MEDICARE

## 2024-10-03 RX ORDER — LUBIPROSTONE 24 UG/1
24 CAPSULE ORAL 2 TIMES DAILY WITH MEALS
Qty: 180 CAPSULE | Refills: 3 | Status: SHIPPED | OUTPATIENT
Start: 2024-10-03

## 2024-10-05 ENCOUNTER — OFFICE VISIT (OUTPATIENT)
Dept: URGENT CARE | Facility: CLINIC | Age: 64
End: 2024-10-05
Payer: MEDICARE

## 2024-10-05 VITALS
OXYGEN SATURATION: 98 % | BODY MASS INDEX: 23.6 KG/M2 | SYSTOLIC BLOOD PRESSURE: 116 MMHG | RESPIRATION RATE: 18 BRPM | DIASTOLIC BLOOD PRESSURE: 70 MMHG | HEART RATE: 88 BPM | WEIGHT: 125 LBS | HEIGHT: 61 IN | TEMPERATURE: 99 F

## 2024-10-05 DIAGNOSIS — J06.9 VIRAL URI: Primary | ICD-10-CM

## 2024-10-05 DIAGNOSIS — R05.9 COUGH, UNSPECIFIED TYPE: ICD-10-CM

## 2024-10-05 LAB
CTP QC/QA: YES
SARS-COV-2 AG RESP QL IA.RAPID: NEGATIVE

## 2024-10-05 PROCEDURE — 99213 OFFICE O/P EST LOW 20 MIN: CPT | Mod: S$GLB,,,

## 2024-10-05 PROCEDURE — 87811 SARS-COV-2 COVID19 W/OPTIC: CPT | Mod: QW,S$GLB,,

## 2024-10-05 NOTE — PROGRESS NOTES
"Subjective:      Patient ID: Raquel Galan is a 64 y.o. female.    Vitals:  height is 5' 1" (1.549 m) and weight is 56.7 kg (125 lb). Her oral temperature is 98.6 °F (37 °C). Her blood pressure is 116/70 and her pulse is 88. Her respiration is 18 and oxygen saturation is 98%.     Chief Complaint: Cough    Pt came in with complaint of nasal congestion and runny nose started over 1 week ago. Symptoms had started to resolve, but then worsened again yesterday. She states that her symptoms still aren't as bad as they were last week. She denies ear pain, fever, sore throat, body aches or chills. She has been taking sudafed for symptoms which has helped.    Cough  This is a new problem. The current episode started in the past 7 days. The problem has been unchanged. Associated symptoms include a fever, nasal congestion and postnasal drip. She has tried nothing for the symptoms. The treatment provided no relief.       Constitution: Positive for fever.   HENT:  Positive for congestion, postnasal drip and sinus pressure.    Respiratory:  Positive for cough.       Objective:     Physical Exam   Constitutional: She is oriented to person, place, and time. She appears well-developed. She is cooperative.  Non-toxic appearance. She does not appear ill. No distress.      Comments:Patient sits comfortably in exam chair. Answers questions in complete sentences. Does not show any signs of distress or discoloration.        HENT:   Head: Normocephalic and atraumatic.   Ears:   Right Ear: Hearing, tympanic membrane, external ear and ear canal normal. no impacted cerumen  Left Ear: Hearing, tympanic membrane, external ear and ear canal normal. no impacted cerumen  Nose: Rhinorrhea and congestion present. No mucosal edema, sinus tenderness or nasal deformity. No epistaxis. Right sinus exhibits no maxillary sinus tenderness and no frontal sinus tenderness. Left sinus exhibits no maxillary sinus tenderness and no frontal sinus tenderness. "   Mouth/Throat: Uvula is midline, oropharynx is clear and moist and mucous membranes are normal. No trismus in the jaw. Normal dentition. No uvula swelling. No oropharyngeal exudate, posterior oropharyngeal edema or posterior oropharyngeal erythema. No tonsillar exudate.   Eyes: Conjunctivae and lids are normal. No scleral icterus.   Neck: Trachea normal and phonation normal. Neck supple. No edema present. No erythema present. No neck rigidity present.   Cardiovascular: Normal rate, regular rhythm, normal heart sounds and normal pulses.   Pulmonary/Chest: Effort normal and breath sounds normal. No stridor. No respiratory distress. She has no decreased breath sounds. She has no wheezes. She has no rhonchi. She has no rales.   Abdominal: Normal appearance.   Musculoskeletal: Normal range of motion.         General: No deformity. Normal range of motion.   Lymphadenopathy:     She has no cervical adenopathy.        Right cervical: No superficial cervical, no deep cervical and no posterior cervical adenopathy present.       Left cervical: No superficial cervical, no deep cervical and no posterior cervical adenopathy present.   Neurological: She is alert and oriented to person, place, and time. She exhibits normal muscle tone. Coordination normal.   Skin: Skin is warm, dry, intact, not diaphoretic and not pale.   Psychiatric: Her speech is normal and behavior is normal. Judgment and thought content normal.   Nursing note and vitals reviewed.    Results for orders placed or performed in visit on 10/05/24   SARS Coronavirus 2 Antigen, POCT Manual Read    Collection Time: 10/05/24  9:26 AM   Result Value Ref Range    SARS Coronavirus 2 Antigen Negative Negative     Acceptable Yes        Assessment:     1. Viral URI    2. Cough, unspecified type        Plan:       Viral URI    Cough, unspecified type  -     SARS Coronavirus 2 Antigen, POCT Manual Read                Patient Instructions   - Rest.    - Drink  plenty of fluids. Increasing your fluid intake will help loosen up mucous.  - Viral upper respiratory infections typically run their course in 10-14 days.      - You can take over-the-counter claritin, zyrtec, allegra, OR xyzal as directed. These are antihistamines that can help with runny nose, nasal congestion, sneezing, and helps to dry up post-nasal drip, which usually causes sore throat and cough.    - You can take Delsym to help with cough.     - If you do NOT have high blood pressure, you may use a decongestant form (D)  of this medication (ie. Claritin- D, zyrtec-D, allegra-D) or if you do not take the D form, you can take sudafed (pseudoephedrine) over the counter, which is a decongestant. Do NOT take two decongestant (D) medications at the same time (such as mucinex-D and claritin-D or plain sudafed and claritin D). Dextromethorphan (DM) is a cough suppressant over the counter (ie. mucinex DM, robitussin, delsym; dayquil/nyquil has DM as well.)     - You can use Flonase (fluticasone) nasal spray as directed for sinus congestion and postnasal drip. This is a steroid nasal spray that works locally over time to decrease the inflammation in your nose/sinuses and help with allergic symptoms. This is not an quick- relief spray like afrin, but it works well if used daily.  Discontinue if you develop nose bleed  - Use nasal saline prior to Flonase.  - Use Ocean Spray Nasal Saline 1-3 puffs each nostril every 2-3 hours then blow out onto tissue. This is to irrigate the nasal passage way to clear the sinus openings. Use until sinus problem resolved.    - A Neti Pot with sterile saline can help break up nasal congestion and give relief.      - Chloraseptic throat spray can help numb the throat.     - Warm salt water gargles can help with sore throat.  - Warm tea with honey can help with sore throat and cough. Honey is a natural cough suppressant.    - Acetaminophen (tylenol) or Ibuprofen (advil,motrin) as directed as  needed for fever/pain. Avoid tylenol if you have a history of liver disease. Do not take ibuprofen if you have a history of GI bleeding, kidney disease, or if you take blood thinners.   - Ibuprofen dosing for adults: 400 mg by mouth every 4-6 hours as needed. Max: 2400 mg/day; Info: use lowest effective dose, shortest effective treatment duration; give w/ food if GI upset occurs.  - Tylenol dosing for adults: [By mouth route, immediate-release form] Dose: 325-1000 mg by mouth every 4-6h as needed; Max: 1 g/4h and 4 g/day from all sources. [By mouth route, extended-release form] Dose: 650-1300 mg Extended Release by mouth every 8h as needed; Max: 4 g/day from all sources.     - You must understand that you have received an Urgent Care treatment only and that you may be released before all of your medical problems are known or treated.   - You, the patient, will arrange for follow up care as instructed.   - If your condition worsens or fails to improve we recommend that you receive another evaluation at the ER immediately or contact your PCP to discuss your concerns or return here.   - Follow up with your PCP or specialty clinic as directed in the next 1-2 weeks if not improved or as needed.  You can call [phone] to schedule an appointment with the appropriate provider.    If your symptoms do not improve or worsen, go to the emergency room immediately.

## 2024-10-05 NOTE — PATIENT INSTRUCTIONS
- Rest.    - Drink plenty of fluids. Increasing your fluid intake will help loosen up mucous.  - Viral upper respiratory infections typically run their course in 10-14 days.     - Okay to take over the counter zinc to help decrease duration of symptoms.      - You can take over-the-counter claritin, zyrtec, allegra, OR xyzal as directed. These are antihistamines that can help with runny nose, nasal congestion, sneezing, and helps to dry up post-nasal drip, which usually causes sore throat and cough.    - You can take Delsym to help with cough.     - If you do NOT have high blood pressure, you may use a decongestant form (D)  of this medication (ie. Claritin- D, zyrtec-D, allegra-D) or if you do not take the D form, you can take sudafed (pseudoephedrine) over the counter, which is a decongestant. Do NOT take two decongestant (D) medications at the same time (such as mucinex-D and claritin-D or plain sudafed and claritin D). Dextromethorphan (DM) is a cough suppressant over the counter (ie. mucinex DM, robitussin, delsym; dayquil/nyquil has DM as well.)     - You can use Flonase (fluticasone) nasal spray as directed for sinus congestion and postnasal drip. This is a steroid nasal spray that works locally over time to decrease the inflammation in your nose/sinuses and help with allergic symptoms. This is not an quick- relief spray like afrin, but it works well if used daily.  Discontinue if you develop nose bleed  - Use nasal saline prior to Flonase.  - Use Ocean Spray Nasal Saline 1-3 puffs each nostril every 2-3 hours then blow out onto tissue. This is to irrigate the nasal passage way to clear the sinus openings. Use until sinus problem resolved.    - A Neti Pot with sterile saline can help break up nasal congestion and give relief.      - Chloraseptic throat spray can help numb the throat.     - Warm salt water gargles can help with sore throat.  - Warm tea with honey can help with sore throat and cough. Honey is a  natural cough suppressant.    - Acetaminophen (tylenol) or Ibuprofen (advil,motrin) as directed as needed for fever/pain. Avoid tylenol if you have a history of liver disease. Do not take ibuprofen if you have a history of GI bleeding, kidney disease, or if you take blood thinners.   - Ibuprofen dosing for adults: 400 mg by mouth every 4-6 hours as needed. Max: 2400 mg/day; Info: use lowest effective dose, shortest effective treatment duration; give w/ food if GI upset occurs.  - Tylenol dosing for adults: [By mouth route, immediate-release form] Dose: 325-1000 mg by mouth every 4-6h as needed; Max: 1 g/4h and 4 g/day from all sources. [By mouth route, extended-release form] Dose: 650-1300 mg Extended Release by mouth every 8h as needed; Max: 4 g/day from all sources.     - You must understand that you have received an Urgent Care treatment only and that you may be released before all of your medical problems are known or treated.   - You, the patient, will arrange for follow up care as instructed.   - If your condition worsens or fails to improve we recommend that you receive another evaluation at the ER immediately or contact your PCP to discuss your concerns or return here.   - Follow up with your PCP or specialty clinic as directed in the next 1-2 weeks if not improved or as needed.  You can call [phone] to schedule an appointment with the appropriate provider.    If your symptoms do not improve or worsen, go to the emergency room immediately.

## 2024-10-10 ENCOUNTER — PATIENT MESSAGE (OUTPATIENT)
Dept: INTERNAL MEDICINE | Facility: CLINIC | Age: 64
End: 2024-10-10
Payer: MEDICARE

## 2024-10-11 ENCOUNTER — OFFICE VISIT (OUTPATIENT)
Dept: URGENT CARE | Facility: CLINIC | Age: 64
End: 2024-10-11
Payer: MEDICARE

## 2024-10-11 VITALS
BODY MASS INDEX: 23.6 KG/M2 | DIASTOLIC BLOOD PRESSURE: 85 MMHG | TEMPERATURE: 98 F | WEIGHT: 125 LBS | RESPIRATION RATE: 18 BRPM | HEIGHT: 61 IN | SYSTOLIC BLOOD PRESSURE: 117 MMHG | HEART RATE: 83 BPM | OXYGEN SATURATION: 97 %

## 2024-10-11 DIAGNOSIS — J06.9 UPPER RESPIRATORY TRACT INFECTION, UNSPECIFIED TYPE: Primary | ICD-10-CM

## 2024-10-11 RX ORDER — GUAIFENESIN 600 MG/1
1200 TABLET, EXTENDED RELEASE ORAL 2 TIMES DAILY
Qty: 30 TABLET | Refills: 0 | Status: SHIPPED | OUTPATIENT
Start: 2024-10-11

## 2024-10-11 RX ORDER — AZITHROMYCIN 250 MG/1
TABLET, FILM COATED ORAL
Qty: 6 TABLET | Refills: 0 | Status: SHIPPED | OUTPATIENT
Start: 2024-10-11

## 2024-10-11 RX ORDER — PROMETHAZINE HYDROCHLORIDE AND DEXTROMETHORPHAN HYDROBROMIDE 6.25; 15 MG/5ML; MG/5ML
5 SYRUP ORAL EVERY 4 HOURS PRN
Qty: 118 ML | Refills: 0 | Status: SHIPPED | OUTPATIENT
Start: 2024-10-11 | End: 2024-10-21

## 2024-10-11 RX ORDER — FLUTICASONE PROPIONATE 50 MCG
1 SPRAY, SUSPENSION (ML) NASAL DAILY
Qty: 11.1 ML | Refills: 0 | Status: SHIPPED | OUTPATIENT
Start: 2024-10-11

## 2024-10-11 RX ORDER — MONTELUKAST SODIUM 10 MG/1
10 TABLET ORAL NIGHTLY
Qty: 30 TABLET | Refills: 0 | Status: SHIPPED | OUTPATIENT
Start: 2024-10-11 | End: 2024-11-10

## 2024-10-11 NOTE — PROGRESS NOTES
"Subjective:      Patient ID: Raquel Galan is a 64 y.o. female.    Vitals:  height is 5' 1" (1.549 m) and weight is 56.7 kg (125 lb). Her temperature is 98.3 °F (36.8 °C). Her blood pressure is 117/85 and her pulse is 83. Her respiration is 18 and oxygen saturation is 97%.     Chief Complaint: Sinus Problem    X3 weeks prior sore throat, fatigue, sinus congestion, green mucus, and cough.    Sinus Problem  This is a new problem. The current episode started 1 to 4 weeks ago. The problem is unchanged. There has been no fever. Associated symptoms include congestion, coughing, sinus pressure and a sore throat.       Constitution: Negative. Negative for fever.   HENT:  Positive for congestion, postnasal drip, sinus pressure and sore throat.    Neck: neck negative.   Cardiovascular: Negative.    Eyes: Negative.    Respiratory:  Positive for cough.    Gastrointestinal: Negative.    Genitourinary: Negative.    Musculoskeletal: Negative.    Skin: Negative.    Allergic/Immunologic: Negative.    Neurological: Negative.    Psychiatric/Behavioral: Negative.        Objective:     Physical Exam   Constitutional: She is oriented to person, place, and time. She appears well-developed. She is cooperative.  Non-toxic appearance. She does not appear ill. No distress.   HENT:   Head: Normocephalic and atraumatic.   Ears:   Right Ear: Hearing and external ear normal.   Left Ear: Hearing and external ear normal.   Nose: Rhinorrhea and congestion present. No mucosal edema or nasal deformity. No epistaxis. Right sinus exhibits no maxillary sinus tenderness and no frontal sinus tenderness. Left sinus exhibits no maxillary sinus tenderness and no frontal sinus tenderness.   Mouth/Throat: Uvula is midline, oropharynx is clear and moist and mucous membranes are normal. No trismus in the jaw. Normal dentition. No uvula swelling. No oropharyngeal exudate, posterior oropharyngeal edema or posterior oropharyngeal erythema.   Eyes: Conjunctivae and " lids are normal. No scleral icterus.   Neck: Trachea normal and phonation normal. Neck supple. No edema present. No erythema present. No neck rigidity present.   Cardiovascular: Normal rate, regular rhythm, normal heart sounds and normal pulses.   Pulmonary/Chest: Effort normal and breath sounds normal. No respiratory distress. She has no decreased breath sounds. She has no wheezes. She has no rhonchi. She has no rales.   Abdominal: Normal appearance.   Musculoskeletal: Normal range of motion.         General: No deformity. Normal range of motion.   Neurological: She is alert and oriented to person, place, and time. She exhibits normal muscle tone. Coordination normal.   Skin: Skin is warm, dry, intact, not diaphoretic and not pale.   Psychiatric: Her speech is normal and behavior is normal. Judgment and thought content normal.   Nursing note and vitals reviewed.      Assessment:     1. Upper respiratory tract infection, unspecified type        Plan:       Upper respiratory tract infection, unspecified type    Other orders  -     montelukast (SINGULAIR) 10 mg tablet; Take 1 tablet (10 mg total) by mouth every evening.  Dispense: 30 tablet; Refill: 0  -     fluticasone propionate (FLONASE) 50 mcg/actuation nasal spray; 1 spray (50 mcg total) by Each Nostril route once daily.  Dispense: 11.1 mL; Refill: 0  -     promethazine-dextromethorphan (PROMETHAZINE-DM) 6.25-15 mg/5 mL Syrp; Take 5 mLs by mouth every 4 (four) hours as needed.  Dispense: 118 mL; Refill: 0  -     azithromycin (Z-PEDRO) 250 MG tablet; Take 2 tablets by mouth on day 1; Take 1 tablet by mouth on days 2-5  Dispense: 6 tablet; Refill: 0  -     guaiFENesin (MUCINEX) 600 mg 12 hr tablet; Take 2 tablets (1,200 mg total) by mouth 2 (two) times daily.  Dispense: 30 tablet; Refill: 0           Antihistamines, inhaled steroid, antitussives as needed.  Tylenol and ibuprofen as needed for pain and body aches.  Instructions given for when to return to the clinic  or present to the ED.  - To ED for any new or acutely worsening symptoms including but not limited to chest pain, palpitations, shortness of breath, or fever greater than 103° F.  Patient in agreement with plan of care.    - The diagnosis, treatment plan, instructions for follow-up and reevaluation as well as ED precautions were discussed and understanding was verbalized. All questions or concerns have been addressed.  -Follow up with your primary care provider for continued evaluation and management.

## 2024-10-21 ENCOUNTER — PATIENT MESSAGE (OUTPATIENT)
Dept: INTERNAL MEDICINE | Facility: CLINIC | Age: 64
End: 2024-10-21
Payer: MEDICARE

## 2024-10-28 ENCOUNTER — OFFICE VISIT (OUTPATIENT)
Dept: INTERNAL MEDICINE | Facility: CLINIC | Age: 64
End: 2024-10-28
Payer: MEDICARE

## 2024-10-28 VITALS
BODY MASS INDEX: 23.35 KG/M2 | TEMPERATURE: 97 F | OXYGEN SATURATION: 98 % | WEIGHT: 123.69 LBS | HEIGHT: 61 IN | DIASTOLIC BLOOD PRESSURE: 82 MMHG | RESPIRATION RATE: 18 BRPM | SYSTOLIC BLOOD PRESSURE: 112 MMHG | HEART RATE: 78 BPM

## 2024-10-28 DIAGNOSIS — R05.1 ACUTE COUGH: ICD-10-CM

## 2024-10-28 DIAGNOSIS — J01.40 ACUTE PANSINUSITIS, RECURRENCE NOT SPECIFIED: Primary | ICD-10-CM

## 2024-10-28 PROCEDURE — 1126F AMNT PAIN NOTED NONE PRSNT: CPT | Mod: CPTII,S$GLB,, | Performed by: NURSE PRACTITIONER

## 2024-10-28 PROCEDURE — 3008F BODY MASS INDEX DOCD: CPT | Mod: CPTII,S$GLB,, | Performed by: NURSE PRACTITIONER

## 2024-10-28 PROCEDURE — 3044F HG A1C LEVEL LT 7.0%: CPT | Mod: CPTII,S$GLB,, | Performed by: NURSE PRACTITIONER

## 2024-10-28 PROCEDURE — 99999 PR PBB SHADOW E&M-EST. PATIENT-LVL V: CPT | Mod: PBBFAC,,, | Performed by: NURSE PRACTITIONER

## 2024-10-28 PROCEDURE — 99213 OFFICE O/P EST LOW 20 MIN: CPT | Mod: S$GLB,,, | Performed by: NURSE PRACTITIONER

## 2024-10-28 PROCEDURE — 1160F RVW MEDS BY RX/DR IN RCRD: CPT | Mod: CPTII,S$GLB,, | Performed by: NURSE PRACTITIONER

## 2024-10-28 PROCEDURE — 3079F DIAST BP 80-89 MM HG: CPT | Mod: CPTII,S$GLB,, | Performed by: NURSE PRACTITIONER

## 2024-10-28 PROCEDURE — 3074F SYST BP LT 130 MM HG: CPT | Mod: CPTII,S$GLB,, | Performed by: NURSE PRACTITIONER

## 2024-10-28 PROCEDURE — 1101F PT FALLS ASSESS-DOCD LE1/YR: CPT | Mod: CPTII,S$GLB,, | Performed by: NURSE PRACTITIONER

## 2024-10-28 PROCEDURE — 1159F MED LIST DOCD IN RCRD: CPT | Mod: CPTII,S$GLB,, | Performed by: NURSE PRACTITIONER

## 2024-10-28 PROCEDURE — 3288F FALL RISK ASSESSMENT DOCD: CPT | Mod: CPTII,S$GLB,, | Performed by: NURSE PRACTITIONER

## 2024-10-28 RX ORDER — AMOXICILLIN AND CLAVULANATE POTASSIUM 875; 125 MG/1; MG/1
1 TABLET, FILM COATED ORAL 2 TIMES DAILY
Qty: 20 TABLET | Refills: 0 | Status: SHIPPED | OUTPATIENT
Start: 2024-10-28 | End: 2024-11-07

## 2024-10-28 RX ORDER — CETIRIZINE HYDROCHLORIDE 10 MG/1
10 TABLET ORAL
COMMUNITY
Start: 2024-10-20

## 2024-10-28 RX ORDER — BENZONATATE 100 MG/1
100 CAPSULE ORAL
COMMUNITY
Start: 2024-10-20

## 2024-10-31 ENCOUNTER — PATIENT MESSAGE (OUTPATIENT)
Dept: INTERNAL MEDICINE | Facility: CLINIC | Age: 64
End: 2024-10-31
Payer: MEDICARE

## 2024-10-31 NOTE — TELEPHONE ENCOUNTER
Please advise pt stated she think the Augmentin is having her Diarrhea. Prescription helping the infection. What should she take ?

## 2024-11-03 ENCOUNTER — PATIENT MESSAGE (OUTPATIENT)
Dept: INTERNAL MEDICINE | Facility: CLINIC | Age: 64
End: 2024-11-03
Payer: MEDICARE

## 2024-11-03 DIAGNOSIS — J32.9 SINUSITIS, UNSPECIFIED CHRONICITY, UNSPECIFIED LOCATION: Primary | ICD-10-CM

## 2024-11-06 ENCOUNTER — PATIENT MESSAGE (OUTPATIENT)
Dept: PAIN MEDICINE | Facility: CLINIC | Age: 64
End: 2024-11-06
Payer: MEDICARE

## 2024-11-07 ENCOUNTER — OFFICE VISIT (OUTPATIENT)
Dept: OTOLARYNGOLOGY | Facility: CLINIC | Age: 64
End: 2024-11-07
Payer: MEDICARE

## 2024-11-07 VITALS
HEART RATE: 101 BPM | WEIGHT: 123.69 LBS | BODY MASS INDEX: 23.37 KG/M2 | SYSTOLIC BLOOD PRESSURE: 132 MMHG | DIASTOLIC BLOOD PRESSURE: 84 MMHG

## 2024-11-07 DIAGNOSIS — J01.90 ACUTE NON-RECURRENT SINUSITIS, UNSPECIFIED LOCATION: ICD-10-CM

## 2024-11-07 DIAGNOSIS — J30.9 ALLERGIC RHINITIS, UNSPECIFIED SEASONALITY, UNSPECIFIED TRIGGER: ICD-10-CM

## 2024-11-07 DIAGNOSIS — H92.01 RIGHT EAR PAIN: Primary | ICD-10-CM

## 2024-11-07 PROCEDURE — 1160F RVW MEDS BY RX/DR IN RCRD: CPT | Mod: CPTII,S$GLB,, | Performed by: OTOLARYNGOLOGY

## 2024-11-07 PROCEDURE — 1159F MED LIST DOCD IN RCRD: CPT | Mod: CPTII,S$GLB,, | Performed by: OTOLARYNGOLOGY

## 2024-11-07 PROCEDURE — 3079F DIAST BP 80-89 MM HG: CPT | Mod: CPTII,S$GLB,, | Performed by: OTOLARYNGOLOGY

## 2024-11-07 PROCEDURE — 99999 PR PBB SHADOW E&M-EST. PATIENT-LVL V: CPT | Mod: PBBFAC,,, | Performed by: OTOLARYNGOLOGY

## 2024-11-07 PROCEDURE — 3288F FALL RISK ASSESSMENT DOCD: CPT | Mod: CPTII,S$GLB,, | Performed by: OTOLARYNGOLOGY

## 2024-11-07 PROCEDURE — 99204 OFFICE O/P NEW MOD 45 MIN: CPT | Mod: S$GLB,,, | Performed by: OTOLARYNGOLOGY

## 2024-11-07 PROCEDURE — 3075F SYST BP GE 130 - 139MM HG: CPT | Mod: CPTII,S$GLB,, | Performed by: OTOLARYNGOLOGY

## 2024-11-07 PROCEDURE — 1101F PT FALLS ASSESS-DOCD LE1/YR: CPT | Mod: CPTII,S$GLB,, | Performed by: OTOLARYNGOLOGY

## 2024-11-07 PROCEDURE — 1126F AMNT PAIN NOTED NONE PRSNT: CPT | Mod: CPTII,S$GLB,, | Performed by: OTOLARYNGOLOGY

## 2024-11-07 PROCEDURE — 3044F HG A1C LEVEL LT 7.0%: CPT | Mod: CPTII,S$GLB,, | Performed by: OTOLARYNGOLOGY

## 2024-11-07 PROCEDURE — 3008F BODY MASS INDEX DOCD: CPT | Mod: CPTII,S$GLB,, | Performed by: OTOLARYNGOLOGY

## 2024-11-07 RX ORDER — AMOXICILLIN AND CLAVULANATE POTASSIUM 875; 125 MG/1; MG/1
1 TABLET, FILM COATED ORAL 2 TIMES DAILY
Qty: 14 TABLET | Refills: 0 | Status: SHIPPED | OUTPATIENT
Start: 2024-11-07 | End: 2024-11-14

## 2024-11-07 NOTE — PROGRESS NOTES
Ms. Galan     Vitals:    24 0841   BP: 132/84   Pulse: 101       Chief Complaint:  Sinusitis       HPI:   is a 64-year-old white female who presents referred by Dr. Bird for sinusitis.  She states that she has had symptoms for the last 6 weeks in his seen several different providers in urgent care facilities.  She has been on Z-Billy and has just completed a 10 day course of Augmentin last night.  She has been on Flonase for the last 6 weeks as well as Astelin.  She was also placed on Zyrtec.  Her symptoms include facial pressure pain with purulent nasal discharge which has decreased since her Augmentin therapy but has not resolved completely.  She does have a history of allergies and did undergo immunotherapy in her younger years.  She has been using nasal saline spray but not saline sinus rinses.    SNOT22- 24 NOSE- 55    Review of Systems:  Constitutional:   weight loss or weight gain: Negative  Allergy/Immunologic:   Positive as above  Nasal Congestion/Obstruction:   Positive for nasal congestion and postnasal drip  Nosebleeds:   Negative  Sinus infections:   Positive as above  Headache/Facial Pain:   Positive as above  Snoring/CANDY:   Negative  Throat: Infections/Pain:   Negative, positive for cough  Hoarseness/Speech Disturbance:   Negative  Trauma Hx:  Negative    Cardiovascular:  M/I Angina: Negative  Hypertension: Negative  Endocrine:    DM/Steroids: Negative  GI:   Dysphagia/Reflux: Negative  :   GYN Pregnancy: Negative  Renal:   Dialysis: Negative  Lymphatic:   Neck Mass/Lymphadenopathy: Negative  Muscoloskeletal:   Negative  Hematologic:   Bleeding Disorders/Anemia: Negative  Neurologic:    Cranial/Neuralgia: Negative  Pulmonary:   Asthma/SOB/Cough: Negative  Skin Disorders: Negative    Past Medical/Surgical/Family/Social History:    ENT Surgery: Negative  Occupational Exposure: Negative   Problems: Negative  Cancer: Negative    Past Family History:   Family history of Cancer:  Negative    Past Social History:   Tobacco: Nonsmoker   Alcohol: Social Drinker      Allergies and medications: Reviewed per med card.    Physical Examination:  Ears:   External auditory canals:  Clear   Hearing: Grossly intact   Tympanic Membranes:  Her right TM appears retracted and somewhat scarred, left is clear.  Nose:   External: Normal   Intranasal:  Her septum is straight, turbinates 1 to 2+, nasal airway clear at this time.  Mouth:   Intraorally: Lips, teeth, and gums: Normal   Oropharynx: Normal   Mucosa: Normal   Tongue: Normal  Throat:      Palate: Normal palate with elevation, Mallampati 1   Tonsils:  Absent   Posterior Pharynx: Normal  Fiberoptic exam: Not performed  Head/Face:     Inspection: Normal and atraumatic   Palpation/Percussion:  She is tender to percussion in the maxillary regions and slightly in the frontal regions.   Facial strength: Normal and symmetric   Salivary glands: Normal  Neck: Supple  Thyroid: No masses  Lymphatics: No nodes  Respiratory:   Effort: Normal  Eyes:   Ocular Mobility: Normal   Vision: Grossly intact  Neuro/Psych:   Cranial Nerves: Grossly Intact   Orientation: Normal   Mood/Affect: Normal      Assessment/Plan:  I have discussed my findings with her in detail as well as my recommendations for treatment.  I will send her an additional prescription to continue her Augmentin for another 5-7 days.  I have recommended that she discontinue all antihistamines including the Astelin as well as Zyrtec as these are designed to dry her up and will increase the thickness of her thickened mucus.  I have suggested that she use Mucinex.  I have given her literature on saline sinus rinses including its use with distilled water and described how this is to be used in detail with her.  I have also recommended that she continue with her Flonase nasal sprays on a daily basis and I have described how this is to be administered as well.  I will place a referral for an allergy consultation  for her.  I will also place a referral for her to see one of our ear specialist regarding her right ear with intermittent pain and drainage.  She will return to clinic here p.r.n.

## 2024-11-10 ENCOUNTER — PATIENT MESSAGE (OUTPATIENT)
Dept: REHABILITATION | Facility: HOSPITAL | Age: 64
End: 2024-11-10
Payer: MEDICARE

## 2024-11-11 ENCOUNTER — PATIENT MESSAGE (OUTPATIENT)
Dept: INTERNAL MEDICINE | Facility: CLINIC | Age: 64
End: 2024-11-11
Payer: MEDICARE

## 2024-11-11 DIAGNOSIS — M53.3 SACROILIAC JOINT DYSFUNCTION: Primary | ICD-10-CM

## 2024-11-11 DIAGNOSIS — M47.817 LUMBAR AND SACRAL ARTHRITIS: ICD-10-CM

## 2024-11-14 ENCOUNTER — OFFICE VISIT (OUTPATIENT)
Dept: GASTROENTEROLOGY | Facility: CLINIC | Age: 64
End: 2024-11-14
Payer: MEDICARE

## 2024-11-14 VITALS
WEIGHT: 124.13 LBS | HEIGHT: 61 IN | HEART RATE: 69 BPM | DIASTOLIC BLOOD PRESSURE: 57 MMHG | BODY MASS INDEX: 23.43 KG/M2 | SYSTOLIC BLOOD PRESSURE: 138 MMHG

## 2024-11-14 DIAGNOSIS — R14.0 BLOATING: ICD-10-CM

## 2024-11-14 DIAGNOSIS — K59.00 CONSTIPATION, UNSPECIFIED CONSTIPATION TYPE: Primary | ICD-10-CM

## 2024-11-14 PROCEDURE — 99999 PR PBB SHADOW E&M-EST. PATIENT-LVL III: CPT | Mod: PBBFAC,,, | Performed by: STUDENT IN AN ORGANIZED HEALTH CARE EDUCATION/TRAINING PROGRAM

## 2024-11-14 NOTE — PROGRESS NOTES
"    [facility] Follow-UP Note    Reason for Follow-Up:  The primary encounter diagnosis was Constipation, unspecified constipation type. A diagnosis of Bloating was also pertinent to this visit.    PCP:   Salazar Bird           HPI:  This is a 64 y.o. female last seen in GI clinic on August 12, 2024 for constipation.  At that time, she was on Linzess, but due to insurance reasons we transitioned to Amitiza.    Interval History:  She notes that Amitiza did not help and she transitioned back to Linzess.  When on Amitiza she passed large bowel movements, but experienced cramping. When she is compliant with Linzess, she is able to move her bowels.  She is passing a bowel movement daily when compliant.  If she misses the medication, she does have difficulty and gets backed up.    She does feel ongoing bloating.      Objective Findings:    Vital Signs:  BP (!) 138/57 (BP Location: Right arm, Patient Position: Sitting)   Pulse 69   Ht 5' 1" (1.549 m)   Wt 56.3 kg (124 lb 1.9 oz)   LMP 04/15/2009   BMI 23.45 kg/m²   Body mass index is 23.45 kg/m².    Physical Exam:  General Appearance: Well appearing in no acute distress        Assessment:  1. Constipation, unspecified constipation type    2. Bloating      In brief, the patient is a 64-year-old female who presents to GI clinic in the setting of constipation.    Currently she is taking Linzess and reports symptomatic relief.  Unfortunately, she does have ongoing bloating despite compliance with Linzess.  I have recommended she begin an over the counter fiber supplement to promote more regular bowel movements.  I suspect she is having incomplete emptying despite being on Linzess therapy.    Additionally, when listening to her experienced on Amitiza, it sounds that this may have been a better fit for her.  She was having larger bowel movements that were more formed in consistency.    I have recommended that she consider trialing Amitiza 1 more " time if no improvement in bloating and bowel movement consistency with fiber supplementation.    No follow-ups on file.      Order summary:         Thank you so much for allowing me to participate in the care of Raquel Vaughn MD

## 2024-11-18 ENCOUNTER — CLINICAL SUPPORT (OUTPATIENT)
Dept: REHABILITATION | Facility: HOSPITAL | Age: 64
End: 2024-11-18
Attending: INTERNAL MEDICINE
Payer: MEDICARE

## 2024-11-18 DIAGNOSIS — M47.817 LUMBAR AND SACRAL ARTHRITIS: ICD-10-CM

## 2024-11-18 DIAGNOSIS — R29.898 DECREASED STRENGTH OF LOWER EXTREMITY: Primary | ICD-10-CM

## 2024-11-18 DIAGNOSIS — M53.3 SACROILIAC JOINT DYSFUNCTION: ICD-10-CM

## 2024-11-18 PROCEDURE — 97161 PT EVAL LOW COMPLEX 20 MIN: CPT | Mod: KX

## 2024-11-18 PROCEDURE — 97112 NEUROMUSCULAR REEDUCATION: CPT | Mod: KX

## 2024-11-18 NOTE — PROGRESS NOTES
See treatment section for initial evaluation.     Melissa Ricketts, PT, DPT, OCS   Rajan Palmer, SPT

## 2024-11-21 NOTE — PLAN OF CARE
[facility]   Physical Therapy Initial Evaluation      Name: Raquel SMITH [facility]  Clinic Number: [id number]    Therapy Diagnosis:   Encounter Diagnoses   Name Primary?    Sacroiliac joint dysfunction     Lumbar and sacral arthritis     Decreased strength of lower extremity Yes     Physician: Salazar Bird MD     Physician Orders: PT Eval and Treat   Medical Diagnosis from Referral:   M53.3 (ICD-10-CM) - Sacroiliac joint dysfunction   M47.817 (ICD-10-CM) - Lumbar and sacral arthritis     Evaluation Date: 11/18/2024  Authorization Period Expiration: 11/11/2025  Plan of Care Expiration: 1/15/2025  Progress Note Due: 12/18/24  Visit # / Visits authorized: 1/1   FOTO: 1/3    Precautions: Standard     Time In: 12:30 pm  Time Out: 1:20 pm   Total Appointment Time (timed & untimed codes): 50 minutes    Subjective     Date of onset: ~1 month ago     History of current condition - Raquel reports: She began getting symptoms around one month ago in her left low back that she describes as sharp, shooting, and aching. She reports that the pain is similar to the type of pain she was having when she was being treated in physical therapy previously. Raquel states her symptoms began after a period of illness, where she stopped doing her exercises and was more sedentary because she was feeling ill. She reports she has also had to lift her dog and grandchild up more as well which could be contributing to her symptoms flaring up as well. Her pain is described as uncomfortable and sometimes it will get to a point where the pain will stop her in her tracks especially weightbearing on her left leg. She denies any numbness or tingling and her goals with physical therapy are to improve her symptoms and get back to where she was prior to getting sick so she can continue with exercises at home and manage her symptoms without having to get an injection.     Prior Therapy: Yes, similar symptoms and improvement following  PT.    Social History:  Lives with their spouse  Occupation: Retired   Prior Level of Function: Independent with ADLs and bending/squatting  Current Level of Function: Difficulty with functional activities like bending/squatting, taking care of grandchild, and caring for dog.     Pain:  Current 3/10, worst 9/10, best 0/10   Location: left Low back/hip  Description: Aching, Sharp, shooting   Aggravating Factors: Standing, Bending, Walking, Lifting her grandchild and dog, and squatting  Easing Factors: Rest, exercises     Patients goals: to get her joint back in place, reduce her pain symptoms to be able to continue doing exercises at home, and to avoid getting an injection.      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     Fibrocystic breast disease in female     HEARING LOSS     right side - no hearing aid    Hyperlipidemia     Osteopenia     PONV (postoperative nausea and vomiting)     PONV (postoperative nausea and vomiting)     Special screening for malignant neoplasms, colon 7/22/2013     Surgical History:   Raquel Galan  has a past surgical history that includes Tonsillectomy; Appendectomy; Cyst Removal (Right); Adenoidectomy; Breast surgery; Mandible fracture surgery (Right); Dilation and curettage of uterus; Colonoscopy (N/A, 9/27/2018); Injection of joint (Left, 10/16/2018); Robot-assisted laparoscopic salpingo-oophorectomy using da Kika Xi (N/A, 6/12/2020); Robot-assisted laparoscopic abdominal hysterectomy using da Kika Xi (N/A, 6/12/2020); Hysterectomy; Breast biopsy (Left); Esophagogastroduodenoscopy (N/A, 8/4/2021); Esophagogastroduodenoscopy (N/A, 5/31/2023); Esophagogastroduodenoscopy (N/A, 2/1/2024); Colonoscopy (N/A, 2/1/2024); and Colonoscopy (N/A, 4/11/2024).    Medications:   Raquel has a current medication list which includes the following prescription(s): azelastine, azithromycin, b complex  "vitamins, benzonatate, calcium 600 with vitamin d3, cetirizine, premarin, estradiol, estradiol, fexofenadine, fluticasone propionate, glucosamine-chondroitin, guaifenesin, linaclotide, lubiprostone, fish oil-omega-3 fatty acids, pantoprazole, and tretinoin.    Allergies:   Review of patient's allergies indicates:  No Known Allergies     Objective      Observation: Patient presents to physical therapy with an overall pleasant general affect who does not appear to be in any acute distress.     Posture: Standing static posture displays slight increased lumbar lordosis.     Dermatomes:  (light touch)   Right Left    L2 Intact Intact   L3 Intact Intact   L4 Intact Intact   L5 Intact Intact   S1 Intact Intact   S2 Intact Intact     Myotomes:    Right  Left    L2 5/5 5/5   L3 5/5 5/5   L4 5/5 5/5   L5 5/5 5/5   S1 5/5 5/5   S2 5/5 5/5     Range of Motion:  Lumbar   Percentage Pain   Flexion 80% No Pain "tight"     Extension 70% Discomfort end range   Right Sidebend  90% No Pain, "tight" L back    Left Sidebend 70% Pain    Right Rotation 80% No Pain "tight"    Left Rotation 80% No Pain "tight"     Hip   Right  Left    Flexion 110 degrees  100 degrees    Extension NT NT   Internal Rotation 30 degrees  30 degrees    External Rotation 45 degrees  45 degrees      Lower Extremity Strength (MMT):   Right Left   Hip Flexion 4-/5 4-/5   Hip Abduction NT NT   Hip ER 4/5 4/5   Hip IR 4-/5 4-/5   Hip Extension 3+/5 3+/5   Knee Flexion 4/5 4/5   Knee Extension 4+/5 4+/5   Dorsiflexion 4+/5 4+/5   Plantarflexion (modified, seated)   5/5 5/5     Joint Mobility: decreased segmental mobility noted with lumbar flexion ~L2-4. Increased lower lumbar spine PA joint mobility and limited hip PA joint mobility     Palpation: Tenderness to palpation with increased pressure with PA joint assessment ~L3-4.     Special Tests:  Lumbar  - Prone Instability Test: (-) for her left sided low back pain, but provided support does reduce different symptoms " "of discomfort around L5-S1.     Sacroiliac   - SI Distraction: (-)  - SI Compression: (-)  - SI Thigh Thrust: NT  - Sacral Thrust: (-)  - Flick Test: Hypomobile right SIJ  - Supine to Sit (ALS/PLS): PLS right side.     Functional Tests:  - DL Squat: Able to perform to parallel with pain in left low back.    - SL Balance EO: Right = 8 sec, Left = 6 sec. Moderate ankle sway/strategy bilaterally and slight hip drop.      Intake Outcome Measure for FOTO Low Back Survey    Therapist reviewed FOTO scores for Raquel Galan on 11/18/2024.   FOTO documents entered into [employer] - see Media section.    Intake Score: 59%         Treatment            Total Treatment time (time-based codes) separate from Evaluation: 18 minutes     Raquel received the treatments listed below:      therapeutic exercises to develop strength, endurance, ROM, flexibility, posture, and core stabilization for 00 minutes including:      manual therapy techniques: Joint mobilizations, Myofacial release, and Soft tissue Mobilization were applied to the: SIJ for 06 minutes, including:    Prone 2-person SI joint manipulation grade V   L sidelying gapping mobilizations grade II-III  - following gapping significant improvement in squat with no pain reported     neuromuscular re-education activities to improve: Balance, Coordination, Kinesthetic, Sense, Proprioception, and Posture for 12 minutes. The following activities were included:    Pt education on PT POC, Prognosis, and HEP   Supine TA activation with physioball 1 x 10 x 5" holds   Supine bridge with B lat pulldown 1 x 10 x 3" holds   Sidelying hip ER 1 x 10 x 3" holds     therapeutic activities to improve functional performance for 00  minutes, including:      gait training to improve functional mobility and safety for 00  minutes, including:    Patient Education and Home Exercises     Education provided:   - PT POC, Prognosis, and HEP     Written Home Exercises Provided: yes. Exercises were reviewed and " Raquel was able to demonstrate them prior to the end of the session.  Raquel demonstrated good  understanding of the education provided. See EMR under Patient Instructions for exercises provided during therapy sessions.    Assessment     Raquel is a 64 y.o. female referred to outpatient Physical Therapy with a medical diagnosis of M53.3 (ICD-10-CM) - Sacroiliac joint dysfunction and M47.817 (ICD-10-CM) - Lumbar and sacral arthritis. Patient presents with decreased lumbar mobility, decreased lumbar segmental mobility and motor coordination impairments with active lumbar movement, decreased bilateral lower extremity strength, limited joint mobility, and functional limitations in ADLs, taking care of her dog and grandchild, and squatting/stair negotiation. Patient would benefit from skilled PT intervention to address her deficits and improve overall functional mobility.     Patient prognosis is Good.   Patient will benefit from skilled outpatient Physical Therapy to address the deficits stated above and in the chart below, provide patient /family education, and to maximize patientt's level of independence.     Plan of care discussed with patient: Yes  Patient's spiritual, cultural and educational needs considered and patient is agreeable to the plan of care and goals as stated below:     Anticipated Barriers for therapy: Scheduling and Chronicity of symptoms     Medical Necessity is demonstrated by the following  History  Co-morbidities and personal factors that may impact the plan of care [] LOW: no personal factors / co-morbidities  [x] MODERATE: 1-2 personal factors / co-morbidities  [] HIGH: 3+ personal factors / co-morbidities    Moderate / High Support Documentation: osteopenia, vestibular disorder, hyperlipidemia      Examination  Body Structures and Functions, activity limitations and participation restrictions that may impact the plan of care [] LOW: addressing 1-2 elements  [x] MODERATE: 3+ elements  [] HIGH:  3+ elements (please support below)    Moderate / High Support Documentation: Decreased lower extremity strength, decreased motor control, decreased lumbar mobility, gait, balance/proprioception,  participation restrictions, and functional limitations      Clinical Presentation [x] LOW: stable  [] MODERATE: Evolving  [] HIGH: Unstable     Decision Making/ Complexity Score: low       Goals:  Short Term Goals: 3-4 weeks   Patient will be independent in HEP to supplement physical therapy services.   Patient will improve pain at its worse to </= 5/10 to improve quality of life.   Patient will be able to perform a squat pain free to help with taking care of her dog.     Long Term Goals: 6-8 weeks   Patient will be independent in updated HEP to supplement physical therapy services.   Patient will improve FOTO score to >/= predicted outcome to demonstrate improvement in perceived functional abilities.   Patient will reduce her pain at its worse to </= 2/10 to improve quality of life.   Patient will improve hip ER strength by 1/2 MMT to help with gait mechanics.    Patient will demonstrate the ability to lift 20 pounds off of the floor 5 times in a row without onset of pain symptoms to improve her functional ability to lift her dog off the floor.   Plan     Plan of care Certification: 11/18/2024 to 1/15/2025.    Outpatient Physical Therapy 1 times weekly to 1 time every other week for 8 weeks to include the following interventions: Electrical Stimulation , Gait Training, Manual Therapy, Moist Heat/ Ice, Neuromuscular Re-ed, Patient Education, Self Care, Therapeutic Activities, and Therapeutic Exercise.     Rajan Palmer, SPT   Co-treated by: Melissa Ricketts, PT, DPT, OCS     I certify that I was present in the room directing the student in service delivery and guiding them using my skilled judgment. As the co-signing therapist I have reviewed the students documentation and am responsible for the treatment, assessment, and  plan.

## 2024-12-02 ENCOUNTER — CLINICAL SUPPORT (OUTPATIENT)
Dept: REHABILITATION | Facility: HOSPITAL | Age: 64
End: 2024-12-02
Payer: MEDICARE

## 2024-12-02 DIAGNOSIS — G89.29 CHRONIC LEFT-SIDED LOW BACK PAIN WITHOUT SCIATICA: ICD-10-CM

## 2024-12-02 DIAGNOSIS — R29.898 DECREASED STRENGTH OF LOWER EXTREMITY: Primary | ICD-10-CM

## 2024-12-02 DIAGNOSIS — M54.50 CHRONIC LEFT-SIDED LOW BACK PAIN WITHOUT SCIATICA: ICD-10-CM

## 2024-12-02 PROCEDURE — 97112 NEUROMUSCULAR REEDUCATION: CPT | Mod: KX

## 2024-12-02 PROCEDURE — 97140 MANUAL THERAPY 1/> REGIONS: CPT | Mod: KX

## 2024-12-02 PROCEDURE — 97530 THERAPEUTIC ACTIVITIES: CPT | Mod: KX

## 2024-12-05 ENCOUNTER — OFFICE VISIT (OUTPATIENT)
Dept: ALLERGY | Facility: CLINIC | Age: 64
End: 2024-12-05
Payer: MEDICARE

## 2024-12-05 ENCOUNTER — LAB VISIT (OUTPATIENT)
Dept: LAB | Facility: HOSPITAL | Age: 64
End: 2024-12-05
Payer: MEDICARE

## 2024-12-05 VITALS — BODY MASS INDEX: 23.81 KG/M2 | HEIGHT: 61 IN | WEIGHT: 126.13 LBS

## 2024-12-05 DIAGNOSIS — H10.423 SIMPLE CHRONIC CONJUNCTIVITIS OF BOTH EYES: ICD-10-CM

## 2024-12-05 DIAGNOSIS — J32.9 RECURRENT SINUS INFECTIONS: ICD-10-CM

## 2024-12-05 DIAGNOSIS — H10.423 SIMPLE CHRONIC CONJUNCTIVITIS OF BOTH EYES: Primary | ICD-10-CM

## 2024-12-05 DIAGNOSIS — J31.0 CHRONIC RHINITIS: ICD-10-CM

## 2024-12-05 LAB
IGA SERPL-MCNC: 136 MG/DL (ref 40–350)
IGG SERPL-MCNC: 1092 MG/DL (ref 650–1600)
IGM SERPL-MCNC: 132 MG/DL (ref 50–300)

## 2024-12-05 PROCEDURE — 86774 TETANUS ANTIBODY: CPT | Performed by: ALLERGY & IMMUNOLOGY

## 2024-12-05 PROCEDURE — 82785 ASSAY OF IGE: CPT | Performed by: ALLERGY & IMMUNOLOGY

## 2024-12-05 PROCEDURE — 99204 OFFICE O/P NEW MOD 45 MIN: CPT | Mod: S$GLB,,, | Performed by: ALLERGY & IMMUNOLOGY

## 2024-12-05 PROCEDURE — 82784 ASSAY IGA/IGD/IGG/IGM EACH: CPT | Performed by: ALLERGY & IMMUNOLOGY

## 2024-12-05 PROCEDURE — 86684 HEMOPHILUS INFLUENZA ANTIBDY: CPT | Performed by: ALLERGY & IMMUNOLOGY

## 2024-12-05 PROCEDURE — 1160F RVW MEDS BY RX/DR IN RCRD: CPT | Mod: CPTII,S$GLB,, | Performed by: ALLERGY & IMMUNOLOGY

## 2024-12-05 PROCEDURE — 86317 IMMUNOASSAY INFECTIOUS AGENT: CPT | Performed by: ALLERGY & IMMUNOLOGY

## 2024-12-05 PROCEDURE — 86648 DIPHTHERIA ANTIBODY: CPT | Performed by: ALLERGY & IMMUNOLOGY

## 2024-12-05 PROCEDURE — 1159F MED LIST DOCD IN RCRD: CPT | Mod: CPTII,S$GLB,, | Performed by: ALLERGY & IMMUNOLOGY

## 2024-12-05 PROCEDURE — 86003 ALLG SPEC IGE CRUDE XTRC EA: CPT | Mod: 59 | Performed by: ALLERGY & IMMUNOLOGY

## 2024-12-05 PROCEDURE — 82784 ASSAY IGA/IGD/IGG/IGM EACH: CPT | Mod: 59 | Performed by: ALLERGY & IMMUNOLOGY

## 2024-12-05 PROCEDURE — 3008F BODY MASS INDEX DOCD: CPT | Mod: CPTII,S$GLB,, | Performed by: ALLERGY & IMMUNOLOGY

## 2024-12-05 PROCEDURE — 3044F HG A1C LEVEL LT 7.0%: CPT | Mod: CPTII,S$GLB,, | Performed by: ALLERGY & IMMUNOLOGY

## 2024-12-05 PROCEDURE — 82787 IGG 1 2 3 OR 4 EACH: CPT | Mod: 59 | Performed by: ALLERGY & IMMUNOLOGY

## 2024-12-05 PROCEDURE — 86003 ALLG SPEC IGE CRUDE XTRC EA: CPT | Performed by: ALLERGY & IMMUNOLOGY

## 2024-12-05 PROCEDURE — 99999 PR PBB SHADOW E&M-EST. PATIENT-LVL III: CPT | Mod: PBBFAC,,, | Performed by: ALLERGY & IMMUNOLOGY

## 2024-12-05 NOTE — PROGRESS NOTES
Subjective:       Patient ID: Raquel Galan is a 64 y.o. female.    Chief Complaint:  Allergic Rhinitis  and Nasal Congestion      65 yo woman presents fro consult from Dr Winston for rhinitis. She states she has had allergy issues most of life, in 20's or 30's was tested and allergic to most everything outside. She did shots- 2 injections twice a week for long time. Did help. She may get sick once a year but has not been bad for year.S then this year she got sick in September and has not been able to clear it. Was in UC several times. Had for about 10 days before given antibiotics. Has had 3 round of antibiotics and finally feeling better. She started with stuffy nose then very runny nose, lots of PND causing cough some sneeze. Pressure in head but no chest, no SOB. She has takes allegra daily in past. Was on Flonase and Astelin with this but she has stopped all as feeling better and wanted break from med's. Still with PND and cough right now. No food allergy. Has had large local reactions to insect bites and stings. No asthma or eczema.         Environmental History: see history section for home environment  Review of Systems   Constitutional:  Positive for fatigue.   HENT:  Positive for congestion, ear pain, postnasal drip, rhinorrhea, sinus pressure, sinus pain, sneezing and sore throat. Negative for facial swelling, mouth sores and nosebleeds.    Eyes:  Positive for discharge. Negative for redness and itching.   Respiratory:  Positive for cough. Negative for chest tightness, shortness of breath and wheezing.    Skin:  Negative for color change and rash.        Objective:      Physical Exam  Vitals and nursing note reviewed.   Constitutional:       General: She is not in acute distress.     Appearance: Normal appearance. She is not ill-appearing.   HENT:      Nose: No rhinorrhea.   Eyes:      General:         Right eye: No discharge.         Left eye: No discharge.      Conjunctiva/sclera: Conjunctivae normal.    Pulmonary:      Effort: Pulmonary effort is normal. No respiratory distress.   Abdominal:      General: There is no distension.   Skin:     General: Skin is warm and dry.      Findings: No erythema or rash.   Neurological:      Mental Status: She is alert and oriented to person, place, and time.   Psychiatric:         Mood and Affect: Mood normal.         Behavior: Behavior normal.         Laboratory:   none performed   Assessment:       1. Simple chronic conjunctivitis of both eyes    2. Chronic rhinitis    3. Recurrent sinus infections         Plan:       Rhinitis- advised can be allergic vs chronic non allergic vs infection, she did have sinus infection but now improved post antibiotics. Parra ewelina immunocaps to se if any allergic triggers now  If allergic then would marjorie fluticasone daily, for now use azelastine 1-2 SEN BID as needed for PND   Phone review    I spent a total of 45 minutes on the day of the visit.  This includes face to face time and non-face to face time preparing to see the patient (eg, review of tests), obtaining and/or reviewing separately obtained history, documenting clinical information in the electronic or other health record, independently interpreting results and communicating results to the patient/family/caregiver, or care coordinator.

## 2024-12-06 LAB — IGE SERPL-ACNC: 120 IU/ML (ref 0–100)

## 2024-12-09 LAB
IGG1 SER-MCNC: 505 MG/DL (ref 382–929)
IGG2 SER-MCNC: 343 MG/DL (ref 242–700)
IGG3 SER-MCNC: 27 MG/DL (ref 22–176)
IGG4 SER-MCNC: 56 MG/DL (ref 4–86)

## 2024-12-10 LAB
A ALTERNATA IGE QN: 6.2 KU/L
A FUMIGATUS IGE QN: 0.63 KU/L
ALLERGEN CHAETOMIUM GLOBOSUM IGE: 0.37 KU/L
ALLERGEN WALNUT TREE IGE: 1.24 KU/L
ALLERGEN WHITE PINE TREE IGE: <0.1 KU/L
BAHIA GRASS IGE QN: 1.23 KU/L
BALD CYPRESS IGE QN: <0.1 KU/L
BERMUDA GRASS IGE QN: 1.03 KU/L
C HERBARUM IGE QN: 0.36 KU/L
C LUNATA IGE QN: 0.67 KU/L
CAT DANDER IGE QN: 0.47 KU/L
COMMON RAGWEED IGE QN: 1.31 KU/L
COTTONWOOD IGE QN: 0.31 KU/L
D FARINAE IGE QN: 4.57 KU/L
D PTERONYSS IGE QN: 2.55 KU/L
DEPRECATED LONDON PLANE IGE RAST QL: ABNORMAL
DEPRECATED TIMOTHY IGE RAST QL: ABNORMAL
DOG DANDER IGE QN: 0.81 KU/L
ELDER IGE QN: 0.38 KU/L
ENGL PLANTAIN IGE QN: 0.13 KU/L
HONEY BEE IGE QN: <0.1 KU/L
HORSE DANDER IGE QN: <0.1 KU/L
JOHNSON GRASS IGE QN: 1.4 KU/L
LONDON PLANE IGE QN: 0.15 KU/L
MUGWORT IGE QN: <0.1 KU/L
P NOTATUM IGE QN: 0.86 KU/L
PAPER WASP IGE QN: 0.14 KU/L
PECAN/HICK TREE IGE QN: 0.15 KU/L
RAST CLASS: ABNORMAL
RAST CLASS: NORMAL
S ROSTRATA IGE QN: 2.54 KU/L
SALTWORT IGE QN: 0.31 KU/L
SILVER BIRCH IGE QN: 0.16 KU/L
TIMOTHY IGE QN: 2.68 KU/L
WEST RAGWEED IGE QN: 0.64 KU/L
WHITE OAK IGE QN: 0.13 KU/L
WHITEFACED HORNET IGE QN: <0.1 KU/L
WILLOW IGE QN: 0.25 KU/L
YELLOW HORNET IGE QN: <0.1 KU/L
YELLOW JACKET IGE QN: <0.1 KU/L

## 2024-12-16 ENCOUNTER — PATIENT MESSAGE (OUTPATIENT)
Dept: ADMINISTRATIVE | Facility: HOSPITAL | Age: 64
End: 2024-12-16
Payer: MEDICARE

## 2024-12-18 ENCOUNTER — PATIENT MESSAGE (OUTPATIENT)
Dept: ALLERGY | Facility: CLINIC | Age: 64
End: 2024-12-18
Payer: MEDICARE

## 2024-12-18 ENCOUNTER — PATIENT OUTREACH (OUTPATIENT)
Dept: ADMINISTRATIVE | Facility: HOSPITAL | Age: 64
End: 2024-12-18
Payer: MEDICARE

## 2024-12-18 DIAGNOSIS — Z78.0 MENOPAUSE: Primary | ICD-10-CM

## 2024-12-26 ENCOUNTER — PROCEDURE VISIT (OUTPATIENT)
Dept: PAIN MEDICINE | Facility: CLINIC | Age: 64
End: 2024-12-26
Attending: ANESTHESIOLOGY
Payer: MEDICARE

## 2024-12-26 VITALS
TEMPERATURE: 98 F | BODY MASS INDEX: 23.83 KG/M2 | DIASTOLIC BLOOD PRESSURE: 77 MMHG | HEART RATE: 94 BPM | SYSTOLIC BLOOD PRESSURE: 117 MMHG | WEIGHT: 126.13 LBS

## 2024-12-26 DIAGNOSIS — M76.30 TENDINITIS OF ILIOTIBIAL BAND, UNSPECIFIED LATERALITY: ICD-10-CM

## 2024-12-26 DIAGNOSIS — M70.61 TROCHANTERIC BURSITIS OF RIGHT HIP: Primary | ICD-10-CM

## 2024-12-26 RX ORDER — TRIAMCINOLONE ACETONIDE 40 MG/ML
40 INJECTION, SUSPENSION INTRA-ARTICULAR; INTRAMUSCULAR
Status: COMPLETED | OUTPATIENT
Start: 2024-12-26 | End: 2024-12-26

## 2024-12-26 RX ADMIN — TRIAMCINOLONE ACETONIDE 40 MG: 40 INJECTION, SUSPENSION INTRA-ARTICULAR; INTRAMUSCULAR at 02:12

## 2024-12-26 NOTE — PROCEDURES
GTB/tendon sheath patient reports she is Injection Procedure Note      Time-out taken to identify patient and procedure side prior to starting the procedure.   I attest that I have reviewed the patient's home medications prior to the procedure and no contraindication have been identified. I  re-evaluated the patient after the patient was positioned for the procedure in the procedure room immediately before the procedural time-out. The vital signs are current and represent the current state of the patient which has not significantly changed since the preprocedure assessment.     Date of Service: 12/26/2024    PCP: Salazar Bird MD                 PROCEDURE:  Bilateral  injection of:  Greater trochanteric bursa  Muscle tendon sheath (gluteus medius/ITB)      REASON FOR PROCEDURE:  * No surgery found *  1. Trochanteric bursitis of right hip    2. Tendinitis of iliotibial band, unspecified laterality      POSTOP DIAGNOSIS: * No surgery found *  1. Trochanteric bursitis of right hip    2. Tendinitis of iliotibial band, unspecified laterality      PHYSICIAN: Danielle Tillman MD  ASSISTANTS: Kang Rhodes MD  PM&R       MEDICATIONS INJECTED: 0.5 mL of Triamcinolone 40mg/ml X with 10 mL of bupivacaine 0.25%    SEDATION MEDICATIONS: None    ESTIMATED BLOOD LOSS:  None.    COMPLICATIONS:  None.    TECHNIQUE:   The patient was brought to the procedure room and placed on exam table in comfortable position.  Patient was in a lateral position contralateral to the side being done.  The trochanter was identified.  We prepped the area with ChloraPrep.  A 26 gauge spinal needle was used to reach the greater trochanter and adjusted to the bursa under ultrasound guidance.  We injected 2-5 mL of the medication mixture in this area.  The needle was retracted adjusted to tendon sheath and the same other medication was injected in that area.  This was repeated on the contralateral side.  The patient tolerated procedure well.  There were  given discharge instructions and a follow-up appointment.      Danielle Tillman

## 2024-12-30 ENCOUNTER — OFFICE VISIT (OUTPATIENT)
Dept: OTOLARYNGOLOGY | Facility: CLINIC | Age: 64
End: 2024-12-30
Payer: MEDICARE

## 2024-12-30 ENCOUNTER — CLINICAL SUPPORT (OUTPATIENT)
Dept: AUDIOLOGY | Facility: CLINIC | Age: 64
End: 2024-12-30
Payer: MEDICARE

## 2024-12-30 DIAGNOSIS — H92.01 RIGHT EAR PAIN: ICD-10-CM

## 2024-12-30 DIAGNOSIS — H93.11 TINNITUS, SUBJECTIVE, RIGHT: ICD-10-CM

## 2024-12-30 DIAGNOSIS — H91.93 HIGH FREQUENCY HEARING LOSS, BILATERAL: ICD-10-CM

## 2024-12-30 DIAGNOSIS — H81.8X9 OTHER DISORDERS OF VESTIBULAR FUNCTION, UNSPECIFIED EAR: Primary | ICD-10-CM

## 2024-12-30 PROCEDURE — 1159F MED LIST DOCD IN RCRD: CPT | Mod: CPTII,S$GLB,, | Performed by: OTOLARYNGOLOGY

## 2024-12-30 PROCEDURE — 99213 OFFICE O/P EST LOW 20 MIN: CPT | Mod: S$GLB,,, | Performed by: OTOLARYNGOLOGY

## 2024-12-30 PROCEDURE — 1101F PT FALLS ASSESS-DOCD LE1/YR: CPT | Mod: CPTII,S$GLB,, | Performed by: OTOLARYNGOLOGY

## 2024-12-30 PROCEDURE — 3044F HG A1C LEVEL LT 7.0%: CPT | Mod: CPTII,S$GLB,, | Performed by: OTOLARYNGOLOGY

## 2024-12-30 PROCEDURE — 99999 PR PBB SHADOW E&M-EST. PATIENT-LVL III: CPT | Mod: PBBFAC,,, | Performed by: OTOLARYNGOLOGY

## 2024-12-30 PROCEDURE — 3288F FALL RISK ASSESSMENT DOCD: CPT | Mod: CPTII,S$GLB,, | Performed by: OTOLARYNGOLOGY

## 2024-12-30 PROCEDURE — 92557 COMPREHENSIVE HEARING TEST: CPT | Mod: S$GLB,,,

## 2024-12-30 PROCEDURE — 92567 TYMPANOMETRY: CPT | Mod: S$GLB,,,

## 2024-12-30 PROCEDURE — 1126F AMNT PAIN NOTED NONE PRSNT: CPT | Mod: CPTII,S$GLB,, | Performed by: OTOLARYNGOLOGY

## 2024-12-30 RX ORDER — SULFAMETHOXAZOLE AND TRIMETHOPRIM 800; 160 MG/1; MG/1
1 TABLET ORAL 2 TIMES DAILY
Qty: 20 TABLET | Refills: 0 | Status: SHIPPED | OUTPATIENT
Start: 2024-12-30 | End: 2025-01-09

## 2024-12-30 NOTE — PROGRESS NOTES
Raquel Galan was seen today in the clinic for an audiologic evaluation.  Patient's main complaint was  dizziness . Mrs. Galan reported she experiences episodes of dizziness when walking quickly or with quick left/right head turns. She reported no perceived difficulty hearing, but that she was previously told she had some hearing loss, possibly in the left ear. Mrs. Galan previously saw Dr. Wheeler for suspected Ménière's Disease vs. Vestibulopathy in the past and was treated for her symptoms by physical therapy. She endorsed constant tinnitus is the right ear. She also reported associated aural fullness with ear popping, intermittently in the right ear. Mrs. Galan denied history of noise exposure.    Tympanometry revealed Type A in the right ear and Type A in the left ear.     Audiogram results revealed essentially normal hearing sensitivity in both ears precipitously sloping to severe hearing loss at 8000 Hz only in the right ear and a mild hearing loss at 8000 Hz only in the left ear.      Speech reception thresholds were noted at 10 dBHL in the right ear and 10 dBHL in the left ear.    Speech discrimination scores were 100% in the right ear and 100% in the left ear.    Recommendations:  Otologic evaluation  Annual audiogram or sooner if change perceived  Hearing protection in noise

## 2024-12-30 NOTE — PROGRESS NOTES
Ear, Nose, & Throat  Otolaryngology - Head & Neck Surgery    Summary of Visit:  Raquel Galan is a kind patient who was referred to me by Dr. Vitaly Winston III in consultation for Dizziness (Vertigo in the past), Ear Drainage (On and off with allergies), and Otalgia (Right side)      Subjective:     Chief Complaint:   Chief Complaint   Patient presents with    Dizziness     Vertigo in the past    Ear Drainage     On and off with allergies    Otalgia     Right side       Raquel Galan is a 64 y.o. female who was referred to me by Dr. Vitaly Winston III in consultation for her ears.  She was previously seen with Dr. Winston for acute sinusitis and noted to have an abnormal right tympanic membrane on exam.  She reports prior history of ear tubes as an adult.  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  She denies any subjective hearing loss, otalgia, otorrhea.    Past Medical History  Active Ambulatory Problems     Diagnosis Date Noted    Disorder of vestibular function of both ears 08/03/2012    Disorder of inner ear 08/04/2012    Loss of balance 08/04/2012    Dizziness 09/11/2012    Hearing loss, sensorineural 09/11/2012    Chronic allergic rhinitis 03/21/2013    Special screening for malignant neoplasms, colon 07/22/2013    Menopausal syndrome on hormone replacement therapy 03/24/2014    Postmenopausal bleeding 04/22/2015    Mucous polyp of cervix 04/22/2015    Menopausal syndrome 03/28/2016    Chronic left-sided low back pain without sciatica 04/12/2017    Spondylosis of lumbar region without myelopathy or radiculopathy 10/26/2017    Disorder of bone density and structure, unspecified 01/09/2018    Chronic pain 01/16/2018    Screening for colon cancer 09/27/2018    Pain 10/16/2018    S/P robotic assisted laparoscopic hysterectomy and BSO 06/12/2020    Right hip pain 01/19/2021    Dysphagia  08/04/2021    Hand joint pain 04/21/2022    Family history of coronary artery disease occurring prior to 55 years of age 08/01/2022    Precordial chest pain 05/09/2023    Decreased strength of lower extremity 06/10/2024    Decreased range of motion of lumbar spine 06/10/2024     Resolved Ambulatory Problems     Diagnosis Date Noted    Acute non-recurrent sinusitis 09/11/2017    Lack of coordination 07/22/2021    Myalgia 07/22/2021    Anginal equivalent 08/01/2022     Past Medical History:   Diagnosis Date    Allergy     Back pain     Fibrocystic breast disease in female     HEARING LOSS     Hyperlipidemia     Osteopenia     PONV (postoperative nausea and vomiting)     PONV (postoperative nausea and vomiting)        Past Surgical History  She has a past surgical history that includes Tonsillectomy; Appendectomy; Cyst Removal (Right); Adenoidectomy; Breast surgery; Mandible fracture surgery (Right); Dilation and curettage of uterus; Colonoscopy (N/A, 09/27/2018); Injection of joint (Left, 10/16/2018); Robot-assisted laparoscopic salpingo-oophorectomy using da Kika Xi (N/A, 06/12/2020); Robot-assisted laparoscopic abdominal hysterectomy using da Kika Xi (N/A, 06/12/2020); Hysterectomy; Breast biopsy (Left); Esophagogastroduodenoscopy (N/A, 08/04/2021); Esophagogastroduodenoscopy (N/A, 05/31/2023); Esophagogastroduodenoscopy (N/A, 02/01/2024); Colonoscopy (N/A, 02/01/2024); Colonoscopy (N/A, 04/11/2024); and Tympanostomy tube placement.    Past Surgical History:   Procedure Laterality Date    ADENOIDECTOMY      APPENDECTOMY      BREAST BIOPSY Left     exc bx, benign per pt    BREAST SURGERY      left breast biopsy    COLONOSCOPY N/A 09/27/2018    Procedure: COLONOSCOPY;  Surgeon: Uche Smiley MD;  Location: [address];  Service: Endoscopy;  Laterality: N/A;  pt/instructed on prep day before and day of importance    COLONOSCOPY N/A 02/01/2024    Procedure: COLONOSCOPY;  Surgeon: Lanre Vaughn MD;   Location: [facility] ENDOSCOPY;  Service: Endoscopy;  Laterality: N/A;    COLONOSCOPY N/A 04/11/2024    Procedure: COLONOSCOPY;  Surgeon: Lanre Vaughn MD;  Location: [facility] ENDOSCOPY;  Service: Endoscopy;  Laterality: N/A;  roderick-miralax extended-instr portal-full liquids 1 day prior-tb  4/4- pc complete. DBM    CYST REMOVAL Right     neck    DILATION AND CURETTAGE OF UTERUS      ESOPHAGOGASTRODUODENOSCOPY N/A 08/04/2021    Procedure: ESOPHAGOGASTRODUODENOSCOPY (EGD);  Surgeon: Ameya Medeiros MD;  Location: [facility] (2ND FLR);  Service: Endoscopy;  Laterality: N/A;  Food gets hung up. Dilatation likely needed.     COVID test in [place] on 8/1-GT  8/2-new instructions via portal-tb    ESOPHAGOGASTRODUODENOSCOPY N/A 05/31/2023    Procedure: EGD (ESOPHAGOGASTRODUODENOSCOPY);  Surgeon: Lanre Vaughn MD;  Location: [facility] ENDOSCOPY;  Service: Endoscopy;  Laterality: N/A;  cardiac clearance received from -see note 5/9/23/ referred by Erendira,KEMAL/ inst portal-RB  5/30 Pre call complete, ride confirmed, no wt loss inj;SG    ESOPHAGOGASTRODUODENOSCOPY N/A 02/01/2024    Procedure: EGD (ESOPHAGOGASTRODUODENOSCOPY);  Surgeon: Lanre Vaughn MD;  Location: [facility] ENDOSCOPY;  Service: Endoscopy;  Laterality: N/A;  1/26/24-Roderick pt, Sutab, instr portal-DS    HYSTERECTOMY      INJECTION OF JOINT Left 10/16/2018    Procedure: INJECTION, JOINT, LEFT SI JOINT INJECTION UNDER FLUORO;  Surgeon: Alirio Lei MD;  Location: [facility] PAIN MGT;  Service: Pain Management;  Laterality: Left;  NEEDS CONSENT    MANDIBLE FRACTURE SURGERY Right     scope    ROBOT-ASSISTED LAPAROSCOPIC ABDOMINAL HYSTERECTOMY USING DA ERICA XI N/A 06/12/2020    Procedure: XI ROBOTIC HYSTERECTOMY;  Surgeon: Alice Valenzuela MD;  Location: [facility] OR;  Service: OB/GYN;  Laterality: N/A;    ROBOT-ASSISTED LAPAROSCOPIC SALPINGO-OOPHORECTOMY USING DA ERICA XI N/A 06/12/2020    Procedure: XI ROBOTIC SALPINGO-OOPHORECTOMY;  Surgeon: Alice  SONIA Valenzuela MD;  Location: [facility] OR;  Service: OB/GYN;  Laterality: N/A;    TONSILLECTOMY      TYMPANOSTOMY TUBE PLACEMENT          Family History  Her family history includes Allergies in her father and mother; Arthritis in her father and mother; COPD in her mother; Cancer (age of onset: 75) in her father; Diabetes in her mother; Heart disease in her father and mother; Hyperlipidemia in her mother; Hypertension in her mother; Kidney disease in her mother; Miscarriages / Stillbirths in her mother; Stroke in her mother.    Social History  She reports that she has never smoked. She has been exposed to tobacco smoke. She has never used smokeless tobacco. She reports that she does not currently use alcohol after a past usage of about 2.0 standard drinks of alcohol per week. She reports that she does not use drugs.    Allergies  She has No Known Allergies.    Medications  She has a current medication list which includes the following prescription(s): azelastine, b complex vitamins, calcium 600 with vitamin d3, premarin, estradiol, fexofenadine, fluticasone propionate, glucosamine-chondroitin, lubiprostone, fish oil-omega-3 fatty acids, and sulfamethoxazole-trimethoprim 800-160mg.    ROS:  Pertinent positive and negative review of systems as noted in HPI.     Objective:     LMP 04/15/2009      General Appearance:   Awake, Alert and Oriented. NAD. Appropriate affect and appearance      Neuro:   Spontaneous eye opening, appropriate verbal responses, follows commands  Pupils equal, round & brisk. EOMI, no proptosis, no spontaneous nystagmus  Face is symmetric, HB I, non-edematous and SILT bilaterally  Vision grossly intact, Hearing grossly intact  VIOLETA, normal tone     Head and Face:   skin is intact, facial movement symmetric     Ears:  Right Ear:    Auricle normally developed   EAC: normal   Tympanic membrane: intact and monomeric central portion   Middle Ear: No effusion present and Ossicles in normal position  Left Ear:     Auricle normally developed   EAC: normal   Tympanic membrane: intact   Middle Ear: No effusion present   Nose:   External nose is symmetric, no skin lesions  Septum midline, No inferior turbinate hypertrophy, No polyps or rhinorrhea     OC/OP:  Tongue midline on extension, non-edematous, soft  No labial, buccal, oral tongue or floor of mouth lesions  Soft palate symmetric, midline and without lesions or masses, tonsils symmetric  No masses or lesions of the visualized oropharynx     Neck:  Neck is symmetric, non-edematous, non-erythematous  Trachea is midline and easily palpable,  No palpable adenopathy or masses in levels I-VI     Glands:  Parotid and submandibular glands are symmetric and non-tender.   No thyroid nodules or masses, non-tender      Respiratory:  Normal work of breathing, no accessory muscle use, no stridor     Voice:  Normal vocal quality, volume, prosody and articulation   Data Review:     AUDIO                  Procedures:       Assessment:     1. Right ear pain        Plan:     I had a long discussion with the patient regarding her condition and the further workup and management options.  She has monomeric area in the right tympanic membrane consistent with a healed tympanostomy tube site.  No significant hearing loss is noted attributable to this.  She does have significant hearing loss at 8000 hertz bilaterally, not necessarily consistent with atypical Meniere's.  She is given a course of Bactrim for her persistent sinusitis symptoms.  Return to clinic if necessary if symptoms fail to improve.  Otherwise recommend repeat audiogram in 1-2 years.  No orders of the defined types were placed in this encounter.     Medications Ordered This Encounter   Medications    sulfamethoxazole-trimethoprim 800-160mg (BACTRIM DS) 800-160 mg Tab      Problem List Items Addressed This Visit    None  Visit Diagnoses       Right ear pain

## 2024-12-31 NOTE — PROGRESS NOTES
"[facility]   Physical Therapy Treatment Note     Name: Raquel SMITH [facility]  Clinic Number: [id number]    Therapy Diagnosis:   Encounter Diagnoses   Name Primary?    Decreased strength of lower extremity Yes    Chronic left-sided low back pain without sciatica      Physician: Salazar Bird MD    Visit Date: 12/2/2024  Physician Orders: PT Eval and Treat   Medical Diagnosis from Referral:   M53.3 (ICD-10-CM) - Sacroiliac joint dysfunction   M47.817 (ICD-10-CM) - Lumbar and sacral arthritis      Evaluation Date: 11/18/2024  Authorization Period Expiration: 11/11/2025  Plan of Care Expiration: 1/15/2025  Progress Note Due: 12/18/24  Visit # / Visits authorized: 1/10   FOTO: 1/3     PTA Visit #: 0/5     FOTO first follow up:   FOTO second follow up:     Precautions: Standard     Time In: 1:30 pm  Time Out: 2:12 pm   Total Billable Time: 42 minutes    SUBJECTIVE     Pt reports: She is feeling significantly better. She has been doing her exercises and felt much better after initial evaluation.   She was compliant with home exercise program.  Response to previous treatment: Independent in HEP  Functional change: Ongoing     Pain: 1/10  Location: left low back and hip     OBJECTIVE     Objective Measures updated at progress report unless specified.     Treatment       Raquel received the treatments listed below:      therapeutic exercises to develop strength, endurance, ROM, flexibility, posture, and core stabilization for 00 minutes including:      manual therapy techniques: Joint mobilizations and Soft tissue Mobilization were applied to the: Lumbar for 09 minutes, including:    Sidelying lumbar gapping grade II-III    neuromuscular re-education activities to improve: Balance, Coordination, Kinesthetic, Sense, Proprioception, and Posture for 21 minutes. The following activities were included:    Bridge with GreenTB and B lat pulldown GreenTB 3 x 8 x 3" holds   TA with physioball 10 x 10" holds  TA " "with physioball and LE extensions 2 x 8 reps ea  Sidelying hip ER 2 x 10 x 3" holds    Sidelying clamshells RedTB 2 x 10 x 5" holds   Paloff press GreenTB 2 x 8 x 3" holds     therapeutic activities to improve functional performance for 12 minutes, including:    Hip hinge pattern squat to 20-inch 2 x 10 reps with dowel   Modified deadlift with 15# KB on 6-inch step working on form to help with lifting dog 2 x 8 reps     Patient Education and Home Exercises     Home Exercises Provided and Patient Education Provided     Education provided:   - Continue with HEP     Written Home Exercises Provided: Patient instructed to cont prior HEP. Exercises were reviewed and Raquel was able to demonstrate them prior to the end of the session.  Raquel demonstrated good  understanding of the education provided. See EMR under Patient Instructions for exercises provided during therapy sessions    ASSESSMENT     Raquel presents to today's therapy session with significant improvement in symptoms. Continues to respond well to manual therapy lumbar gapping and continued utilization at today's session. Progressed with further hip/glute strengthening and core stability with good challenge. Added squatting mechanics and imitating lifting her dog to ensure good form and carryover at home. Will continue to monitor and progress as able.     Raquel Is progressing well towards her goals.   Pt prognosis is Good.     Pt will continue to benefit from skilled outpatient physical therapy to address the deficits listed in the problem list box on initial evaluation, provide pt/family education and to maximize pt's level of independence in the home and community environment.     Pt's spiritual, cultural and educational needs considered and pt agreeable to plan of care and goals.     Anticipated barriers to physical therapy: Scheduling, and chronicity of symptoms     Goals:   Short Term Goals: 3-4 weeks (Progressing, not met)  Patient will be independent " in HEP to supplement physical therapy services. - MET  Patient will improve pain at its worse to </= 5/10 to improve quality of life. - MET  Patient will be able to perform a squat pain free to help with taking care of her dog.      Long Term Goals: 6-8 weeks (Progressing, not met)  Patient will be independent in updated HEP to supplement physical therapy services.   Patient will improve FOTO score to >/= predicted outcome to demonstrate improvement in perceived functional abilities.   Patient will reduce her pain at its worse to </= 2/10 to improve quality of life.   Patient will improve hip ER strength by 1/2 MMT to help with gait mechanics.    Patient will demonstrate the ability to lift 20 pounds off of the floor 5 times in a row without onset of pain symptoms to improve her functional ability to lift her dog off the floor.     PLAN   Plan of care Certification: 11/18/2024 to 1/15/2025.     Continue with current plan of care progressing as tolerated.     Melissa Ricketts, PT, DPT, OCS   Co-treated by: Rajan Palmer, SPT

## 2025-01-06 ENCOUNTER — PATIENT MESSAGE (OUTPATIENT)
Dept: GASTROENTEROLOGY | Facility: CLINIC | Age: 65
End: 2025-01-06
Payer: MEDICARE

## 2025-01-15 ENCOUNTER — PATIENT MESSAGE (OUTPATIENT)
Dept: GASTROENTEROLOGY | Facility: CLINIC | Age: 65
End: 2025-01-15
Payer: MEDICARE

## 2025-01-19 ENCOUNTER — PATIENT MESSAGE (OUTPATIENT)
Dept: DERMATOLOGY | Facility: CLINIC | Age: 65
End: 2025-01-19
Payer: MEDICARE

## 2025-01-27 ENCOUNTER — PATIENT MESSAGE (OUTPATIENT)
Dept: PAIN MEDICINE | Facility: CLINIC | Age: 65
End: 2025-01-27
Payer: MEDICARE

## 2025-01-27 ENCOUNTER — PATIENT MESSAGE (OUTPATIENT)
Dept: OTOLARYNGOLOGY | Facility: CLINIC | Age: 65
End: 2025-01-27
Payer: MEDICARE

## 2025-01-28 ENCOUNTER — TELEPHONE (OUTPATIENT)
Dept: PAIN MEDICINE | Facility: CLINIC | Age: 65
End: 2025-01-28
Payer: MEDICARE

## 2025-01-28 NOTE — TELEPHONE ENCOUNTER
----- Message from Bernie sent at 1/28/2025  9:41 AM CST -----  Type: Patient call    Who called: Patient    Does the patient know what this is regarding? Requesting a call back in regards to returning a call from Cassidy ; please advise     Would the patient rather a call back or response via [facility]? Call    Best call back number: [phone]    Additional information:

## 2025-01-28 NOTE — TELEPHONE ENCOUNTER
----- Message from Stacey sent at 1/28/2025  8:30 AM CST -----  Regarding: Back Injection  Name of Who is Calling:  Patient          What is the request in detail:  Please call patient she stated she would like to make an appointment for a back injection            Can the clinic reply by [facility]: Yes            What Number to Call Back if not in [facility]: [phone]

## 2025-01-28 NOTE — TELEPHONE ENCOUNTER
Staff spoke with patient and got her scheduled for an appointment to discuss back injections. Staff scheduled her on 2/25. Patient is in agreement to the above and verbalized understanding. Patient also wanted to get her  scheduled for a new patient consult. Staff scheduled him  on 2/19. Patient is in agreement to the and verbalized understanding.

## 2025-02-04 ENCOUNTER — NURSE TRIAGE (OUTPATIENT)
Dept: ADMINISTRATIVE | Facility: CLINIC | Age: 65
End: 2025-02-04
Payer: MEDICARE

## 2025-02-04 ENCOUNTER — OFFICE VISIT (OUTPATIENT)
Dept: INTERNAL MEDICINE | Facility: CLINIC | Age: 65
End: 2025-02-04
Payer: MEDICARE

## 2025-02-04 VITALS
RESPIRATION RATE: 14 BRPM | WEIGHT: 123.56 LBS | HEART RATE: 83 BPM | SYSTOLIC BLOOD PRESSURE: 106 MMHG | DIASTOLIC BLOOD PRESSURE: 62 MMHG | HEIGHT: 61 IN | TEMPERATURE: 97 F | BODY MASS INDEX: 23.33 KG/M2 | OXYGEN SATURATION: 97 %

## 2025-02-04 DIAGNOSIS — L03.213 PRESEPTAL CELLULITIS OF RIGHT EYE: Primary | ICD-10-CM

## 2025-02-04 PROCEDURE — 3074F SYST BP LT 130 MM HG: CPT | Mod: CPTII,S$GLB,,

## 2025-02-04 PROCEDURE — 1126F AMNT PAIN NOTED NONE PRSNT: CPT | Mod: CPTII,S$GLB,,

## 2025-02-04 PROCEDURE — 99213 OFFICE O/P EST LOW 20 MIN: CPT | Mod: S$GLB,,,

## 2025-02-04 PROCEDURE — 3008F BODY MASS INDEX DOCD: CPT | Mod: CPTII,S$GLB,,

## 2025-02-04 PROCEDURE — 1101F PT FALLS ASSESS-DOCD LE1/YR: CPT | Mod: CPTII,S$GLB,,

## 2025-02-04 PROCEDURE — 99999 PR PBB SHADOW E&M-EST. PATIENT-LVL V: CPT | Mod: PBBFAC,,,

## 2025-02-04 PROCEDURE — 3288F FALL RISK ASSESSMENT DOCD: CPT | Mod: CPTII,S$GLB,,

## 2025-02-04 PROCEDURE — 3078F DIAST BP <80 MM HG: CPT | Mod: CPTII,S$GLB,,

## 2025-02-04 PROCEDURE — 1159F MED LIST DOCD IN RCRD: CPT | Mod: CPTII,S$GLB,,

## 2025-02-04 PROCEDURE — 1160F RVW MEDS BY RX/DR IN RCRD: CPT | Mod: CPTII,S$GLB,,

## 2025-02-04 RX ORDER — AMOXICILLIN AND CLAVULANATE POTASSIUM 875; 125 MG/1; MG/1
1 TABLET, FILM COATED ORAL 2 TIMES DAILY
Qty: 14 TABLET | Refills: 0 | Status: SHIPPED | OUTPATIENT
Start: 2025-02-04 | End: 2025-02-11

## 2025-02-04 NOTE — TELEPHONE ENCOUNTER
Pt calling with c/o right eye res and swollen and thinks had a sty and hasn't had this in a very long time. Pt triaged and care advice to see MD today and offered virtual visit and pt to call back if any other questions or concerns or worsening. Pt to try warm compresses to help with the swelling and drainage              Reason for Disposition   Eyelid is very swollen and no fever    Additional Information   Negative: Unresponsive, passed out or very weak   Negative: Difficulty breathing or wheezing   Negative: Difficulty swallowing or slurred speech and sudden onset   Negative: Sounds like a life-threatening emergency to the triager   Sty suspected (i.e., small, painful red lump present on lid margin)   Negative: Patient sounds very sick or weak to the triager   Negative: Eyelid is red and fever   Negative: Eyelid is swollen and fever   Negative: Redness spreads around the eye (both upper and lower eyelid are red)   Negative: Blurred vision AND new-onset or getting worse    Protocols used: Eyelid Swelling-A-OH, Sty-A-OH

## 2025-02-04 NOTE — PROGRESS NOTES
"Subjective:       Patient ID: Raquel Galan is a 64 y.o. female.    Chief Complaint: Eye Drainage (Right eye drainage, swelling)    HPI    History of Present Illness    CHIEF COMPLAINT:  Ms. Galan presents today with right eye swelling and drainage.    OCULAR SYMPTOMS:  She reports right eye symptoms that began on Sunday with a tender spot, progressing to clear drainage described as "weeping" by last night. This morning she noticed increased swelling and redness around the eye. She reports itching which she attributes to frequent touching of the area. The drainage is persistent, requiring her to sleep with a towel. Symptoms may have initiated after attending a concert on Saturday night where she initially felt a foreign body sensation. She denies fever or pain with eye movement. She denies visual disturbance except for when copious drainage occurs. She has been using cleansing wipes for eye hygiene and applying hot compresses, which she reports has helped initiate drainage.    MEDICAL HISTORY:  She has a history of styes and recently recovered from a sinus infection that began in September and resolved about a month ago.    ALLERGIES:  She denies any medication allergies.      ROS:  Constitutional: -fevers  Eyes: +vision changes, -eye pain, +eye discharge, +eye swelling  Integumentary: +itching         Review of Systems          Objective:      Physical Exam  Vitals reviewed.   Constitutional:       General: She is awake. She is not in acute distress.     Appearance: Normal appearance. She is well-developed and well-groomed. She is not ill-appearing, toxic-appearing or diaphoretic.   HENT:      Head: Normocephalic and atraumatic.        Nose: Nose normal.      Mouth/Throat:      Mouth: Mucous membranes are moist.      Pharynx: Oropharynx is clear.   Eyes:      General: Lids are everted, no foreign bodies appreciated.         Right eye: Discharge present.      Extraocular Movements: Extraocular movements intact. "      Conjunctiva/sclera:      Right eye: Right conjunctiva is injected. Exudate present.      Left eye: Left conjunctiva is injected.      Pupils: Pupils are equal, round, and reactive to light.      Comments: Moderate swelling affecting right eye with warmth and erythema. No pain with ocular movement. PERRLA. Yellow drainage present.    Cardiovascular:      Rate and Rhythm: Normal rate and regular rhythm.      Pulses: Normal pulses.      Heart sounds: Normal heart sounds. No murmur heard.     No friction rub. No gallop.   Pulmonary:      Effort: Pulmonary effort is normal. No respiratory distress.   Musculoskeletal:      Cervical back: Normal range of motion and neck supple.   Lymphadenopathy:      Cervical: No cervical adenopathy.   Skin:     General: Skin is warm and dry.   Neurological:      Mental Status: She is alert and oriented to person, place, and time. Mental status is at baseline.      GCS: GCS eye subscore is 4. GCS verbal subscore is 5. GCS motor subscore is 6.   Psychiatric:         Behavior: Behavior is cooperative.           Physical Exam            Assessment:       1. Preseptal cellulitis of right eye  - amoxicillin-clavulanate 875-125mg (AUGMENTIN) 875-125 mg per tablet; Take 1 tablet by mouth 2 (two) times daily. for 7 days  Dispense: 14 tablet; Refill: 0      Plan:         Assessment & Plan    PRESEPTAL CELLULITIS:  - Assessed the patient's condition as preseptal cellulitis based on symptoms of increased swelling, redness, and drainage around the right eye.  - Explained the difference between preseptal cellulitis and stye.  - Discussed signs of worsening infection requiring emergency evaluation: fever, severe pain, visual disturbances, pain with eye movements, or severe  swelling.  - Reviewed expected improvement timeline with antibiotic treatment.  - Prescribed Augmentin 875mg/125mg, 1 tablet twice daily for 7 days.  - Recommend moisturizing eye drops for comfort.  - Advised applying cool  packs to the affected area for pain and inflammation relief.  - Suggested using warm compresses if they provide symptom relief.  - Recommend OTC acetaminophen or ibuprofen as needed for pain.  - Instructed the patient to avoid touching the eye and maintain good hand hygiene.  - Advised contacting the office if symptoms do not improve within 24-48 hours.  - Ms. Galan to gently cleanse eye area with warm cloth if drainage causes eyelids to stick together upon waking.      RTC with new or worsening symptoms.            This note was generated with the assistance of ambient listening technology. Verbal consent was obtained by the patient and accompanying visitor(s) for the recording of patient appointment to facilitate this note. I attest to having reviewed and edited the generated note for accuracy, though some syntax or spelling errors may persist. Please contact the author of this note for any clarification.

## 2025-02-10 ENCOUNTER — PATIENT MESSAGE (OUTPATIENT)
Dept: INTERNAL MEDICINE | Facility: CLINIC | Age: 65
End: 2025-02-10
Payer: MEDICARE

## 2025-02-14 ENCOUNTER — PROCEDURE VISIT (OUTPATIENT)
Dept: DERMATOLOGY | Facility: CLINIC | Age: 65
End: 2025-02-14
Payer: MEDICARE

## 2025-02-14 DIAGNOSIS — Z41.1 ENCOUNTER FOR COSMETIC PROCEDURE: Primary | ICD-10-CM

## 2025-02-14 NOTE — PROGRESS NOTES
Patient is here today for cosmetic Botox treatment.   She denies any history of adverse reaction to Botox or history of neuromuscular disease.     Discussed benefits and risks of Botox injections, including headache, weakness/paralysis of muscles, asymmetry, eyebrow/lid drooping, pain, bruising, swelling, infection, and rare risk of systemic botulism. Verbal and written consent was obtained.    Patient agreed to proceed with treatment. 27 units total were injected today:  10 in frontalis  17 in glabella (2-4-5-4-2)    Patient tolerated well with no complications. She was instructed not to rub or massage the treated areas and that she should avoid lying down, bending upside down, and strenuous exercise for the rest of the day.  Instructed to wait two weeks to assess response before presenting for a touch up injection, if needed.      Botox dilution: 10u / 0.2mL (10u / 0.4mL for frontalis)  Lot #: [id number]  Exp date: 12/2026

## 2025-02-17 ENCOUNTER — PATIENT MESSAGE (OUTPATIENT)
Dept: INTERNAL MEDICINE | Facility: CLINIC | Age: 65
End: 2025-02-17
Payer: MEDICARE

## 2025-02-18 ENCOUNTER — TELEPHONE (OUTPATIENT)
Dept: INTERNAL MEDICINE | Facility: CLINIC | Age: 65
End: 2025-02-18
Payer: MEDICARE

## 2025-02-18 DIAGNOSIS — E78.5 HYPERLIPIDEMIA, UNSPECIFIED HYPERLIPIDEMIA TYPE: ICD-10-CM

## 2025-02-18 DIAGNOSIS — R79.9 ABNORMAL FINDING OF BLOOD CHEMISTRY, UNSPECIFIED: ICD-10-CM

## 2025-02-18 DIAGNOSIS — I10 HYPERTENSION, ESSENTIAL: Primary | ICD-10-CM

## 2025-02-18 DIAGNOSIS — H00.019 HORDEOLUM, UNSPECIFIED HORDEOLUM TYPE, UNSPECIFIED LATERALITY: Primary | ICD-10-CM

## 2025-02-18 DIAGNOSIS — Z00.00 WELL ADULT EXAM: ICD-10-CM

## 2025-02-18 NOTE — TELEPHONE ENCOUNTER
----- Message from Med Assistant Sy sent at 2/17/2025  4:31 PM CST -----  Contact: [phone]    ----- Message -----  From: Saima Mina  Sent: 2/17/2025   4:22 PM CST  To: [facility] Staff    type: LabCaller is requesting to schedule their Lab appointment prior to an appointment.Order is not listed in EPIC.  Please enter order and contact patient to schedule.Preferred Date and Time of Labs: 08/11/2025, Monday or Friday a week before the appt in the morning.Date of Appointment:08/15/2025Where would they like the lab performed? [facility] Would the patient rather a call back or a response via [facility]? Portal Best Call Back Number:[phone]

## 2025-02-18 NOTE — TELEPHONE ENCOUNTER
Good morning,    I think it would be beneficial to see optometry for further evaluation and management. I will place the referral for you now. Please let me know if you have any questions.    Best,  Rickey Marte

## 2025-02-25 ENCOUNTER — PATIENT MESSAGE (OUTPATIENT)
Dept: PAIN MEDICINE | Facility: OTHER | Age: 65
End: 2025-02-25
Payer: MEDICARE

## 2025-02-25 ENCOUNTER — HOSPITAL ENCOUNTER (OUTPATIENT)
Dept: RADIOLOGY | Facility: OTHER | Age: 65
Discharge: HOME | End: 2025-02-25
Attending: STUDENT IN AN ORGANIZED HEALTH CARE EDUCATION/TRAINING PROGRAM
Payer: MEDICARE

## 2025-02-25 ENCOUNTER — OFFICE VISIT (OUTPATIENT)
Dept: PAIN MEDICINE | Facility: CLINIC | Age: 65
End: 2025-02-25
Attending: ANESTHESIOLOGY
Payer: MEDICARE

## 2025-02-25 VITALS
DIASTOLIC BLOOD PRESSURE: 93 MMHG | SYSTOLIC BLOOD PRESSURE: 138 MMHG | BODY MASS INDEX: 23.37 KG/M2 | WEIGHT: 123.69 LBS | TEMPERATURE: 98 F | HEART RATE: 81 BPM

## 2025-02-25 DIAGNOSIS — M46.1 SACROILIITIS: Primary | ICD-10-CM

## 2025-02-25 DIAGNOSIS — M70.61 TROCHANTERIC BURSITIS OF RIGHT HIP: ICD-10-CM

## 2025-02-25 DIAGNOSIS — M47.816 LUMBAR SPONDYLOSIS: ICD-10-CM

## 2025-02-25 DIAGNOSIS — M76.30 TENDINITIS OF ILIOTIBIAL BAND, UNSPECIFIED LATERALITY: ICD-10-CM

## 2025-02-25 DIAGNOSIS — M46.1 SACROILIITIS: ICD-10-CM

## 2025-02-25 PROCEDURE — 1101F PT FALLS ASSESS-DOCD LE1/YR: CPT | Mod: CPTII,S$GLB,, | Performed by: ANESTHESIOLOGY

## 2025-02-25 PROCEDURE — 72110 X-RAY EXAM L-2 SPINE 4/>VWS: CPT | Mod: TC,FY

## 2025-02-25 PROCEDURE — 72202 X-RAY EXAM SI JOINTS 3/> VWS: CPT | Mod: 26,,, | Performed by: RADIOLOGY

## 2025-02-25 PROCEDURE — 1160F RVW MEDS BY RX/DR IN RCRD: CPT | Mod: CPTII,S$GLB,, | Performed by: ANESTHESIOLOGY

## 2025-02-25 PROCEDURE — 1125F AMNT PAIN NOTED PAIN PRSNT: CPT | Mod: CPTII,S$GLB,, | Performed by: ANESTHESIOLOGY

## 2025-02-25 PROCEDURE — 3008F BODY MASS INDEX DOCD: CPT | Mod: CPTII,S$GLB,, | Performed by: ANESTHESIOLOGY

## 2025-02-25 PROCEDURE — 72110 X-RAY EXAM L-2 SPINE 4/>VWS: CPT | Mod: 26,,, | Performed by: RADIOLOGY

## 2025-02-25 PROCEDURE — 1159F MED LIST DOCD IN RCRD: CPT | Mod: CPTII,S$GLB,, | Performed by: ANESTHESIOLOGY

## 2025-02-25 PROCEDURE — 3075F SYST BP GE 130 - 139MM HG: CPT | Mod: CPTII,S$GLB,, | Performed by: ANESTHESIOLOGY

## 2025-02-25 PROCEDURE — G2211 COMPLEX E/M VISIT ADD ON: HCPCS | Mod: S$GLB,,, | Performed by: ANESTHESIOLOGY

## 2025-02-25 PROCEDURE — 3288F FALL RISK ASSESSMENT DOCD: CPT | Mod: CPTII,S$GLB,, | Performed by: ANESTHESIOLOGY

## 2025-02-25 PROCEDURE — 3080F DIAST BP >= 90 MM HG: CPT | Mod: CPTII,S$GLB,, | Performed by: ANESTHESIOLOGY

## 2025-02-25 PROCEDURE — 72202 X-RAY EXAM SI JOINTS 3/> VWS: CPT | Mod: TC,FY

## 2025-02-25 PROCEDURE — 99214 OFFICE O/P EST MOD 30 MIN: CPT | Mod: GC,S$GLB,, | Performed by: ANESTHESIOLOGY

## 2025-02-25 PROCEDURE — 99999 PR PBB SHADOW E&M-EST. PATIENT-LVL III: CPT | Mod: PBBFAC,,, | Performed by: ANESTHESIOLOGY

## 2025-02-25 NOTE — PROGRESS NOTES
Chronic Pain-Established Note (Follow up visit)        Referring Physician: No ref. provider found    Chief Complaint:   Chief Complaint   Patient presents with    Back Pain         Interval History 2/25/2025:  Patient is here for follow up of bilateral hip pain. She underwent in-clinic bilateral GTB injection under ultrasound on 12/26/24, which provided her with 90% relief. Patient presents today to discuss left sided lower back pain. It began about one year ago, she underwent physical therapy which was beneficial at the time, but the pain has returned even with HEP. She uses voltaren gel which is somewhat helpful. She inquires about a Toradol injection which she had in the past. Exacerbated by carrying her great nephew, bending and lifting. Mitigated by rest and heat pads, topicals and tylenol. Rates the pain as 3/10, can become 8/10. She denies red flag symptoms.     Interval History 7/29/2024:  The patient is here for follow up of bilateral hip pain. She is s/p bilateral GTB and bilateral gluteal tendon sheath injections on 5/23/24 with 90% relief. Her pain is currently tolerable. She is currently in PT which she finds helpful. She is working on strengthening as well. Her pain today is 3/10.    Interval History 4/4/2024:  Raquel Galan returns today for follow-up. She reports excellent relief of previous GTB injections. She reports return of her lateral hip pain in the same quality and character as previous. The pain is located to the lateral hip bilaterally and radiates to the knee. She denies weakness, numbness/tingling, or low back pain. Previous injection over 1 year ago with excellent relief. She is interested in repeat injections at this time.     Interval History 10/24/2023:  Raquel Galan returns today for follow-up. Since last seen, they report their pain has been returning. It seems her right hip and ischial bursa are inflammed again and she feels she needs a repeat injection. Notes her left hip  is starting to hurt also. She is to have a garden party and wants to do work outside before and needs the injections to tolerate. Still doing regular PT and exercise with silver sneakers. Still having balance difficulties from inner ear condition that leads to her stabilizing muscle overuse/strain. No numbness, tingling or weakness.     Interval History 8/15/2023:  The patient has a virtual follow up for right sided buttock and hip pain. There was difficulty with audio through the bess so the latter portion of the visit was conducted via phone audio. She is now s/p GTB and ischial bursa injection with about 90% relief. She went on a long vacation recently and was able to walk and be active. She is restarting Silver Sneakers this week. Her pain is tolerable at this time. Her pain today is 2/10.    Interval History 2/9/2023:  The patient presents for increased right sided hip and buttock pain. She has been having more pain over the past couple of weeks. The pain is located to the right buttock/lateral hip and radiates down the side of the leg. No numbness. She has mild symptoms on the left. The pain bothers her more when she is laying at night or putting pressure on the right side. Her pain today is 8/10.    Interval History 12/5/2022:  The patient is here for follow up of right sided hip and buttock pain. She is s/p right GTB and ischial bursa injections in office on 10/19/22 with Dr. Tillman. She reports 90% relief of her pain. She was able to take a trip and walk a lot after the procedure without significant pain. She has very mild, intermittent pain since the procedure. Her pain today is 0/10.    Interval History 10/17/2022:  The patient presents today for re-evaluation of back and hip pain. I last saw her in May 2021 and she had hip injections with benefit. She says that her pain was manageable until recently. She is now having more right lateral hip and buttock pain. She had benefit with right GTB and ischial bursa  in April 2021. She is no longer taking NSAIDs due to stomach issues. She takes Tylenol PRN. Her pain today is 8/10.    Interval History 5/14/2021:  The patient has a virtual video appointment for follow up of right hip and buttock pain. She is s/p right ischial bursa and GTB injection with 90% pain relief.  She now only has some mild soreness after she exercises from the right side.  She says her pain has significantly improved.  She mild pain on the left which also worsens after exercise.  She is working on walking and increasing activity for weight loss.  No new complaints noted.  Her pain today is 1/10.    Interval History 4/15/2021:  The patient reports today for follow up of of right-sided buttock and hip pain.  We have not seen the patient in was 2 years.  She reports that she has been doing fairly well but had a recent injury that caused increased pain.  Her buttock pain is located to the bottom of her buttock and posterior thigh.  It worsens with sitting and applying pressure.  She also has right-sided lateral hip pain.  She would like to discuss injections today.  Her pain today is 8/10.    Interval History 8/28/2019:  The patient is here for follow up of left sided buttock pain.  She has intermittent Toradol injections as well as SI joint injections as needed.  She had a Toradol shot in April which controlled her symptoms well until recently.  She is requesting a repeat today.  She takes Ibuprofen sparingly as well.  Her pain worsens with sitting and standing.  She is not having any shooting into the legs.  Her pain today is 3/10.    Interval History 4/5/2019:  The patient returns to discuss pain.  After her last visit, I gave her a Toradol shot which provided her with significant benefit for some time.  She has been taking 800 mg Ibuprofen as needed with benefit, usually about once per week.  Her pain worsens with sitting and driving.  She does not have very much pain with walking.  Her pain today is  9/10.     Interval History 11/26/2018:  The patient returns for follow up of left sided lower back pain.  She is s/p left SI joint injection on 10/16/18 with minimal benefit.  Previous injection helped significantly.  She continues to report pain over the left buttock.  She does have pain over the lumbar spine, but this is mild.  Her pain is most severe when she sits for prolonged periods or changes from sitting to standing.  She is active and has been exercising and gardening.  Her previous XRAYs from last year show right sided curvature and retrolisthesis without instability.  She has not had imaging of hips or pelvis recently.  Her pain today is 6/10.  She takes OTC Ibuprofen with some benefit.    Interval History 2/1/2018:  The patient presents today for follow up.  She is s/p left SI joint injection on 1/16/18 with about 80% pain relief overall.  She has intermittent pain to left buttock which is mild.  This is mainly with bending and lifting.  Since her last visit, she has also changed her body mechanics which she thinks is helping.  She takes OTC NSAIDs sparingly as needed.  She has also increased her activity and has been walking.  Her pain today is 3/10.    Interval History 12/7/2017:  The patient presents today for follow up of left sided lower back and buttock pain.  She is s/p left L3,4,5 MBB on 11/7/17.  She reports that she felt numbness to her lower back for about one hour after the procedure.  Immediately after the procedure, she went to walk her dog in the park and started to have her usual pain.  Her pain is mainly to the left buttock at this time.  It is worsened with prolonged sitting, changing from sitting to standing, and lifting her 20 lb nephew.  She also notices that she carried him on her left hip which worsens her pain.  She does have some benefit with OTC NSAIDs.  Voltaren gel provided limited benefit.      Initial encounter:    Raquel Galan presents to the clinic for the evaluation of  left sided low back pain. The pain started 10 years ago and symptoms have been worsening.    Brief history:  Patient had a history of radicular symptoms but they are not occurring now.    Pain Description:    The pain is located in the left side of lower back area and does not radiate.      At BEST  2/10     At WORST  9/10 on the WORST day.      On average pain is rated as 5/10.     Today the pain is rated as 4/10    The pain is described as aching and sharp      Symptoms interfere with daily activity.     Exacerbating factors: Sitting, Standing, Laying, Bending, Coughing/Sneezing, Walking, Morning, Lifting and Getting out of bed/chair.      Mitigating factors heat, ice, massage and medications.     Patient denies night fever/night sweats, urinary incontinence, bowel incontinence, significant weight loss, significant motor weakness and loss of sensations.  Patient denies any suicidal or homicidal ideations    Pain Medications:  Current:  Voltaren gel  Tylenol PRN    Tried in Past:  NSAIDs - Ibuprofen and Voltaren gel  TCA -Never  SNRI -Never  Anti-convulsants -asprin 81mg  Muscle Relaxants -Never  Opioids-Never    Physical Therapy/Home Exercise: yes  -works with a physical therapist and psychiatrist     report:  Reviewed and consistent with medication use as prescribed.    Pain Procedures:   11/7/17 Left L3,4,5 MBB- limited relief after 1 hr with activity  1/16/18 Left SI joint injection- 80% relief  10/16/18 Left SI joint injection  4/29/21 Right GTB and ischial bursa injection- 90% relief  10/19/22 Right GTB and ischial bursa injection- 90% relief  6/20/23 Right GTB and ischial bursa injection- 90% relief  5/23/24 bilateral GTB and gluteal tendon sheath injections- 90% relief    Chiropractor -never  Acupuncture - never  TENS unit -never  Spinal decompression -never  Joint replacement -never    CMP  Sodium   Date Value Ref Range Status   08/08/2024 141 136 - 145 mmol/L Final     Potassium   Date Value Ref Range  Status   08/08/2024 3.9 3.5 - 5.1 mmol/L Final     Chloride   Date Value Ref Range Status   08/08/2024 110 95 - 110 mmol/L Final     CO2   Date Value Ref Range Status   08/08/2024 20 (L) 23 - 29 mmol/L Final     Glucose   Date Value Ref Range Status   08/08/2024 98 70 - 110 mg/dL Final     BUN   Date Value Ref Range Status   08/08/2024 10 8 - 23 mg/dL Final     Creatinine   Date Value Ref Range Status   08/08/2024 0.8 0.5 - 1.4 mg/dL Final     Calcium   Date Value Ref Range Status   08/08/2024 8.9 8.7 - 10.5 mg/dL Final     Total Protein   Date Value Ref Range Status   08/08/2024 6.9 6.0 - 8.4 g/dL Final     Albumin   Date Value Ref Range Status   08/08/2024 3.7 3.5 - 5.2 g/dL Final     Total Bilirubin   Date Value Ref Range Status   08/08/2024 0.6 0.1 - 1.0 mg/dL Final     Comment:     For infants and newborns, interpretation of results should be based  on gestational age, weight and in agreement with clinical  observations.    Premature Infant recommended reference ranges:  Up to 24 hours.............<8.0 mg/dL  Up to 48 hours............<12.0 mg/dL  3-5 days..................<15.0 mg/dL  6-29 days.................<15.0 mg/dL       Alkaline Phosphatase   Date Value Ref Range Status   08/08/2024 75 55 - 135 U/L Final     AST   Date Value Ref Range Status   08/08/2024 16 10 - 40 U/L Final     ALT   Date Value Ref Range Status   08/08/2024 18 10 - 44 U/L Final     Anion Gap   Date Value Ref Range Status   08/08/2024 11 8 - 16 mmol/L Final     eGFR if    Date Value Ref Range Status   06/30/2022 >60.0 >60 mL/min/1.73 m^2 Final     eGFR if non    Date Value Ref Range Status   06/30/2022 >60.0 >60 mL/min/1.73 m^2 Final     Comment:     Calculation used to obtain the estimated glomerular filtration  rate (eGFR) is the CKD-EPI equation.        Lab Results   Component Value Date    WBC 5.99 08/08/2024    HGB 13.5 08/08/2024    HCT 41.8 08/08/2024     (H) 08/08/2024      08/08/2024       Imaging:  Narrative     EXAMINATION:  XR HIPS BILATERAL 2 VIEW INCL AP PELVIS    CLINICAL HISTORY:  Sacroiliitis, not elsewhere classified    TECHNIQUE:  AP view of the pelvis and frogleg lateral views of both hips were performed.    COMPARISON:  None.    FINDINGS:  There is no evidence of acute fracture, dislocation, or bone destruction.  Joint spaces are well preserved.  There is mild degenerative change of the sacroiliac joints.  No erosions are seen      Impression       See above.     Narrative     EXAMINATION:  XR LUMBAR SPINE 5 VIEW WITH FLEX AND EXT    CLINICAL HISTORY:  Low back pain, >6wks conservative tx, persistent-progressive sx, surgical candidate;  Sacroiliitis, not elsewhere classified    TECHNIQUE:  Five views of the lumbar spine plus flexion extension views were performed.    COMPARISON:  Prior dated 10/26/2017    FINDINGS:  There is dextroscoliosis of the lumbar spine.  There is retrolisthesis of L2-3 and L3 on L4 (grade 1) measuring approximately 4 mm.  This is similar to previous exam.  There is no significant change on flexion/extension views to suggest translational instability.  There are endplate degenerative changes with subchondral sclerosis and marginal osteophyte formation and disc height narrowing at L3-4.  Mild facet arthropathy is present at the lower lumbar levels.      Impression       Degenerative change of the lumbar spine with associated dextroscoliosis and L2-3/L3-4 retrolisthesis, similar to previous exam.  No translational instability.       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     Fibrocystic breast disease in female     HEARING LOSS     right side - no hearing aid    Hyperlipidemia     Osteopenia     PONV (postoperative nausea and vomiting)     PONV (postoperative nausea and vomiting)     Special screening for malignant neoplasms, colon 7/22/2013      Past Surgical History:   Procedure Laterality Date    ADENOIDECTOMY      APPENDECTOMY      BREAST BIOPSY Left     exc bx, benign per pt    BREAST SURGERY      left breast biopsy    COLONOSCOPY N/A 09/27/2018    Procedure: COLONOSCOPY;  Surgeon: Uche Smiley MD;  Location: [facility] ENDO (4TH FLR);  Service: Endoscopy;  Laterality: N/A;  pt/instructed on prep day before and day of importance    COLONOSCOPY N/A 02/01/2024    Procedure: COLONOSCOPY;  Surgeon: Lanre Vaughn MD;  Location: [facility] ENDOSCOPY;  Service: Endoscopy;  Laterality: N/A;    COLONOSCOPY N/A 04/11/2024    Procedure: COLONOSCOPY;  Surgeon: Lanre Vaughn MD;  Location: [facility] ENDOSCOPY;  Service: Endoscopy;  Laterality: N/A;  vaughn-miralax extended-instr portal-full liquids 1 day prior-tb  4/4- pc complete. DBM    CYST REMOVAL Right     neck    DILATION AND CURETTAGE OF UTERUS      ESOPHAGOGASTRODUODENOSCOPY N/A 08/04/2021    Procedure: ESOPHAGOGASTRODUODENOSCOPY (EGD);  Surgeon: Ameya Medeiros MD;  Location: [facility] (2ND FLR);  Service: Endoscopy;  Laterality: N/A;  Food gets hung up. Dilatation likely needed.     COVID test in [place] on 8/1-GT  8/2-new instructions via portal-tb    ESOPHAGOGASTRODUODENOSCOPY N/A 05/31/2023    Procedure: EGD (ESOPHAGOGASTRODUODENOSCOPY);  Surgeon: Lanre Vaughn MD;  Location: [facility] ENDOSCOPY;  Service: Endoscopy;  Laterality: N/A;  cardiac clearance received from -see note 5/9/23/ referred by KEMAL Krishna/ inst portal-RB  5/30 Pre call complete, ride confirmed, no wt loss inj;SG    ESOPHAGOGASTRODUODENOSCOPY N/A 02/01/2024    Procedure: EGD (ESOPHAGOGASTRODUODENOSCOPY);  Surgeon: Lanre Vaughn MD;  Location: [facility] ENDOSCOPY;  Service: Endoscopy;  Laterality: N/A;  1/26/24-Vaughn pt, [place], instr portal-DS    HYSTERECTOMY      INJECTION OF JOINT Left 10/16/2018    Procedure: INJECTION, JOINT, LEFT SI JOINT INJECTION UNDER FLUORO;  Surgeon: Alirio Lei MD;   Location: [facility] PAIN MGT;  Service: Pain Management;  Laterality: Left;  NEEDS CONSENT    MANDIBLE FRACTURE SURGERY Right     scope    ROBOT-ASSISTED LAPAROSCOPIC ABDOMINAL HYSTERECTOMY USING DA ERICA XI N/A 06/12/2020    Procedure: XI ROBOTIC HYSTERECTOMY;  Surgeon: Alice Valenzuela MD;  Location: [facility] OR;  Service: OB/GYN;  Laterality: N/A;    ROBOT-ASSISTED LAPAROSCOPIC SALPINGO-OOPHORECTOMY USING DA ERICA XI N/A 06/12/2020    Procedure: XI ROBOTIC SALPINGO-OOPHORECTOMY;  Surgeon: Alice Valenzuela MD;  Location: [facility] OR;  Service: OB/GYN;  Laterality: N/A;    TONSILLECTOMY      TYMPANOSTOMY TUBE PLACEMENT       Social History     Socioeconomic History    Marital status:    Tobacco Use    Smoking status: Never     Passive exposure: Past    Smokeless tobacco: Never   Substance and Sexual Activity    Alcohol use: Not Currently     Alcohol/week: 2.0 standard drinks of alcohol     Types: 2 Glasses of wine per week     Comment: social    Drug use: Never    Sexual activity: Yes     Partners: Male     Birth control/protection: Partner-Vasectomy, Post-menopausal, None   Other Topics Concern    Are you pregnant or think you may be? No    Breast-feeding No   Social History Narrative    Lives with spouse and feels safe in her home.     Social Drivers of Health     Financial Resource Strain: Low Risk  (6/19/2024)    Overall Financial Resource Strain (CARDIA)     Difficulty of Paying Living Expenses: Not very hard   Food Insecurity: No Food Insecurity (6/19/2024)    Hunger Vital Sign     Worried About Running Out of Food in the Last Year: Never true     Ran Out of Food in the Last Year: Never true   Transportation Needs: No Transportation Needs (8/2/2023)    PRAPARE - Transportation     Lack of Transportation (Medical): No     Lack of Transportation (Non-Medical): No   Physical Activity: Unknown (6/19/2024)    Exercise Vital Sign     Days of Exercise per Week: 0 days   Recent Concern: Physical Activity - Inactive  (6/19/2024)    Exercise Vital Sign     Days of Exercise per Week: 0 days     Minutes of Exercise per Session: 30 min   Stress: No Stress Concern Present (6/19/2024)    [place] [place] [employer] - Occupational Stress Questionnaire     Feeling of Stress : Only a little   Housing Stability: Low Risk  (8/2/2023)    Housing Stability Vital Sign     Unable to Pay for Housing in the Last Year: No     Number of Places Lived in the Last Year: 2     Unstable Housing in the Last Year: No     Family History   Problem Relation Name Age of Onset    Allergies Mother Tran  S Hickey     Heart disease Mother [place]  S Hickey     Kidney disease Mother Tran  S Hickey     Diabetes Mother [place]  S Hickey     Hypertension Mother Tran  S Hickey     Hyperlipidemia Mother [place]  S Hickey     COPD Mother Tran  S Hickey     Arthritis Mother [place]  S Hickey     Miscarriages / Stillbirths Mother Tran  S Hickey     Stroke Mother [place]  S Hickey     Allergies Father Sandor Hickey     Heart disease Father Sandor Hickey     Cancer Father Sandor Hickey 75        lymphoma, bladder tumors    Arthritis Father Sandor Hickey     Breast cancer Neg Hx      Ovarian cancer Neg Hx      Colon cancer Neg Hx      Lupus Neg Hx      Psoriasis Neg Hx      Rheum arthritis Neg Hx      Inflammatory bowel disease Neg Hx      Uterine cancer Neg Hx      Cervical cancer Neg Hx         Review of patient's allergies indicates:   Allergen Reactions    Dexamethasone      Flushing and headache with medrol dosepack & tachycardia       Current Outpatient Medications   Medication Sig    azelastine (ASTELIN) 137 mcg (0.1 %) nasal spray 2 sprays (274 mcg total) by Nasal route 2 (two) times daily.    b complex vitamins tablet Take 1 tablet by mouth once daily.    CALCIUM 600 WITH VITAMIN D3 600 mg(1,500mg) -200 unit Tab Take 1 tablet by mouth once daily.    estradioL (ESTRACE) 1 MG tablet Take 1 tablet (1 mg total) by mouth once daily.    fluticasone propionate  (FLONASE) 50 mcg/actuation nasal spray 1 spray (50 mcg total) by Each Nostril route once daily.    glucosamine-chondroitin 500-400 mg tablet Take 1 tablet by mouth Daily.    omega-3 fatty acids/fish oil (FISH OIL-OMEGA-3 FATTY ACIDS) 300-1,000 mg capsule Take 1 capsule by mouth once daily.    conjugated estrogens (PREMARIN) vaginal cream Place 1 g vaginally twice a week.    fexofenadine (ALLEGRA) 180 MG tablet Take 1 tablet (180 mg total) by mouth once daily.     No current facility-administered medications for this visit.       REVIEW OF SYSTEMS:    GENERAL:  No weight loss, malaise or fevers.  HEENT:   No recent changes in vision or hearing  NECK:  Negative for lumps, no difficulty with swallowing.  RESPIRATORY:  Negative for cough, wheezing or shortness of breath, patient denies any recent URI.  CARDIOVASCULAR:  Negative for chest pain, leg swelling or palpitations.  GI:  Negative for abdominal discomfort, blood in stools or black stools or change in bowel habits.  MUSCULOSKELETAL:  See HPI.  SKIN:  Negative for lesions, rash, and itching.  PSYCH:  No mood disorder or recent psychosocial stressors.  Patient's sleep is disturbed secondary to pain.  HEMATOLOGY/LYMPHOLOGY:  Negative for prolonged bleeding, bruising easily or swollen nodes.  Patient is not currently taking any anti-coagulants  ENDO: No history of diabetes or thyroid dysfunction  NEURO:   No history of headaches, syncope, paralysis, seizures or tremors.  All other reviewed and negative other than HPI.    OBJECTIVE:    BP (!) 138/93 (Patient Position: Sitting)   Pulse 81   Temp 97.5 °F (36.4 °C)   Wt 56.1 kg (123 lb 10.9 oz)   LMP 04/15/2009   BMI 23.37 kg/m²       PHYSICAL EXAMINATION:    GENERAL: Well appearing, in no acute distress, alert and oriented x3.  PSYCH:  Mood and affect appropriate.  SKIN: Skin color, texture, turgor normal, no rashes or lesions.  HEAD/FACE:  Normocephalic, atraumatic. Cranial nerves grossly intact.  BACK: Straight  leg raising in the sitting and supine positions is negative to radicular pain. There is no pain with palpation over the facet joints of the lumbar spine. Full ROM without pain. +Facet loading to the Left   EXTREMITIES: Peripheral joint ROM is full and pain free without obvious instability or laxity in all four extremities. No deformities, edema, or skin discoloration. Good capillary refill.  MUSCULOSKELETAL: No muscular atrophy, gross deformity or overlying skin change. TTP to left SIJ.  Bilateral lower extremity strength is normal and symmetric.  No atrophy or tone abnormalities are noted. +TARA on the L side. +Camacho Finger Test to L side.   NEURO: No loss of sensation noted.   GAIT: Normal.    ASSESSMENT: 64 y.o. year old female with left sided back/buttock pain, consistent with the following:    Encounter Diagnoses   Name Primary?    Sacroiliitis Yes    Lumbar spondylosis            PLAN:   We discussed with the patient the assessment and recommendations. The following is the plan we agreed on:     - Previous imaging was reviewed and discussed with the patient today.  - We discussed back pain and the nature of back pain.  We discussed that it is not one thing that causes the pain but an accumulation of multiple things that we do.    - We discussed posture sitting and the importance of trying to sit better.    - We discussed the benefits of therapy and exercise and continuing to move. We encouraged the patient to continue with her HEP.   - We will obtain new lumbar flex/ex and SI XR's to further evaluate her new onset lower back pain.   - Schedule for L SI Joint injection   - Patient met with TENS unit specialist today to go over use of the device.   - Patient not a candidate to receive IM Toradol as she has had gastric inflammation in the past secondary to NSAIDs.   - RTC two weeks after procedure with PRESTON.         The above plan and management options were discussed at length with patient. Patient is in  agreement with the above and verbalized understanding.    Reuben Anders  02/25/2025  Visit today included increased complexity associated with the care of the episodic problem of chronic pain which was addressed and continue to manage the longitudinal care of the patient due to the serious and/or complex managed problem(s) listed above.   I have personally taken the history and examined this patient and agree with the fellow's note as stated above.

## 2025-02-26 ENCOUNTER — PATIENT MESSAGE (OUTPATIENT)
Dept: PAIN MEDICINE | Facility: OTHER | Age: 65
End: 2025-02-26
Payer: MEDICARE

## 2025-02-28 ENCOUNTER — PATIENT MESSAGE (OUTPATIENT)
Dept: ALLERGY | Facility: CLINIC | Age: 65
End: 2025-02-28
Payer: MEDICARE

## 2025-02-28 ENCOUNTER — HOSPITAL ENCOUNTER (EMERGENCY)
Facility: HOSPITAL | Age: 65
Discharge: HOME | End: 2025-02-28
Attending: EMERGENCY MEDICINE
Payer: MEDICARE

## 2025-02-28 VITALS
HEIGHT: 61 IN | OXYGEN SATURATION: 97 % | TEMPERATURE: 98 F | HEART RATE: 83 BPM | SYSTOLIC BLOOD PRESSURE: 147 MMHG | DIASTOLIC BLOOD PRESSURE: 74 MMHG | WEIGHT: 123 LBS | BODY MASS INDEX: 23.22 KG/M2 | RESPIRATION RATE: 18 BRPM

## 2025-02-28 DIAGNOSIS — T78.40XA ALLERGIC REACTION, INITIAL ENCOUNTER: Primary | ICD-10-CM

## 2025-02-28 DIAGNOSIS — R00.0 TACHYCARDIA: ICD-10-CM

## 2025-02-28 PROCEDURE — 96375 TX/PRO/DX INJ NEW DRUG ADDON: CPT

## 2025-02-28 PROCEDURE — 93005 ELECTROCARDIOGRAM TRACING: CPT

## 2025-02-28 PROCEDURE — 63600175 PHARM REV CODE 636 W HCPCS: Performed by: EMERGENCY MEDICINE

## 2025-02-28 PROCEDURE — 99284 EMERGENCY DEPT VISIT MOD MDM: CPT | Mod: 25

## 2025-02-28 PROCEDURE — 63600175 PHARM REV CODE 636 W HCPCS: Performed by: PHYSICIAN ASSISTANT

## 2025-02-28 PROCEDURE — 93010 ELECTROCARDIOGRAM REPORT: CPT | Mod: ,,, | Performed by: INTERNAL MEDICINE

## 2025-02-28 PROCEDURE — 25000003 PHARM REV CODE 250: Performed by: PHYSICIAN ASSISTANT

## 2025-02-28 PROCEDURE — 96374 THER/PROPH/DIAG INJ IV PUSH: CPT

## 2025-02-28 PROCEDURE — 96361 HYDRATE IV INFUSION ADD-ON: CPT

## 2025-02-28 RX ORDER — EPINEPHRINE 0.3 MG/.3ML
1 INJECTION SUBCUTANEOUS
Qty: 1 EACH | Refills: 1 | Status: SHIPPED | OUTPATIENT
Start: 2025-02-28 | End: 2026-02-28

## 2025-02-28 RX ORDER — PREDNISONE 5 MG/1
5 TABLET ORAL DAILY
Qty: 7 TABLET | Refills: 0 | Status: SHIPPED | OUTPATIENT
Start: 2025-02-28 | End: 2025-03-07

## 2025-02-28 RX ORDER — CETIRIZINE HYDROCHLORIDE 10 MG/1
10 TABLET ORAL DAILY
Qty: 30 TABLET | Refills: 0 | Status: SHIPPED | OUTPATIENT
Start: 2025-02-28 | End: 2025-03-30

## 2025-02-28 RX ORDER — DEXAMETHASONE SODIUM PHOSPHATE 4 MG/ML
8 INJECTION, SOLUTION INTRA-ARTICULAR; INTRALESIONAL; INTRAMUSCULAR; INTRAVENOUS; SOFT TISSUE
Status: COMPLETED | OUTPATIENT
Start: 2025-02-28 | End: 2025-02-28

## 2025-02-28 RX ORDER — FAMOTIDINE 10 MG/ML
20 INJECTION INTRAVENOUS
Status: COMPLETED | OUTPATIENT
Start: 2025-02-28 | End: 2025-02-28

## 2025-02-28 RX ORDER — FAMOTIDINE 20 MG/1
20 TABLET, FILM COATED ORAL DAILY
Qty: 7 TABLET | Refills: 0 | Status: SHIPPED | OUTPATIENT
Start: 2025-02-28 | End: 2025-03-07

## 2025-02-28 RX ORDER — PREDNISONE 5 MG/1
40 TABLET ORAL DAILY
Qty: 56 TABLET | Refills: 0 | Status: SHIPPED | OUTPATIENT
Start: 2025-02-28 | End: 2025-02-28

## 2025-02-28 RX ADMIN — SODIUM CHLORIDE, POTASSIUM CHLORIDE, SODIUM LACTATE AND CALCIUM CHLORIDE 1000 ML: 600; 310; 30; 20 INJECTION, SOLUTION INTRAVENOUS at 03:02

## 2025-02-28 RX ADMIN — FAMOTIDINE 20 MG: 10 INJECTION, SOLUTION INTRAVENOUS at 03:02

## 2025-02-28 RX ADMIN — DEXAMETHASONE SODIUM PHOSPHATE 8 MG: 4 INJECTION INTRA-ARTICULAR; INTRALESIONAL; INTRAMUSCULAR; INTRAVENOUS; SOFT TISSUE at 03:02

## 2025-02-28 NOTE — DISCHARGE INSTRUCTIONS
Diagnosis: Allergic reaction    I think your symptoms are due to a severe allergic reaction.  You were given steroids, fluids and Pepcid in the emergency department which improved your symptoms.  I am prescribing an allergy medicine, cetirizine, Pepcid that you should take for the next week.  I am also prescribing an EpiPen that you should use if you have think of your throat, for trouble breathing.  If you ever use your EpiPen, you should call 911 and come to the emergency department immediately.    Please schedule an appointment with your primary care doctor for follow-up. If you start to have any new or worsening symptoms, please come back to the emergency department.

## 2025-02-28 NOTE — FIRST PROVIDER EVALUATION
"Medical screening examination initiated.  I have conducted a focused provider triage encounter, findings are as follows:    Brief history of present illness:  Patient with many environmental allergies was outside the last 2 days - noted diffuse rash and tiching, NO difficulty swallowing, no trouble breathing, however, itching worse despite benadryl     Vitals:    02/28/25 1447   BP: (!) 170/93   BP Location: Right arm   Pulse: (!) 112   Resp: 18   Temp: 97.6 °F (36.4 °C)   TempSrc: Oral   SpO2: 98%   Weight: 55.8 kg (123 lb)   Height: 5' 1" (1.549 m)       Pertinent physical exam:    Mouth:  No tonsillar hypertrophy, no erythema, no purulence, uvula midline, no stridor, no drooling  Lungs CTAB  No wheezing  Diffuse erythematous rash    IV steroids typically work - will plan for IV dex, awaiting bed    Preliminary workup initiated; this workup will be continued and followed by the physician or advanced practice provider that is assigned to the patient when roomed.  "

## 2025-02-28 NOTE — ED NOTES
Patient identifiers for Raquel Galan 64 y.o. female checked and correct.  Chief Complaint   Patient presents with    Rash     Was at a school parade and noticed rash around 1pm today. States happened before, has environmental allergies, was in garden yesterday. +runny nose, denies SOB. Took a 50mg benadryl around 1:30p, no relief. States lips are becoming tingling, no voice changes, no angioedema noted      Past Medical History:   Diagnosis Date    Allergy     Back pain     Disorder of vestibular function of both ears     Fibrocystic breast disease in female     HEARING LOSS     right side - no hearing aid    Hyperlipidemia     Osteopenia     PONV (postoperative nausea and vomiting)     PONV (postoperative nausea and vomiting)     Special screening for malignant neoplasms, colon 7/22/2013     Allergies reported: Review of patient's allergies indicates:  No Known Allergies    Most recent vital signs:  Vitals:    02/28/25 1447   BP: (!) 170/93   Pulse: (!) 112   Resp: 18   Temp: 97.6 °F (36.4 °C)      LOC: The patient is awake, alert and aware of environment with an appropriate affect, the patient is oriented x 3 and speaking appropriately.   APPEARANCE: Patient appears comfortable and in no acute distress, patient is clean and well groomed.  SKIN: + red + itchy   MUSCULOSKELETAL: Patient moving all extremities spontaneously, no swelling noted.  RESPIRATORY: Airway is open and patent; + cough, + runny nose  CARDIAC: Patient has a normal rate and regular rhythm, no edema noted, capillary refill < 3 seconds.   GASTRO: Soft and non tender to palpation, no distention noted, normoactive bowel sounds present in all four quadrants. Pt states bowel movements have been regular.  : Pt denies any pain or frequency with urination.  NEURO: Pt opens eyes spontaneously, behavior appropriate to situation, follows commands, facial expression symmetrical, bilateral hand grasp equal and even, purposeful motor response noted, normal  sensation in all extremities when touched with a finger.

## 2025-02-28 NOTE — Clinical Note
"Raquel"Vasyl Galan was seen and treated in our emergency department on 2/28/2025.  She may return to work on 03/03/2025.       If you have any questions or concerns, please don't hesitate to call.      DR Dacosta/ CHARANJIT Hua    "

## 2025-02-28 NOTE — ED PROVIDER NOTES
Encounter Date: 2/28/2025       History     Chief Complaint   Patient presents with    Rash     Was at a school parade and noticed rash around 1pm today. States happened before, has environmental allergies, was in garden yesterday. +runny nose, denies SOB. Took a 50mg benadryl around 1:30p, no relief. States lips are becoming tingling, no voice changes, no angioedema noted      64-year-old female with history of vertigo, multiple environmental allergies presents for diffuse rash.  She started to have an itching rash covering her whole body earlier today, took some Benadryl about 2 hours ago without any improvement.  Reports that her lips feel tingly and swollen but denies throat closing, shortness of breath, vomiting or wheezing.  She does have history of anaphylaxis but no longer has an EpiPen.      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     Fibrocystic breast disease in female     HEARING LOSS     right side - no hearing aid    Hyperlipidemia     Osteopenia     PONV (postoperative nausea and vomiting)     PONV (postoperative nausea and vomiting)     Special screening for malignant neoplasms, colon 7/22/2013     Past Surgical History:   Procedure Laterality Date    ADENOIDECTOMY      APPENDECTOMY      BREAST BIOPSY Left     exc bx, benign per pt    BREAST SURGERY      left breast biopsy    COLONOSCOPY N/A 09/27/2018    Procedure: COLONOSCOPY;  Surgeon: Uche Smiley MD;  Location: [address]);  Service: Endoscopy;  Laterality: N/A;  pt/instructed on prep day before and day of importance    COLONOSCOPY N/A 02/01/2024    Procedure: COLONOSCOPY;  Surgeon: Lanre Vaughn MD;  Location: [facility] beds at the [facility] ENDOSCOPY;  Service: Endoscopy;  Laterality: N/A;    COLONOSCOPY N/A 04/11/2024    Procedure: COLONOSCOPY;  Surgeon: Lanre Vaughn MD;  Location: [facility] 234 beds at the [facility] ENDOSCOPY;  Service:  Endoscopy;  Laterality: N/A;  vaughn-miralax extended-instr portal-full liquids 1 day prior-tb  4/4- pc complete. DBM    CYST REMOVAL Right     neck    DILATION AND CURETTAGE OF UTERUS      ESOPHAGOGASTRODUODENOSCOPY N/A 08/04/2021    Procedure: ESOPHAGOGASTRODUODENOSCOPY (EGD);  Surgeon: Ameya Medeiros MD;  Location: [address]);  Service: Endoscopy;  Laterality: N/A;  Food gets hung up. Dilatation likely needed.     COVID test in [place] on 8/1-GT  8/2-new instructions via portal-tb    ESOPHAGOGASTRODUODENOSCOPY N/A 05/31/2023    Procedure: EGD (ESOPHAGOGASTRODUODENOSCOPY);  Surgeon: Lanre Vaughn MD;  Location: [facility] ENDOSCOPY;  Service: Endoscopy;  Laterality: N/A;  cardiac clearance received from -see note 5/9/23/ referred by Erendira,NP/ inst portal-RB  5/30 Pre call complete, ride confirmed, no wt loss inj;SG    ESOPHAGOGASTRODUODENOSCOPY N/A 02/01/2024    Procedure: EGD (ESOPHAGOGASTRODUODENOSCOPY);  Surgeon: Lanre Vaughn MD;  Location: [facility] ENDOSCOPY;  Service: Endoscopy;  Laterality: N/A;  1/26/24-Vaughn pt, Sutab, instr portal-DS    HYSTERECTOMY      INJECTION OF JOINT Left 10/16/2018    Procedure: INJECTION, JOINT, LEFT SI JOINT INJECTION UNDER FLUORO;  Surgeon: Alirio Lei MD;  Location: [facility] PAIN MGT;  Service: Pain Management;  Laterality: Left;  NEEDS CONSENT    MANDIBLE FRACTURE SURGERY Right     scope    ROBOT-ASSISTED LAPAROSCOPIC ABDOMINAL HYSTERECTOMY USING DA ERICA XI N/A 06/12/2020    Procedure: XI ROBOTIC HYSTERECTOMY;  Surgeon: Alice Valenzuela MD;  Location: [facility] OR;  Service: OB/GYN;  Laterality: N/A;    ROBOT-ASSISTED LAPAROSCOPIC SALPINGO-OOPHORECTOMY USING DA ERICA XI N/A 06/12/2020    Procedure: XI ROBOTIC SALPINGO-OOPHORECTOMY;  Surgeon: Alice Valenzuela MD;  Location: [facility] OR;  Service: OB/GYN;  Laterality: N/A;    TONSILLECTOMY      TYMPANOSTOMY TUBE PLACEMENT       Family History   Problem Relation Name Age of Onset    Allergies Mother  Tran  S Hickey     Heart disease Mother Welcome  S Hickey     Kidney disease Mother Tran  S Hickey     Diabetes Mother Welcome  S Hickey     Hypertension Mother Welcome  S Hickey     Hyperlipidemia Mother Welcome  S Hickey     COPD Mother Tran  S Hickey     Arthritis Mother Tran  S Hickey     Miscarriages / Stillbirths Mother Welcome  S Hickey     Stroke Mother Tran  S Hickey     Allergies Father Sandor Hickey     Heart disease Father Sandor Hickey     Cancer Father Sandor Hickey 75        lymphoma, bladder tumors    Arthritis Father Sandor Hickey     Breast cancer Neg Hx      Ovarian cancer Neg Hx      Colon cancer Neg Hx      Lupus Neg Hx      Psoriasis Neg Hx      Rheum arthritis Neg Hx      Inflammatory bowel disease Neg Hx      Uterine cancer Neg Hx      Cervical cancer Neg Hx       Social History[1]  Review of Systems    Physical Exam     Initial Vitals [02/28/25 1447]   BP Pulse Resp Temp SpO2   (!) 170/93 (!) 112 18 97.6 °F (36.4 °C) 98 %      MAP       --         Physical Exam    Nursing note and vitals reviewed.  Constitutional: She appears well-developed and well-nourished.   HENT:   Head: Normocephalic and atraumatic.   No facial or intraoral swelling.  Normal voice.  Tolerating secretions.  No stridor.   Neck: Neck supple.   Normal range of motion.  Cardiovascular:  Regular rhythm, normal heart sounds and intact distal pulses.     Exam reveals no gallop and no friction rub.       No murmur heard.  Tachycardic   Pulmonary/Chest: Breath sounds normal. No stridor. No respiratory distress. She has no wheezes. She has no rhonchi. She has no rales. She exhibits no tenderness.   No wheezing   Musculoskeletal:         General: Normal range of motion.      Cervical back: Normal range of motion and neck supple.     Skin: Skin is warm and dry. Rash noted.   Diffuse erythematous rash   Psychiatric: She has a normal mood and affect.         ED Course   Procedures  Labs Reviewed - No data to display    EKG Readings:  (Independently Interpreted)   Initial Reading: No STEMI. Rhythm: Normal Sinus Rhythm. Heart Rate: 85. Ectopy: No Ectopy. ST Segments: Normal ST Segments. Clinical Impression: Normal Sinus Rhythm       Imaging Results    None          Medications   dexAMETHasone injection 8 mg (8 mg Intravenous Given 2/28/25 1555)   lactated ringers bolus 1,000 mL (0 mLs Intravenous Stopped 2/28/25 1655)   famotidine (PF) injection 20 mg (20 mg Intravenous Given 2/28/25 1557)     Medical Decision Making  64-year-old female presenting for diffuse rash with some tingling of her face.  Hypertensive at 170/93 and tachycardic with heart rate of 112.  No signs of airway compromise, no stridor or wheezing.    Differential diagnosis:  Allergic reaction   I do not think this represents true anaphylaxis, however she does have a history of this so will watch ensure that she is improving  No clinical signs of angioedema    Will give steroids, Pepcid, fluids and reassess.    Patient reports feeling much better after therapy.  She did briefly develop some leg shaking after receiving medication but this is improving on reassessment.  Her vitals have normalized.  Will discharge with steroids, cetirizine, Pepcid and EpiPen with instructions to follow up with PCP in her allergist and return to the ED for worsening symptoms. Stressed the importance of follow-up, strict ED return precautions given.  Patient voiced understanding and is comfortable with discharge.     Risk  OTC drugs.  Prescription drug management.               ED Course as of 02/28/25 2129 Fri Feb 28, 2025   1611 On reassessment, patient reports that she is feeling less anxious but her legs have started to shake without her control.  She did have this reaction previously when being treated for an allergic reaction but she previously attributes this to epinephrine.  Still no signs of airway compromise.  Will continue to monitor [CC]   1657 Reassessment patient is feeling better.  Her  rash is improving and the shaking of her legs has slowed.  No shortness a breath or facial swelling. [CC]      ED Course User Index  [CC] Cari Miles PA-C                           Clinical Impression:  Final diagnoses:  [R00.0] Tachycardia  [T78.40XA] Allergic reaction, initial encounter (Primary)          ED Disposition Condition    Discharge Stable          ED Prescriptions       Medication Sig Dispense Start Date End Date Auth. Provider    EPINEPHrine (EPIPEN) 0.3 mg/0.3 mL AtIn Inject 0.3 mLs (0.3 mg total) into the muscle as needed. 1 each 2/28/2025 2/28/2026 Cari Miles PA-C    cetirizine (ZYRTEC) 10 MG tablet Take 1 tablet (10 mg total) by mouth once daily. 30 tablet 2/28/2025 3/30/2025 Cari Miles PA-C    famotidine (PEPCID) 20 MG tablet Take 1 tablet (20 mg total) by mouth Daily. for 7 days 7 tablet 2/28/2025 3/7/2025 Cari Miles PA-C    predniSONE (DELTASONE) 5 MG tablet  (Status: Discontinued) Take 8 tablets (40 mg total) by mouth once daily. for 7 days 56 tablet 2/28/2025 2/28/2025 Cari Miles PA-C    predniSONE (DELTASONE) 5 MG tablet Take 1 tablet (5 mg total) by mouth once daily. for 7 days 7 tablet 2/28/2025 3/7/2025 Cari Miles PA-C          Follow-up Information       Follow up With Specialties Details Why Contact Info    Salazar Bird MD Internal Medicine Schedule an appointment as soon as possible for a visit in 1 week  [address] [facility] [address]  [phone]      [facility] - Emergency Dept Emergency Medicine Go to  If symptoms worsen [address] [facility] [address] [phone]               [1]   Social History  Tobacco Use    Smoking status: Never     Passive exposure: Past    Smokeless tobacco: Never   Substance Use Topics    Alcohol use: Not Currently     Alcohol/week: 2.0 standard drinks of alcohol     Types: 2 Glasses of wine per week     Comment: social    Drug use: Never         Jd, MAXWELL Alcala  02/28/25 1247

## 2025-03-01 LAB
OHS QRS DURATION: 78 MS
OHS QTC CALCULATION: 452 MS

## 2025-03-02 ENCOUNTER — PATIENT MESSAGE (OUTPATIENT)
Dept: PAIN MEDICINE | Facility: CLINIC | Age: 65
End: 2025-03-02
Payer: MEDICARE

## 2025-03-11 ENCOUNTER — APPOINTMENT (OUTPATIENT)
Dept: RADIOLOGY | Facility: CLINIC | Age: 65
End: 2025-03-11
Attending: INTERNAL MEDICINE
Payer: MEDICARE

## 2025-03-11 DIAGNOSIS — Z78.0 MENOPAUSE: ICD-10-CM

## 2025-03-11 PROCEDURE — 77080 DXA BONE DENSITY AXIAL: CPT | Mod: 26,,, | Performed by: INTERNAL MEDICINE

## 2025-03-11 PROCEDURE — 77080 DXA BONE DENSITY AXIAL: CPT | Mod: TC,PO

## 2025-03-13 ENCOUNTER — OFFICE VISIT (OUTPATIENT)
Dept: ALLERGY | Facility: CLINIC | Age: 65
End: 2025-03-13
Payer: MEDICARE

## 2025-03-13 VITALS — WEIGHT: 126.31 LBS | HEIGHT: 61 IN | BODY MASS INDEX: 23.85 KG/M2

## 2025-03-13 DIAGNOSIS — J30.9 CHRONIC ALLERGIC RHINITIS: Primary | ICD-10-CM

## 2025-03-13 DIAGNOSIS — J32.9 RECURRENT SINUS INFECTIONS: ICD-10-CM

## 2025-03-13 DIAGNOSIS — H10.13 ALLERGIC CONJUNCTIVITIS, BILATERAL: ICD-10-CM

## 2025-03-13 PROCEDURE — 3008F BODY MASS INDEX DOCD: CPT | Mod: CPTII,S$GLB,, | Performed by: ALLERGY & IMMUNOLOGY

## 2025-03-13 PROCEDURE — 1126F AMNT PAIN NOTED NONE PRSNT: CPT | Mod: CPTII,S$GLB,, | Performed by: ALLERGY & IMMUNOLOGY

## 2025-03-13 PROCEDURE — 1160F RVW MEDS BY RX/DR IN RCRD: CPT | Mod: CPTII,S$GLB,, | Performed by: ALLERGY & IMMUNOLOGY

## 2025-03-13 PROCEDURE — 99999 PR PBB SHADOW E&M-EST. PATIENT-LVL III: CPT | Mod: PBBFAC,,, | Performed by: ALLERGY & IMMUNOLOGY

## 2025-03-13 PROCEDURE — 1159F MED LIST DOCD IN RCRD: CPT | Mod: CPTII,S$GLB,, | Performed by: ALLERGY & IMMUNOLOGY

## 2025-03-13 PROCEDURE — 99215 OFFICE O/P EST HI 40 MIN: CPT | Mod: S$GLB,,, | Performed by: ALLERGY & IMMUNOLOGY

## 2025-03-13 NOTE — PATIENT INSTRUCTIONS
Continue cetirizine daily every day  When know going to be outside more increase it to twice a day  If start with an itchy/red reaction take 2 cetirizine at that time or benadryl  Can add famotidine in as needed for flares    For nasal symptoms if more runny nose or sneeze can start the azelastine. If congested can add the Flonase

## 2025-03-13 NOTE — PROGRESS NOTES
Subjective:       Patient ID: Raquel Galan is a 64 y.o. female.    Chief Complaint:  Allergic Reaction      65 yo woman presents fro continued evaluation of allergic rhinitis and conjunctivitis with recurrent sinus infections. She was last seen 12/5/20424. She had immunocaps then with positives to cats, dogs, dust mites, trees, weeds, grass and mold. She was advised on avoidance measures and to use fluticasone 2 SEN daily and azelastine 1-2 SEN BID as needed. She did and nasal symptoms were better so using sprays as needed. Then before Serge gras she was outside all day one day and ok. Then next day at school parade and out with kids and she got itchy palms then itch and red all over. Has had before and only thing that helps is steroids. She took benadryl and not better so went to ED and steroid and helped. She did not have much sneeze or runny nose with it. No SOB. She wanted to know best way to treat in future    Prior history taken 12/5/2024: consult from Dr Winston for rhinitis. She states she has had allergy issues most of life, in 20's or 30's was tested and allergic to most everything outside. She did shots- 2 injections twice a week for long time. Did help. She may get sick once a year but has not been bad for year.S then this year she got sick in September and has not been able to clear it. Was in UC several times. Had for about 10 days before given antibiotics. Has had 3 round of antibiotics and finally feeling better. She started with stuffy nose then very runny nose, lots of PND causing cough some sneeze. Pressure in head but no chest, no SOB. She has takes allegra daily in past. Was on Flonase and Astelin with this but she has stopped all as feeling better and wanted break from med's. Still with PND and cough right now. No food allergy. Has had large local reactions to insect bites and stings. No asthma or eczema.         Environmental History: see history section for home environment  Review of  Systems   Constitutional:  Positive for fatigue.   HENT:  Positive for congestion, ear pain, postnasal drip, rhinorrhea, sinus pressure, sinus pain, sneezing and sore throat. Negative for facial swelling, mouth sores and nosebleeds.    Eyes:  Positive for discharge. Negative for redness and itching.   Respiratory:  Positive for cough. Negative for chest tightness, shortness of breath and wheezing.    Skin:  Negative for color change and rash.        Objective:      Physical Exam  Vitals and nursing note reviewed.   Constitutional:       General: She is not in acute distress.     Appearance: Normal appearance. She is not ill-appearing.   HENT:      Nose: No rhinorrhea.   Eyes:      General:         Right eye: No discharge.         Left eye: No discharge.      Conjunctiva/sclera: Conjunctivae normal.   Pulmonary:      Effort: Pulmonary effort is normal. No respiratory distress.   Abdominal:      General: There is no distension.   Skin:     General: Skin is warm and dry.      Findings: No erythema or rash.   Neurological:      Mental Status: She is alert and oriented to person, place, and time.   Psychiatric:         Mood and Affect: Mood normal.         Behavior: Behavior normal.       Laboratory:   none performed   Assessment:       1. Chronic allergic rhinitis    2. Allergic conjunctivitis, bilateral    3. Recurrent sinus infections         Plan:       Rhinitis- Dust mite avoidance, measures discussed and handout provided.cat and dog avoidance as well  continue fluticasone 1 SEN BID and azelastine 1-2 SEN BID as needed   Cetirizine 10 mg daily every day, increase to BID when know going to be outside more. If has flare with skin itch and flush can take Pepcid 20 mg and cetirizine 20 mg at the time  If not controlled consider IT  RTC annually or sooner if needed    I spent a total of 40 minutes on the day of the visit.  This includes face to face time and non-face to face time preparing to see the patient (eg, review  of tests), obtaining and/or reviewing separately obtained history, documenting clinical information in the electronic or other health record, independently interpreting results and communicating results to the patient/family/caregiver, or care coordinator.

## 2025-03-19 ENCOUNTER — PATIENT MESSAGE (OUTPATIENT)
Dept: ALLERGY | Facility: CLINIC | Age: 65
End: 2025-03-19
Payer: MEDICARE

## 2025-03-24 ENCOUNTER — PATIENT MESSAGE (OUTPATIENT)
Dept: PAIN MEDICINE | Facility: OTHER | Age: 65
End: 2025-03-24
Payer: MEDICARE

## 2025-03-24 DIAGNOSIS — Z00.00 ENCOUNTER FOR MEDICARE ANNUAL WELLNESS EXAM: ICD-10-CM

## 2025-03-25 ENCOUNTER — PATIENT MESSAGE (OUTPATIENT)
Dept: PAIN MEDICINE | Facility: OTHER | Age: 65
End: 2025-03-25
Payer: MEDICARE

## 2025-03-26 ENCOUNTER — PATIENT MESSAGE (OUTPATIENT)
Dept: PAIN MEDICINE | Facility: OTHER | Age: 65
End: 2025-03-26
Payer: MEDICARE

## 2025-04-30 NOTE — TELEPHONE ENCOUNTER
Bed: 10  Expected date: 7/26/24  Expected time: 10:12 AM  Means of arrival: [employer]  Comments:  CFD-Psych   No care due was identified.  [facility] Embedded Care Due Messages. Reference number: [id number].   4/30/2025 3:41:11 PM CDT

## 2025-05-01 RX ORDER — MECLIZINE HCL 12.5 MG 12.5 MG/1
TABLET ORAL
Qty: 40 TABLET | Refills: 2 | Status: SHIPPED | OUTPATIENT
Start: 2025-05-01

## 2025-05-05 ENCOUNTER — PATIENT MESSAGE (OUTPATIENT)
Dept: INTERNAL MEDICINE | Facility: CLINIC | Age: 65
End: 2025-05-05
Payer: MEDICARE

## 2025-06-03 ENCOUNTER — PATIENT MESSAGE (OUTPATIENT)
Dept: PAIN MEDICINE | Facility: CLINIC | Age: 65
End: 2025-06-03

## 2025-06-03 ENCOUNTER — OFFICE VISIT (OUTPATIENT)
Dept: PAIN MEDICINE | Facility: CLINIC | Age: 65
End: 2025-06-03
Attending: ANESTHESIOLOGY
Payer: MEDICARE

## 2025-06-03 VITALS
OXYGEN SATURATION: 97 % | TEMPERATURE: 98 F | HEART RATE: 90 BPM | DIASTOLIC BLOOD PRESSURE: 86 MMHG | WEIGHT: 124.75 LBS | SYSTOLIC BLOOD PRESSURE: 126 MMHG | BODY MASS INDEX: 23.58 KG/M2

## 2025-06-03 DIAGNOSIS — M53.3 SACROILIAC JOINT PAIN: Primary | ICD-10-CM

## 2025-06-03 DIAGNOSIS — M70.60 TROCHANTERIC BURSITIS, UNSPECIFIED LATERALITY: ICD-10-CM

## 2025-06-03 DIAGNOSIS — M54.89 VERTEBROGENIC PAIN: ICD-10-CM

## 2025-06-03 PROCEDURE — 99214 OFFICE O/P EST MOD 30 MIN: CPT | Mod: GC,S$GLB,, | Performed by: ANESTHESIOLOGY

## 2025-06-03 PROCEDURE — 1159F MED LIST DOCD IN RCRD: CPT | Mod: CPTII,S$GLB,, | Performed by: ANESTHESIOLOGY

## 2025-06-03 PROCEDURE — 99999 PR PBB SHADOW E&M-EST. PATIENT-LVL III: CPT | Mod: PBBFAC,,, | Performed by: ANESTHESIOLOGY

## 2025-06-03 PROCEDURE — 3074F SYST BP LT 130 MM HG: CPT | Mod: CPTII,S$GLB,, | Performed by: ANESTHESIOLOGY

## 2025-06-03 PROCEDURE — 1125F AMNT PAIN NOTED PAIN PRSNT: CPT | Mod: CPTII,S$GLB,, | Performed by: ANESTHESIOLOGY

## 2025-06-03 PROCEDURE — 1101F PT FALLS ASSESS-DOCD LE1/YR: CPT | Mod: CPTII,S$GLB,, | Performed by: ANESTHESIOLOGY

## 2025-06-03 PROCEDURE — 3008F BODY MASS INDEX DOCD: CPT | Mod: CPTII,S$GLB,, | Performed by: ANESTHESIOLOGY

## 2025-06-03 PROCEDURE — 3079F DIAST BP 80-89 MM HG: CPT | Mod: CPTII,S$GLB,, | Performed by: ANESTHESIOLOGY

## 2025-06-03 PROCEDURE — 1160F RVW MEDS BY RX/DR IN RCRD: CPT | Mod: CPTII,S$GLB,, | Performed by: ANESTHESIOLOGY

## 2025-06-03 PROCEDURE — 3288F FALL RISK ASSESSMENT DOCD: CPT | Mod: CPTII,S$GLB,, | Performed by: ANESTHESIOLOGY

## 2025-06-09 ENCOUNTER — PATIENT MESSAGE (OUTPATIENT)
Dept: PAIN MEDICINE | Facility: OTHER | Age: 65
End: 2025-06-09
Payer: MEDICARE

## 2025-06-12 ENCOUNTER — PATIENT MESSAGE (OUTPATIENT)
Dept: PAIN MEDICINE | Facility: OTHER | Age: 65
End: 2025-06-12
Payer: MEDICARE

## 2025-06-16 ENCOUNTER — PATIENT MESSAGE (OUTPATIENT)
Dept: PAIN MEDICINE | Facility: OTHER | Age: 65
End: 2025-06-16
Payer: MEDICARE

## 2025-06-16 ENCOUNTER — HOSPITAL ENCOUNTER (OUTPATIENT)
Facility: OTHER | Age: 65
Discharge: HOME | End: 2025-06-16
Attending: ANESTHESIOLOGY | Admitting: ANESTHESIOLOGY
Payer: MEDICARE

## 2025-06-16 VITALS
TEMPERATURE: 98 F | SYSTOLIC BLOOD PRESSURE: 112 MMHG | WEIGHT: 120 LBS | DIASTOLIC BLOOD PRESSURE: 78 MMHG | BODY MASS INDEX: 22.66 KG/M2 | RESPIRATION RATE: 18 BRPM | HEART RATE: 69 BPM | HEIGHT: 61 IN | OXYGEN SATURATION: 92 %

## 2025-06-16 DIAGNOSIS — M46.1 SACROILIITIS: Primary | ICD-10-CM

## 2025-06-16 DIAGNOSIS — G89.29 CHRONIC PAIN: ICD-10-CM

## 2025-06-16 PROCEDURE — 25000003 PHARM REV CODE 250: Performed by: ANESTHESIOLOGY

## 2025-06-16 PROCEDURE — 63600175 PHARM REV CODE 636 W HCPCS: Performed by: ANESTHESIOLOGY

## 2025-06-16 PROCEDURE — 25500020 PHARM REV CODE 255: Performed by: ANESTHESIOLOGY

## 2025-06-16 PROCEDURE — 20610 DRAIN/INJ JOINT/BURSA W/O US: CPT | Mod: 50,59 | Performed by: ANESTHESIOLOGY

## 2025-06-16 PROCEDURE — 20610 DRAIN/INJ JOINT/BURSA W/O US: CPT | Mod: 50,59,, | Performed by: ANESTHESIOLOGY

## 2025-06-16 PROCEDURE — 27096 INJECT SACROILIAC JOINT: CPT | Mod: LT,,, | Performed by: ANESTHESIOLOGY

## 2025-06-16 PROCEDURE — 27096 INJECT SACROILIAC JOINT: CPT | Mod: LT | Performed by: ANESTHESIOLOGY

## 2025-06-16 RX ORDER — SODIUM CHLORIDE 9 MG/ML
INJECTION, SOLUTION INTRAVENOUS CONTINUOUS
Status: DISCONTINUED | OUTPATIENT
Start: 2025-06-16 | End: 2025-06-16 | Stop reason: HOSPADM

## 2025-06-16 RX ORDER — LIDOCAINE HYDROCHLORIDE 20 MG/ML
INJECTION, SOLUTION INFILTRATION; PERINEURAL
Status: DISCONTINUED | OUTPATIENT
Start: 2025-06-16 | End: 2025-06-16 | Stop reason: HOSPADM

## 2025-06-16 RX ORDER — TRIAMCINOLONE ACETONIDE 40 MG/ML
INJECTION, SUSPENSION INTRA-ARTICULAR; INTRAMUSCULAR
Status: DISCONTINUED | OUTPATIENT
Start: 2025-06-16 | End: 2025-06-16 | Stop reason: HOSPADM

## 2025-06-16 RX ORDER — ALPRAZOLAM 0.5 MG/1
0.5 TABLET, ORALLY DISINTEGRATING ORAL
Status: DISCONTINUED | OUTPATIENT
Start: 2025-06-16 | End: 2025-06-16 | Stop reason: HOSPADM

## 2025-06-16 RX ADMIN — ALPRAZOLAM 0.5 MG: 0.5 TABLET, ORALLY DISINTEGRATING ORAL at 09:06

## 2025-06-16 NOTE — DISCHARGE INSTRUCTIONS

## 2025-06-16 NOTE — OP NOTE
Sacroiliac Joint and Greater Trochanteric Bursa Injection under Fluoroscopic Guidance    The procedure, risks, benefits, and options were discussed with the patient. There are no contraindications to the procedure. The patent expressed understanding and agreed to the procedure. Informed written consent was obtained prior to the start of the procedure and can be found in the patient's chart.    PATIENT NAME: Raquel Galan   MRN: [id number]     DATE OF PROCEDURE: 06/16/2025    PROCEDURE:   Left Sacroiliac Joint Injection under Fluoroscopic Guidance  Bilateral Greater Trochanteric Bursa Injection under Fluoroscopic Guidance    PRE-OP DIAGNOSIS: Sacroiliac joint pain [M53.3]  Trochanteric bursitis, unspecified laterality [M70.60]    POST-OP DIAGNOSIS: Same    PHYSICIAN: Danielle Tillman MD    ASSISTANTS: None     MEDICATIONS INJECTED: Preservative-free Kenalog 40mg with 7cc of Bupivacine 0.25%     LOCAL ANESTHETIC INJECTED: Xylocaine 2%     SEDATION: None    ESTIMATED BLOOD LOSS: None    COMPLICATIONS: None    TECHNIQUE: Time-out was performed to identify the patient and procedure to be performed. With the patient laying in a prone position, the surgical area was prepped and draped in the usual sterile fashion using ChloraPrep and a fenestrated drape. The sacroiliac joint was determined under fluoroscopy guidance. Skin anesthesia was achieved by injecting Lidocaine 2% over the injection site. The sacroiliac joint was then approached with a 25 gauge,  3.5 inch spinal quinke needle that was introduced under fluoroscopic guidance in the AP and Lateral views. Once the needle tip was in the area of the joint, and there was no blood aspiration, contrast dye contrast dye Omnipaque (300mg/mL) was injected to confirm placement and there was no vascular runoff. Fluoroscopic imaging in the AP and lateral views revealed a clear outline of the joint space. 4 mL of the medication mixture listed above was injected slowly intraarticular and  rogelio-articular. Displacement of the radio opaque contrast after injection of the medication confirmed that the medication went into the area of the joint. The needles were removed and bleeding was nil. A sterile dressing was applied. No specimens collected. The patient tolerated the procedure well.     TECHNIQUE: Time-out was performed to identify the patient and procedure to be performed. With the patient laying in a prone position, the surgical area was prepped and draped in the usual sterile fashion using ChloraPrep and a fenestrated drape. The area overlying the greater trochanteric bursa was determined under fluoroscopy guidance. Skin anesthesia was achieved by injecting Lidocaine 2% over the injection site. The greater trochanteric bursa was then approached with a 25 gauge, 3.5 inch spinal quinke needle that was introduced under fluoroscopic guidance in the AP view. Once the needle tip was in the area of the bursa, and there was no blood aspiration,  Contrast dye  Omnipaque (300mg/mL) was injected to confirm placement and there was no vascular runoff. 4 mL of the medication mixture listed above was injected slowly. The needles were removed and bleeding was nil. A sterile dressing was applied. No specimens collected. The patient tolerated the procedure well.    PRE-PROCEDURE PAIN SCORE: 9/10    POST-PROCEDURE PAIN SCORE: 0/10    The patient was monitored after the procedure in the recovery area. They were given post-procedure and discharge instructions to follow at home. The patient was discharged in a stable condition.    Guzman Manriquez MD     I reviewed and edited the fellow's note. I conducted my own interview and physical examination. I agree with the findings. I was present and supervising all critical portions of the procedure.

## 2025-06-16 NOTE — DISCHARGE SUMMARY
Discharge Note  Short Stay      SUMMARY     Admit Date: 6/16/2025    Attending Physician: Danielle Tillman MD    Discharge Physician: Danielle Tillman MD      Discharge Date: 6/16/2025 9:30 AM    Procedure(s) (LRB):  INJECTION,SACROILIAC JOINT LEFT AND BILATERAL GTB (N/A)    Final Diagnosis: Sacroiliac joint pain [M53.3]  Trochanteric bursitis, unspecified laterality [M70.60]    Disposition: Home or self care    Patient Instructions:   Current Discharge Medication List        CONTINUE these medications which have NOT CHANGED    Details   azelastine (ASTELIN) 137 mcg (0.1 %) nasal spray 2 sprays (274 mcg total) by Nasal route 2 (two) times daily.  Qty: 30 mL, Refills: 11      b complex vitamins tablet Take 1 tablet by mouth once daily.      CALCIUM 600 WITH VITAMIN D3 600 mg(1,500mg) -200 unit Tab Take 1 tablet by mouth once daily.      estradioL (ESTRACE) 1 MG tablet Take 1 tablet (1 mg total) by mouth once daily.  Qty: 90 tablet, Refills: 3    Associated Diagnoses: Menopausal symptom      fexofenadine (ALLEGRA) 180 MG tablet Take 1 tablet (180 mg total) by mouth once daily.  Qty: 30 tablet, Refills: 11      fluticasone propionate (FLONASE) 50 mcg/actuation nasal spray 1 spray (50 mcg total) by Each Nostril route once daily.  Qty: 11.1 mL, Refills: 0    Comments: Do not send a renewal requests to me.      glucosamine-chondroitin 500-400 mg tablet Take 1 tablet by mouth Daily.      meclizine (ANTIVERT) 12.5 mg tablet TAKE 1 TABLET (12.5 MG TOTAL) BY MOUTH EVERY 6 TO 8 HOURS AS NEEDED (VERTIGO).  Qty: 40 tablet, Refills: 2      omega-3 fatty acids/fish oil (FISH OIL-OMEGA-3 FATTY ACIDS) 300-1,000 mg capsule Take 1 capsule by mouth once daily.                 Discharge Diagnosis: Sacroiliac joint pain [M53.3]  Trochanteric bursitis, unspecified laterality [M70.60]  Condition on Discharge: Stable with no complications to procedure   Diet on Discharge: Same as before.  Activity: as per instruction sheet.  Discharge to: Home  with a responsible adult.  Follow up: 2-4 weeks       Please call my office or pager at [phone] if experienced any weakness or loss of sensation, fever > 101.5, pain uncontrolled with oral medications, persistent nausea/vomiting/or diarrhea, redness or drainage from the incisions, or any other worrisome concerns. If physician on call was not reached or could not communicate with our office for any reason please go to the nearest emergency department     Guzman Manriquez MD

## 2025-06-16 NOTE — H&P
HPI  Patient presenting for Procedure(s) (LRB):  INJECTION,SACROILIAC JOINT LEFT AND BILATERAL GTB (N/A)     Patient on Anti-coagulation No    No health changes since previous encounter    Past Medical History:   Diagnosis Date    Allergy     Back pain     Disorder of vestibular function of both ears     Fibrocystic breast disease in female     HEARING LOSS     right side - no hearing aid    Hyperlipidemia     Osteopenia     PONV (postoperative nausea and vomiting)     PONV (postoperative nausea and vomiting)     Special screening for malignant neoplasms, colon 7/22/2013     Past Surgical History:   Procedure Laterality Date    ADENOIDECTOMY      APPENDECTOMY      BREAST BIOPSY Left     exc bx, benign per pt    BREAST SURGERY      left breast biopsy    COLONOSCOPY N/A 09/27/2018    Procedure: COLONOSCOPY;  Surgeon: Uche Smiley MD;  Location: [facility] ENDO (4TH FLR);  Service: Endoscopy;  Laterality: N/A;  pt/instructed on prep day before and day of importance    COLONOSCOPY N/A 02/01/2024    Procedure: COLONOSCOPY;  Surgeon: Lanre Vaughn MD;  Location: [facility] ENDOSCOPY;  Service: Endoscopy;  Laterality: N/A;    COLONOSCOPY N/A 04/11/2024    Procedure: COLONOSCOPY;  Surgeon: Lanre Vaughn MD;  Location: [facility] ENDOSCOPY;  Service: Endoscopy;  Laterality: N/A;  vaughn-miralax extended-instr portal-full liquids 1 day prior-tb  4/4- pc complete. DBM    CYST REMOVAL Right     neck    DILATION AND CURETTAGE OF UTERUS      ESOPHAGOGASTRODUODENOSCOPY N/A 08/04/2021    Procedure: ESOPHAGOGASTRODUODENOSCOPY (EGD);  Surgeon: Ameya Medeiros MD;  Location: [facility] ENDO (2ND FLR);  Service: Endoscopy;  Laterality: N/A;  Food gets hung up. Dilatation likely needed.     COVID test in [place] on 8/1-GT  8/2-new instructions via portal-tb    ESOPHAGOGASTRODUODENOSCOPY N/A 05/31/2023    Procedure: EGD (ESOPHAGOGASTRODUODENOSCOPY);  Surgeon: Lanre Vaughn MD;  Location: [facility] ENDOSCOPY;  Service: Endoscopy;   "Laterality: N/A;  cardiac clearance received from -see note 5/9/23/ referred by KEMAL Krishna/ inst portal-RB  5/30 Pre call complete, ride confirmed, no wt loss inj;SG    ESOPHAGOGASTRODUODENOSCOPY N/A 02/01/2024    Procedure: EGD (ESOPHAGOGASTRODUODENOSCOPY);  Surgeon: Lanre Vaughn MD;  Location: [facility] ENDOSCOPY;  Service: Endoscopy;  Laterality: N/A;  1/26/24-Roderick pt, [place], instr portal-DS    HYSTERECTOMY      INJECTION OF JOINT Left 10/16/2018    Procedure: INJECTION, JOINT, LEFT SI JOINT INJECTION UNDER FLUORO;  Surgeon: Alirio Lei MD;  Location: [facility] PAIN MGT;  Service: Pain Management;  Laterality: Left;  NEEDS CONSENT    MANDIBLE FRACTURE SURGERY Right     scope    ROBOT-ASSISTED LAPAROSCOPIC ABDOMINAL HYSTERECTOMY USING DA ERICA XI N/A 06/12/2020    Procedure: XI ROBOTIC HYSTERECTOMY;  Surgeon: Alice Valenzuela MD;  Location: [facility] OR;  Service: OB/GYN;  Laterality: N/A;    ROBOT-ASSISTED LAPAROSCOPIC SALPINGO-OOPHORECTOMY USING DA ERICA XI N/A 06/12/2020    Procedure: XI ROBOTIC SALPINGO-OOPHORECTOMY;  Surgeon: Alice Valenzuela MD;  Location: [facility] OR;  Service: OB/GYN;  Laterality: N/A;    TONSILLECTOMY      TYMPANOSTOMY TUBE PLACEMENT       Review of patient's allergies indicates:  No Known Allergies   Current Facility-Administered Medications   Medication    0.9% NaCl infusion    alprazolam ODT dissolvable tablet 0.5 mg       PMHx, PSHx, Allergies, Medications reviewed in epic    ROS negative except pain complaints in HPI    OBJECTIVE:    /64   Pulse 83   Temp 97.8 °F (36.6 °C) (Oral)   Resp 16   Ht 5' 1" (1.549 m)   Wt 54.4 kg (120 lb)   LMP 04/15/2009   SpO2 98%   Breastfeeding No   BMI 22.67 kg/m²     PHYSICAL EXAMINATION:    GENERAL: Well appearing, in no acute distress, alert and oriented x3.  PSYCH:  Mood and affect appropriate.  SKIN: Skin color, texture, turgor normal, no rashes or lesions which will impact the procedure.  CV: RRR with palpation of the " radial artery.  PULM: No evidence of respiratory difficulty, symmetric chest rise. Clear to auscultation.  NEURO: Cranial nerves grossly intact.    Plan:    Proceed with procedure as planned Procedure(s) (LRB):  INJECTION,SACROILIAC JOINT LEFT AND BILATERAL GTB (N/A)    Guzman Manriquez  06/16/2025

## 2025-06-26 ENCOUNTER — OFFICE VISIT (OUTPATIENT)
Dept: OPTOMETRY | Facility: CLINIC | Age: 65
End: 2025-06-26
Payer: COMMERCIAL

## 2025-06-26 DIAGNOSIS — Z13.5 GLAUCOMA SCREENING: ICD-10-CM

## 2025-06-26 DIAGNOSIS — Z01.00 ROUTINE EYE EXAM: Primary | ICD-10-CM

## 2025-06-26 DIAGNOSIS — H52.4 PRESBYOPIA: ICD-10-CM

## 2025-06-26 DIAGNOSIS — H25.13 NUCLEAR SCLEROSIS, BILATERAL: ICD-10-CM

## 2025-06-26 PROCEDURE — 99999 PR PBB SHADOW E&M-EST. PATIENT-LVL III: CPT | Mod: PBBFAC,,, | Performed by: OPTOMETRIST

## 2025-06-26 PROCEDURE — 92015 DETERMINE REFRACTIVE STATE: CPT | Mod: S$GLB,,, | Performed by: OPTOMETRIST

## 2025-06-26 PROCEDURE — 92014 COMPRE OPH EXAM EST PT 1/>: CPT | Mod: S$GLB,,, | Performed by: OPTOMETRIST

## 2025-06-26 NOTE — PROGRESS NOTES
HPI     Dry Eye            Comments: Patient Raquel Galan is a 65 year old female.          Comments    Pt here for annual eye exam. Pt states that she is happy with OTC readers   but has some blurry VA OD>OS for both intermediate and distance. Pt states   some trouble with allergies, using OTC medications help with condition. Pt   states that she has a swollen eyelid with a bump OD, had to go to urgent   care in February 2025 (eventually went away). Pt denies any eye pain.    DLS: 06/26/2024 with Dr. Ruiz  PD: 58.5 mm    Meds: AT's PRN OU            OTC allergy medication      POHx:   Nuclear sclerosis, bilateral  Glaucoma screening  Presbyopia  Hx of preseptal cellulitis          Last edited by Snehal Ren on 6/26/2025  9:06 AM.            Assessment /Plan     For exam results, see Encounter Report.    Routine eye exam    Nuclear sclerosis, bilateral    Glaucoma screening    Presbyopia        1. Small cats OU--pt happy w otc readers  2. YELENA--was on RESTASIS, but ran out and now using otc drops and feels OK.  3. Pt had a hordeolum earlier this year which resolved.  Discussed eyelid cleansers/heat mask as prophylaxis    PLAN:    1. Pt to cont ATs QID+  2. rtc 1 yr

## 2025-07-11 ENCOUNTER — PATIENT MESSAGE (OUTPATIENT)
Dept: PAIN MEDICINE | Facility: CLINIC | Age: 65
End: 2025-07-11
Payer: MEDICARE

## 2025-07-25 ENCOUNTER — OFFICE VISIT (OUTPATIENT)
Dept: PAIN MEDICINE | Facility: CLINIC | Age: 65
End: 2025-07-25
Payer: MEDICARE

## 2025-07-25 VITALS
HEIGHT: 61 IN | WEIGHT: 121.69 LBS | DIASTOLIC BLOOD PRESSURE: 72 MMHG | TEMPERATURE: 97 F | OXYGEN SATURATION: 100 % | RESPIRATION RATE: 18 BRPM | HEART RATE: 69 BPM | BODY MASS INDEX: 22.98 KG/M2 | SYSTOLIC BLOOD PRESSURE: 108 MMHG

## 2025-07-25 DIAGNOSIS — M70.60 TROCHANTERIC BURSITIS, UNSPECIFIED LATERALITY: ICD-10-CM

## 2025-07-25 DIAGNOSIS — G89.4 CHRONIC PAIN SYNDROME: ICD-10-CM

## 2025-07-25 DIAGNOSIS — M46.1 SACROILIITIS: Primary | ICD-10-CM

## 2025-07-25 DIAGNOSIS — M25.551 BILATERAL HIP PAIN: ICD-10-CM

## 2025-07-25 DIAGNOSIS — M25.552 BILATERAL HIP PAIN: ICD-10-CM

## 2025-07-25 PROCEDURE — 99999 PR PBB SHADOW E&M-EST. PATIENT-LVL IV: CPT | Mod: PBBFAC,,, | Performed by: NURSE PRACTITIONER

## 2025-07-25 NOTE — PROGRESS NOTES
Chronic Pain-Established Note (Follow up visit)        Referring Physician: No ref. provider found    Chief Complaint:   Chief Complaint   Patient presents with    Follow-up     Interval History 7/25/2025:  The patient presents for follow-up of hip and lower back pain. She is s/p bilateral GTB and left SI joint injection with 50% relief. She is having increased left hip pain, mainly to the posterior area. She has some radiation into the lateral and anterior thigh. Pain is exacerbated by bending, particularly when interacting with her grandchildren, and during activities like painting that involve frequent up and down movements. For treatment, she has been using ice, topical treatments, and OTC pain medications, including ibuprofen and Tylenol arthritis. However, she reports stomach discomfort as a side effect. She has also been using Voltaren gel as an alternative. Her pain today is 0/10.    Interval History 6/3/2025:  65 year old female returns in follow up. At last visit we ordered XR lumbar spine with flex/ex and XR SIJ. No suspicious SIJ sclerosis on XR SIJ or dynamic instability on flex/ex imaging. We planned to proceed with left SIJ but this did not occur. Patient does have disc space narrowing and endplate sclerosis at L3/L4. Patient's primary pain complaint today is her bilateral hip and left sided LBP. Pain is rated 8/10 today. Lateral hip pain limits her the most but back pain is worse with lifting. Patient denies recent falls, trauma, hospitalizations, or infections. Patient has no new onset numbness, tingling, or weakness. Patient denies new onset bowel or bladder incontinence. Patient denies periectal numbness or saddle anesthesia.      Interval History 2/25/2025:  Patient is here for follow up of bilateral hip pain. She underwent in-clinic bilateral GTB injection under ultrasound on 12/26/24, which provided her with 90% relief. Patient presents today to discuss left sided lower back pain. It began  about one year ago, she underwent physical therapy which was beneficial at the time, but the pain has returned even with HEP. She uses voltaren gel which is somewhat helpful. She inquires about a Toradol injection which she had in the past. Exacerbated by carrying her great nephew, bending and lifting. Mitigated by rest and heat pads, topicals and tylenol. Rates the pain as 3/10, can become 8/10. She denies red flag symptoms.     Interval History 7/29/2024:  The patient is here for follow up of bilateral hip pain. She is s/p bilateral GTB and bilateral gluteal tendon sheath injections on 5/23/24 with 90% relief. Her pain is currently tolerable. She is currently in PT which she finds helpful. She is working on strengthening as well. Her pain today is 3/10.    Interval History 4/4/2024:  Raquel Galan returns today for follow-up. She reports excellent relief of previous GTB injections. She reports return of her lateral hip pain in the same quality and character as previous. The pain is located to the lateral hip bilaterally and radiates to the knee. She denies weakness, numbness/tingling, or low back pain. Previous injection over 1 year ago with excellent relief. She is interested in repeat injections at this time.     Interval History 10/24/2023:  Raquel Galan returns today for follow-up. Since last seen, they report their pain has been returning. It seems her right hip and ischial bursa are inflammed again and she feels she needs a repeat injection. Notes her left hip is starting to hurt also. She is to have a garden party and wants to do work outside before and needs the injections to tolerate. Still doing regular PT and exercise with silver sneakers. Still having balance difficulties from inner ear condition that leads to her stabilizing muscle overuse/strain. No numbness, tingling or weakness.     Interval History 8/15/2023:  The patient has a virtual follow up for right sided buttock and hip pain. There was  difficulty with audio through the bess so the latter portion of the visit was conducted via phone audio. She is now s/p GTB and ischial bursa injection with about 90% relief. She went on a long vacation recently and was able to walk and be active. She is restarting Silver Sneakers this week. Her pain is tolerable at this time. Her pain today is 2/10.    Interval History 2/9/2023:  The patient presents for increased right sided hip and buttock pain. She has been having more pain over the past couple of weeks. The pain is located to the right buttock/lateral hip and radiates down the side of the leg. No numbness. She has mild symptoms on the left. The pain bothers her more when she is laying at night or putting pressure on the right side. Her pain today is 8/10.    Interval History 12/5/2022:  The patient is here for follow up of right sided hip and buttock pain. She is s/p right GTB and ischial bursa injections in office on 10/19/22 with Dr. Tillman. She reports 90% relief of her pain. She was able to take a trip and walk a lot after the procedure without significant pain. She has very mild, intermittent pain since the procedure. Her pain today is 0/10.    Interval History 10/17/2022:  The patient presents today for re-evaluation of back and hip pain. I last saw her in May 2021 and she had hip injections with benefit. She says that her pain was manageable until recently. She is now having more right lateral hip and buttock pain. She had benefit with right GTB and ischial bursa in April 2021. She is no longer taking NSAIDs due to stomach issues. She takes Tylenol PRN. Her pain today is 8/10.    Interval History 5/14/2021:  The patient has a virtual video appointment for follow up of right hip and buttock pain. She is s/p right ischial bursa and GTB injection with 90% pain relief.  She now only has some mild soreness after she exercises from the right side.  She says her pain has significantly improved.  She mild pain on  the left which also worsens after exercise.  She is working on walking and increasing activity for weight loss.  No new complaints noted.  Her pain today is 1/10.    Interval History 4/15/2021:  The patient reports today for follow up of of right-sided buttock and hip pain.  We have not seen the patient in was 2 years.  She reports that she has been doing fairly well but had a recent injury that caused increased pain.  Her buttock pain is located to the bottom of her buttock and posterior thigh.  It worsens with sitting and applying pressure.  She also has right-sided lateral hip pain.  She would like to discuss injections today.  Her pain today is 8/10.    Interval History 8/28/2019:  The patient is here for follow up of left sided buttock pain.  She has intermittent Toradol injections as well as SI joint injections as needed.  She had a Toradol shot in April which controlled her symptoms well until recently.  She is requesting a repeat today.  She takes Ibuprofen sparingly as well.  Her pain worsens with sitting and standing.  She is not having any shooting into the legs.  Her pain today is 3/10.    Interval History 4/5/2019:  The patient returns to discuss pain.  After her last visit, I gave her a Toradol shot which provided her with significant benefit for some time.  She has been taking 800 mg Ibuprofen as needed with benefit, usually about once per week.  Her pain worsens with sitting and driving.  She does not have very much pain with walking.  Her pain today is 9/10.     Interval History 11/26/2018:  The patient returns for follow up of left sided lower back pain.  She is s/p left SI joint injection on 10/16/18 with minimal benefit.  Previous injection helped significantly.  She continues to report pain over the left buttock.  She does have pain over the lumbar spine, but this is mild.  Her pain is most severe when she sits for prolonged periods or changes from sitting to standing.  She is active and has  been exercising and gardening.  Her previous XRAYs from last year show right sided curvature and retrolisthesis without instability.  She has not had imaging of hips or pelvis recently.  Her pain today is 6/10.  She takes OTC Ibuprofen with some benefit.    Interval History 2/1/2018:  The patient presents today for follow up.  She is s/p left SI joint injection on 1/16/18 with about 80% pain relief overall.  She has intermittent pain to left buttock which is mild.  This is mainly with bending and lifting.  Since her last visit, she has also changed her body mechanics which she thinks is helping.  She takes OTC NSAIDs sparingly as needed.  She has also increased her activity and has been walking.  Her pain today is 3/10.    Interval History 12/7/2017:  The patient presents today for follow up of left sided lower back and buttock pain.  She is s/p left L3,4,5 MBB on 11/7/17.  She reports that she felt numbness to her lower back for about one hour after the procedure.  Immediately after the procedure, she went to walk her dog in the park and started to have her usual pain.  Her pain is mainly to the left buttock at this time.  It is worsened with prolonged sitting, changing from sitting to standing, and lifting her 20 lb nephew.  She also notices that she carried him on her left hip which worsens her pain.  She does have some benefit with OTC NSAIDs.  Voltaren gel provided limited benefit.      Initial encounter:    Raquel Galan presents to the clinic for the evaluation of left sided low back pain. The pain started 10 years ago and symptoms have been worsening.    Brief history:  Patient had a history of radicular symptoms but they are not occurring now.    Pain Description:    The pain is located in the left side of lower back area and does not radiate.      At BEST  2/10     At WORST  9/10 on the WORST day.      On average pain is rated as 5/10.     Today the pain is rated as 4/10    The pain is described as  aching and sharp      Symptoms interfere with daily activity.     Exacerbating factors: Sitting, Standing, Laying, Bending, Coughing/Sneezing, Walking, Morning, Lifting and Getting out of bed/chair.      Mitigating factors heat, ice, massage and medications.     Patient denies night fever/night sweats, urinary incontinence, bowel incontinence, significant weight loss, significant motor weakness and loss of sensations.  Patient denies any suicidal or homicidal ideations    Pain Medications:  Current:  Voltaren gel  Tylenol PRN    Tried in Past:  NSAIDs - Ibuprofen and Voltaren gel  TCA -Never  SNRI -Never  Anti-convulsants -asprin 81mg  Muscle Relaxants -Never  Opioids-Never    Physical Therapy/Home Exercise: yes  -works with a physical therapist and psychiatrist     report:  Reviewed and consistent with medication use as prescribed.    Pain Procedures:   11/7/17 Left L3,4,5 MBB- limited relief after 1 hr with activity  1/16/18 Left SI joint injection- 80% relief  10/16/18 Left SI joint injection  4/29/21 Right GTB and ischial bursa injection- 90% relief  10/19/22 Right GTB and ischial bursa injection- 90% relief  6/20/23 Right GTB and ischial bursa injection- 90% relief  5/23/24 bilateral GTB and gluteal tendon sheath injections- 90% relief  6/16/25 Bilateral GTB and left SI joint injection- 50% relief    Chiropractor -never  Acupuncture - never  TENS unit -never  Spinal decompression -never  Joint replacement -never    CMP  Sodium   Date Value Ref Range Status   08/08/2024 141 136 - 145 mmol/L Final     Potassium   Date Value Ref Range Status   08/08/2024 3.9 3.5 - 5.1 mmol/L Final     Chloride   Date Value Ref Range Status   08/08/2024 110 95 - 110 mmol/L Final     CO2   Date Value Ref Range Status   08/08/2024 20 (L) 23 - 29 mmol/L Final     Glucose   Date Value Ref Range Status   08/08/2024 98 70 - 110 mg/dL Final     BUN   Date Value Ref Range Status   08/08/2024 10 8 - 23 mg/dL Final     Creatinine   Date  Value Ref Range Status   08/08/2024 0.8 0.5 - 1.4 mg/dL Final     Calcium   Date Value Ref Range Status   08/08/2024 8.9 8.7 - 10.5 mg/dL Final     Total Protein   Date Value Ref Range Status   08/08/2024 6.9 6.0 - 8.4 g/dL Final     Albumin   Date Value Ref Range Status   08/08/2024 3.7 3.5 - 5.2 g/dL Final     Total Bilirubin   Date Value Ref Range Status   08/08/2024 0.6 0.1 - 1.0 mg/dL Final     Comment:     For infants and newborns, interpretation of results should be based  on gestational age, weight and in agreement with clinical  observations.    Premature Infant recommended reference ranges:  Up to 24 hours.............<8.0 mg/dL  Up to 48 hours............<12.0 mg/dL  3-5 days..................<15.0 mg/dL  6-29 days.................<15.0 mg/dL       Alkaline Phosphatase   Date Value Ref Range Status   08/08/2024 75 55 - 135 U/L Final     AST   Date Value Ref Range Status   08/08/2024 16 10 - 40 U/L Final     ALT   Date Value Ref Range Status   08/08/2024 18 10 - 44 U/L Final     Anion Gap   Date Value Ref Range Status   08/08/2024 11 8 - 16 mmol/L Final     eGFR if    Date Value Ref Range Status   06/30/2022 >60.0 >60 mL/min/1.73 m^2 Final     eGFR if non    Date Value Ref Range Status   06/30/2022 >60.0 >60 mL/min/1.73 m^2 Final     Comment:     Calculation used to obtain the estimated glomerular filtration  rate (eGFR) is the CKD-EPI equation.        Lab Results   Component Value Date    WBC 5.99 08/08/2024    HGB 13.5 08/08/2024    HCT 41.8 08/08/2024     (H) 08/08/2024     08/08/2024       Imaging:  Narrative     EXAMINATION:  XR HIPS BILATERAL 2 VIEW INCL AP PELVIS    CLINICAL HISTORY:  Sacroiliitis, not elsewhere classified    TECHNIQUE:  AP view of the pelvis and frogleg lateral views of both hips were performed.    COMPARISON:  None.    FINDINGS:  There is no evidence of acute fracture, dislocation, or bone destruction.  Joint spaces are well preserved.   There is mild degenerative change of the sacroiliac joints.  No erosions are seen      Impression       See above.     Narrative     EXAMINATION:  XR LUMBAR SPINE 5 VIEW WITH FLEX AND EXT    CLINICAL HISTORY:  Low back pain, >6wks conservative tx, persistent-progressive sx, surgical candidate;  Sacroiliitis, not elsewhere classified    TECHNIQUE:  Five views of the lumbar spine plus flexion extension views were performed.    COMPARISON:  Prior dated 10/26/2017    FINDINGS:  There is dextroscoliosis of the lumbar spine.  There is retrolisthesis of L2-3 and L3 on L4 (grade 1) measuring approximately 4 mm.  This is similar to previous exam.  There is no significant change on flexion/extension views to suggest translational instability.  There are endplate degenerative changes with subchondral sclerosis and marginal osteophyte formation and disc height narrowing at L3-4.  Mild facet arthropathy is present at the lower lumbar levels.      Impression       Degenerative change of the lumbar spine with associated dextroscoliosis and L2-3/L3-4 retrolisthesis, similar to previous exam.  No translational instability.       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     Fibrocystic breast disease in female     HEARING LOSS     right side - no hearing aid    Hyperlipidemia     Osteopenia     PONV (postoperative nausea and vomiting)     PONV (postoperative nausea and vomiting)     Special screening for malignant neoplasms, colon 7/22/2013     Past Surgical History:   Procedure Laterality Date    ADENOIDECTOMY      APPENDECTOMY      BREAST BIOPSY Left     exc bx, benign per pt    BREAST SURGERY      left breast biopsy    COLONOSCOPY N/A 09/27/2018    Procedure: COLONOSCOPY;  Surgeon: Uche Smiley MD;  Location: [address]);  Service: Endoscopy;  Laterality: N/A;  pt/instructed on prep day before and day of importance     COLONOSCOPY N/A 02/01/2024    Procedure: COLONOSCOPY;  Surgeon: Lanre Vaughn MD;  Location: [facility] ENDOSCOPY;  Service: Endoscopy;  Laterality: N/A;    COLONOSCOPY N/A 04/11/2024    Procedure: COLONOSCOPY;  Surgeon: Lanre Vaughn MD;  Location: [facility] ENDOSCOPY;  Service: Endoscopy;  Laterality: N/A;  vaughn-miralax extended-instr portal-full liquids 1 day prior-tb  4/4- pc complete. DBM    CYST REMOVAL Right     neck    DILATION AND CURETTAGE OF UTERUS      ESOPHAGOGASTRODUODENOSCOPY N/A 08/04/2021    Procedure: ESOPHAGOGASTRODUODENOSCOPY (EGD);  Surgeon: Ameya Medeiros MD;  Location: [facility] ([address] [place] [address]);  Service: Endoscopy;  Laterality: N/A;  Food gets hung up. Dilatation likely needed.     COVID test in [place] on 8/1-GT  8/2-new instructions via portal-tb    ESOPHAGOGASTRODUODENOSCOPY N/A 05/31/2023    Procedure: EGD (ESOPHAGOGASTRODUODENOSCOPY);  Surgeon: Lanre Vaughn MD;  Location: [facility] ENDOSCOPY;  Service: Endoscopy;  Laterality: N/A;  cardiac clearance received from -see note 5/9/23/ referred by KEMAL Krishna/ inst portal-RB  5/30 Pre call complete, ride confirmed, no wt loss inj;SG    ESOPHAGOGASTRODUODENOSCOPY N/A 02/01/2024    Procedure: EGD (ESOPHAGOGASTRODUODENOSCOPY);  Surgeon: Lanre Vaughn MD;  Location: [facility] ENDOSCOPY;  Service: Endoscopy;  Laterality: N/A;  1/26/24-Roderick pt, Sutab, instr portal-DS    HYSTERECTOMY      INJECTION OF JOINT Left 10/16/2018    Procedure: INJECTION, JOINT, LEFT SI JOINT INJECTION UNDER FLUORO;  Surgeon: Alirio Lei MD;  Location: [facility] PAIN MGT;  Service: Pain Management;  Laterality: Left;  NEEDS CONSENT    INJECTION, SACROILIAC JOINT N/A 6/16/2025    Procedure: INJECTION,SACROILIAC JOINT LEFT AND BILATERAL GTB;  Surgeon: Danielle Tillman MD;  Location: [facility] PAIN [facility]GT;  Service: Pain Management;  Laterality: N/A;  2 WK F/U PRESTON    MANDIBLE FRACTURE SURGERY Right     scope    ROBOT-ASSISTED LAPAROSCOPIC ABDOMINAL  HYSTERECTOMY USING DA ERICA XI N/A 06/12/2020    Procedure: XI ROBOTIC HYSTERECTOMY;  Surgeon: Alice Valenzuela MD;  Location: [facility] OR;  Service: OB/GYN;  Laterality: N/A;    ROBOT-ASSISTED LAPAROSCOPIC SALPINGO-OOPHORECTOMY USING DA ERICA XI N/A 06/12/2020    Procedure: XI ROBOTIC SALPINGO-OOPHORECTOMY;  Surgeon: Alice Valenzuela MD;  Location: [facility] OR;  Service: OB/GYN;  Laterality: N/A;    TONSILLECTOMY      TYMPANOSTOMY TUBE PLACEMENT       Social History     Socioeconomic History    Marital status:    Tobacco Use    Smoking status: Never     Passive exposure: Past    Smokeless tobacco: Never   Substance and Sexual Activity    Alcohol use: Not Currently     Alcohol/week: 2.0 standard drinks of alcohol     Types: 2 Glasses of wine per week     Comment: social    Drug use: Never    Sexual activity: Yes     Partners: Male     Birth control/protection: Partner-Vasectomy, Post-menopausal, None   Other Topics Concern    Are you pregnant or think you may be? No    Breast-feeding No   Social History Narrative    Lives with spouse and feels safe in her home.     Social Drivers of Health     Financial Resource Strain: Low Risk  (6/19/2024)    Overall Financial Resource Strain (CARDIA)     Difficulty of Paying Living Expenses: Not very hard   Food Insecurity: No Food Insecurity (6/19/2024)    Hunger Vital Sign     Worried About Running Out of Food in the Last Year: Never true     Ran Out of Food in the Last Year: Never true   Transportation Needs: No Transportation Needs (8/2/2023)    PRAPARE - Transportation     Lack of Transportation (Medical): No     Lack of Transportation (Non-Medical): No   Physical Activity: Unknown (6/19/2024)    Exercise Vital Sign     Days of Exercise per Week: 0 days   Recent Concern: Physical Activity - Inactive (6/19/2024)    Exercise Vital Sign     Days of Exercise per Week: 0 days     Minutes of Exercise per Session: 30 min   Stress: No Stress Concern Present (6/19/2024)    [employer] - Occupational Stress Questionnaire     Feeling of Stress : Only a little   Housing Stability: Low Risk  (8/2/2023)    Housing Stability Vital Sign     Unable to Pay for Housing in the Last Year: No     Number of Places Lived in the Last Year: 2     Unstable Housing in the Last Year: No     Family History   Problem Relation Name Age of Onset    Allergies Mother Mangham  S Hickey     Heart disease Mother Mangham  S Hickey     Kidney disease Mother Mangham  S Hickey     Diabetes Mother Tran  S Hickey     Hypertension Mother Tran  S Hickey     Hyperlipidemia Mother Tran  S Hickey     COPD Mother Mangham  S Hickey     Arthritis Mother Mangham  S Hickey     Miscarriages / Stillbirths Mother Mangham  S Hickey     Stroke Mother Mangham  S Hickey     Allergies Father Sandor Hickey     Heart disease Father Sandor Hickey     Cancer Father Sandor Hickey 75        lymphoma, bladder tumors    Arthritis Father Sandor Hickey     Breast cancer Neg Hx      Ovarian cancer Neg Hx      Colon cancer Neg Hx      Lupus Neg Hx      Psoriasis Neg Hx      Rheum arthritis Neg Hx      Inflammatory bowel disease Neg Hx      Uterine cancer Neg Hx      Cervical cancer Neg Hx         Review of patient's allergies indicates:   Allergen Reactions    Dexamethasone      Flushing and headache with medrol dosepack & tachycardia       Current Outpatient Medications   Medication Sig    azelastine (ASTELIN) 137 mcg (0.1 %) nasal spray 2 sprays (274 mcg total) by Nasal route 2 (two) times daily.    b complex vitamins tablet Take 1 tablet by mouth once daily.    CALCIUM 600 WITH VITAMIN D3 600 mg(1,500mg) -200 unit Tab Take 1 tablet by mouth once daily.    estradioL (ESTRACE) 1 MG tablet Take 1 tablet (1 mg total) by mouth once daily.    fluticasone propionate (FLONASE) 50 mcg/actuation nasal spray 1 spray (50 mcg total) by Each Nostril route once daily.    glucosamine-chondroitin 500-400 mg tablet Take 1 tablet by mouth Daily.     "meclizine (ANTIVERT) 12.5 mg tablet TAKE 1 TABLET (12.5 MG TOTAL) BY MOUTH EVERY 6 TO 8 HOURS AS NEEDED (VERTIGO).    omega-3 fatty acids/fish oil (FISH OIL-OMEGA-3 FATTY ACIDS) 300-1,000 mg capsule Take 1 capsule by mouth once daily.    fexofenadine (ALLEGRA) 180 MG tablet Take 1 tablet (180 mg total) by mouth once daily.     No current facility-administered medications for this visit.       REVIEW OF SYSTEMS:    GENERAL:  No weight loss, malaise or fevers.  HEENT:   No recent changes in vision or hearing  NECK:  Negative for lumps, no difficulty with swallowing.  RESPIRATORY:  Negative for cough, wheezing or shortness of breath, patient denies any recent URI.  CARDIOVASCULAR:  Negative for chest pain, leg swelling or palpitations.  GI:  Negative for abdominal discomfort, blood in stools or black stools or change in bowel habits.  MUSCULOSKELETAL:  See HPI.  SKIN:  Negative for lesions, rash, and itching.  PSYCH:  No mood disorder or recent psychosocial stressors.  Patient's sleep is disturbed secondary to pain.  HEMATOLOGY/LYMPHOLOGY:  Negative for prolonged bleeding, bruising easily or swollen nodes.  Patient is not currently taking any anti-coagulants  ENDO: No history of diabetes or thyroid dysfunction  NEURO:   No history of headaches, syncope, paralysis, seizures or tremors.  All other reviewed and negative other than HPI.    OBJECTIVE:    /72 (BP Location: Right arm, Patient Position: Sitting)   Pulse 69   Temp 97.2 °F (36.2 °C) (Oral)   Resp 18   Ht 5' 1" (1.549 m)   Wt 55.2 kg (121 lb 11.1 oz)   LMP 04/15/2009   SpO2 100%   BMI 22.99 kg/m²       PHYSICAL EXAMINATION:    GENERAL: Well appearing, in no acute distress, alert and oriented x3.  PSYCH:  Mood and affect appropriate.  SKIN: Skin color, texture, turgor normal, no rashes or lesions.  HEAD/FACE:  Normocephalic, atraumatic. Cranial nerves grossly intact.  BACK: Straight leg raising in the sitting and supine positions is negative to " radicular pain. There is no pain with palpation over the facet joints of the lumbar spine. Full ROM with pain on flexion. Facet loading to the Left.   EXTREMITIES: Peripheral joint ROM is full and pain free without obvious instability or laxity in all four extremities. No deformities, edema, or skin discoloration. Good capillary refill.  MUSCULOSKELETAL: No muscular atrophy, gross deformity or overlying skin change. TTP to left SIJ. SIJ provocative maneuvers are negative. No pain to palpation over bilateral GTB. Pain with external rotation of the left hip. Pierre's is positive on the left.   Bilateral lower extremity strength is normal and symmetric.  No atrophy or tone abnormalities are noted.   NEURO: No loss of sensation noted.   GAIT: Normal.    ASSESSMENT: 65 y.o. year old female with left sided back/buttock pain, consistent with the following:    Encounter Diagnoses   Name Primary?    Sacroiliitis Yes    Chronic pain syndrome     Trochanteric bursitis, unspecified laterality     Bilateral hip pain          PLAN:   We discussed with the patient the assessment and recommendations. The following is the plan we agreed on:     - Previous imaging was reviewed and discussed with the patient today.  - Continue HEP.  - Imaging reviewed.   - We discussed left hip joint injection. She can call to schedule if needed.  - Continue with TENS prn.   - RTC PRN.        The above plan and management options were discussed at length with patient. Patient is in agreement with the above and verbalized understanding.    Jennifer Pickens  07/25/2025    This note was generated with the assistance of ambient listening technology. Verbal consent was obtained by the patient and accompanying visitor(s) for the recording of patient appointment to facilitate this note. I attest to having reviewed and edited the generated note for accuracy, though some syntax or spelling errors may persist. Please contact the author of this note for any  clarification.

## 2025-07-29 DIAGNOSIS — N95.1 MENOPAUSAL SYMPTOM: ICD-10-CM

## 2025-07-29 RX ORDER — ESTRADIOL 1 MG/1
1 TABLET ORAL
Qty: 90 TABLET | Refills: 0 | Status: SHIPPED | OUTPATIENT
Start: 2025-07-29

## 2025-07-29 NOTE — TELEPHONE ENCOUNTER
Refill Decision Note   Raquel Angelia  is requesting a refill authorization.  Brief Assessment and Rationale for Refill:  Approve     Medication Therapy Plan:         Comments:     Note composed:8:14 AM 07/29/2025

## 2025-08-10 ENCOUNTER — PATIENT MESSAGE (OUTPATIENT)
Dept: ALLERGY | Facility: CLINIC | Age: 65
End: 2025-08-10
Payer: MEDICARE

## 2025-08-11 ENCOUNTER — HOSPITAL ENCOUNTER (OUTPATIENT)
Dept: RADIOLOGY | Facility: HOSPITAL | Age: 65
Discharge: HOME | End: 2025-08-11
Attending: INTERNAL MEDICINE
Payer: MEDICARE

## 2025-08-11 VITALS — BODY MASS INDEX: 22.84 KG/M2 | HEIGHT: 61 IN | WEIGHT: 121 LBS

## 2025-08-11 DIAGNOSIS — Z12.31 ENCOUNTER FOR SCREENING MAMMOGRAM FOR BREAST CANCER: ICD-10-CM

## 2025-08-11 PROCEDURE — 77067 SCR MAMMO BI INCL CAD: CPT | Mod: TC

## 2025-08-15 ENCOUNTER — OFFICE VISIT (OUTPATIENT)
Dept: OBSTETRICS AND GYNECOLOGY | Facility: CLINIC | Age: 65
End: 2025-08-15
Payer: MEDICARE

## 2025-08-15 VITALS
BODY MASS INDEX: 22.96 KG/M2 | HEIGHT: 61 IN | WEIGHT: 121.63 LBS | DIASTOLIC BLOOD PRESSURE: 64 MMHG | SYSTOLIC BLOOD PRESSURE: 111 MMHG | HEART RATE: 84 BPM

## 2025-08-15 DIAGNOSIS — Z01.419 ENCOUNTER FOR GYNECOLOGICAL EXAMINATION WITHOUT ABNORMAL FINDING: Primary | ICD-10-CM

## 2025-08-15 DIAGNOSIS — Z12.31 VISIT FOR SCREENING MAMMOGRAM: ICD-10-CM

## 2025-08-15 DIAGNOSIS — N95.1 MENOPAUSAL SYMPTOM: ICD-10-CM

## 2025-08-15 PROCEDURE — 99999 PR PBB SHADOW E&M-EST. PATIENT-LVL III: CPT | Mod: PBBFAC,,, | Performed by: PHYSICIAN ASSISTANT

## 2025-08-15 RX ORDER — ESTRADIOL 1 MG/1
1 TABLET ORAL DAILY
Qty: 90 TABLET | Refills: 3 | Status: SHIPPED | OUTPATIENT
Start: 2025-08-15

## 2025-08-18 ENCOUNTER — OFFICE VISIT (OUTPATIENT)
Dept: INTERNAL MEDICINE | Facility: CLINIC | Age: 65
End: 2025-08-18
Payer: MEDICARE

## 2025-08-18 VITALS
WEIGHT: 121 LBS | HEIGHT: 61 IN | SYSTOLIC BLOOD PRESSURE: 108 MMHG | RESPIRATION RATE: 16 BRPM | TEMPERATURE: 97 F | BODY MASS INDEX: 22.84 KG/M2 | HEART RATE: 80 BPM | DIASTOLIC BLOOD PRESSURE: 72 MMHG | OXYGEN SATURATION: 97 %

## 2025-08-18 DIAGNOSIS — F41.9 ANXIETY: ICD-10-CM

## 2025-08-18 DIAGNOSIS — J30.9 CHRONIC ALLERGIC RHINITIS: ICD-10-CM

## 2025-08-18 DIAGNOSIS — E78.5 HYPERLIPIDEMIA, UNSPECIFIED HYPERLIPIDEMIA TYPE: ICD-10-CM

## 2025-08-18 DIAGNOSIS — I10 HYPERTENSION, ESSENTIAL: Primary | ICD-10-CM

## 2025-08-18 PROCEDURE — 1126F AMNT PAIN NOTED NONE PRSNT: CPT | Mod: CPTII,S$GLB,, | Performed by: INTERNAL MEDICINE

## 2025-08-18 PROCEDURE — 3078F DIAST BP <80 MM HG: CPT | Mod: CPTII,S$GLB,, | Performed by: INTERNAL MEDICINE

## 2025-08-18 PROCEDURE — 3288F FALL RISK ASSESSMENT DOCD: CPT | Mod: CPTII,S$GLB,, | Performed by: INTERNAL MEDICINE

## 2025-08-18 PROCEDURE — 3044F HG A1C LEVEL LT 7.0%: CPT | Mod: CPTII,S$GLB,, | Performed by: INTERNAL MEDICINE

## 2025-08-18 PROCEDURE — 3008F BODY MASS INDEX DOCD: CPT | Mod: CPTII,S$GLB,, | Performed by: INTERNAL MEDICINE

## 2025-08-18 PROCEDURE — 1101F PT FALLS ASSESS-DOCD LE1/YR: CPT | Mod: CPTII,S$GLB,, | Performed by: INTERNAL MEDICINE

## 2025-08-18 PROCEDURE — 1160F RVW MEDS BY RX/DR IN RCRD: CPT | Mod: CPTII,S$GLB,, | Performed by: INTERNAL MEDICINE

## 2025-08-18 PROCEDURE — 99214 OFFICE O/P EST MOD 30 MIN: CPT | Mod: S$GLB,,, | Performed by: INTERNAL MEDICINE

## 2025-08-18 PROCEDURE — 3074F SYST BP LT 130 MM HG: CPT | Mod: CPTII,S$GLB,, | Performed by: INTERNAL MEDICINE

## 2025-08-18 PROCEDURE — 1159F MED LIST DOCD IN RCRD: CPT | Mod: CPTII,S$GLB,, | Performed by: INTERNAL MEDICINE

## 2025-08-18 PROCEDURE — 99999 PR PBB SHADOW E&M-EST. PATIENT-LVL IV: CPT | Mod: PBBFAC,,, | Performed by: INTERNAL MEDICINE

## 2025-08-18 RX ORDER — BUSPIRONE HYDROCHLORIDE 5 MG/1
5 TABLET ORAL 2 TIMES DAILY
Qty: 60 TABLET | Refills: 5 | Status: SHIPPED | OUTPATIENT
Start: 2025-08-18 | End: 2026-08-18

## 2025-08-18 RX ORDER — VITAMIN E 268 MG
400 CAPSULE ORAL DAILY
COMMUNITY

## (undated) DEVICE — MANIPULATOR VCARE PLUS 34MM

## (undated) DEVICE — SEE MEDLINE ITEM 157110

## (undated) DEVICE — DRAPE COLUMN DAVINCI XI

## (undated) DEVICE — PORT ACCESS 8MM W/120MM LOW

## (undated) DEVICE — ELECTRODE REM PLYHSV RETURN 9

## (undated) DEVICE — OBTURATOR BLADELESS 8MM XI CLR

## (undated) DEVICE — SEAL UNIVERSAL 5MM-8MM XI

## (undated) DEVICE — NDL INSUF ULTRA VERESS 120MM

## (undated) DEVICE — SOL CLEARIFY VISUALIZATION LAP

## (undated) DEVICE — SET TRI-LUMEN FILTERED TUBE

## (undated) DEVICE — SYR 10CC LUER LOCK

## (undated) DEVICE — DEVICE ANC SW STAT FOLEY 6-24

## (undated) DEVICE — SUT PROLENE 0 CT1 30IN BLUE

## (undated) DEVICE — DRESSING LEUKOPLAST FLEX 1X3IN

## (undated) DEVICE — BANDAGE ADHESIVE

## (undated) DEVICE — GLOVE BIOGEL SKINSENSE PI 6.5

## (undated) DEVICE — SOL WATER STRL IRR 1000ML

## (undated) DEVICE — INSERT CUSHIONPRONE VIEW LARGE

## (undated) DEVICE — KIT WING PAD POSITIONING

## (undated) DEVICE — SOL ELECTROLUBE ANTI-STIC

## (undated) DEVICE — SUT V-LOC 180 ABD 2/0 GS-21

## (undated) DEVICE — SOL NS 1000CC

## (undated) DEVICE — COVER TIP CURVED SCISSORS XI

## (undated) DEVICE — IRRIGATOR ENDOSCOPY DISP.

## (undated) DEVICE — ADHESIVE DERMABOND ADVANCED

## (undated) DEVICE — JELLY SURGILUBE 5GR

## (undated) DEVICE — SEE MEDLINE ITEM 156923

## (undated) DEVICE — SUT MCRYL PLUS 4-0 PS2 27IN

## (undated) DEVICE — DRAPE ARM DAVINCI XI